# Patient Record
Sex: FEMALE | Race: WHITE | Employment: PART TIME | URBAN - METROPOLITAN AREA
[De-identification: names, ages, dates, MRNs, and addresses within clinical notes are randomized per-mention and may not be internally consistent; named-entity substitution may affect disease eponyms.]

---
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
## 2017-01-10 ENCOUNTER — TELEPHONE (OUTPATIENT)
Dept: CARDIOLOGY CLINIC | Age: 67
End: 2017-01-10

## 2017-01-10 DIAGNOSIS — L65.9 HAIR LOSS: Primary | ICD-10-CM

## 2017-01-10 LAB — INR BLD: 1.6

## 2017-01-11 ENCOUNTER — ANTI-COAG VISIT (OUTPATIENT)
Dept: CARDIOLOGY CLINIC | Age: 67
End: 2017-01-11

## 2017-01-16 RX ORDER — NITROGLYCERIN 0.4 MG/1
TABLET SUBLINGUAL
Qty: 25 TABLET | Refills: 0 | Status: SHIPPED | OUTPATIENT
Start: 2017-01-16 | End: 2018-06-19 | Stop reason: SDUPTHER

## 2017-01-20 LAB — INR BLD: 2.6

## 2017-01-23 ENCOUNTER — ANTI-COAG VISIT (OUTPATIENT)
Dept: CARDIOLOGY CLINIC | Age: 67
End: 2017-01-23

## 2017-01-26 ENCOUNTER — TELEPHONE (OUTPATIENT)
Dept: CARDIOLOGY CLINIC | Age: 67
End: 2017-01-26

## 2017-01-31 ENCOUNTER — OFFICE VISIT (OUTPATIENT)
Dept: INTERNAL MEDICINE CLINIC | Age: 67
End: 2017-01-31

## 2017-01-31 VITALS
BODY MASS INDEX: 25 KG/M2 | OXYGEN SATURATION: 98 % | TEMPERATURE: 98 F | SYSTOLIC BLOOD PRESSURE: 128 MMHG | DIASTOLIC BLOOD PRESSURE: 70 MMHG | WEIGHT: 128 LBS | HEART RATE: 75 BPM

## 2017-01-31 DIAGNOSIS — M70.61 TROCHANTERIC BURSITIS OF RIGHT HIP: Primary | ICD-10-CM

## 2017-01-31 PROCEDURE — 99213 OFFICE O/P EST LOW 20 MIN: CPT | Performed by: FAMILY MEDICINE

## 2017-02-03 ENCOUNTER — TELEPHONE (OUTPATIENT)
Dept: INTERNAL MEDICINE CLINIC | Age: 67
End: 2017-02-03

## 2017-02-08 ENCOUNTER — TELEPHONE (OUTPATIENT)
Dept: CARDIOLOGY CLINIC | Age: 67
End: 2017-02-08

## 2017-03-03 RX ORDER — ATORVASTATIN CALCIUM 40 MG/1
TABLET, FILM COATED ORAL
Qty: 30 TABLET | Refills: 6 | Status: SHIPPED | OUTPATIENT
Start: 2017-03-03 | End: 2017-10-09 | Stop reason: SDUPTHER

## 2017-03-06 ENCOUNTER — TELEPHONE (OUTPATIENT)
Dept: CARDIOLOGY CLINIC | Age: 67
End: 2017-03-06

## 2017-03-09 ENCOUNTER — OFFICE VISIT (OUTPATIENT)
Dept: INTERNAL MEDICINE CLINIC | Age: 67
End: 2017-03-09

## 2017-03-09 VITALS
TEMPERATURE: 98.1 F | BODY MASS INDEX: 25 KG/M2 | WEIGHT: 128 LBS | SYSTOLIC BLOOD PRESSURE: 147 MMHG | DIASTOLIC BLOOD PRESSURE: 90 MMHG | HEART RATE: 96 BPM

## 2017-03-09 DIAGNOSIS — M54.31 SCIATICA OF RIGHT SIDE: Primary | ICD-10-CM

## 2017-03-09 PROCEDURE — 99213 OFFICE O/P EST LOW 20 MIN: CPT | Performed by: FAMILY MEDICINE

## 2017-03-09 RX ORDER — METHYLPREDNISOLONE 4 MG/1
TABLET ORAL
COMMUNITY
Start: 2017-03-05 | End: 2018-08-15

## 2017-03-09 RX ORDER — TRAMADOL HYDROCHLORIDE 50 MG/1
TABLET ORAL
COMMUNITY
Start: 2017-03-05 | End: 2017-03-09 | Stop reason: SDUPTHER

## 2017-03-09 RX ORDER — TRAMADOL HYDROCHLORIDE 50 MG/1
50 TABLET ORAL EVERY 6 HOURS PRN
Qty: 120 TABLET | Refills: 5 | Status: SHIPPED | OUTPATIENT
Start: 2017-03-09 | End: 2018-08-15

## 2017-03-09 ASSESSMENT — ENCOUNTER SYMPTOMS: BACK PAIN: 1

## 2017-03-10 ENCOUNTER — TELEPHONE (OUTPATIENT)
Dept: CARDIOLOGY CLINIC | Age: 67
End: 2017-03-10

## 2017-03-13 LAB — INR BLD: 4.9

## 2017-03-15 ENCOUNTER — ANTI-COAG VISIT (OUTPATIENT)
Dept: CARDIOLOGY CLINIC | Age: 67
End: 2017-03-15

## 2017-03-17 ENCOUNTER — TELEPHONE (OUTPATIENT)
Dept: CARDIOLOGY CLINIC | Age: 67
End: 2017-03-17

## 2017-03-20 LAB — INR BLD: 1.6

## 2017-03-21 ENCOUNTER — ANTI-COAG VISIT (OUTPATIENT)
Dept: CARDIOLOGY CLINIC | Age: 67
End: 2017-03-21

## 2017-03-23 ENCOUNTER — TELEPHONE (OUTPATIENT)
Dept: INTERNAL MEDICINE CLINIC | Age: 67
End: 2017-03-23

## 2017-03-25 ENCOUNTER — HOSPITAL ENCOUNTER (OUTPATIENT)
Dept: OTHER | Age: 67
Discharge: HOME | End: 2017-03-25
Attending: FAMILY MEDICINE | Admitting: FAMILY MEDICINE

## 2017-03-25 ENCOUNTER — HOSPITAL ENCOUNTER (OUTPATIENT)
Dept: GENERAL RADIOLOGY | Age: 67
Discharge: HOME | End: 2017-03-25

## 2017-03-25 DIAGNOSIS — M54.31 SCIATICA OF RIGHT SIDE: ICD-10-CM

## 2017-03-27 ENCOUNTER — TELEPHONE (OUTPATIENT)
Dept: INTERNAL MEDICINE CLINIC | Age: 67
End: 2017-03-27

## 2017-03-27 ENCOUNTER — CARE COORDINATION (OUTPATIENT)
Dept: CARE COORDINATION | Age: 67
End: 2017-03-27

## 2017-03-28 ENCOUNTER — TELEPHONE (OUTPATIENT)
Dept: CARDIOLOGY CLINIC | Age: 67
End: 2017-03-28

## 2017-03-28 ENCOUNTER — ANTI-COAG VISIT (OUTPATIENT)
Dept: CARDIOLOGY CLINIC | Age: 67
End: 2017-03-28

## 2017-03-28 LAB
INR BLD: 5
INR BLD: 5
PROTHROMBIN TIME: 48.5 SEC (ref 9–11.5)

## 2017-03-29 ENCOUNTER — TELEPHONE (OUTPATIENT)
Dept: INTERNAL MEDICINE CLINIC | Age: 67
End: 2017-03-29

## 2017-03-30 ENCOUNTER — TELEPHONE (OUTPATIENT)
Dept: CARDIOLOGY CLINIC | Age: 67
End: 2017-03-30

## 2017-03-30 DIAGNOSIS — I48.20 CHRONIC ATRIAL FIBRILLATION (HCC): Primary | ICD-10-CM

## 2017-03-30 LAB — INR BLD: 1.8

## 2017-03-31 ENCOUNTER — ANTI-COAG VISIT (OUTPATIENT)
Dept: CARDIOLOGY CLINIC | Age: 67
End: 2017-03-31

## 2017-03-31 LAB
INR BLD: 1.8
PROTHROMBIN TIME: 17.4 SEC (ref 9–11.5)

## 2017-04-17 RX ORDER — ATENOLOL 25 MG/1
TABLET ORAL
Qty: 60 TABLET | Refills: 5 | Status: SHIPPED | OUTPATIENT
Start: 2017-04-17 | End: 2017-10-09 | Stop reason: SDUPTHER

## 2017-04-25 ENCOUNTER — TELEPHONE (OUTPATIENT)
Dept: CARDIOLOGY CLINIC | Age: 67
End: 2017-04-25

## 2017-04-26 DIAGNOSIS — Z79.01 LONG TERM (CURRENT) USE OF ANTICOAGULANTS: ICD-10-CM

## 2017-04-26 DIAGNOSIS — I48.20 CHRONIC ATRIAL FIBRILLATION (HCC): Primary | ICD-10-CM

## 2017-04-26 LAB — INR BLD: 3.1

## 2017-04-27 LAB
INR BLD: 3.1
PROTHROMBIN TIME: 31.1 SEC (ref 9–11.5)

## 2017-04-28 ENCOUNTER — ANTI-COAG VISIT (OUTPATIENT)
Dept: CARDIOLOGY CLINIC | Age: 67
End: 2017-04-28

## 2017-05-15 LAB — INR BLD: 1.7

## 2017-05-16 ENCOUNTER — ANTI-COAG VISIT (OUTPATIENT)
Dept: CARDIOLOGY CLINIC | Age: 67
End: 2017-05-16

## 2017-05-16 LAB
INR BLD: 1.7
PROTHROMBIN TIME: 17.3 SEC (ref 9–11.5)

## 2017-06-19 ENCOUNTER — TELEPHONE (OUTPATIENT)
Dept: CARDIOLOGY CLINIC | Age: 67
End: 2017-06-19

## 2017-06-19 DIAGNOSIS — M25.50 ARTHRALGIA, UNSPECIFIED JOINT: ICD-10-CM

## 2017-06-19 DIAGNOSIS — E78.00 PURE HYPERCHOLESTEROLEMIA: Primary | ICD-10-CM

## 2017-06-19 RX ORDER — UBIDECARENONE 200 MG
200 CAPSULE ORAL DAILY
Qty: 30 CAPSULE | Refills: 5 | Status: SHIPPED | OUTPATIENT
Start: 2017-06-19 | End: 2017-09-19

## 2017-06-20 LAB — INR BLD: 3.8

## 2017-06-21 ENCOUNTER — OFFICE VISIT (OUTPATIENT)
Dept: INTERNAL MEDICINE CLINIC | Age: 67
End: 2017-06-21

## 2017-06-21 ENCOUNTER — TELEPHONE (OUTPATIENT)
Dept: CARDIOLOGY CLINIC | Age: 67
End: 2017-06-21

## 2017-06-21 VITALS
DIASTOLIC BLOOD PRESSURE: 60 MMHG | TEMPERATURE: 98 F | WEIGHT: 123 LBS | SYSTOLIC BLOOD PRESSURE: 110 MMHG | HEIGHT: 59 IN | BODY MASS INDEX: 24.8 KG/M2 | HEART RATE: 82 BPM | OXYGEN SATURATION: 96 % | RESPIRATION RATE: 14 BRPM

## 2017-06-21 DIAGNOSIS — H10.33 ACUTE BACTERIAL CONJUNCTIVITIS OF BOTH EYES: ICD-10-CM

## 2017-06-21 DIAGNOSIS — R09.89 CHEST CONGESTION: Primary | ICD-10-CM

## 2017-06-21 LAB
BUN / CREAT RATIO: ABNORMAL (CALC) (ref 6–22)
BUN BLDV-MCNC: 10 MG/DL (ref 7–25)
CALCIUM SERPL-MCNC: 9.4 MG/DL (ref 8.6–10.4)
CHLORIDE BLD-SCNC: 101 MMOL/L (ref 98–110)
CO2: 31 MMOL/L (ref 20–31)
CREAT SERPL-MCNC: 0.74 MG/DL (ref 0.5–0.99)
GFR AFRICAN AMERICAN: 97 ML/MIN/1.73M2
GFR SERPL CREATININE-BSD FRML MDRD: 84 ML/MIN/1.73M2
GLUCOSE BLD-MCNC: 107 MG/DL (ref 65–99)
INR BLD: 3.8
MAGNESIUM: 1.6 MG/DL (ref 1.5–2.5)
POTASSIUM SERPL-SCNC: 3.6 MMOL/L (ref 3.5–5.3)
PROTHROMBIN TIME: 37.3 SEC (ref 9–11.5)
SODIUM BLD-SCNC: 140 MMOL/L (ref 135–146)

## 2017-06-21 PROCEDURE — 99213 OFFICE O/P EST LOW 20 MIN: CPT | Performed by: FAMILY MEDICINE

## 2017-06-21 RX ORDER — ERYTHROMYCIN 5 MG/G
OINTMENT OPHTHALMIC
Qty: 1 TUBE | Refills: 0 | Status: SHIPPED | OUTPATIENT
Start: 2017-06-21 | End: 2017-07-01

## 2017-06-21 RX ORDER — CETIRIZINE HYDROCHLORIDE 10 MG/1
10 TABLET ORAL DAILY
Qty: 30 TABLET | Refills: 5 | Status: SHIPPED | OUTPATIENT
Start: 2017-06-21 | End: 2018-08-15

## 2017-06-21 ASSESSMENT — ENCOUNTER SYMPTOMS
EYE REDNESS: 1
COUGH: 1
EYE DISCHARGE: 1
EYE ITCHING: 1

## 2017-06-22 ENCOUNTER — TELEPHONE (OUTPATIENT)
Dept: CARDIOLOGY CLINIC | Age: 67
End: 2017-06-22

## 2017-06-22 ENCOUNTER — ANTI-COAG VISIT (OUTPATIENT)
Dept: CARDIOLOGY CLINIC | Age: 67
End: 2017-06-22

## 2017-06-22 DIAGNOSIS — Z79.899 ENCOUNTER FOR LONG-TERM (CURRENT) USE OF MEDICATIONS: Primary | ICD-10-CM

## 2017-07-10 ENCOUNTER — TELEPHONE (OUTPATIENT)
Dept: INTERNAL MEDICINE CLINIC | Age: 67
End: 2017-07-10

## 2017-07-14 ENCOUNTER — TELEPHONE (OUTPATIENT)
Dept: INTERNAL MEDICINE CLINIC | Age: 67
End: 2017-07-14

## 2017-07-31 LAB — INR BLD: 4

## 2017-08-01 ENCOUNTER — ANTI-COAG VISIT (OUTPATIENT)
Dept: CARDIOLOGY CLINIC | Age: 67
End: 2017-08-01

## 2017-08-09 RX ORDER — WARFARIN SODIUM 5 MG/1
TABLET ORAL
Qty: 30 TABLET | Refills: 10 | Status: SHIPPED | OUTPATIENT
Start: 2017-08-09 | End: 2018-09-01 | Stop reason: SDUPTHER

## 2017-09-07 ENCOUNTER — TELEPHONE (OUTPATIENT)
Dept: CARDIOLOGY CLINIC | Age: 67
End: 2017-09-07

## 2017-09-07 ENCOUNTER — ANTI-COAG VISIT (OUTPATIENT)
Dept: CARDIOLOGY CLINIC | Age: 67
End: 2017-09-07

## 2017-09-07 DIAGNOSIS — E78.00 PURE HYPERCHOLESTEROLEMIA: Primary | ICD-10-CM

## 2017-09-07 LAB — INR BLD: 2.2

## 2017-09-12 ENCOUNTER — ANTI-COAG VISIT (OUTPATIENT)
Dept: CARDIOLOGY CLINIC | Age: 67
End: 2017-09-12

## 2017-09-12 ENCOUNTER — TELEPHONE (OUTPATIENT)
Dept: CARDIOLOGY CLINIC | Age: 67
End: 2017-09-12

## 2017-09-12 LAB — INR BLD: 2.5

## 2017-09-15 ENCOUNTER — TELEPHONE (OUTPATIENT)
Dept: CARDIOLOGY CLINIC | Age: 67
End: 2017-09-15

## 2017-09-19 ENCOUNTER — OFFICE VISIT (OUTPATIENT)
Dept: CARDIOLOGY CLINIC | Age: 67
End: 2017-09-19

## 2017-09-19 VITALS
WEIGHT: 127 LBS | HEART RATE: 89 BPM | SYSTOLIC BLOOD PRESSURE: 130 MMHG | HEIGHT: 59 IN | BODY MASS INDEX: 25.6 KG/M2 | DIASTOLIC BLOOD PRESSURE: 82 MMHG

## 2017-09-19 DIAGNOSIS — I25.10 ATHEROSCLEROSIS OF NATIVE CORONARY ARTERY OF NATIVE HEART WITHOUT ANGINA PECTORIS: ICD-10-CM

## 2017-09-19 DIAGNOSIS — I48.20 CHRONIC ATRIAL FIBRILLATION (HCC): ICD-10-CM

## 2017-09-19 DIAGNOSIS — I05.9 MITRAL VALVE DISORDER: ICD-10-CM

## 2017-09-19 DIAGNOSIS — Z72.0 TOBACCO ABUSE: ICD-10-CM

## 2017-09-19 DIAGNOSIS — E78.00 PURE HYPERCHOLESTEROLEMIA: Primary | ICD-10-CM

## 2017-09-19 PROCEDURE — 99213 OFFICE O/P EST LOW 20 MIN: CPT | Performed by: INTERNAL MEDICINE

## 2017-09-19 RX ORDER — ASPIRIN 81 MG/1
81 TABLET ORAL DAILY
Qty: 30 TABLET | Refills: 11 | Status: SHIPPED | OUTPATIENT
Start: 2017-09-19 | End: 2019-02-26 | Stop reason: DRUGHIGH

## 2017-10-03 ENCOUNTER — TELEPHONE (OUTPATIENT)
Dept: CARDIOLOGY CLINIC | Age: 67
End: 2017-10-03

## 2017-10-03 DIAGNOSIS — I10 ESSENTIAL HYPERTENSION: Primary | ICD-10-CM

## 2017-10-03 NOTE — TELEPHONE ENCOUNTER
Ok to order? Assessment: 9/19/17  1. Pure hypercholesterolemia: on lipitor 40 mg daily--will repeat   2. CAD:11/18/14  Cath: Alpine stent to rca. Stable--may stop plavix restart asa   3. Mitral valve disorders: mild to moderated  12/14 YESY mild to moderate MR, small ASD     4. Atrial fibrillation: Irregularly irregular on exam today, Rate is controlled. Remains on warfarin. 5. Tobacco abuse: Quit March 2013. exposed to second hand smoke--grandson whom lives with her         Plan:     1. Continue ASA--patient increased dose to 325--Increased risk of bleeding discussed. 2.  May Take claritin D  3. Patient stopped CoQ  Patient has been advised on the importance of regular exercise of at least 20-30 minutes daily alternating with aerobic and isometric activities.   OV X 6 months.

## 2017-10-03 NOTE — TELEPHONE ENCOUNTER
Pt calling, would like to know if she can have an order to test her potassium? She states her whole body aches, pt states she knows she doesn't eat right but she's never hungry and when she does eat it's not a whole meal of anything. She thinks her potassium is low.  Pls call to advise Thank you

## 2017-10-09 ENCOUNTER — TELEPHONE (OUTPATIENT)
Dept: CARDIOLOGY CLINIC | Age: 67
End: 2017-10-09

## 2017-10-09 RX ORDER — ATENOLOL 25 MG/1
TABLET ORAL
Qty: 60 TABLET | Refills: 5 | Status: SHIPPED | OUTPATIENT
Start: 2017-10-09 | End: 2018-04-04 | Stop reason: SDUPTHER

## 2017-10-09 RX ORDER — ATORVASTATIN CALCIUM 40 MG/1
TABLET, FILM COATED ORAL
Qty: 30 TABLET | Refills: 5 | Status: SHIPPED | OUTPATIENT
Start: 2017-10-09 | End: 2018-04-07 | Stop reason: SDUPTHER

## 2017-10-09 NOTE — TELEPHONE ENCOUNTER
Last ov 9/19/17  Pending appt due in march  Last refill 4/17/17 #60x5  Last labs 10/4/17  Last ekg 11/17/14

## 2017-10-11 ENCOUNTER — TELEPHONE (OUTPATIENT)
Dept: CARDIOLOGY CLINIC | Age: 67
End: 2017-10-11

## 2017-10-11 LAB
INR BLD: 2.1
PROTHROMBIN TIME: 21.2 SEC (ref 9–11.5)

## 2017-10-11 NOTE — TELEPHONE ENCOUNTER
Had potassium test yesterday at [facility] lab on [address] .  Calling for results

## 2017-10-11 NOTE — TELEPHONE ENCOUNTER
Informed pt we do not have the bloodwork yet and I tried to call but their office is closed. I informed pt to call them tomorrow and give them our fax number.

## 2017-10-12 ENCOUNTER — ANTI-COAG VISIT (OUTPATIENT)
Dept: CARDIOLOGY CLINIC | Age: 67
End: 2017-10-12

## 2017-10-12 LAB — INR BLD: 2.1

## 2017-10-13 ENCOUNTER — TELEPHONE (OUTPATIENT)
Dept: CARDIOLOGY CLINIC | Age: 67
End: 2017-10-13

## 2017-10-16 NOTE — TELEPHONE ENCOUNTER
Attempted to call patient. . Unable to reach.  Left message to call our office back regarding flu shot

## 2017-10-18 ENCOUNTER — TELEPHONE (OUTPATIENT)
Dept: CARDIOLOGY CLINIC | Age: 67
End: 2017-10-18

## 2017-10-18 NOTE — TELEPHONE ENCOUNTER
Patient at [facility] lab now and they dont have an order to check her potassium . Can an order be faxed to [phone] right away ?

## 2017-10-19 ENCOUNTER — TELEPHONE (OUTPATIENT)
Dept: CARDIOLOGY CLINIC | Age: 67
End: 2017-10-19

## 2017-10-19 DIAGNOSIS — E87.6 HYPOKALEMIA: Primary | ICD-10-CM

## 2017-10-19 LAB
BUN / CREAT RATIO: ABNORMAL (CALC) (ref 6–22)
BUN BLDV-MCNC: 11 MG/DL (ref 7–25)
CALCIUM SERPL-MCNC: 9.6 MG/DL (ref 8.6–10.4)
CHLORIDE BLD-SCNC: 98 MMOL/L (ref 98–110)
CO2: 33 MMOL/L (ref 20–31)
CREAT SERPL-MCNC: 0.73 MG/DL (ref 0.5–0.99)
GFR AFRICAN AMERICAN: 99 ML/MIN/1.73M2
GFR SERPL CREATININE-BSD FRML MDRD: 85 ML/MIN/1.73M2
GLUCOSE BLD-MCNC: 119 MG/DL (ref 65–99)
POTASSIUM SERPL-SCNC: 3.4 MMOL/L (ref 3.5–5.3)
SODIUM BLD-SCNC: 137 MMOL/L (ref 135–146)

## 2017-10-19 NOTE — TELEPHONE ENCOUNTER
Called with results of renal panel.  Will restart kdur 20 meq daily, has standing order for renal panel, will repeat in one month

## 2017-11-20 ENCOUNTER — TELEPHONE (OUTPATIENT)
Dept: CARDIOLOGY CLINIC | Age: 67
End: 2017-11-20

## 2017-11-20 DIAGNOSIS — E87.6 HYPOKALEMIA: Primary | ICD-10-CM

## 2017-11-20 RX ORDER — POTASSIUM CHLORIDE 20 MEQ/1
20 TABLET, EXTENDED RELEASE ORAL DAILY
Qty: 30 TABLET | Refills: 11 | Status: SHIPPED | OUTPATIENT
Start: 2017-11-20 | End: 2019-02-06 | Stop reason: SDUPTHER

## 2017-11-20 NOTE — TELEPHONE ENCOUNTER
Called to see if DGB wishes for pt to continue taking:  potassium chloride (KLOR-CON M20) 20 MEQ extended release tablet    Please call to adv.   Thank you CSF

## 2017-11-20 NOTE — TELEPHONE ENCOUNTER
10/19/2017  4:23 PM - [facility], Lab In seven     Component Results     Component Value Ref Range & Units Status Collected Lab   Glucose 119   65 - 99 mg/dL Final 10/18/2017 11:12 AM [facility]              Fasting reference interval   For someone without known diabetes, a glucose value   between 100 and 125 mg/dL is consistent with   prediabetes and should be confirmed with a   follow-up test.    BUN 11  7 - 25 mg/dL Final 10/18/2017 11:12 AM [facility]   CREATININE 0.73  0.50 - 0.99 mg/dL Final 10/18/2017 11:12 AM [facility]   For patients >52years of age, the reference limit   for Creatinine is approximately 13% higher for people   identified as -American.     Est, Glom Filt Rate 85  > OR = 60 mL/min/1.73m2 Final 10/18/2017 11:12 AM [facility]   GFR  99  > OR = 60 mL/min/1.73m2 Final 10/18/2017 11:12 AM [facility]   BUN/Creatinine Ratio NOT APPLICABLE  6 - 22 (calc) Final 10/18/2017 11:12 AM [facility]   Sodium 137  135 - 146 mmol/L Final 10/18/2017 11:12 AM [facility]   Potassium 3.4   3.5 - 5.3 mmol/L Final 10/18/2017 11:12 AM [facility]   Chloride 98  98 - 110 mmol/L Final 10/18/2017 11:12 AM [facility]   CO2 33   20 - 31 mmol/L Final 10/18/2017 11:12 AM [facility]   Calcium 9.6

## 2017-11-20 NOTE — TELEPHONE ENCOUNTER
Faxed renal panel form to [facility] on [address]. She will get labs tomorrow.   I have refilled her potassium, which she said \"I am not filling until I get my labs\"  I have been out a few days

## 2017-11-21 DIAGNOSIS — Z79.01 LONG-TERM (CURRENT) USE OF ANTICOAGULANTS: ICD-10-CM

## 2017-11-21 DIAGNOSIS — I48.20 CHRONIC ATRIAL FIBRILLATION (HCC): Primary | ICD-10-CM

## 2017-11-22 ENCOUNTER — TELEPHONE (OUTPATIENT)
Dept: CARDIOLOGY CLINIC | Age: 67
End: 2017-11-22

## 2017-11-22 ENCOUNTER — ANTI-COAG VISIT (OUTPATIENT)
Dept: CARDIOLOGY CLINIC | Age: 67
End: 2017-11-22

## 2017-11-22 DIAGNOSIS — E87.6 HYPOKALEMIA: Primary | ICD-10-CM

## 2017-11-22 LAB
ALBUMIN SERPL-MCNC: 4.1 G/DL (ref 3.6–5.1)
BUN / CREAT RATIO: ABNORMAL (CALC) (ref 6–22)
BUN BLDV-MCNC: 12 MG/DL (ref 7–25)
CALCIUM SERPL-MCNC: 10.1 MG/DL (ref 8.6–10.4)
CHLORIDE BLD-SCNC: 99 MMOL/L (ref 98–110)
CO2: 28 MMOL/L (ref 20–31)
CREAT SERPL-MCNC: 0.8 MG/DL (ref 0.5–0.99)
GFR AFRICAN AMERICAN: 88 ML/MIN/1.73M2
GFR SERPL CREATININE-BSD FRML MDRD: 76 ML/MIN/1.73M2
GLUCOSE BLD-MCNC: 104 MG/DL (ref 65–99)
INR BLD: 2.5
PHOSPHORUS: 3.9 MG/DL (ref 2.1–4.3)
POTASSIUM SERPL-SCNC: 4.2 MMOL/L (ref 3.5–5.3)
PROTHROMBIN TIME: 25 SEC (ref 9–11.5)
SODIUM BLD-SCNC: 137 MMOL/L (ref 135–146)

## 2017-11-22 NOTE — TELEPHONE ENCOUNTER
I spoke with pt and relayed results and instructions per DGB . Pt verbalized understanding.  INR addressed in an anticoag encounter

## 2017-11-27 ENCOUNTER — TELEPHONE (OUTPATIENT)
Dept: CARDIOLOGY CLINIC | Age: 67
End: 2017-11-27

## 2017-11-27 NOTE — TELEPHONE ENCOUNTER
LMOM for pt to let her know that that last testing we have is 11/22 and the results were already given for this. I spoke with pt myself and relayed them. I requested that she give me a call if she has something more recent, that we are unable to see in Epic.

## 2017-12-06 ENCOUNTER — TELEPHONE (OUTPATIENT)
Dept: OTHER | Facility: CLINIC | Age: 67
End: 2017-12-06

## 2017-12-06 NOTE — TELEPHONE ENCOUNTER
Contacted patient regarding need for mammogram.  Explained to patient that she can go to any [facility] facility for her mammogram without an order.

## 2017-12-15 RX ORDER — METOLAZONE 5 MG/1
TABLET ORAL
Qty: 30 TABLET | Refills: 4 | Status: SHIPPED | OUTPATIENT
Start: 2017-12-15 | End: 2018-08-06 | Stop reason: SDUPTHER

## 2017-12-26 ENCOUNTER — TELEPHONE (OUTPATIENT)
Dept: CARDIOLOGY CLINIC | Age: 67
End: 2017-12-26

## 2017-12-26 DIAGNOSIS — M51.9 LUMBAR DISC DISEASE: ICD-10-CM

## 2017-12-26 DIAGNOSIS — M79.604 BILATERAL LEG PAIN: Primary | ICD-10-CM

## 2017-12-26 DIAGNOSIS — I73.9 CLAUDICATION OF BOTH LOWER EXTREMITIES (HCC): ICD-10-CM

## 2017-12-26 DIAGNOSIS — M79.605 BILATERAL LEG PAIN: Primary | ICD-10-CM

## 2017-12-26 NOTE — TELEPHONE ENCOUNTER
Pt called asked for Laith WASHINGTON to call her regarding some previous testing she had done. Please call to adv.   Thank you CSF

## 2017-12-27 NOTE — TELEPHONE ENCOUNTER
Spoke to the pt, with instructions per Dr. Marie Fenton. Ordered the arterial dopplers, and sent the referral to Dr. Bello Ruiz.

## 2018-01-19 LAB — INR BLD: 2.7

## 2018-01-20 LAB
INR BLD: 2.7
PROTHROMBIN TIME: 26.9 SEC (ref 9–11.5)

## 2018-01-22 ENCOUNTER — ANTI-COAG VISIT (OUTPATIENT)
Dept: CARDIOLOGY CLINIC | Age: 68
End: 2018-01-22

## 2018-03-01 ENCOUNTER — ANTI-COAG VISIT (OUTPATIENT)
Dept: CARDIOLOGY CLINIC | Age: 68
End: 2018-03-01

## 2018-03-01 LAB — INR BLD: 2.2

## 2018-03-02 ENCOUNTER — TELEPHONE (OUTPATIENT)
Dept: INTERNAL MEDICINE CLINIC | Age: 68
End: 2018-03-02

## 2018-03-15 ENCOUNTER — OFFICE VISIT (OUTPATIENT)
Dept: CARDIOLOGY CLINIC | Age: 68
End: 2018-03-15

## 2018-03-15 VITALS
BODY MASS INDEX: 25.6 KG/M2 | OXYGEN SATURATION: 96 % | HEART RATE: 80 BPM | WEIGHT: 127 LBS | HEIGHT: 59 IN | SYSTOLIC BLOOD PRESSURE: 124 MMHG | DIASTOLIC BLOOD PRESSURE: 78 MMHG

## 2018-03-15 DIAGNOSIS — I05.9 MITRAL VALVE DISORDER: ICD-10-CM

## 2018-03-15 DIAGNOSIS — I25.10 ATHEROSCLEROSIS OF NATIVE CORONARY ARTERY OF NATIVE HEART WITHOUT ANGINA PECTORIS: ICD-10-CM

## 2018-03-15 DIAGNOSIS — E78.00 PURE HYPERCHOLESTEROLEMIA: ICD-10-CM

## 2018-03-15 DIAGNOSIS — R06.02 SHORTNESS OF BREATH: ICD-10-CM

## 2018-03-15 DIAGNOSIS — I48.20 CHRONIC ATRIAL FIBRILLATION (HCC): Primary | ICD-10-CM

## 2018-03-15 DIAGNOSIS — I34.0 MITRAL VALVE INSUFFICIENCY, UNSPECIFIED ETIOLOGY: ICD-10-CM

## 2018-03-15 PROCEDURE — 1090F PRES/ABSN URINE INCON ASSESS: CPT | Performed by: INTERNAL MEDICINE

## 2018-03-15 PROCEDURE — G8419 CALC BMI OUT NRM PARAM NOF/U: HCPCS | Performed by: INTERNAL MEDICINE

## 2018-03-15 PROCEDURE — 1036F TOBACCO NON-USER: CPT | Performed by: INTERNAL MEDICINE

## 2018-03-15 PROCEDURE — G8484 FLU IMMUNIZE NO ADMIN: HCPCS | Performed by: INTERNAL MEDICINE

## 2018-03-15 PROCEDURE — 1123F ACP DISCUSS/DSCN MKR DOCD: CPT | Performed by: INTERNAL MEDICINE

## 2018-03-15 PROCEDURE — G8400 PT W/DXA NO RESULTS DOC: HCPCS | Performed by: INTERNAL MEDICINE

## 2018-03-15 PROCEDURE — 4040F PNEUMOC VAC/ADMIN/RCVD: CPT | Performed by: INTERNAL MEDICINE

## 2018-03-15 PROCEDURE — 3017F COLORECTAL CA SCREEN DOC REV: CPT | Performed by: INTERNAL MEDICINE

## 2018-03-15 PROCEDURE — 99214 OFFICE O/P EST MOD 30 MIN: CPT | Performed by: INTERNAL MEDICINE

## 2018-03-15 PROCEDURE — G8598 ASA/ANTIPLAT THER USED: HCPCS | Performed by: INTERNAL MEDICINE

## 2018-03-15 PROCEDURE — G8427 DOCREV CUR MEDS BY ELIG CLIN: HCPCS | Performed by: INTERNAL MEDICINE

## 2018-03-15 PROCEDURE — 3014F SCREEN MAMMO DOC REV: CPT | Performed by: INTERNAL MEDICINE

## 2018-03-15 NOTE — COMMUNICATION BODY
[address]   Cardiac Followup    Referring Provider:  No primary care provider on file. Chief Complaint   Patient presents with    Atrial Fibrillation     no cardiac complaints         History of Present Illness:  Ms. Dominick Alvarado is here for a 6 month follow up. Last visit she was taken off of plavix, and started full dose asa. She has a history of CAD,mild to mod MR, hypertension, hyperlipidemia and atrial fibrillation(on coumadin with protimes at [facility]). She has a small pfo. Today, she states that she feels tired in this weather. Denies chest discomfort, change in exercise tolerance, palpitations or syncope. Patient has been advised on the importance of regular exercise of at least 20-30 minutes daily alternating with aerobic and isometric activities. She gets a lot of exercise at work. Walks at home. She has been fighting a chest cold and taking Tylenol cold and flu. Past Medical History:   has a past medical history of AF (atrial fibrillation) ([address] Back pain; CAD (coronary artery disease); Compression fracture; Hyperlipidemia; Hypertension; Myelolipoma; Right ear injury; Shingles; and Valvular disease. Surgical History:   has a past surgical history that includes Coronary angioplasty with stent; Coronary angioplasty with stent (03/14/2003 & 06/02/2003); Hysterectomy (09/01/1983); Tubal ligation (09/1983); and ventral hernia repair (5-). Social History:   reports that she quit smoking about 4 years ago. Her smoking use included Cigarettes. She has a 10.00 pack-year smoking history. She has never used smokeless tobacco. She reports that she drinks about 1.8 oz of alcohol per week . She reports that she does not use drugs. Family History:  family history includes Heart Attack (age of onset: 64) in her mother; Heart Attack (age of onset: 76) in her father; Heart Disease in her father.      Current Outpatient Prescriptions   Medication Sig Dispense Refill    thickness and systolic function with an estimated ejection fraction of 50-55%. Mild late systolic prolapse of anterior leaflet of mitral valve. Mild to moderate mitral regurgitation is present. Patent foramen ovale, small in size, with right-to-left shunt was visualized. A bubble study was performed and showed evidence of right to left shunt consistent with a patent foramen ovale . Biatrial enlargment. 5/16 Left lower lobe pneumonia  Mild cardiomegaly and/or pericardial effusion   Assessment:   1. Pure hypercholesterolemia: on lipitor 40 mg daily--  9/11/17    hdl 56 ldl 75 tri 87   2. CAD:11/18/14  Cath: Alpine stent to rca. 4/13  myoview--normal perfusion     3. Mitral valve disorders: mild to moderated  12/14 YESY mild to moderate MR, small ASD  --on asa   4. Atrial fibrillation:on coumadin. 5. Tobacco abuse: Quit March 2013. exposed to second hand smoke--grandson whom lives with her       Plan:  CPT  Echo to F/U MR  Patient has been advised on the importance of regular exercise of at least 20-30 minutes daily alternating with aerobic and isometric activities. OV X 6 months    Thank you for allowing me to participate in the care of this individual.      Sohail Driscoll M.D., [facility].

## 2018-03-15 NOTE — PROGRESS NOTES
[address]   Cardiac Followup    Referring Provider:  No primary care provider on file. Chief Complaint   Patient presents with    Atrial Fibrillation     no cardiac complaints         History of Present Illness:  Ms. Abdullahi Caba is here for a 6 month follow up. Last visit she was taken off of plavix, and started full dose asa. She has a history of CAD,mild to mod MR, hypertension, hyperlipidemia and atrial fibrillation(on coumadin with protimes at [facility]). She has a small pfo. Today, she states that she feels tired in this weather. Denies chest discomfort, change in exercise tolerance, palpitations or syncope. Patient has been advised on the importance of regular exercise of at least 20-30 minutes daily alternating with aerobic and isometric activities. She gets a lot of exercise at work. Walks at home. She has been fighting a chest cold and taking Tylenol cold and flu. Past Medical History:   has a past medical history of AF (atrial fibrillation) ([address] Back pain; CAD (coronary artery disease); Compression fracture; Hyperlipidemia; Hypertension; Myelolipoma; Right ear injury; Shingles; and Valvular disease. Surgical History:   has a past surgical history that includes Coronary angioplasty with stent; Coronary angioplasty with stent (03/14/2003 & 06/02/2003); Hysterectomy (09/01/1983); Tubal ligation (09/1983); and ventral hernia repair (5-). Social History:   reports that she quit smoking about 4 years ago. Her smoking use included Cigarettes. She has a 10.00 pack-year smoking history. She has never used smokeless tobacco. She reports that she drinks about 1.8 oz of alcohol per week . She reports that she does not use drugs. Family History:  family history includes Heart Attack (age of onset: 64) in her mother; Heart Attack (age of onset: 76) in her father; Heart Disease in her father.      Current Outpatient Prescriptions   Medication Sig Dispense Refill   

## 2018-03-27 ENCOUNTER — HOSPITAL ENCOUNTER (OUTPATIENT)
Dept: NON INVASIVE DIAGNOSTICS | Age: 68
Discharge: HOME | End: 2018-03-27
Attending: INTERNAL MEDICINE | Admitting: INTERNAL MEDICINE

## 2018-03-27 DIAGNOSIS — I48.20 CHRONIC ATRIAL FIBRILLATION (HCC): ICD-10-CM

## 2018-03-27 LAB
LEFT VENTRICULAR EJECTION FRACTION HIGH VALUE: 50 %
LEFT VENTRICULAR EJECTION FRACTION MODE: NORMAL
LV EF: 45 %
LV EF: 48 %
LVEF MODALITY: NORMAL

## 2018-03-30 ENCOUNTER — TELEPHONE (OUTPATIENT)
Dept: CARDIOLOGY CLINIC | Age: 68
End: 2018-03-30

## 2018-03-30 DIAGNOSIS — E78.00 PURE HYPERCHOLESTEROLEMIA: Primary | ICD-10-CM

## 2018-03-30 DIAGNOSIS — I25.10 ATHEROSCLEROSIS OF NATIVE CORONARY ARTERY OF NATIVE HEART WITHOUT ANGINA PECTORIS: ICD-10-CM

## 2018-03-30 NOTE — TELEPHONE ENCOUNTER
Called patient, she has been off of potassium for some time. \"someone called and told me to lay off for a while\"  I asked when she came off and she doesn't know. Just happened to think about taking it again. Per dgb  [facility]  For her to do it.  She will do it Monday so that we can decide if she needs potassium

## 2018-03-30 NOTE — TELEPHONE ENCOUNTER
Pt calling to know if she is supposed to start back taking medication potassium chloride (KLOR-CON M) or not. Please call to advise,Thank You!

## 2018-04-02 ENCOUNTER — TELEPHONE (OUTPATIENT)
Dept: CARDIOLOGY CLINIC | Age: 68
End: 2018-04-02

## 2018-04-03 ENCOUNTER — TELEPHONE (OUTPATIENT)
Dept: CARDIOLOGY CLINIC | Age: 68
End: 2018-04-03

## 2018-04-04 RX ORDER — ATENOLOL 25 MG/1
TABLET ORAL
Qty: 60 TABLET | Refills: 4 | Status: SHIPPED | OUTPATIENT
Start: 2018-04-04 | End: 2018-09-01 | Stop reason: SDUPTHER

## 2018-04-09 RX ORDER — ATORVASTATIN CALCIUM 40 MG/1
TABLET, FILM COATED ORAL
Qty: 30 TABLET | Refills: 4 | Status: SHIPPED | OUTPATIENT
Start: 2018-04-09 | End: 2018-08-27 | Stop reason: SDUPTHER

## 2018-04-11 LAB — INR BLD: 2.7

## 2018-04-12 ENCOUNTER — ANTI-COAG VISIT (OUTPATIENT)
Dept: CARDIOLOGY CLINIC | Age: 68
End: 2018-04-12

## 2018-04-12 LAB
BUN / CREAT RATIO: ABNORMAL (CALC) (ref 6–22)
BUN BLDV-MCNC: 12 MG/DL (ref 7–25)
CALCIUM SERPL-MCNC: 9.5 MG/DL (ref 8.6–10.4)
CHLORIDE BLD-SCNC: 99 MMOL/L (ref 98–110)
CO2: 33 MMOL/L (ref 20–31)
CREAT SERPL-MCNC: 0.79 MG/DL (ref 0.5–0.99)
GFR AFRICAN AMERICAN: 89 ML/MIN/1.73M2
GFR SERPL CREATININE-BSD FRML MDRD: 77 ML/MIN/1.73M2
GLUCOSE BLD-MCNC: 118 MG/DL (ref 65–139)
INR BLD: 2.7
POTASSIUM SERPL-SCNC: 4 MMOL/L (ref 3.5–5.3)
PROTHROMBIN TIME: 27.3 SEC (ref 9–11.5)
SODIUM BLD-SCNC: 138 MMOL/L (ref 135–146)

## 2018-04-13 ENCOUNTER — TELEPHONE (OUTPATIENT)
Dept: CARDIOLOGY CLINIC | Age: 68
End: 2018-04-13

## 2018-05-31 ENCOUNTER — TELEPHONE (OUTPATIENT)
Dept: CARDIOLOGY CLINIC | Age: 68
End: 2018-05-31

## 2018-05-31 DIAGNOSIS — I48.20 CHRONIC ATRIAL FIBRILLATION (HCC): Primary | ICD-10-CM

## 2018-05-31 LAB — INR BLD: 2.6

## 2018-06-01 ENCOUNTER — ANTI-COAG VISIT (OUTPATIENT)
Dept: CARDIOLOGY CLINIC | Age: 68
End: 2018-06-01

## 2018-06-13 ENCOUNTER — TELEPHONE (OUTPATIENT)
Dept: CARDIOLOGY CLINIC | Age: 68
End: 2018-06-13

## 2018-06-18 ENCOUNTER — TELEPHONE (OUTPATIENT)
Dept: CARDIOLOGY CLINIC | Age: 68
End: 2018-06-18

## 2018-06-19 RX ORDER — NITROGLYCERIN 0.4 MG/1
TABLET SUBLINGUAL
Qty: 25 TABLET | Refills: 3 | Status: SHIPPED | OUTPATIENT
Start: 2018-06-19 | End: 2018-08-15 | Stop reason: ALTCHOICE

## 2018-07-31 LAB — INR BLD: 1.9

## 2018-08-01 ENCOUNTER — ANTI-COAG VISIT (OUTPATIENT)
Dept: CARDIOLOGY CLINIC | Age: 68
End: 2018-08-01

## 2018-08-01 ENCOUNTER — TELEPHONE (OUTPATIENT)
Dept: CARDIOLOGY CLINIC | Age: 68
End: 2018-08-01

## 2018-08-01 NOTE — TELEPHONE ENCOUNTER
Spoke to the pt. Has started taking Jass Brand Glucosamine/Chondroitin 500 mg, three tabs once daily.

## 2018-08-06 RX ORDER — METOLAZONE 5 MG/1
5 TABLET ORAL DAILY
Qty: 30 TABLET | Refills: 3 | Status: SHIPPED | OUTPATIENT
Start: 2018-08-06 | End: 2018-12-23 | Stop reason: SDUPTHER

## 2018-08-15 ENCOUNTER — OFFICE VISIT (OUTPATIENT)
Dept: INTERNAL MEDICINE CLINIC | Age: 68
End: 2018-08-15

## 2018-08-15 VITALS
BODY MASS INDEX: 27.01 KG/M2 | WEIGHT: 134 LBS | HEART RATE: 64 BPM | DIASTOLIC BLOOD PRESSURE: 80 MMHG | HEIGHT: 59 IN | SYSTOLIC BLOOD PRESSURE: 124 MMHG

## 2018-08-15 DIAGNOSIS — F17.200 TOBACCO USE DISORDER: Primary | ICD-10-CM

## 2018-08-15 ASSESSMENT — PATIENT HEALTH QUESTIONNAIRE - PHQ9
SUM OF ALL RESPONSES TO PHQ QUESTIONS 1-9: 0
SUM OF ALL RESPONSES TO PHQ9 QUESTIONS 1 & 2: 0
SUM OF ALL RESPONSES TO PHQ QUESTIONS 1-9: 0
2. FEELING DOWN, DEPRESSED OR HOPELESS: 0
1. LITTLE INTEREST OR PLEASURE IN DOING THINGS: 0

## 2018-08-15 NOTE — PROGRESS NOTES
Patient declined to stay for her visit because she did not want to sign up for MyChart.     Rut Hopper MD

## 2018-08-27 RX ORDER — ATORVASTATIN CALCIUM 40 MG/1
TABLET, FILM COATED ORAL
Qty: 90 TABLET | Refills: 3 | Status: SHIPPED | OUTPATIENT
Start: 2018-08-27 | End: 2018-08-28 | Stop reason: SDUPTHER

## 2018-08-28 ENCOUNTER — TELEPHONE (OUTPATIENT)
Dept: CARDIOLOGY CLINIC | Age: 68
End: 2018-08-28

## 2018-08-28 RX ORDER — ATORVASTATIN CALCIUM 40 MG/1
TABLET, FILM COATED ORAL
Qty: 90 TABLET | Refills: 3 | Status: SHIPPED | OUTPATIENT
Start: 2018-08-28 | End: 2018-11-30 | Stop reason: SDUPTHER

## 2018-08-28 NOTE — TELEPHONE ENCOUNTER
Medication Refill    When was your last appointment with cardiology?  (if 1year or longer, please schedule an appointment)    Medication needing refilled:lipitor     Doseage of the medication:    How are you taking this medication (QD, BID, TID, QID, PRN):    Patient want a 30 or 90 day supply called in:90 due to ins     Which Pharmacy are we sending the medication to:[facility]     Pharmacy Phone number:    Pharmacy Fax number:  Has appt 9/13/18

## 2018-09-04 RX ORDER — ATENOLOL 25 MG/1
TABLET ORAL
Qty: 180 TABLET | Refills: 1 | Status: SHIPPED | OUTPATIENT
Start: 2018-09-04 | End: 2019-02-26 | Stop reason: SDUPTHER

## 2018-09-04 RX ORDER — WARFARIN SODIUM 5 MG/1
TABLET ORAL
Qty: 90 TABLET | Refills: 1 | Status: SHIPPED | OUTPATIENT
Start: 2018-09-04 | End: 2019-02-26 | Stop reason: SDUPTHER

## 2018-09-12 ENCOUNTER — TELEPHONE (OUTPATIENT)
Dept: CARDIOLOGY CLINIC | Age: 68
End: 2018-09-12

## 2018-09-12 NOTE — TELEPHONE ENCOUNTER
Pt calling to let us know that she has not got the result of her last INR.  She states that she had an INR done in the middle of August. I called Timi and she stated that the last was July 31st.

## 2018-09-13 LAB — INR BLD: 3.4

## 2018-09-14 ENCOUNTER — ANTI-COAG VISIT (OUTPATIENT)
Dept: CARDIOLOGY CLINIC | Age: 68
End: 2018-09-14

## 2018-09-14 NOTE — PROGRESS NOTES
Left a message for the pt to return the call. Need to verify dosage schedule. Spoke to the pt. She took an extra 2.5 mg by mistake last week. Advised to resume the regular dosage schedule.

## 2018-10-09 NOTE — PROGRESS NOTES
[facility]   Cardiac Followup    Referring Provider:  Jose Raul Turcios MD     Chief Complaint   Patient presents with    Coronary Artery Disease     No cardiac complaints    Atrial Fibrillation    Hypertension    Hyperlipidemia        History of Present Illness:  Ms. Toya Blanton is here for a 6 month follow up. She has a history of CAD,mild to mod MR, hypertension, hyperlipidemia and atrial fibrillation(on coumadin with protimes at [facility]). She has a small pfo. She no longer smokes(quit 2013), eats \"pretzel rods\"    In the interval since her last visit she had an echo that showed mild to mod MR, mild MVP. Today, she states she is \"kicking along\"  She is looking for a job that  Does not involve computers. She has no chest pain, change in exertional shortness of breath palpitations or dizziness. She is active, would  Like to ride her bike but right now  Both tires are flat. Currently suffering from cold like symptoms, runny nose,  drainage       Past Medical History:   has a past medical history of AF (atrial fibrillation) ([address] Back pain; CAD (coronary artery disease); Compression fracture; Hyperlipidemia; Hypertension; Myelolipoma; Right ear injury; Shingles; and Valvular disease. Surgical History:   has a past surgical history that includes Coronary angioplasty with stent; Coronary angioplasty with stent (03/14/2003 & 06/02/2003); Hysterectomy (09/01/1983); Tubal ligation (09/1983); and ventral hernia repair (5-). Social History:   reports that she quit smoking about 4 years ago. Her smoking use included Cigarettes. She has a 10.00 pack-year smoking history. She has never used smokeless tobacco. She reports that she drinks about 1.8 oz of alcohol per week . She reports that she does not use drugs. Family History:  family history includes Heart Attack (age of onset: 64) in her mother; Heart Attack (age of onset: 76) in her father; Heart Disease in her father.      Current temperature intolerance. No excessive thirst, fluid intake, or urination. No tremor. · Hematologic/Lymphatic: No abnormal bruising or bleeding, blood clots or swollen lymph nodes. · Allergic/Immunologic: + nasal congestion    Physical Examination:    Vitals:    10/08/18 1633   BP: 110/70   Pulse: 66        Constitutional and General Appearance:   . NAD   SKIN:  .     Warm and dry  EYES:    .     EOMI  Neck:   . Normal carotid contour  Respiratory:  Normal excursion and expansion without use of accessory muscles  Resp Auscultation: Normal breath sounds without dullness  Cardiovascular: The apical impulses not displaced  Heart tones are crisp and normal  Cervical veins are not engorged  Normal S1S2, No S3, No Murmur  Peripheral pulses are symmetrical and full  Extremities: There is no clubbing, cyanosis of the extremities. No edema  Femoral Arteries: 2+ and equal  Pedal Pulses: 2+ and equal   Abdomen:  No masses or tenderness  Liver/Spleen: No Abnormalities Noted  Neurological/Psychiatric:  Alert and oriented in all spheres  Moves all extremities well  Exhibits normal gait balance and coordination  No abnormalities of mood, affect, memory    4/2007 Carotid US <40%    10/12 ct of abd--no aaa  . 10/29/13  CT of chest--no pulmonary nodules, mild left basilar atelectasis versus fibrosis, stable bilateral lipomatous lesions of the adrenal glands  Consistent with myelolipomas    11/18/14  Angiogram: 90% ostial RCA--3.0x12 Alpine to 18 HATTIE--0 residual, Mid RCA stent patent, Normal LV FXN, 2+ MR    8/18 echo  Normal left ventricle size and wall thickness. Ejection fraction is visually estimated to be 45-50%.  Mild septal hypokinesis noted. Mild-moderate mitral regurgitation is present. Mild Mitral valve prolapse is present. Mild tricuspid regurgitation.    Trivial pericardial effusion noted. Assessment:   1.  Pure hypercholesterolemia: on lipitor 40 mg daily--  9/11/17    hdl 56 ldl 75 tri 87-has  Muscle

## 2018-10-10 ENCOUNTER — OFFICE VISIT (OUTPATIENT)
Dept: CARDIOLOGY CLINIC | Age: 68
End: 2018-10-10
Payer: MEDICARE

## 2018-10-10 VITALS
BODY MASS INDEX: 26 KG/M2 | WEIGHT: 129 LBS | HEIGHT: 59 IN | DIASTOLIC BLOOD PRESSURE: 70 MMHG | HEART RATE: 66 BPM | SYSTOLIC BLOOD PRESSURE: 110 MMHG

## 2018-10-10 DIAGNOSIS — E78.00 PURE HYPERCHOLESTEROLEMIA: ICD-10-CM

## 2018-10-10 DIAGNOSIS — I25.10 ATHEROSCLEROSIS OF NATIVE CORONARY ARTERY OF NATIVE HEART WITHOUT ANGINA PECTORIS: ICD-10-CM

## 2018-10-10 DIAGNOSIS — I05.9 MITRAL VALVE DISORDER: ICD-10-CM

## 2018-10-10 DIAGNOSIS — I34.0 MITRAL VALVE INSUFFICIENCY, UNSPECIFIED ETIOLOGY: ICD-10-CM

## 2018-10-10 DIAGNOSIS — I48.20 CHRONIC ATRIAL FIBRILLATION (HCC): ICD-10-CM

## 2018-10-10 DIAGNOSIS — R06.02 SHORTNESS OF BREATH: Primary | ICD-10-CM

## 2018-10-10 PROCEDURE — G8427 DOCREV CUR MEDS BY ELIG CLIN: HCPCS | Performed by: INTERNAL MEDICINE

## 2018-10-10 PROCEDURE — 3017F COLORECTAL CA SCREEN DOC REV: CPT | Performed by: INTERNAL MEDICINE

## 2018-10-10 PROCEDURE — G8484 FLU IMMUNIZE NO ADMIN: HCPCS | Performed by: INTERNAL MEDICINE

## 2018-10-10 PROCEDURE — G8598 ASA/ANTIPLAT THER USED: HCPCS | Performed by: INTERNAL MEDICINE

## 2018-10-10 PROCEDURE — 1036F TOBACCO NON-USER: CPT | Performed by: INTERNAL MEDICINE

## 2018-10-10 PROCEDURE — 1101F PT FALLS ASSESS-DOCD LE1/YR: CPT | Performed by: INTERNAL MEDICINE

## 2018-10-10 PROCEDURE — G8400 PT W/DXA NO RESULTS DOC: HCPCS | Performed by: INTERNAL MEDICINE

## 2018-10-10 PROCEDURE — 4040F PNEUMOC VAC/ADMIN/RCVD: CPT | Performed by: INTERNAL MEDICINE

## 2018-10-10 PROCEDURE — 99214 OFFICE O/P EST MOD 30 MIN: CPT | Performed by: INTERNAL MEDICINE

## 2018-10-10 PROCEDURE — G8419 CALC BMI OUT NRM PARAM NOF/U: HCPCS | Performed by: INTERNAL MEDICINE

## 2018-10-10 PROCEDURE — 1090F PRES/ABSN URINE INCON ASSESS: CPT | Performed by: INTERNAL MEDICINE

## 2018-10-10 PROCEDURE — 1123F ACP DISCUSS/DSCN MKR DOCD: CPT | Performed by: INTERNAL MEDICINE

## 2018-10-12 ENCOUNTER — TELEPHONE (OUTPATIENT)
Dept: CARDIOLOGY CLINIC | Age: 68
End: 2018-10-12

## 2018-10-12 DIAGNOSIS — E78.00 PURE HYPERCHOLESTEROLEMIA: Primary | ICD-10-CM

## 2018-10-12 LAB — INR BLD: 2.6

## 2018-10-12 NOTE — COMMUNICATION BODY
aches and pains--refused crestor--recommend she come off of statin for one month to see I f this alleviates the pain. She is reluctant to do this   2. CAD:11/18/14  Cath: Alpine stent to rca. 4/13  myoview--normal perfusion     3. Mitral valve disorders: mild to moderated  12/14 YESY mild to moderate MR, small ASD  --on asa  8/18 echo mild to mod MR mild MVP  Echo in 6months   4. Atrial fibrillation:on coumadin. -Asx--No bleeding   5. Tobacco abuse: Quit March 2013. exposed to second hand smoke--grandson whom lives with her   6. Sob--multifactorial,  But will repeat echo in 6months    Plan:  Echo and follow up  In 6 months  Continue all medications  Can take mucinex, or mucinex allergy/claritin D if needed for cold like symptoms  Bonny Rg [id number]  Stop atorvastatin for one month to evaluate it it alleviates muscle aches or  Fatigue--Patient reluctant to try. Thank you for allowing me to participate in the care of this individual.      Ana Wall M.D., [facility]. Scribe Attestation:  Earle Gonzalez, am scribing for and in the presence of Marycruz Murphy MD.   Cortez Ames 10/10/18 11:22 AM   Provider Urszula Zavaleta is working as a scribe for and in the presence of me Marycruz Murphy MD). Working as a scribe, Charlette Leighann may have prepopulated components of this note with my historical  intellectual property under my direct supervision. Any additions to this intellectual property were performed in my presence and at my direction. Furthermore, the content and accuracy of this note have been reviewed by me Marycruz Murphy MD).

## 2018-10-13 LAB
INR BLD: 2.6
PROTHROMBIN TIME: 25.7 SEC (ref 9–11.5)

## 2018-10-15 ENCOUNTER — ANTI-COAG VISIT (OUTPATIENT)
Dept: CARDIOLOGY CLINIC | Age: 68
End: 2018-10-15

## 2018-10-24 ENCOUNTER — TELEPHONE (OUTPATIENT)
Dept: CARDIOLOGY CLINIC | Age: 68
End: 2018-10-24

## 2018-11-01 ENCOUNTER — TELEPHONE (OUTPATIENT)
Dept: CARDIOLOGY CLINIC | Age: 68
End: 2018-11-01

## 2018-11-27 LAB — INR BLD: 1.7

## 2018-11-28 ENCOUNTER — ANTI-COAG VISIT (OUTPATIENT)
Dept: CARDIOLOGY CLINIC | Age: 68
End: 2018-11-28

## 2018-11-28 LAB
A/G RATIO: 1.7 (CALC) (ref 1–2.5)
ALBUMIN SERPL-MCNC: 4 G/DL (ref 3.6–5.1)
ALP BLD-CCNC: 72 U/L (ref 33–130)
ALT SERPL-CCNC: 25 U/L (ref 6–29)
AST SERPL-CCNC: 20 U/L (ref 10–35)
BILIRUB SERPL-MCNC: 0.4 MG/DL (ref 0.2–1.2)
BILIRUBIN DIRECT: 0.1 MG/DL
BILIRUBIN, INDIRECT: 0.3 MG/DL (CALC) (ref 0.2–1.2)
CHOLESTEROL, TOTAL: 219 MG/DL
CHOLESTEROL/HDL RATIO: 4 (CALC)
CHOLESTEROL: 164 MG/DL (CALC)
GDT REPLACEMENT: NORMAL
GLOBULIN: 2.3 G/DL (CALC) (ref 1.9–3.7)
HDLC SERPL-MCNC: 55 MG/DL
INR BLD: 1.7
LDL CHOLESTEROL CALCULATED: 144 MG/DL (CALC)
PROTHROMBIN TIME: 16.9 SEC (ref 9–11.5)
TOTAL PROTEIN: 6.3 G/DL (ref 6.1–8.1)
TRIGL SERPL-MCNC: 94 MG/DL

## 2018-11-30 ENCOUNTER — TELEPHONE (OUTPATIENT)
Dept: CARDIOLOGY CLINIC | Age: 68
End: 2018-11-30

## 2018-11-30 RX ORDER — ATORVASTATIN CALCIUM 40 MG/1
TABLET, FILM COATED ORAL
Qty: 90 TABLET | Refills: 3 | Status: SHIPPED | OUTPATIENT
Start: 2018-11-30 | End: 2019-02-26

## 2018-11-30 NOTE — TELEPHONE ENCOUNTER
Sent medication to pharmacy on file-- and explained that she also needs the OCT medication COQ10 as well - stated to call us back if a different pharmacy is needed and with any other questions or concerns

## 2018-11-30 NOTE — TELEPHONE ENCOUNTER
She is leaving for the weekend in an hour and needs to know if dgb wants her to start taking cholesterol meds again . Please call asap because if she is supposed to take them she needs to get rx before she goes away for the weekend .

## 2018-12-03 ENCOUNTER — TELEPHONE (OUTPATIENT)
Dept: CARDIOLOGY CLINIC | Age: 68
End: 2018-12-03

## 2018-12-05 ENCOUNTER — ANTI-COAG VISIT (OUTPATIENT)
Dept: CARDIOLOGY CLINIC | Age: 68
End: 2018-12-05

## 2018-12-05 DIAGNOSIS — I48.20 CHRONIC ATRIAL FIBRILLATION (HCC): ICD-10-CM

## 2018-12-05 DIAGNOSIS — I25.10 ATHEROSCLEROSIS OF NATIVE CORONARY ARTERY OF NATIVE HEART WITHOUT ANGINA PECTORIS: ICD-10-CM

## 2018-12-05 DIAGNOSIS — E78.00 PURE HYPERCHOLESTEROLEMIA: Primary | ICD-10-CM

## 2018-12-05 DIAGNOSIS — Z79.01 LONG TERM (CURRENT) USE OF ANTICOAGULANTS: ICD-10-CM

## 2018-12-05 DIAGNOSIS — M79.10 MUSCLE ACHE: ICD-10-CM

## 2018-12-05 DIAGNOSIS — Z79.899 ENCOUNTER FOR LONG-TERM (CURRENT) USE OF MEDICATIONS: ICD-10-CM

## 2018-12-17 ENCOUNTER — TELEPHONE (OUTPATIENT)
Dept: CARDIOLOGY CLINIC | Age: 68
End: 2018-12-17

## 2018-12-17 RX ORDER — ROSUVASTATIN CALCIUM 10 MG/1
10 TABLET, COATED ORAL DAILY
Qty: 30 TABLET | Refills: 3 | Status: SHIPPED | OUTPATIENT
Start: 2018-12-17 | End: 2019-02-26 | Stop reason: SDUPTHER

## 2018-12-17 NOTE — TELEPHONE ENCOUNTER
Spoke to Dr Rafi Aguirre and he stated to have her back on atorvastatin 40 mg once daily-  Spoke to pt and pt stated why is she being put back on a medication that is not helping- I explained because she is refusing the other medication option we have given her and Dr Edwin Arthur would rather her on at least the atorvastatin then nothing at all - her last option was the OTC red yeast rice which she also refused     Pt then agreed to start the generic of Crestor which was the medication Dr Karla Yeung wanted to put her on due to the side affects she was having from the atorvastatin     Spoke to Dr Rafi Aguirre and he stated to start Rovastatin   (crestor) 10 mg once daily

## 2018-12-17 NOTE — TELEPHONE ENCOUNTER
Pt stated that she didn't was the lipitor she want something that would help so dr Vita Chu suggested Crestor - pt stated that she didn't want that medication so dr Jessica Malcolm suggested repatha injection and if to expansive to just go on OTC medication red yeast rice    Spoke to pt and she just wants to be put back on atorvastatin

## 2018-12-20 ENCOUNTER — TELEPHONE (OUTPATIENT)
Dept: CARDIOLOGY CLINIC | Age: 68
End: 2018-12-20

## 2018-12-24 RX ORDER — METOLAZONE 5 MG/1
TABLET ORAL
Qty: 30 TABLET | Refills: 6 | Status: SHIPPED | OUTPATIENT
Start: 2018-12-24 | End: 2019-06-20 | Stop reason: SDUPTHER

## 2019-01-08 ENCOUNTER — TELEPHONE (OUTPATIENT)
Dept: CARDIOLOGY CLINIC | Age: 69
End: 2019-01-08

## 2019-01-21 ENCOUNTER — TELEPHONE (OUTPATIENT)
Dept: CARDIOLOGY CLINIC | Age: 69
End: 2019-01-21

## 2019-01-21 DIAGNOSIS — R73.9 HYPERGLYCEMIA: Primary | ICD-10-CM

## 2019-01-22 LAB — INR BLD: 3.3

## 2019-01-23 ENCOUNTER — ANTI-COAG VISIT (OUTPATIENT)
Dept: CARDIOLOGY CLINIC | Age: 69
End: 2019-01-23

## 2019-01-23 LAB
HBA1C MFR BLD: 6.3 % OF TOTAL HGB
INR BLD: 3.3
PROTHROMBIN TIME: 31.6 SEC (ref 9–11.5)
TOTAL CK: 175 U/L (ref 29–143)

## 2019-02-06 RX ORDER — POTASSIUM CHLORIDE 20 MEQ/1
TABLET, EXTENDED RELEASE ORAL
Qty: 90 TABLET | Refills: 10 | Status: SHIPPED | OUTPATIENT
Start: 2019-02-06 | End: 2020-02-10

## 2019-02-08 ENCOUNTER — TELEPHONE (OUTPATIENT)
Dept: CARDIOLOGY CLINIC | Age: 69
End: 2019-02-08

## 2019-02-14 ENCOUNTER — HOSPITAL ENCOUNTER (OUTPATIENT)
Dept: VASCULAR LAB | Age: 69
Discharge: HOME | End: 2019-02-14
Payer: MEDICARE

## 2019-02-14 DIAGNOSIS — M79.604 LEG PAIN, BILATERAL: Primary | ICD-10-CM

## 2019-02-14 DIAGNOSIS — M79.605 LEG PAIN, BILATERAL: Primary | ICD-10-CM

## 2019-02-14 PROCEDURE — 93923 UPR/LXTR ART STDY 3+ LVLS: CPT

## 2019-02-18 ENCOUNTER — TELEPHONE (OUTPATIENT)
Dept: CARDIOLOGY CLINIC | Age: 69
End: 2019-02-18

## 2019-02-19 ENCOUNTER — TELEPHONE (OUTPATIENT)
Dept: CARDIOLOGY CLINIC | Age: 69
End: 2019-02-19

## 2019-02-26 ENCOUNTER — OFFICE VISIT (OUTPATIENT)
Dept: CARDIOLOGY CLINIC | Age: 69
End: 2019-02-26
Payer: MEDICARE

## 2019-02-26 VITALS
OXYGEN SATURATION: 98 % | SYSTOLIC BLOOD PRESSURE: 94 MMHG | WEIGHT: 127.3 LBS | BODY MASS INDEX: 25.66 KG/M2 | HEART RATE: 77 BPM | DIASTOLIC BLOOD PRESSURE: 64 MMHG | HEIGHT: 59 IN

## 2019-02-26 DIAGNOSIS — M79.605 PAIN IN BOTH LOWER EXTREMITIES: Primary | ICD-10-CM

## 2019-02-26 DIAGNOSIS — I73.9 PAD (PERIPHERAL ARTERY DISEASE) (HCC): ICD-10-CM

## 2019-02-26 DIAGNOSIS — M79.604 PAIN IN BOTH LOWER EXTREMITIES: Primary | ICD-10-CM

## 2019-02-26 PROCEDURE — 1101F PT FALLS ASSESS-DOCD LE1/YR: CPT | Performed by: INTERNAL MEDICINE

## 2019-02-26 PROCEDURE — 1123F ACP DISCUSS/DSCN MKR DOCD: CPT | Performed by: INTERNAL MEDICINE

## 2019-02-26 PROCEDURE — G8484 FLU IMMUNIZE NO ADMIN: HCPCS | Performed by: INTERNAL MEDICINE

## 2019-02-26 PROCEDURE — 99214 OFFICE O/P EST MOD 30 MIN: CPT | Performed by: INTERNAL MEDICINE

## 2019-02-26 PROCEDURE — G8427 DOCREV CUR MEDS BY ELIG CLIN: HCPCS | Performed by: INTERNAL MEDICINE

## 2019-02-26 PROCEDURE — 1036F TOBACCO NON-USER: CPT | Performed by: INTERNAL MEDICINE

## 2019-02-26 PROCEDURE — 3017F COLORECTAL CA SCREEN DOC REV: CPT | Performed by: INTERNAL MEDICINE

## 2019-02-26 PROCEDURE — 4040F PNEUMOC VAC/ADMIN/RCVD: CPT | Performed by: INTERNAL MEDICINE

## 2019-02-26 PROCEDURE — G8598 ASA/ANTIPLAT THER USED: HCPCS | Performed by: INTERNAL MEDICINE

## 2019-02-26 PROCEDURE — G8400 PT W/DXA NO RESULTS DOC: HCPCS | Performed by: INTERNAL MEDICINE

## 2019-02-26 PROCEDURE — G8419 CALC BMI OUT NRM PARAM NOF/U: HCPCS | Performed by: INTERNAL MEDICINE

## 2019-02-26 PROCEDURE — 1090F PRES/ABSN URINE INCON ASSESS: CPT | Performed by: INTERNAL MEDICINE

## 2019-02-26 RX ORDER — ATENOLOL 25 MG/1
TABLET ORAL
Qty: 180 TABLET | Refills: 1 | Status: SHIPPED | OUTPATIENT
Start: 2019-02-26 | End: 2019-09-03 | Stop reason: SDUPTHER

## 2019-02-26 RX ORDER — ATENOLOL 25 MG/1
TABLET ORAL
Qty: 180 TABLET | Refills: 1 | Status: CANCELLED | OUTPATIENT
Start: 2019-02-26

## 2019-02-26 RX ORDER — ROSUVASTATIN CALCIUM 10 MG/1
10 TABLET, COATED ORAL DAILY
Qty: 30 TABLET | Refills: 3 | Status: SHIPPED | OUTPATIENT
Start: 2019-02-26 | End: 2019-11-12 | Stop reason: SDUPTHER

## 2019-02-26 RX ORDER — WARFARIN SODIUM 5 MG/1
TABLET ORAL
Qty: 90 TABLET | Refills: 1 | Status: SHIPPED | OUTPATIENT
Start: 2019-02-26 | End: 2020-01-03

## 2019-02-26 RX ORDER — ROSUVASTATIN CALCIUM 10 MG/1
10 TABLET, COATED ORAL DAILY
Qty: 30 TABLET | Refills: 3 | Status: CANCELLED | OUTPATIENT
Start: 2019-02-26

## 2019-03-05 ENCOUNTER — HOSPITAL ENCOUNTER (OUTPATIENT)
Dept: CT IMAGING | Age: 69
Discharge: HOME | End: 2019-03-05
Payer: MEDICARE

## 2019-03-05 DIAGNOSIS — I73.9 PAD (PERIPHERAL ARTERY DISEASE) (HCC): ICD-10-CM

## 2019-03-05 DIAGNOSIS — M79.604 PAIN IN BOTH LOWER EXTREMITIES: ICD-10-CM

## 2019-03-05 DIAGNOSIS — M79.605 PAIN IN BOTH LOWER EXTREMITIES: ICD-10-CM

## 2019-03-05 LAB
ANION GAP SERPL CALCULATED.3IONS-SCNC: 9 MMOL/L (ref 3–16)
BUN BLDV-MCNC: 24 MG/DL (ref 7–20)
CALCIUM SERPL-MCNC: 10.5 MG/DL (ref 8.3–10.6)
CHLORIDE BLD-SCNC: 97 MMOL/L (ref 99–110)
CO2: 32 MMOL/L (ref 21–32)
CREAT SERPL-MCNC: 0.7 MG/DL (ref 0.6–1.2)
GFR AFRICAN AMERICAN: >60
GFR NON-AFRICAN AMERICAN: >60
GLUCOSE BLD-MCNC: 105 MG/DL (ref 70–99)
POTASSIUM SERPL-SCNC: 4.1 MMOL/L (ref 3.5–5.1)
SODIUM BLD-SCNC: 138 MMOL/L (ref 136–145)

## 2019-03-05 PROCEDURE — 75635 CT ANGIO ABDOMINAL ARTERIES: CPT

## 2019-03-05 PROCEDURE — 80048 BASIC METABOLIC PNL TOTAL CA: CPT

## 2019-03-05 PROCEDURE — 36415 COLL VENOUS BLD VENIPUNCTURE: CPT

## 2019-03-05 PROCEDURE — 6360000004 HC RX CONTRAST MEDICATION: Performed by: INTERNAL MEDICINE

## 2019-03-05 RX ADMIN — IOPAMIDOL 100 ML: 755 INJECTION, SOLUTION INTRAVENOUS at 15:43

## 2019-03-08 ENCOUNTER — TELEPHONE (OUTPATIENT)
Dept: CARDIOLOGY CLINIC | Age: 69
End: 2019-03-08

## 2019-03-12 ENCOUNTER — TELEPHONE (OUTPATIENT)
Dept: CARDIOLOGY CLINIC | Age: 69
End: 2019-03-12

## 2019-03-13 DIAGNOSIS — I73.9 PAD (PERIPHERAL ARTERY DISEASE) (HCC): Primary | ICD-10-CM

## 2019-03-15 ENCOUNTER — TELEPHONE (OUTPATIENT)
Dept: CARDIOLOGY CLINIC | Age: 69
End: 2019-03-15

## 2019-03-19 ENCOUNTER — ANTI-COAG VISIT (OUTPATIENT)
Dept: CARDIOLOGY CLINIC | Age: 69
End: 2019-03-19

## 2019-03-19 LAB
INR BLD: 2
INR BLD: 2
PROTHROMBIN TIME: 20.1 SEC (ref 9–11.5)

## 2019-03-21 ENCOUNTER — HOSPITAL ENCOUNTER (OUTPATIENT)
Dept: CARDIAC CATH/INVASIVE PROCEDURES | Age: 69
Discharge: HOME | End: 2019-03-21
Attending: INTERNAL MEDICINE | Admitting: INTERNAL MEDICINE
Payer: MEDICARE

## 2019-03-21 VITALS
HEART RATE: 79 BPM | WEIGHT: 127 LBS | BODY MASS INDEX: 25.6 KG/M2 | OXYGEN SATURATION: 100 % | SYSTOLIC BLOOD PRESSURE: 135 MMHG | DIASTOLIC BLOOD PRESSURE: 76 MMHG | TEMPERATURE: 98 F | RESPIRATION RATE: 18 BRPM | HEIGHT: 59 IN

## 2019-03-21 LAB
GFR AFRICAN AMERICAN: >60
GFR NON-AFRICAN AMERICAN: >60
GLUCOSE BLD-MCNC: 104 MG/DL (ref 70–99)
HEMOGLOBIN: 14.2 G/DL (ref 12–16)
INR BLD: 1.5 (ref 0.85–1.15)
PERFORMED ON: ABNORMAL
PLATELET # BLD: 186 K/UL (ref 135–450)
POC ACT LR: 314 SEC
POC ACT LR: >400 SEC
POC BUN: 18 MG/DL (ref 7–18)
POC CREATININE: 0.7 MG/DL (ref 0.6–1.2)
POC HEMATOCRIT: 43 % (ref 36–48)
POC POTASSIUM: 3.8 MMOL/L (ref 3.5–5.1)
POC SAMPLE TYPE: ABNORMAL
POC SODIUM: 138 MMOL/L (ref 136–145)
WBC # BLD: 9 K/UL (ref 4–11)

## 2019-03-21 PROCEDURE — 6360000002 HC RX W HCPCS

## 2019-03-21 PROCEDURE — 36415 COLL VENOUS BLD VENIPUNCTURE: CPT

## 2019-03-21 PROCEDURE — 85610 PROTHROMBIN TIME: CPT

## 2019-03-21 PROCEDURE — 99152 MOD SED SAME PHYS/QHP 5/>YRS: CPT

## 2019-03-21 PROCEDURE — 84520 ASSAY OF UREA NITROGEN: CPT

## 2019-03-21 PROCEDURE — 75716 ARTERY X-RAYS ARMS/LEGS: CPT | Performed by: INTERNAL MEDICINE

## 2019-03-21 PROCEDURE — 37226 PR REVSC OPN/PRQ FEM/POP W/STNT/ANGIOP SM VSL: CPT | Performed by: INTERNAL MEDICINE

## 2019-03-21 PROCEDURE — 84132 ASSAY OF SERUM POTASSIUM: CPT

## 2019-03-21 PROCEDURE — 99153 MOD SED SAME PHYS/QHP EA: CPT

## 2019-03-21 PROCEDURE — C1760 CLOSURE DEV, VASC: HCPCS

## 2019-03-21 PROCEDURE — 2500000003 HC RX 250 WO HCPCS

## 2019-03-21 PROCEDURE — 75774 ARTERY X-RAY EACH VESSEL: CPT

## 2019-03-21 PROCEDURE — 85048 AUTOMATED LEUKOCYTE COUNT: CPT

## 2019-03-21 PROCEDURE — C1894 INTRO/SHEATH, NON-LASER: HCPCS

## 2019-03-21 PROCEDURE — C1769 GUIDE WIRE: HCPCS

## 2019-03-21 PROCEDURE — 2580000003 HC RX 258

## 2019-03-21 PROCEDURE — 2709999900 HC NON-CHARGEABLE SUPPLY

## 2019-03-21 PROCEDURE — 75716 ARTERY X-RAYS ARMS/LEGS: CPT

## 2019-03-21 PROCEDURE — 82565 ASSAY OF CREATININE: CPT

## 2019-03-21 PROCEDURE — C2623 CATH, TRANSLUMIN, DRUG-COAT: HCPCS

## 2019-03-21 PROCEDURE — 6360000004 HC RX CONTRAST MEDICATION: Performed by: INTERNAL MEDICINE

## 2019-03-21 PROCEDURE — 85049 AUTOMATED PLATELET COUNT: CPT

## 2019-03-21 PROCEDURE — 85018 HEMOGLOBIN: CPT

## 2019-03-21 PROCEDURE — 85014 HEMATOCRIT: CPT

## 2019-03-21 PROCEDURE — 84295 ASSAY OF SERUM SODIUM: CPT

## 2019-03-21 PROCEDURE — 6370000000 HC RX 637 (ALT 250 FOR IP)

## 2019-03-21 PROCEDURE — 37226 HC FEM POP TERR PLASTY AND STENT: CPT

## 2019-03-21 PROCEDURE — 85347 COAGULATION TIME ACTIVATED: CPT

## 2019-03-21 PROCEDURE — C1874 STENT, COATED/COV W/DEL SYS: HCPCS

## 2019-03-21 PROCEDURE — 99223 1ST HOSP IP/OBS HIGH 75: CPT | Performed by: INTERNAL MEDICINE

## 2019-03-21 PROCEDURE — 75625 CONTRAST EXAM ABDOMINL AORTA: CPT | Performed by: INTERNAL MEDICINE

## 2019-03-21 PROCEDURE — 37224 PR REVSC OPN/PRG FEM/POP W/ANGIOPLASTY UNI: CPT | Performed by: INTERNAL MEDICINE

## 2019-03-21 PROCEDURE — C1725 CATH, TRANSLUMIN NON-LASER: HCPCS

## 2019-03-21 PROCEDURE — C1887 CATHETER, GUIDING: HCPCS

## 2019-03-21 PROCEDURE — 75625 CONTRAST EXAM ABDOMINL AORTA: CPT

## 2019-03-21 PROCEDURE — 82947 ASSAY GLUCOSE BLOOD QUANT: CPT

## 2019-03-21 RX ORDER — IODIXANOL 320 MG/ML
240 INJECTION, SOLUTION INTRAVASCULAR ONCE
Status: COMPLETED | OUTPATIENT
Start: 2019-03-21 | End: 2019-03-21

## 2019-03-21 RX ORDER — CLOPIDOGREL BISULFATE 75 MG/1
75 TABLET ORAL DAILY
Qty: 30 TABLET | Refills: 5 | Status: SHIPPED | OUTPATIENT
Start: 2019-03-21 | End: 2019-12-10 | Stop reason: ALTCHOICE

## 2019-03-21 RX ADMIN — IODIXANOL 240 ML: 320 INJECTION, SOLUTION INTRAVASCULAR at 15:11

## 2019-03-22 ENCOUNTER — TELEPHONE (OUTPATIENT)
Dept: CARDIOLOGY CLINIC | Age: 69
End: 2019-03-22

## 2019-03-26 ENCOUNTER — OFFICE VISIT (OUTPATIENT)
Dept: CARDIOLOGY CLINIC | Age: 69
End: 2019-03-26
Payer: MEDICARE

## 2019-03-26 VITALS
WEIGHT: 128.2 LBS | HEIGHT: 58 IN | SYSTOLIC BLOOD PRESSURE: 96 MMHG | HEART RATE: 70 BPM | BODY MASS INDEX: 26.91 KG/M2 | DIASTOLIC BLOOD PRESSURE: 64 MMHG | OXYGEN SATURATION: 90 %

## 2019-03-26 DIAGNOSIS — I10 ESSENTIAL HYPERTENSION: ICD-10-CM

## 2019-03-26 DIAGNOSIS — I73.9 PAD (PERIPHERAL ARTERY DISEASE) (HCC): Primary | ICD-10-CM

## 2019-03-26 DIAGNOSIS — I25.10 ATHEROSCLEROSIS OF NATIVE CORONARY ARTERY OF NATIVE HEART WITHOUT ANGINA PECTORIS: ICD-10-CM

## 2019-03-26 DIAGNOSIS — I05.9 MITRAL VALVE DISORDER: ICD-10-CM

## 2019-03-26 PROCEDURE — 99214 OFFICE O/P EST MOD 30 MIN: CPT | Performed by: NURSE PRACTITIONER

## 2019-03-26 PROCEDURE — G8427 DOCREV CUR MEDS BY ELIG CLIN: HCPCS | Performed by: NURSE PRACTITIONER

## 2019-03-26 PROCEDURE — G8598 ASA/ANTIPLAT THER USED: HCPCS | Performed by: NURSE PRACTITIONER

## 2019-03-26 PROCEDURE — G8400 PT W/DXA NO RESULTS DOC: HCPCS | Performed by: NURSE PRACTITIONER

## 2019-03-26 PROCEDURE — G8484 FLU IMMUNIZE NO ADMIN: HCPCS | Performed by: NURSE PRACTITIONER

## 2019-03-26 PROCEDURE — G8419 CALC BMI OUT NRM PARAM NOF/U: HCPCS | Performed by: NURSE PRACTITIONER

## 2019-03-26 PROCEDURE — 1036F TOBACCO NON-USER: CPT | Performed by: NURSE PRACTITIONER

## 2019-03-26 PROCEDURE — 4040F PNEUMOC VAC/ADMIN/RCVD: CPT | Performed by: NURSE PRACTITIONER

## 2019-03-26 PROCEDURE — 1090F PRES/ABSN URINE INCON ASSESS: CPT | Performed by: NURSE PRACTITIONER

## 2019-03-26 PROCEDURE — 3017F COLORECTAL CA SCREEN DOC REV: CPT | Performed by: NURSE PRACTITIONER

## 2019-03-26 PROCEDURE — 1123F ACP DISCUSS/DSCN MKR DOCD: CPT | Performed by: NURSE PRACTITIONER

## 2019-03-29 ENCOUNTER — TELEPHONE (OUTPATIENT)
Dept: CARDIOLOGY CLINIC | Age: 69
End: 2019-03-29

## 2019-04-04 LAB — INR BLD: 1.6

## 2019-04-05 ENCOUNTER — ANTI-COAG VISIT (OUTPATIENT)
Dept: CARDIOLOGY CLINIC | Age: 69
End: 2019-04-05

## 2019-04-05 LAB
INR BLD: 1.6
PROTHROMBIN TIME: 15.9 SEC (ref 9–11.5)

## 2019-04-11 ENCOUNTER — TELEPHONE (OUTPATIENT)
Dept: CARDIOLOGY CLINIC | Age: 69
End: 2019-04-11

## 2019-04-11 NOTE — TELEPHONE ENCOUNTER
According to tel enc dated 4/5/19, Elayne MORTON did speak to the patient and gave dosing instructions. However, when I spoke to Corey, she insists she did not receive a call but admits to being under a lot of stress lately. We went over dosing instructions: 5mg 4 days/week, 2.5mg 3 days/week to which she repeated to me twice. This was a slight increase. She states she does not eat a lot of green vegetables. I advised her to recheck 4/25/19. She confirmed her upcoming 4/23/19 ECHO and DGB OV.

## 2019-04-23 ENCOUNTER — OFFICE VISIT (OUTPATIENT)
Dept: CARDIOLOGY CLINIC | Age: 69
End: 2019-04-23
Payer: MEDICARE

## 2019-04-23 ENCOUNTER — HOSPITAL ENCOUNTER (OUTPATIENT)
Dept: NON INVASIVE DIAGNOSTICS | Age: 69
Discharge: HOME | End: 2019-04-23
Payer: MEDICARE

## 2019-04-23 VITALS
HEIGHT: 58 IN | SYSTOLIC BLOOD PRESSURE: 102 MMHG | OXYGEN SATURATION: 96 % | WEIGHT: 125 LBS | BODY MASS INDEX: 26.24 KG/M2 | HEART RATE: 75 BPM | DIASTOLIC BLOOD PRESSURE: 70 MMHG

## 2019-04-23 DIAGNOSIS — I48.20 CHRONIC ATRIAL FIBRILLATION (HCC): ICD-10-CM

## 2019-04-23 DIAGNOSIS — I34.0 MITRAL VALVE INSUFFICIENCY, UNSPECIFIED ETIOLOGY: ICD-10-CM

## 2019-04-23 DIAGNOSIS — I10 ESSENTIAL HYPERTENSION: ICD-10-CM

## 2019-04-23 DIAGNOSIS — I73.9 PAD (PERIPHERAL ARTERY DISEASE) (HCC): ICD-10-CM

## 2019-04-23 DIAGNOSIS — E78.00 PURE HYPERCHOLESTEROLEMIA: ICD-10-CM

## 2019-04-23 DIAGNOSIS — I25.10 ATHEROSCLEROSIS OF NATIVE CORONARY ARTERY OF NATIVE HEART WITHOUT ANGINA PECTORIS: Primary | ICD-10-CM

## 2019-04-23 LAB
LEFT VENTRICULAR EJECTION FRACTION HIGH VALUE: 60 %
LEFT VENTRICULAR EJECTION FRACTION MODE: NORMAL
LV EF: 60 %
LVEF MODALITY: NORMAL

## 2019-04-23 PROCEDURE — G8419 CALC BMI OUT NRM PARAM NOF/U: HCPCS | Performed by: INTERNAL MEDICINE

## 2019-04-23 PROCEDURE — 1123F ACP DISCUSS/DSCN MKR DOCD: CPT | Performed by: INTERNAL MEDICINE

## 2019-04-23 PROCEDURE — 3017F COLORECTAL CA SCREEN DOC REV: CPT | Performed by: INTERNAL MEDICINE

## 2019-04-23 PROCEDURE — G8598 ASA/ANTIPLAT THER USED: HCPCS | Performed by: INTERNAL MEDICINE

## 2019-04-23 PROCEDURE — G8400 PT W/DXA NO RESULTS DOC: HCPCS | Performed by: INTERNAL MEDICINE

## 2019-04-23 PROCEDURE — 93306 TTE W/DOPPLER COMPLETE: CPT

## 2019-04-23 PROCEDURE — G8427 DOCREV CUR MEDS BY ELIG CLIN: HCPCS | Performed by: INTERNAL MEDICINE

## 2019-04-23 PROCEDURE — 99213 OFFICE O/P EST LOW 20 MIN: CPT | Performed by: INTERNAL MEDICINE

## 2019-04-23 PROCEDURE — 1036F TOBACCO NON-USER: CPT | Performed by: INTERNAL MEDICINE

## 2019-04-23 PROCEDURE — 4040F PNEUMOC VAC/ADMIN/RCVD: CPT | Performed by: INTERNAL MEDICINE

## 2019-04-23 PROCEDURE — 1090F PRES/ABSN URINE INCON ASSESS: CPT | Performed by: INTERNAL MEDICINE

## 2019-04-23 NOTE — PROGRESS NOTES
[facility], [place], operated by [facility]   Cardiac Followup    Referring Provider:  No primary care provider on file. Chief Complaint   Patient presents with    6 Month Follow-Up     Patient return to office for 6 month f/u /c Echo results     Coronary Artery Disease    Atrial Fibrillation       History of Present Illness:  Ms Michell Perez is seen today as a 6 month follow up with an echo to evaluate sob with mild  To mod MR. She has a history of CAD,htn, hchol,  Atrial fibrillation, MR. She has a small pfo. Echo today stable    In the interval since her last visit she had her  left toenail removed  with podiatrist.  She also had PTA left SVA and popliteal  Artery 3/21/19  Today she states she is doing well, except for her left foot from her toenail removal.  She has no exertional or resting chest pain, palpitations dizziness, palpitations or syncope. No edema. Continues to work as a  until 2 am     Past Medical History:   has a past medical history of AF (atrial fibrillation) ([address] Back pain, CAD (coronary artery disease), Compression fracture, Hyperlipidemia, Hypertension, Myelolipoma, Right ear injury, Shingles, and Valvular disease. Surgical History:   has a past surgical history that includes Coronary angioplasty with stent; Coronary angioplasty with stent (03/14/2003 & 06/02/2003); Hysterectomy (09/01/1983); Tubal ligation (09/1983); and ventral hernia repair (5-). Social History:   reports that she quit smoking about 5 years ago. Her smoking use included cigarettes. She has a 10.00 pack-year smoking history. She has never used smokeless tobacco. She reports that she does not drink alcohol or use drugs. Family History:  family history includes Heart Attack (age of onset: 64) in her mother; Heart Attack (age of onset: 76) in her father; Heart Disease in her father.      Current Outpatient Medications   Medication Sig Dispense Refill    Specialty Vitamins Products (VITAMINS FOR THE HAIR PO) Take by mouth daily      clopidogrel (PLAVIX) 75 MG tablet Take 1 tablet by mouth daily 30 tablet 5    atenolol (TENORMIN) 25 MG tablet TAKE TWO TABLETS BY MOUTH DAILY 180 tablet 1    rosuvastatin (CRESTOR) 10 MG tablet Take 1 tablet by mouth daily Dispense Generic name only per pt request 30 tablet 3    warfarin (COUMADIN) 5 MG tablet TAKE ONE TABLET BY MOUTH DAILY 90 tablet 1    potassium chloride (KLOR-CON M) 20 MEQ extended release tablet TAKE ONE TABLET BY MOUTH DAILY 90 tablet 10    metolazone (ZAROXOLYN) 5 MG tablet TAKE ONE TABLET BY MOUTH DAILY (Patient taking differently: TAKE ONE TABLET BY MOUTH DAILY, PRN) 30 tablet 6    Coenzyme Q10 400 MG CAPS Take 400 mg/day by mouth daily 90 capsule 3    Acetaminophen-Guaifenesin (TYLENOL CHEST CONGESTION PO) Take by mouth      Magnesium Oxide 250 MG TABS Take 2 tablets by mouth daily (Patient taking differently: Take 500 mg by mouth 2 times daily Taking 1 tab daily) 30 tablet 5    Simethicone (GAS RELIEF) 180 MG CAPS Take  by mouth as needed. No current facility-administered medications for this visit. Allergies:  Patient has no known allergies. Review of Systems:   · Constitutional: there has been no unanticipated weight loss. Complains of fatigue  · Eyes: No visual changes or diplopia. No scleral icterus. · ENT: No Headaches, hearing loss or vertigo. No mouth sores or sore throat. · Cardiovascular: Reviewed in HPI  · Respiratory: No cough or wheezing, no sputum production. No hematemesis. · Gastrointestinal: No abdominal pain, appetite loss, blood in stools. No change in bowel or bladder habits. · Genitourinary: No dysuria, trouble voiding, or hematuria. · Musculoskeletal:  No gait disturbance, weakness or joint complaints. · Integumentary: No rash or pruritis. · Neurological: No headache, diplopia, change in muscle strength, numbness or tingling.  No change in gait, balance, coordination, mood, affect, memory, mentation, Tolerating medications    hchol  Will repeat LLL on crestor 10 mg daily    MR  Stable  By echo today    PVD   PTA left SVA and popliteal  Artery 3/21/19  No claudication    AF  On coumadin with routine checks      Plan:  Continue all  Medications  Call for any changes   Follow up in 6 months    Patient has been advised on the importance of regular exercise of at least 20-30 minutes daily alternating with aerobic and isometric activities. Thank you for allowing me to participate in the care of this individual.      Irena Eller M.D., [facility] [place]. Scribe Attestation:  Scribe Attestation:  Micky Marquez am scribing for and in the presence of Elieser Olvera MD.   Lalo Sanfordp 04/23/19 6:30 PM   Provider Rupali Keys is working as a scribe for and in the presence of leoncio Olvera MD). Working as a scribe, [facility] may have prepopulated components of this note with my historical  intellectual property under my direct supervision. Any additions to this intellectual property were performed in my presence and at my direction. Furthermore, the content and accuracy of this note have been reviewed by leoncio Olvera MD).

## 2019-04-29 ENCOUNTER — TELEPHONE (OUTPATIENT)
Dept: CARDIOLOGY CLINIC | Age: 69
End: 2019-04-29

## 2019-05-08 LAB — INR BLD: 2.9

## 2019-05-09 ENCOUNTER — ANTI-COAG VISIT (OUTPATIENT)
Dept: CARDIOLOGY CLINIC | Age: 69
End: 2019-05-09

## 2019-05-09 LAB
A/G RATIO: 2 (CALC) (ref 1–2.5)
ALBUMIN SERPL-MCNC: 3.9 G/DL (ref 3.6–5.1)
ALBUMIN SERPL-MCNC: 3.9 G/DL (ref 3.6–5.1)
ALP BLD-CCNC: 58 U/L (ref 33–130)
ALT SERPL-CCNC: 22 U/L (ref 6–29)
AST SERPL-CCNC: 23 U/L (ref 10–35)
BILIRUB SERPL-MCNC: 0.7 MG/DL (ref 0.2–1.2)
BILIRUBIN DIRECT: 0.2 MG/DL
BILIRUBIN, INDIRECT: 0.5 MG/DL (CALC) (ref 0.2–1.2)
BUN / CREAT RATIO: ABNORMAL (CALC) (ref 6–22)
BUN BLDV-MCNC: 13 MG/DL (ref 7–25)
CALCIUM SERPL-MCNC: 9.5 MG/DL (ref 8.6–10.4)
CHLORIDE BLD-SCNC: 92 MMOL/L (ref 98–110)
CHOLESTEROL, TOTAL: 135 MG/DL
CHOLESTEROL/HDL RATIO: 2.6 (CALC)
CHOLESTEROL: 83 MG/DL (CALC)
CO2: 34 MMOL/L (ref 20–32)
CREAT SERPL-MCNC: 0.75 MG/DL (ref 0.5–0.99)
GFR AFRICAN AMERICAN: 94 ML/MIN/1.73M2
GFR, ESTIMATED: 81 ML/MIN/1.73M2
GLOBULIN: 2 G/DL (CALC) (ref 1.9–3.7)
GLUCOSE BLD-MCNC: 105 MG/DL (ref 65–99)
HDLC SERPL-MCNC: 52 MG/DL
INR BLD: 2.9
LDL CHOLESTEROL CALCULATED: 65 MG/DL (CALC)
PHOSPHORUS: 3 MG/DL (ref 2.1–4.3)
POTASSIUM SERPL-SCNC: 3.5 MMOL/L (ref 3.5–5.3)
PROTHROMBIN TIME: 28.3 SEC (ref 9–11.5)
SODIUM BLD-SCNC: 133 MMOL/L (ref 135–146)
TOTAL PROTEIN: 5.9 G/DL (ref 6.1–8.1)
TRIGL SERPL-MCNC: 94 MG/DL

## 2019-05-10 ENCOUNTER — TELEPHONE (OUTPATIENT)
Dept: CARDIOLOGY CLINIC | Age: 69
End: 2019-05-10

## 2019-05-10 NOTE — TELEPHONE ENCOUNTER
Pt calling, Vascular Lab called advising her that her appt on 5-22 needed to be canceled because office will be in a meeting and the room will not be available. She now needs to cancel her appt on same day with [facility] because she was not going to come here twice to have all of this done. She needs to get all of this done soon because she needs to know what is going on with her legs. The next available for [facility] is July. Can we work her in before then and also try to get Arterial Doppler scheduled the same day. Pt did stress she can only do on a Tues or Thurs. pls call to advise. Thank you.

## 2019-05-10 NOTE — TELEPHONE ENCOUNTER
[facility] does not have offices on a Tuesday until July. She does not do office on Thursdays. [employer] can be added to a vascular spot on 5/24 with leg studies same day. She also has options to be added on to n schedule with leg studies same day on 6/12 at 1215 OR 6/14 at 1245. If she wants to wait until 7/2 (a Tuesday) she can be added on at [phone] with her leg studies same day. Or she can keep currently scheduled appointment and do leg studies at her convenience.

## 2019-05-14 ENCOUNTER — TELEPHONE (OUTPATIENT)
Dept: CARDIOLOGY CLINIC | Age: 69
End: 2019-05-14

## 2019-05-14 NOTE — TELEPHONE ENCOUNTER
I spoke with pt and relayed message per  MMRN/ DGB. I told her to avoid white foods . She states that she doesn't eat sugar. I advised her to do the best that she can. She request that Carlos Perry D call her about the testing she has coming up.

## 2019-05-14 NOTE — TELEPHONE ENCOUNTER
Cele Kelsey, RN  [facility]             Please call and tell her that her labs are stable, be sure someone has dosed her INR   dgb/MM      Gave pt message. Let her know what glucose was after she asked. She asked what can she do to get that in range? I let her know watch sugar and carbohydrates intake. She states she barely eats carbohydrates and does not eat anything sugary. She wants to know what else she can do?     Also faxing results to Dr Merari Eng per patient request.

## 2019-05-15 NOTE — TELEPHONE ENCOUNTER
Spoke to the pt-she was seen by Dr. Leal Cornea 5/13/19. She did like his bedside manner. He did tell her she should not have the arterial doppler-\"it will flatten the stent\". Asking for Dr. Bridget Moyer response. She is aware that Dr. Cary Cline is out until 5/20/19.

## 2019-05-16 ENCOUNTER — TELEPHONE (OUTPATIENT)
Dept: CARDIOLOGY CLINIC | Age: 69
End: 2019-05-16

## 2019-05-16 NOTE — TELEPHONE ENCOUNTER
EVANI  - wanted DGB know that pt has stents in her legs and concerned about the arterial lower ext text on 5-22. Should she still have this done? Thank you.

## 2019-05-21 ENCOUNTER — TELEPHONE (OUTPATIENT)
Dept: CARDIOLOGY CLINIC | Age: 69
End: 2019-05-21

## 2019-05-21 NOTE — TELEPHONE ENCOUNTER
Pt calling wants to have lab results and any test results faxed to her new PCP Dr Bonnie Yo at [phone] office [phone].  Pls call to advise Thank you

## 2019-05-22 ENCOUNTER — OFFICE VISIT (OUTPATIENT)
Dept: CARDIOLOGY CLINIC | Age: 69
End: 2019-05-22
Payer: MEDICARE

## 2019-05-22 ENCOUNTER — TELEPHONE (OUTPATIENT)
Dept: CARDIOLOGY CLINIC | Age: 69
End: 2019-05-22

## 2019-05-22 ENCOUNTER — HOSPITAL ENCOUNTER (OUTPATIENT)
Dept: VASCULAR LAB | Age: 69
Discharge: HOME | End: 2019-05-22
Payer: MEDICARE

## 2019-05-22 VITALS
SYSTOLIC BLOOD PRESSURE: 114 MMHG | DIASTOLIC BLOOD PRESSURE: 82 MMHG | HEART RATE: 77 BPM | BODY MASS INDEX: 26.01 KG/M2 | WEIGHT: 123.9 LBS | HEIGHT: 58 IN | OXYGEN SATURATION: 97 %

## 2019-05-22 DIAGNOSIS — I73.9 PAD (PERIPHERAL ARTERY DISEASE) (HCC): Primary | ICD-10-CM

## 2019-05-22 DIAGNOSIS — I05.9 MITRAL VALVE DISORDER: ICD-10-CM

## 2019-05-22 DIAGNOSIS — Z72.0 TOBACCO ABUSE: ICD-10-CM

## 2019-05-22 DIAGNOSIS — I48.20 CHRONIC ATRIAL FIBRILLATION (HCC): ICD-10-CM

## 2019-05-22 DIAGNOSIS — I25.10 ATHEROSCLEROSIS OF NATIVE CORONARY ARTERY OF NATIVE HEART WITHOUT ANGINA PECTORIS: ICD-10-CM

## 2019-05-22 PROCEDURE — 1123F ACP DISCUSS/DSCN MKR DOCD: CPT | Performed by: INTERNAL MEDICINE

## 2019-05-22 PROCEDURE — 1036F TOBACCO NON-USER: CPT | Performed by: INTERNAL MEDICINE

## 2019-05-22 PROCEDURE — G8419 CALC BMI OUT NRM PARAM NOF/U: HCPCS | Performed by: INTERNAL MEDICINE

## 2019-05-22 PROCEDURE — G8400 PT W/DXA NO RESULTS DOC: HCPCS | Performed by: INTERNAL MEDICINE

## 2019-05-22 PROCEDURE — 1090F PRES/ABSN URINE INCON ASSESS: CPT | Performed by: INTERNAL MEDICINE

## 2019-05-22 PROCEDURE — 93923 UPR/LXTR ART STDY 3+ LVLS: CPT

## 2019-05-22 PROCEDURE — G8598 ASA/ANTIPLAT THER USED: HCPCS | Performed by: INTERNAL MEDICINE

## 2019-05-22 PROCEDURE — 99214 OFFICE O/P EST MOD 30 MIN: CPT | Performed by: INTERNAL MEDICINE

## 2019-05-22 PROCEDURE — 3017F COLORECTAL CA SCREEN DOC REV: CPT | Performed by: INTERNAL MEDICINE

## 2019-05-22 PROCEDURE — 4040F PNEUMOC VAC/ADMIN/RCVD: CPT | Performed by: INTERNAL MEDICINE

## 2019-05-22 PROCEDURE — G8427 DOCREV CUR MEDS BY ELIG CLIN: HCPCS | Performed by: INTERNAL MEDICINE

## 2019-05-22 NOTE — LETTER
[facility]  [address]  [address] [address]  Phone: [phone]  Fax: [phone], DO        May 29, 2019     Maida Magana, [place] [place] [facility] [address]    Patient: Dinora Villalobos  MR Number: [id number]  YOB: 1950  Date of Visit: 2019    Dear Dr. Maida Magana:                                         19    PATIENT: Dinora Villalobos  : 1950    Reason for evaluation:   Chief Complaint   Patient presents with    Hypertension     no cardiac complaints at this time    Hyperlipidemia    Other     PVD    Follow-up     vascular study       History of present illness:   Ms. Dinora Villalobos is a 70 y.o. female patient of Dr. Stew Carlos who was referred after recent abnormal ankle brachial indices and is now   Two months out from intervention for symptomatic PAD. Nanda Reddy continues to work 3 nights a week from 5 PM to approximately 2 AM.  She says that they can be even longer shifts given her line of work as a . She says she is on her feet the entire 9-10 hours and denies any level of claudication or leg pain or fatigue. She's had a right bunion repair in the past and is looking to surgery for her left bunion. However, more recently she has had a wound at the left hallux valgus and is wearing a slipper. The wound itself is healing. She again says several times that she is very nervous about the thought of an amputation. She quit smoking in  when following repeat coronary stenting from Dr. Car Morrow. Medical History:      Diagnosis Date    AF (atrial fibrillation) (HCC)     on anticoagulation    Back pain     CAD (coronary artery disease)     Compression fracture 1994    Hyperlipidemia     Hypertension     Myelolipoma     bilateral adrenals    Right ear injury 1983    Per pt.     Shingles     Valvular disease        Surgical History:      Procedure Laterality Date  CORONARY ANGIOPLASTY WITH STENT PLACEMENT      CORONARY ANGIOPLASTY WITH STENT PLACEMENT  2003 & 2003    per pt    HYSTERECTOMY  1983    fibroids    TUBAL LIGATION  1983    VENTRAL HERNIA REPAIR  2013    VENTRAL HERNIA REPAIR WITH MESH              Social History:  Social History     Socioeconomic History    Marital status:      Spouse name: Not on file    Number of children: Not on file    Years of education: Not on file    Highest education level: Not on file   Occupational History    Occupation: cleans woods   Social Needs    Financial resource strain: Not on file    Food insecurity:     Worry: Not on file     Inability: Not on file    Transportation needs:     Medical: Not on file     Non-medical: Not on file   Tobacco Use    Smoking status: Former Smoker     Years: 40.00     Types: Cigarettes     Last attempt to quit: 3/1/2014     Years since quittin.2    Smokeless tobacco: Never Used   Substance and Sexual Activity    Alcohol use: No     Alcohol/week: 1.8 oz     Types: 3 Cans of beer per week    Drug use: No    Sexual activity: Not Currently   Lifestyle    Physical activity:     Days per week: Not on file     Minutes per session: Not on file    Stress: Not on file   Relationships    Social connections:     Talks on phone: Not on file     Gets together: Not on file     Attends [employer] service: Not on file     Active member of club or organization: Not on file     Attends meetings of clubs or organizations: Not on file     Relationship status: Not on file    Intimate partner violence:     Fear of current or ex partner: Not on file     Emotionally abused: Not on file     Physically abused: Not on file     Forced sexual activity: Not on file   Other Topics Concern    Not on file   Social History Narrative    Not on file        Family History:  No evidence for sudden cardiac death or premature CAD.       Problem Relation Age of Onset  Heart Attack Father 76    Heart Disease Father         complications from surgery, per pt    Heart Attack Mother 64       Medications:  [x] Medications and dosages reviewed. Prior to Admission medications    Medication Sig Start Date End Date Taking? Authorizing Provider   Specialty Vitamins Products (VITAMINS FOR THE HAIR PO) Take by mouth daily   Yes Historical Provider, MD   clopidogrel (PLAVIX) 75 MG tablet Take 1 tablet by mouth daily 3/21/19  Yes Kaylen Coley,    atenolol (TENORMIN) 25 MG tablet TAKE TWO TABLETS BY MOUTH DAILY 2/26/19  Yes James Mcallister MD   rosuvastatin (CRESTOR) 10 MG tablet Take 1 tablet by mouth daily Dispense Generic name only per pt request 2/26/19  Yes James Mcallister MD   warfarin (COUMADIN) 5 MG tablet TAKE ONE TABLET BY MOUTH DAILY 2/26/19  Yes James Mcallister MD   potassium chloride (KLOR-CON M) 20 MEQ extended release tablet TAKE ONE TABLET BY MOUTH DAILY 2/6/19  Yes James Mcallister MD   metolazone (ZAROXOLYN) 5 MG tablet TAKE ONE TABLET BY MOUTH DAILY  Patient taking differently: TAKE ONE TABLET BY MOUTH DAILY, PRN 12/24/18  Yes James Mcallister MD   Coenzyme Q10 400 MG CAPS Take 400 mg/day by mouth daily 12/3/18  Yes James Mcallister MD   Acetaminophen-Guaifenesin (TYLENOL CHEST CONGESTION PO) Take by mouth   Yes Historical Provider, MD   Magnesium Oxide 250 MG TABS Take 2 tablets by mouth daily  Patient taking differently: Take 500 mg by mouth daily Taking 1 tab daily 4/6/16  Yes James Mcallister MD   Simethicone (GAS RELIEF) 180 MG CAPS Take  by mouth as needed. Yes Historical Provider, MD       Allergies:  Patient has no known allergies.      Review of Systems:    [x]Full ROS obtained and negative except as mentioned in HPI    Physical Examination:    /82 (Site: Right Upper Arm, Position: Sitting, Cuff Size: Medium Adult)   Pulse 77   Ht 4' 10\" (1.473 m)   Wt 123 lb 14.4 oz (56.2 kg)   LMP 01/01/1983   SpO2 97%   BMI 25.90 kg/m²  Alb 05/08/2019 3.9     Globulin 05/08/2019 2.0     Albumin/Globulin Ratio 05/08/2019 2.0     Total Bilirubin 05/08/2019 0.7     Bilirubin, Direct 05/08/2019 0.2     Bilirubin, Indirect 05/08/2019 0.5     Alkaline Phosphatase 05/08/2019 58     AST 05/08/2019 23     ALT 05/08/2019 22     INR 05/08/2019 2.9*    Protime 05/08/2019 28.3*   Anti-coag visit on 04/05/2019   Component Date Value    INR 04/04/2019 1.60          Imaging:  I have reviewed the below testing personally:  ECHO:   3/27/18   Summary      Normal left ventricle size and wall thickness.      Ejection fraction is visually estimated to be 45-50%.     Mild septal hypokinesis noted.      Mild-moderate mitral regurgitation is present. Mild Mitral valve prolapse is present.      Mild tricuspid regurgitation.      Trivial pericardial effusion noted. STRESS TEST:   4/2013   Summary    There is normal isotope uptake at stress and rest. There is no evidence of    myocardial ischemia or scar. VASCULAR:   2/14/19  Impressions   Right Impression   Right SUKUMAR: 1.14. This is consistent with no significant PAD at rest.   Right first toe/brachial index 0.28: This is severely abnormal and not   favorable for wound healing. Right continuous wave Doppler tracings, pulse volume recordings and segmental   pressures are normal .   Photoplethysmography (PPG) on the right 1st thorugh 5th digit(s) demonstrates   mildly diminished waveforms . Left Impression   Left SUKUMAR: 0.64. This is consistent with moderate arterial insufficiency at   rest.   A toe pressure was not able to be obtained due to low signal.   Continuous wave Doppler of the left PTA, DPA is abnormal monophasic . Left pulse volume recordings are normal .   Digit photoplethysmography (PPG) on the left 1st thoriugh 5th digits   demonstrates severly diminished waveforms .       Conclusions        Summary        -Right SUKUMAR: 1.14.  This is consistent with no significant PAD at rest.  -Right first toe/brachial index 0.28: This is severely abnormal and not    favorable for wound healing. Clinical correlation advised.    -Left SUKUMAR: 0.64. This is consistent with moderate arterial insufficiency at   [facility] which appears to involve the infrapopliteal circulation.    -A toe pressure in the left lower extremity was not able to be obtained due    to low signal.        Recommendations        -Recommend referral to a vascular specialist.         3/5/19      VASCULAR       Calcified plaque along the aorta and its branches.  Mild-Moderate stenosis   proximal celiac and SMA.  Mild stenosis in the renal arteries bilaterally. Calcified plaque and moderate stenosis proximal SFA.  Multiple mild to   moderate tandem stenoses are noted more distally.  There is moderate stenosis   noted in the popliteal artery with three-vessel runoff to the ankles but   diffuse scattered calcific atherosclerotic disease.       Mild-to-moderate plaque and stenosis throughout the superficial femoral   artery the with multiple diffuse tandem lesions. Melissa Trace is three-vessel   runoff with diffuse calcified plaque to the ankles.        Peripheral Angiography 3/21/19  Abdominal Aortogram with bilateral lower extremity runoff, selective LLE angiography with scoring PTA to distal left SFA and proximal popliteal segments, followed by long DCB covering both prepared lesions and subsequent short DEE to popliteal      Infrarenal aorta and aortoiliac tree with mild plaquing.      Mild diffuse plaque in bilateral SFAs  Left SFA with hazy napkin ring lesion distally that is mildly calcified and estimated to be 80% narrowing; proximal popliteal with 60-70% eccentric stenosis      3 vessel runoff bilaterally to foot      Advantage wire with Omni flush catheter for initial angiography from distal abdominal aorta to tibial vessels.      Omni used to select left and allow for right to left crossing with Advantage wire followed by exchange for 4 Fr Earmon Nanas to mid left SFA for additional images of hazy, concentric short stenosis in distal SFA.      Distal SFA and popliteal lesions prepped with 5 x 40 mm Chocolate scoring PTA to nominal      5.0 x 100 mm Lutonix drug coated balloon PTA over 0.18 long Advantage wire      5.0 x 15 mm Sebastián DEE to residual popliteal disease with good angiographic results following 5.0 x 13 mm NC to 13 HTATIE.    5/22/19  Tech Comments   Right   Right SUKUMAR: 1.08. This is consistent with no significant PAD at rest.   Right first toe/brachial index 0.52: This is mildly abnormal .   Left   Left SUKUMAR: 1.24. This is consistent with no significant PAD at rest.   Left first toe/brachial index 0.21: This is abnormal .         Impression/Recommendations    Ms. Norm Morgan is a 70 y.o. female patient of Dr. Jamie Bowers:     Stable PAD S/P Scoring PTA, DCB, and DEE to left distal SFA/popliteal 3/21/19  CAD (2014 PCI to [facility]- with mRCA stent patent)    ICMP  PAF, on Warfarin   Primary MR - mild to moderate   HTN  Hyperlipidemia   PFO  Nicotine dependence, in remission since 2013      Continue Aspirin and statin therapies. Clopidogrel for at least one more month. Effie Dance is pleased that her left SUKUMAR has normalized on today's vascular study. Left TBI is abnormal. She reports weekly follow up for wound care with great improvement thus far at left foot. She knows to call should this progress stall and we can speak about further options. Return in about 6 months (around 11/22/2019). Patient Instructions   1 more month of Plavix- Can stop taking after June 21st  Follow up in 6 months        Thank you for allowing me to participate in the care of your patient. Please do not hesitate to call. Omero Haywood D.O., [facility] [place]  Interventional Cardiology     o: [phone]  [address], [address]    NOTE:  This report was transcribed using voice recognition software.

## 2019-05-22 NOTE — PROGRESS NOTES
19    PATIENT: Sara Acosta  : 1950    Reason for evaluation:   Chief Complaint   Patient presents with    Hypertension     no cardiac complaints at this time    Hyperlipidemia    Other     PVD    Follow-up     vascular study       History of present illness:   Ms. Sara Acosta is a 70 y.o. female patient of Dr. Asia Jones who was referred after recent abnormal ankle brachial indices and is now   Two months out from intervention for symptomatic PAD. Peter Wahl continues to work 3 nights a week from 5 PM to approximately 2 AM.  She says that they can be even longer shifts given her line of work as a . She says she is on her feet the entire 9-10 hours and denies any level of claudication or leg pain or fatigue. She's had a right bunion repair in the past and is looking to surgery for her left bunion. However, more recently she has had a wound at the left hallux valgus and is wearing a slipper. The wound itself is healing. She again says several times that she is very nervous about the thought of an amputation. She quit smoking in  when following repeat coronary stenting from Dr. Macrina Mai. Medical History:      Diagnosis Date    AF (atrial fibrillation) (HCC)     on anticoagulation    Back pain     CAD (coronary artery disease)     Compression fracture 1994    Hyperlipidemia     Hypertension     Myelolipoma     bilateral adrenals    Right ear injury 1983    Per pt.  Shingles     Valvular disease        Surgical History:      Procedure Laterality Date    CORONARY ANGIOPLASTY WITH STENT PLACEMENT      CORONARY ANGIOPLASTY WITH STENT PLACEMENT  2003 & 2003    per pt    HYSTERECTOMY  1983    fibroids    TUBAL LIGATION  1983    VENTRAL HERNIA REPAIR  2013    VENTRAL HERNIA REPAIR WITH MESH              Social History:  Social History     Socioeconomic History    Marital status:   Spouse name: Not on file    Number of children: Not on file    Years of education: Not on file    Highest education level: Not on file   Occupational History    Occupation: cleans woods   Social Needs    Financial resource strain: Not on file    Food insecurity:     Worry: Not on file     Inability: Not on file    Transportation needs:     Medical: Not on file     Non-medical: Not on file   Tobacco Use    Smoking status: Former Smoker     Years: 40.00     Types: Cigarettes     Last attempt to quit: 3/1/2014     Years since quittin.2    Smokeless tobacco: Never Used   Substance and Sexual Activity    Alcohol use: No     Alcohol/week: 1.8 oz     Types: 3 Cans of beer per week    Drug use: No    Sexual activity: Not Currently   Lifestyle    Physical activity:     Days per week: Not on file     Minutes per session: Not on file    Stress: Not on file   Relationships    Social connections:     Talks on phone: Not on file     Gets together: Not on file     Attends Sikh service: Not on file     Active member of club or organization: Not on file     Attends meetings of clubs or organizations: Not on file     Relationship status: Not on file    Intimate partner violence:     Fear of current or ex partner: Not on file     Emotionally abused: Not on file     Physically abused: Not on file     Forced sexual activity: Not on file   Other Topics Concern    Not on file   Social History Narrative    Not on file        Family History:  No evidence for sudden cardiac death or premature CAD. Problem Relation Age of Onset    Heart Attack Father 76    Heart Disease Father         complications from surgery, per pt    Heart Attack Mother 64       Medications:  [x] Medications and dosages reviewed. Prior to Admission medications    Medication Sig Start Date End Date Taking?  Authorizing Provider   Specialty Vitamins Products (VITAMINS FOR THE HAIR PO) Take by mouth daily   Yes Historical Provider, MD clopidogrel (PLAVIX) 75 MG tablet Take 1 tablet by mouth daily 3/21/19  Yes Liane Brown DO   atenolol (TENORMIN) 25 MG tablet TAKE TWO TABLETS BY MOUTH DAILY 2/26/19  Yes Peter Lopez MD   rosuvastatin (CRESTOR) 10 MG tablet Take 1 tablet by mouth daily Dispense Generic name only per pt request 2/26/19  Yes Peter Lopez MD   warfarin (COUMADIN) 5 MG tablet TAKE ONE TABLET BY MOUTH DAILY 2/26/19  Yes Peter Lopez MD   potassium chloride (KLOR-CON M) 20 MEQ extended release tablet TAKE ONE TABLET BY MOUTH DAILY 2/6/19  Yes Peter Lopez MD   metolazone (ZAROXOLYN) 5 MG tablet TAKE ONE TABLET BY MOUTH DAILY  Patient taking differently: TAKE ONE TABLET BY MOUTH DAILY, PRN 12/24/18  Yes Peter Lopez MD   Coenzyme Q10 400 MG CAPS Take 400 mg/day by mouth daily 12/3/18  Yes Peter Lopez MD   Acetaminophen-Guaifenesin (TYLENOL CHEST CONGESTION PO) Take by mouth   Yes Historical Provider, MD   Magnesium Oxide 250 MG TABS Take 2 tablets by mouth daily  Patient taking differently: Take 500 mg by mouth daily Taking 1 tab daily 4/6/16  Yes Peter Lopez MD   Simethicone (GAS RELIEF) 180 MG CAPS Take  by mouth as needed. Yes Historical Provider, MD       Allergies:  Patient has no known allergies.      Review of Systems:    [x]Full ROS obtained and negative except as mentioned in HPI    Physical Examination:    /82 (Site: Right Upper Arm, Position: Sitting, Cuff Size: Medium Adult)   Pulse 77   Ht 4' 10\" (1.473 m)   Wt 123 lb 14.4 oz (56.2 kg)   LMP 01/01/1983   SpO2 97%   BMI 25.90 kg/m²   Wt Readings from Last 3 Encounters:   05/22/19 123 lb 14.4 oz (56.2 kg)   04/23/19 125 lb (56.7 kg)   03/26/19 128 lb 3.2 oz (58.2 kg)       GENERAL: Well developed, well nourished, no acute distress  NEUROLOGICAL: Alert and oriented x3  PSYCH: Normal mood and affect   SKIN: Warm and dry, without lesions  HEENT: Normocephalic, atraumatic, Sclera non-icteric, mucous membranes moist  NECK: supple,    5/22/19  Tech Comments   Right   Right SUKUMAR: 1.08. This is consistent with no significant PAD at rest.   Right first toe/brachial index 0.52: This is mildly abnormal .   Left   Left SUKUMAR: 1.24. This is consistent with no significant PAD at rest.   Left first toe/brachial index 0.21: This is abnormal .         Impression/Recommendations    Ms. Kinjal Dallas is a 70 y.o. female patient of Dr. Benito Larios:     Stable PAD S/P Scoring PTA, DCB, and DEE to left distal SFA/popliteal 3/21/19  CAD (2014 PCI to [facility]- with mRCA stent patent)    ICMP  PAF, on Warfarin   Primary MR - mild to moderate   HTN  Hyperlipidemia   PFO  Nicotine dependence, in remission since 2013      Continue Aspirin and statin therapies. Clopidogrel for at least one more month. Bal Lauren is pleased that her left SUKUMAR has normalized on today's vascular study. Left TBI is abnormal. She reports weekly follow up for wound care with great improvement thus far at left foot. She knows to call should this progress stall and we can speak about further options. Return in about 6 months (around 11/22/2019). Patient Instructions   1 more month of Plavix- Can stop taking after June 21st  Follow up in 6 months        Thank you for allowing me to participate in the care of your patient. Please do not hesitate to call. Le Peacock D.O., [facility] [place]  Interventional Cardiology     o: [phone]  [facility] [address] [facility], [address] [facility]    NOTE:  This report was transcribed using voice recognition software. Every effort was made to ensure accuracy; however, inadvertent computerized transcription errors may be present. Scribe's Attestation: This note was scribed in the presence of Dr. Nataly Mackey DO by ELIOT Obrien. The above documentation has been prepared under my direction and personally reviewed by me in its entirety.  I confirm that the note above accurately reflects all work, treatment, procedures, and medical decision making

## 2019-05-22 NOTE — TELEPHONE ENCOUNTER
Pt is asking that the test results that [facility] did today be faxed to both Dr. Rajani Quick office. She provided fax numbers to [facility] nurse at her last visit. She needs them faxed before Friday because she has an appt with Dr. Jose Manuel Foster that day. Pls call to advise. Thank you.

## 2019-05-24 ENCOUNTER — TELEPHONE (OUTPATIENT)
Dept: CARDIOLOGY CLINIC | Age: 69
End: 2019-05-24

## 2019-05-24 NOTE — TELEPHONE ENCOUNTER
No bypass or stents at this time- we discussed yesterday significant improvement in blood flow to foot and will monitor healing of wound first as it has not stalled.

## 2019-05-24 NOTE — TELEPHONE ENCOUNTER
Pt wants to know if she needs another by pass on legs or more stents. Pt went to foot doctor and he said that there is still decreased blood flow to her toes and therefore he will not perform bunion surgery. Pt would like [facility] to call her.

## 2019-05-30 ENCOUNTER — HOSPITAL ENCOUNTER (OUTPATIENT)
Dept: VASCULAR LAB | Age: 69
Discharge: HOME | End: 2019-05-30
Payer: MEDICARE

## 2019-05-30 DIAGNOSIS — R09.89 BILATERAL CAROTID BRUITS: Primary | ICD-10-CM

## 2019-05-30 PROCEDURE — 93880 EXTRACRANIAL BILAT STUDY: CPT

## 2019-06-03 ENCOUNTER — TELEPHONE (OUTPATIENT)
Dept: CARDIOLOGY CLINIC | Age: 69
End: 2019-06-03

## 2019-06-03 NOTE — TELEPHONE ENCOUNTER
I spoke with pt and relayed message per [facility]. She verbalized understanding    She was saying she aggravated because her new PCP Dr Nori Seaman is requesting all kinds of blood tests. I told her that if they will accept, I can fax them over. I called and LMOM for Dr Nori Seaman office to call me and let them know pt had some labs last month and will fax if they like.

## 2019-06-03 NOTE — TELEPHONE ENCOUNTER
Carotids reviewed by [facility] which demonstrated <50% stenosis bilaterally, which is stable. Will not need rechecked for 1 year.

## 2019-06-03 NOTE — TELEPHONE ENCOUNTER
Ciro Yeung calling to get the results of her carotid bilateral test she had done on 5-30. Pls call to advise. Thank you.

## 2019-06-04 LAB — INR BLD: 1.9

## 2019-06-05 ENCOUNTER — ANTI-COAG VISIT (OUTPATIENT)
Dept: CARDIOLOGY CLINIC | Age: 69
End: 2019-06-05

## 2019-06-05 LAB
COPY RECEIVED FROM:: NORMAL
COPY TO: NORMAL
INR BLD: 1.9
INR BLD: 1.9
PROTHROMBIN TIME: 19 SEC (ref 9–11.5)
PROTHROMBIN TIME: 19 SEC (ref 9–11.5)

## 2019-06-12 ENCOUNTER — TELEPHONE (OUTPATIENT)
Dept: CARDIOLOGY CLINIC | Age: 69
End: 2019-06-12

## 2019-06-12 NOTE — TELEPHONE ENCOUNTER
No results in Epic. I spoke to Cirobere Yeung; she had her blood drawn at [address] [facility] per Dr. Chand [place] order but she asked them to send us a copy. I found results in [facility] folder and went over results with her. A1c and glucose slightly elevated, vit D low. She also said her PCP has mentioned that she needs to take vitamin K but she wasn't clear about this, was told by pharmacist not to take it so she did not fill Rx (she is on Coumadin and knows Vit K would interfere). She has yajaira't with PCP 6/18/19 and will go over results with him.

## 2019-06-12 NOTE — TELEPHONE ENCOUNTER
Trey Settles calling to get the results of her blood work from 6-4-19. Please call to advise. Thank you.

## 2019-06-20 RX ORDER — METOLAZONE 5 MG/1
TABLET ORAL
Qty: 30 TABLET | Refills: 6 | Status: SHIPPED | OUTPATIENT
Start: 2019-06-20 | End: 2020-03-06 | Stop reason: SDUPTHER

## 2019-07-02 DIAGNOSIS — I48.91 ATRIAL FIBRILLATION, UNSPECIFIED TYPE (HCC): Primary | ICD-10-CM

## 2019-07-02 LAB — INR BLD: 4.7

## 2019-07-03 ENCOUNTER — ANTI-COAG VISIT (OUTPATIENT)
Dept: CARDIOLOGY CLINIC | Age: 69
End: 2019-07-03

## 2019-07-03 DIAGNOSIS — I48.91 ATRIAL FIBRILLATION, UNSPECIFIED TYPE (HCC): Primary | ICD-10-CM

## 2019-07-03 DIAGNOSIS — Z79.01 LONG TERM (CURRENT) USE OF ANTICOAGULANTS: ICD-10-CM

## 2019-07-03 LAB
INR BLD: 4.7
PROTHROMBIN TIME: 44.7 SEC (ref 9–11.5)

## 2019-07-09 ENCOUNTER — TELEPHONE (OUTPATIENT)
Dept: CARDIOLOGY CLINIC | Age: 69
End: 2019-07-09

## 2019-07-09 NOTE — TELEPHONE ENCOUNTER
Shelly Son calling to let Tammi Jin know that she had her INR drawn today and results should be available tomorrow. Please call her to discuss after results are seen. Thank you.

## 2019-07-10 ENCOUNTER — ANTI-COAG VISIT (OUTPATIENT)
Dept: CARDIOLOGY CLINIC | Age: 69
End: 2019-07-10

## 2019-07-10 LAB
INR BLD: 1.7
INR BLD: 1.7
PROTHROMBIN TIME: 17.4 SEC (ref 9–11.5)

## 2019-07-24 ENCOUNTER — ANTI-COAG VISIT (OUTPATIENT)
Dept: CARDIOLOGY CLINIC | Age: 69
End: 2019-07-24

## 2019-07-24 LAB
INR BLD: 4.2
PROTHROMBIN TIME: 39.8 SEC (ref 9–11.5)

## 2019-08-01 ENCOUNTER — TELEPHONE (OUTPATIENT)
Dept: CARDIOLOGY CLINIC | Age: 69
End: 2019-08-01

## 2019-08-01 RX ORDER — NITROGLYCERIN 0.4 MG/1
0.4 TABLET SUBLINGUAL EVERY 5 MIN PRN
Qty: 25 TABLET | Refills: 3 | Status: SHIPPED | OUTPATIENT
Start: 2019-08-01 | End: 2019-09-18 | Stop reason: SDUPTHER

## 2019-08-01 NOTE — TELEPHONE ENCOUNTER
Per [facility], DAHLIA to address as she is seeing [place] [facility] for PVD.  TY

## 2019-09-03 ENCOUNTER — TELEPHONE (OUTPATIENT)
Dept: CARDIOLOGY CLINIC | Age: 69
End: 2019-09-03

## 2019-09-03 RX ORDER — ATENOLOL 25 MG/1
TABLET ORAL
Qty: 180 TABLET | Refills: 1 | Status: SHIPPED | OUTPATIENT
Start: 2019-09-03 | End: 2019-12-10 | Stop reason: CLARIF

## 2019-09-03 NOTE — TELEPHONE ENCOUNTER
Medication Refill    Last appointment with cardiology? 5-22-19     Next appointment with Cardiology?  11-12-19    Medication needing refilled: atenolol (TENORMIN) 25 MG tablet    Doseage of the medication: 25 mg    How are you taking this medication (QD, BID, TID, QID, PRN): 2 tablets daily    Patient want a 30 or 90 day supply called in: 80    Which Pharmacy are we sending the medication to    [facility] [address] [place] [address]

## 2019-09-05 LAB — INR BLD: 2

## 2019-09-05 RX ORDER — ATENOLOL 25 MG/1
TABLET ORAL
Qty: 180 TABLET | Refills: 3 | Status: SHIPPED | OUTPATIENT
Start: 2019-09-05 | End: 2019-12-10

## 2019-09-06 ENCOUNTER — ANTI-COAG VISIT (OUTPATIENT)
Dept: CARDIOLOGY CLINIC | Age: 69
End: 2019-09-06

## 2019-09-06 LAB
INR BLD: 2
PROTHROMBIN TIME: 20 SEC (ref 9–11.5)

## 2019-09-19 RX ORDER — NITROGLYCERIN 0.4 MG/1
0.4 TABLET SUBLINGUAL EVERY 5 MIN PRN
Qty: 25 TABLET | Refills: 3 | Status: SHIPPED | OUTPATIENT
Start: 2019-09-19 | End: 2020-10-21 | Stop reason: SDUPTHER

## 2019-10-15 LAB — INR BLD: 2.8

## 2019-10-16 ENCOUNTER — ANTI-COAG VISIT (OUTPATIENT)
Dept: CARDIOLOGY CLINIC | Age: 69
End: 2019-10-16

## 2019-10-22 ENCOUNTER — TELEPHONE (OUTPATIENT)
Dept: CARDIOLOGY CLINIC | Age: 69
End: 2019-10-22

## 2019-10-24 ENCOUNTER — TELEPHONE (OUTPATIENT)
Dept: CARDIOLOGY CLINIC | Age: 69
End: 2019-10-24

## 2019-11-12 RX ORDER — ROSUVASTATIN CALCIUM 10 MG/1
10 TABLET, COATED ORAL DAILY
Qty: 30 TABLET | Refills: 11 | Status: SHIPPED | OUTPATIENT
Start: 2019-11-12 | End: 2020-11-16 | Stop reason: SDUPTHER

## 2019-11-19 ENCOUNTER — TELEPHONE (OUTPATIENT)
Dept: CARDIOLOGY CLINIC | Age: 69
End: 2019-11-19

## 2019-11-19 DIAGNOSIS — I48.91 ATRIAL FIBRILLATION, UNSPECIFIED TYPE (HCC): Primary | ICD-10-CM

## 2019-11-19 DIAGNOSIS — Z79.01 LONG TERM (CURRENT) USE OF ANTICOAGULANTS: ICD-10-CM

## 2019-12-03 ENCOUNTER — TELEPHONE (OUTPATIENT)
Dept: CARDIOLOGY CLINIC | Age: 69
End: 2019-12-03

## 2019-12-03 LAB — INR BLD: 6.2

## 2019-12-04 ENCOUNTER — ANTI-COAG VISIT (OUTPATIENT)
Dept: CARDIOLOGY CLINIC | Age: 69
End: 2019-12-04

## 2019-12-04 ENCOUNTER — TELEPHONE (OUTPATIENT)
Dept: CARDIOLOGY CLINIC | Age: 69
End: 2019-12-04

## 2019-12-05 ENCOUNTER — TELEPHONE (OUTPATIENT)
Dept: CARDIOLOGY CLINIC | Age: 69
End: 2019-12-05

## 2019-12-05 ENCOUNTER — HOSPITAL ENCOUNTER (OUTPATIENT)
Dept: GENERAL RADIOLOGY | Age: 69
Discharge: HOME | End: 2019-12-05
Payer: MEDICARE

## 2019-12-05 ENCOUNTER — HOSPITAL ENCOUNTER (OUTPATIENT)
Age: 69
Discharge: HOME | End: 2019-12-05
Payer: MEDICARE

## 2019-12-05 DIAGNOSIS — R05.9 COUGH: ICD-10-CM

## 2019-12-05 PROCEDURE — 71046 X-RAY EXAM CHEST 2 VIEWS: CPT

## 2019-12-06 ENCOUNTER — TELEPHONE (OUTPATIENT)
Dept: CARDIOLOGY CLINIC | Age: 69
End: 2019-12-06

## 2019-12-10 ENCOUNTER — TELEPHONE (OUTPATIENT)
Dept: CARDIOLOGY CLINIC | Age: 69
End: 2019-12-10

## 2019-12-10 ENCOUNTER — OFFICE VISIT (OUTPATIENT)
Dept: CARDIOLOGY CLINIC | Age: 69
End: 2019-12-10
Payer: MEDICARE

## 2019-12-10 ENCOUNTER — ANTI-COAG VISIT (OUTPATIENT)
Dept: CARDIOLOGY CLINIC | Age: 69
End: 2019-12-10

## 2019-12-10 VITALS
BODY MASS INDEX: 27.92 KG/M2 | HEIGHT: 56 IN | WEIGHT: 124.1 LBS | HEART RATE: 84 BPM | DIASTOLIC BLOOD PRESSURE: 56 MMHG | OXYGEN SATURATION: 98 % | SYSTOLIC BLOOD PRESSURE: 92 MMHG

## 2019-12-10 DIAGNOSIS — R53.83 OTHER FATIGUE: ICD-10-CM

## 2019-12-10 DIAGNOSIS — Z79.01 LONG TERM (CURRENT) USE OF ANTICOAGULANTS: ICD-10-CM

## 2019-12-10 DIAGNOSIS — I10 ESSENTIAL HYPERTENSION: ICD-10-CM

## 2019-12-10 DIAGNOSIS — E78.00 PURE HYPERCHOLESTEROLEMIA: ICD-10-CM

## 2019-12-10 DIAGNOSIS — I05.9 MITRAL VALVE DISORDER: ICD-10-CM

## 2019-12-10 DIAGNOSIS — I65.23 OCCLUSION AND STENOSIS OF BILATERAL CAROTID ARTERIES: ICD-10-CM

## 2019-12-10 DIAGNOSIS — I48.91 ATRIAL FIBRILLATION, UNSPECIFIED TYPE (HCC): Primary | ICD-10-CM

## 2019-12-10 LAB
INR BLD: 1.2
PROTHROMBIN TIME: 12.3 SEC (ref 9–11.5)

## 2019-12-10 PROCEDURE — G8417 CALC BMI ABV UP PARAM F/U: HCPCS | Performed by: INTERNAL MEDICINE

## 2019-12-10 PROCEDURE — G8427 DOCREV CUR MEDS BY ELIG CLIN: HCPCS | Performed by: INTERNAL MEDICINE

## 2019-12-10 PROCEDURE — 3017F COLORECTAL CA SCREEN DOC REV: CPT | Performed by: INTERNAL MEDICINE

## 2019-12-10 PROCEDURE — 1123F ACP DISCUSS/DSCN MKR DOCD: CPT | Performed by: INTERNAL MEDICINE

## 2019-12-10 PROCEDURE — 4040F PNEUMOC VAC/ADMIN/RCVD: CPT | Performed by: INTERNAL MEDICINE

## 2019-12-10 PROCEDURE — 99214 OFFICE O/P EST MOD 30 MIN: CPT | Performed by: INTERNAL MEDICINE

## 2019-12-10 PROCEDURE — G8484 FLU IMMUNIZE NO ADMIN: HCPCS | Performed by: INTERNAL MEDICINE

## 2019-12-10 PROCEDURE — 1090F PRES/ABSN URINE INCON ASSESS: CPT | Performed by: INTERNAL MEDICINE

## 2019-12-10 PROCEDURE — G8598 ASA/ANTIPLAT THER USED: HCPCS | Performed by: INTERNAL MEDICINE

## 2019-12-10 PROCEDURE — G8400 PT W/DXA NO RESULTS DOC: HCPCS | Performed by: INTERNAL MEDICINE

## 2019-12-10 PROCEDURE — 1036F TOBACCO NON-USER: CPT | Performed by: INTERNAL MEDICINE

## 2019-12-10 RX ORDER — ATENOLOL 25 MG/1
25 TABLET ORAL DAILY
Qty: 90 TABLET | Refills: 3 | Status: SHIPPED | OUTPATIENT
Start: 2019-12-10 | End: 2020-05-01 | Stop reason: SDUPTHER

## 2019-12-18 LAB
INR BLD: 2.5
INR BLD: 2.5

## 2019-12-19 ENCOUNTER — ANTI-COAG VISIT (OUTPATIENT)
Dept: CARDIOLOGY CLINIC | Age: 69
End: 2019-12-19

## 2019-12-19 LAB
INR BLD: 2.5
PROTHROMBIN TIME: 25 SEC (ref 9–11.5)

## 2019-12-26 ENCOUNTER — TELEPHONE (OUTPATIENT)
Dept: CARDIOLOGY CLINIC | Age: 69
End: 2019-12-26

## 2019-12-30 ENCOUNTER — TELEPHONE (OUTPATIENT)
Dept: CARDIOLOGY CLINIC | Age: 69
End: 2019-12-30

## 2020-01-02 ENCOUNTER — TELEPHONE (OUTPATIENT)
Dept: CARDIOLOGY CLINIC | Age: 70
End: 2020-01-02

## 2020-01-03 ENCOUNTER — TELEPHONE (OUTPATIENT)
Dept: CARDIOLOGY CLINIC | Age: 70
End: 2020-01-03

## 2020-01-03 RX ORDER — WARFARIN SODIUM 5 MG/1
TABLET ORAL
Qty: 90 TABLET | Refills: 0 | Status: SHIPPED | OUTPATIENT
Start: 2020-01-03 | End: 2020-04-17 | Stop reason: SDUPTHER

## 2020-01-14 ENCOUNTER — TELEPHONE (OUTPATIENT)
Dept: CARDIOLOGY CLINIC | Age: 70
End: 2020-01-14

## 2020-01-15 NOTE — TELEPHONE ENCOUNTER
I investigated this issue and discovered the patient did in fact pay her $40 Co-pay at her 12/10/19 office visit with Dr. Bogdan Simon. The bill received was for a co-pay she did not pay when she saw Dr. Sewell Fees 2/26/19. I verified this in her chart, billing record and on our EOD paper work for both dates in questions. Ms. Siddharth Guidry did call the billing office in late December to pay her outstanding balance so she currently does not owe anything. I spoke with MsBrooke Goldie to relayed this information and to apologize for any confusion. She went on to express her dissatisfaction with [facility]'s policy to collect the co-pay at check-in instead of check-out. She doesn't agree with the policy and doesn't think the staff should be so persistent in collecting the co-pays. I explained the staff is following the policy as directed,  and are required to ask for the co-pay at check-in. I did however assure her that [facility] does not deny care to those who are unable to pay and that there are options available to help her meet these costs. I gave her my name and told her she can call me anytime if she has a problem or concern. She seemed satisfied with this, and wrote down my name for future reference.

## 2020-01-15 NOTE — PROGRESS NOTES
file     Non-medical: Not on file   Tobacco Use    Smoking status: Former Smoker     Years: 40.00     Types: Cigarettes     Last attempt to quit: 3/1/2014     Years since quittin.8    Smokeless tobacco: Never Used   Substance and Sexual Activity    Alcohol use: No     Alcohol/week: 3.0 standard drinks     Types: 3 Cans of beer per week    Drug use: No    Sexual activity: Not Currently   Lifestyle    Physical activity:     Days per week: Not on file     Minutes per session: Not on file    Stress: Not on file   Relationships    Social connections:     Talks on phone: Not on file     Gets together: Not on file     Attends Congregational service: Not on file     Active member of club or organization: Not on file     Attends meetings of clubs or organizations: Not on file     Relationship status: Not on file    Intimate partner violence:     Fear of current or ex partner: Not on file     Emotionally abused: Not on file     Physically abused: Not on file     Forced sexual activity: Not on file   Other Topics Concern    Not on file   Social History Narrative    Not on file        Family History:  No evidence for sudden cardiac death or premature CAD. Problem Relation Age of Onset    Heart Attack Father 76    Heart Disease Father         complications from surgery, per pt    Heart Attack Mother 64       Medications:  [x] Medications and dosages reviewed. Prior to Admission medications    Medication Sig Start Date End Date Taking?  Authorizing Provider   warfarin (COUMADIN) 5 MG tablet TAKE ONE TABLET BY MOUTH DAILY 1/3/20  Yes Coco Davila MD   atenolol (TENORMIN) 25 MG tablet Take 1 tablet by mouth daily 12/10/19  Yes Coco Davila MD   rosuvastatin (CRESTOR) 10 MG tablet Take 1 tablet by mouth daily Dispense Generic name only per pt request 19  Yes Coco Davila MD   nitroGLYCERIN (NITROSTAT) 0.4 MG SL tablet Place 1 tablet under the tongue every 5 minutes as needed for Chest pain 19 Yes Latisha Raya MD   metolazone (ZAROXOLYN) 5 MG tablet TAKE ONE TABLET BY MOUTH DAILY, PRN 6/20/19  Yes Latisha Raya MD   Specialty Vitamins Products (VITAMINS FOR THE HAIR PO) Take by mouth daily   Yes Historical Provider, MD   potassium chloride (KLOR-CON M) 20 MEQ extended release tablet TAKE ONE TABLET BY MOUTH DAILY 2/6/19  Yes Latisha Raya MD   Magnesium Oxide 250 MG TABS Take 2 tablets by mouth daily  Patient taking differently: Take 500 mg by mouth daily Taking 1 tab daily 4/6/16  Yes Latisha Raya MD   Simethicone (GAS RELIEF) 180 MG CAPS Take  by mouth as needed. Historical Provider, MD       Allergies:  Patient has no known allergies.      Review of Systems:    [x]Full ROS obtained and negative except as mentioned in HPI    Physical Examination:    BP 96/60   Pulse 78   Ht 4' 7\" (1.397 m) Comment: per pt report  Wt 125 lb (56.7 kg)   LMP 01/01/1983   SpO2 99%   BMI 29.05 kg/m²   Wt Readings from Last 3 Encounters:   01/17/20 125 lb (56.7 kg)   12/10/19 124 lb 1.6 oz (56.3 kg)   05/22/19 123 lb 14.4 oz (56.2 kg)        Vitals:    01/17/20 1127   BP: 96/60   Pulse: 78   SpO2: 99%       · GENERAL: Well developed, well nourished, no acute distress  · NEUROLOGICAL: Alert and oriented x3  · PSYCH: Normal mood and affect   · SKIN: Warm and dry  · HEENT: Normocephalic, atraumatic, Sclera non-icteric, mucous membranes moist  · NECK: supple, JVP normal  · CARDIAC: Normal PMI, regular rate and rhythm, normal S1S2, no murmur, rub, or gallop  · RESPIRATORY: Normal respiratory effort, clear to auscultation bilaterally  · EXTREMITIES: no edema or clubbing, Doppler pulses left foot   · MUSCULOSKELETAL: No joint swelling or tenderness, no chest wall tenderness  · GASTROINTESTINAL: normal bowel sounds, soft, non-tender      Labs:  Lab Review   Anti-coag visit on 12/19/2019   Component Date Value    INR 12/18/2019 2.50     INR 12/18/2019 2.50    Office Visit on 12/10/2019   Component Date Value is consistent with no significant PAD at rest.   Left first toe/brachial index 0.21: This is abnormal .         Impression/Recommendations    Ms. Rosendo Dwyer is a 78 y.o. female patient     Stable PAD S/P Scoring PTA, DCB, and DEE to left distal SFA/popliteal March 2019 (May 2019 Left SUKUMAR normalized to 1.24)    CAD (2014 PCI to [facility]- with mRCA stent patent) , under the care of Dr. Nereida CABRERA, on Warfarin   Hypertension  Hyperlipidemia   Nicotine dependence, in remission since 2013      Continue Aspirin and Rosuvastatin. Biphasic PT and DP by left sided Doppler in office today. Wound at left bunion appears healed and she continues to follow with Podiatry. No recurrent claudication. Will see in annual follow up. Return in about 1 year (around 1/17/2021). Patient Instructions   No medication changes  Follow up in 1 year    Dr. Luz Marina Wynn- Podiatry  [phone]  [address]        Thank you for allowing me to participate in the care of your patient. Please do not hesitate to call. Ayala Beasley D.O., [facility] [place]  Interventional Cardiology     o: [phone]  [address] [place] [address]., [address], [address] [facility]    NOTE:  This report was transcribed using voice recognition software. Every effort was made to ensure accuracy; however, inadvertent computerized transcription errors may be present. Scribe's Attestation: This note was scribed in the presence of Dr. Fabienne Roa DO by Arielle Cevallos RN.    I, Ayala Beasley, have personally performed the services described in this documentation as scribed by Francine Sage. ELIOT Green in my presence, and it is both accurate and complete. An electronic signature was used to authenticate this note.

## 2020-01-17 ENCOUNTER — TELEPHONE (OUTPATIENT)
Dept: CARDIOLOGY CLINIC | Age: 70
End: 2020-01-17

## 2020-01-17 ENCOUNTER — OFFICE VISIT (OUTPATIENT)
Dept: CARDIOLOGY CLINIC | Age: 70
End: 2020-01-17
Payer: MEDICARE

## 2020-01-17 VITALS
DIASTOLIC BLOOD PRESSURE: 60 MMHG | HEIGHT: 55 IN | WEIGHT: 125 LBS | SYSTOLIC BLOOD PRESSURE: 96 MMHG | HEART RATE: 78 BPM | OXYGEN SATURATION: 99 % | BODY MASS INDEX: 28.93 KG/M2

## 2020-01-17 PROCEDURE — 1090F PRES/ABSN URINE INCON ASSESS: CPT | Performed by: INTERNAL MEDICINE

## 2020-01-17 PROCEDURE — G8400 PT W/DXA NO RESULTS DOC: HCPCS | Performed by: INTERNAL MEDICINE

## 2020-01-17 PROCEDURE — G8484 FLU IMMUNIZE NO ADMIN: HCPCS | Performed by: INTERNAL MEDICINE

## 2020-01-17 PROCEDURE — 1123F ACP DISCUSS/DSCN MKR DOCD: CPT | Performed by: INTERNAL MEDICINE

## 2020-01-17 PROCEDURE — 99214 OFFICE O/P EST MOD 30 MIN: CPT | Performed by: INTERNAL MEDICINE

## 2020-01-17 PROCEDURE — G8427 DOCREV CUR MEDS BY ELIG CLIN: HCPCS | Performed by: INTERNAL MEDICINE

## 2020-01-17 PROCEDURE — 3017F COLORECTAL CA SCREEN DOC REV: CPT | Performed by: INTERNAL MEDICINE

## 2020-01-17 PROCEDURE — 1036F TOBACCO NON-USER: CPT | Performed by: INTERNAL MEDICINE

## 2020-01-17 PROCEDURE — 4040F PNEUMOC VAC/ADMIN/RCVD: CPT | Performed by: INTERNAL MEDICINE

## 2020-01-17 PROCEDURE — G8417 CALC BMI ABV UP PARAM F/U: HCPCS | Performed by: INTERNAL MEDICINE

## 2020-01-17 NOTE — TELEPHONE ENCOUNTER
Attempted to call and speak with patient. Female answered and stated that the patient was not available. She will relay message for return call.

## 2020-01-17 NOTE — PATIENT INSTRUCTIONS
No medication changes  Follow up in 1 year    Dr. Shakira Miller- Podiatry  [phone]  [address] [facility]

## 2020-01-17 NOTE — LETTER
Spouse name: Not on file    Number of children: Not on file    Years of education: Not on file    Highest education level: Not on file   Occupational History    Occupation: cleans woods   Social Needs    Financial resource strain: Not on file    Food insecurity:     Worry: Not on file     Inability: Not on file    Transportation needs:     Medical: Not on file     Non-medical: Not on file   Tobacco Use    Smoking status: Former Smoker     Years: 40.00     Types: Cigarettes     Last attempt to quit: 3/1/2014     Years since quittin.8    Smokeless tobacco: Never Used   Substance and Sexual Activity    Alcohol use: No     Alcohol/week: 3.0 standard drinks     Types: 3 Cans of beer per week    Drug use: No    Sexual activity: Not Currently   Lifestyle    Physical activity:     Days per week: Not on file     Minutes per session: Not on file    Stress: Not on file   Relationships    Social connections:     Talks on phone: Not on file     Gets together: Not on file     Attends [employer] service: Not on file     Active member of club or organization: Not on file     Attends meetings of clubs or organizations: Not on file     Relationship status: Not on file    Intimate partner violence:     Fear of current or ex partner: Not on file     Emotionally abused: Not on file     Physically abused: Not on file     Forced sexual activity: Not on file   Other Topics Concern    Not on file   Social History Narrative    Not on file        Family History:  No evidence for sudden cardiac death or premature CAD. Problem Relation Age of Onset    Heart Attack Father 76    Heart Disease Father         complications from surgery, per pt    Heart Attack Mother 64       Medications:  [x] Medications and dosages reviewed. Prior to Admission medications    Medication Sig Start Date End Date Taking?  Authorizing Provider   warfarin (COUMADIN) 5 MG tablet TAKE ONE TABLET BY MOUTH DAILY 1/3/20  Yes bilaterally  · EXTREMITIES: no edema or clubbing, Doppler pulses left foot   · MUSCULOSKELETAL: No joint swelling or tenderness, no chest wall tenderness  · GASTROINTESTINAL: normal bowel sounds, soft, non-tender      Labs:  Lab Review   Anti-coag visit on 12/19/2019   Component Date Value    INR 12/18/2019 2.50     INR 12/18/2019 2.50    Office Visit on 12/10/2019   Component Date Value    INR 12/18/2019 2.5*    Protime 12/18/2019 25.0*   Anti-coag visit on 12/10/2019   Component Date Value    INR 1950 1.20    Hospital Outpatient Visit on 12/05/2019   Component Date Value    INR 12/09/2019 1.2*    Protime 12/09/2019 12.3*   Anti-coag visit on 12/04/2019   Component Date Value    INR 12/03/2019 6.20          Imaging:  I have reviewed the below testing personally:    VASCULAR:   2/14/19  Impressions   Right Impression   Right SUKUMAR: 1.14. This is consistent with no significant PAD at rest.   Right first toe/brachial index 0.28: This is severely abnormal and not   favorable for wound healing. Right continuous wave Doppler tracings, pulse volume recordings and segmental   pressures are normal .   Photoplethysmography (PPG) on the right 1st thorugh 5th digit(s) demonstrates   mildly diminished waveforms . Left Impression   Left SUKUMAR: 0.64. This is consistent with moderate arterial insufficiency at   rest.   A toe pressure was not able to be obtained due to low signal.   Continuous wave Doppler of the left PTA, DPA is abnormal monophasic . Left pulse volume recordings are normal .   Digit photoplethysmography (PPG) on the left 1st thoriugh 5th digits   demonstrates severly diminished waveforms .       Conclusions        Summary        -Right SUKUMAR: 1.14. This is consistent with no significant PAD at rest.    -Right first toe/brachial index 0.28: This is severely abnormal and not    favorable for wound healing. Clinical correlation advised.

## 2020-01-18 LAB
INR BLD: 7.6
PROTHROMBIN TIME: 74.6 SEC (ref 9–11.5)

## 2020-01-20 ENCOUNTER — ANTI-COAG VISIT (OUTPATIENT)
Dept: CARDIOLOGY CLINIC | Age: 70
End: 2020-01-20

## 2020-01-20 ENCOUNTER — TELEPHONE (OUTPATIENT)
Dept: CARDIOLOGY CLINIC | Age: 70
End: 2020-01-20

## 2020-01-20 LAB — INR BLD: 7.6

## 2020-01-20 NOTE — PROGRESS NOTES
Spoke to the pt with instructions per Dr. Beth Brooks. No active bleeding. The [facility] she uses does not have Vitamin K 5 mg. It was called into [facility], [phone], per pt request. Will hold the Coumadin, and recheck 1/21/2020.

## 2020-01-20 NOTE — PROGRESS NOTES
Make certain she is having no active bleeding or she needs to go to ER  Encourage eating greens today if she would like   Vitamin K 5mg po X 1 today--Call it in  Needs repeat INR tomorrow to assure it is going down  If it is increasing tomorrow, she needs to go to ER for Vit K injection.

## 2020-01-21 ENCOUNTER — TELEPHONE (OUTPATIENT)
Dept: CARDIOLOGY CLINIC | Age: 70
End: 2020-01-21

## 2020-01-21 LAB — INR BLD: 3.2

## 2020-01-21 NOTE — TELEPHONE ENCOUNTER
Dottiedani Flaherty is at [facility] [employer] [facility] right now waiting to get her standing INR drawn. They need the standing weekly orders faxed to them at [phone]. Please call with any questions. Thank you.

## 2020-01-22 ENCOUNTER — ANTI-COAG VISIT (OUTPATIENT)
Dept: CARDIOLOGY CLINIC | Age: 70
End: 2020-01-22

## 2020-01-22 LAB
INR BLD: 3.2
PROTHROMBIN TIME: 32 SEC (ref 9–11.5)

## 2020-01-31 LAB
INR BLD: 3
PROTHROMBIN TIME: 30 SEC (ref 9–11.5)

## 2020-02-03 ENCOUNTER — TELEPHONE (OUTPATIENT)
Dept: CARDIOLOGY CLINIC | Age: 70
End: 2020-02-03

## 2020-02-03 NOTE — TELEPHONE ENCOUNTER
Pt calling for Carline Perez, she was supposed to have her INR done today. Her other Dr gave her 2 Rx's one is a steroid that she didn't get until today and can't start until tomorrow, it's the one that you take 6 pills the 1st day then 5,4,3,2,1 each day after. Pt was going to wait to have INR done until after this medication was done. Is this ok?  Pls call to advise Thank you

## 2020-02-06 LAB — INR BLD: 3.3

## 2020-02-07 ENCOUNTER — ANTI-COAG VISIT (OUTPATIENT)
Dept: CARDIOLOGY CLINIC | Age: 70
End: 2020-02-07

## 2020-02-07 LAB
INR BLD: 3.3
PROTHROMBIN TIME: 32.5 SEC (ref 9–11.5)

## 2020-02-10 RX ORDER — POTASSIUM CHLORIDE 20 MEQ/1
TABLET, EXTENDED RELEASE ORAL
Qty: 90 TABLET | Refills: 9 | Status: SHIPPED | OUTPATIENT
Start: 2020-02-10 | End: 2021-04-19 | Stop reason: SDUPTHER

## 2020-02-10 NOTE — TELEPHONE ENCOUNTER
Received refill request for Potassium 20 meq from [address] [place] [address].     Last OV: 1/17/20 [facility] and 12/10/19 [facility]    Next Appt:  6/16/20    Last Refill: 2/6/19 #90 with 10 refills    Last Labs: 5/18/19

## 2020-02-11 ENCOUNTER — TELEPHONE (OUTPATIENT)
Dept: CARDIOLOGY CLINIC | Age: 70
End: 2020-02-11

## 2020-02-11 NOTE — TELEPHONE ENCOUNTER
The celebrex can interfere with warfarin thoughwould be ok  To take if you monitor INR weekly for 3 weeks. .  Also would suggest you limit it to 200mg/day.

## 2020-02-13 NOTE — TELEPHONE ENCOUNTER
Pt calling back. She is having a lot of pain, and will take the Celebrex twice a day, and have her INR tested 2/17/20.

## 2020-02-18 LAB — INR BLD: 2.3

## 2020-02-20 ENCOUNTER — TELEPHONE (OUTPATIENT)
Dept: CARDIOLOGY CLINIC | Age: 70
End: 2020-02-20

## 2020-02-20 ENCOUNTER — ANTI-COAG VISIT (OUTPATIENT)
Dept: CARDIOLOGY CLINIC | Age: 70
End: 2020-02-20

## 2020-02-20 NOTE — TELEPHONE ENCOUNTER
I printed out the VL Lower extremity segmental pressure reports from 2/14/19 and 5/22/19. The UA doppler leg left was ordered 3/26/19 is still showing \"active. \"   I notified the office requesting the documents. The verbalized understanding. Faxed the reports listed above to the number provided below.

## 2020-02-21 ENCOUNTER — TELEPHONE (OUTPATIENT)
Dept: CARDIOLOGY CLINIC | Age: 70
End: 2020-02-21

## 2020-02-21 NOTE — TELEPHONE ENCOUNTER
She saw the foot doctor , Dr Negin Mares , yesterday and she wants to know what to do now? Does she need appt with [facility] again?

## 2020-03-05 NOTE — TELEPHONE ENCOUNTER
Medication Refill    Medication needing refilled:   metolazone (ZAROXOLYN) 5 MG tablet     Doseage of the medication: 5 mg    How are you taking this medication (QD, BID, TID, QID, PRN): 1 tablet daily, prn    30 or 90 day supply called in: 30    Which Pharmacy are we sending the medication to?:    Janice [facility] [address] [place] [address] [phone]    Pt would like to pick it up on Friday 3-6. Thank you.

## 2020-03-06 RX ORDER — METOLAZONE 5 MG/1
TABLET ORAL
Qty: 30 TABLET | Refills: 6 | Status: SHIPPED | OUTPATIENT
Start: 2020-03-06 | End: 2020-08-26

## 2020-03-17 ENCOUNTER — OFFICE VISIT (OUTPATIENT)
Dept: VASCULAR SURGERY | Age: 70
End: 2020-03-17
Payer: MEDICARE

## 2020-03-17 VITALS
DIASTOLIC BLOOD PRESSURE: 68 MMHG | SYSTOLIC BLOOD PRESSURE: 124 MMHG | WEIGHT: 133 LBS | HEIGHT: 55 IN | BODY MASS INDEX: 30.78 KG/M2

## 2020-03-17 PROCEDURE — 1090F PRES/ABSN URINE INCON ASSESS: CPT | Performed by: SURGERY

## 2020-03-17 PROCEDURE — G8427 DOCREV CUR MEDS BY ELIG CLIN: HCPCS | Performed by: SURGERY

## 2020-03-17 PROCEDURE — G8400 PT W/DXA NO RESULTS DOC: HCPCS | Performed by: SURGERY

## 2020-03-17 PROCEDURE — G8417 CALC BMI ABV UP PARAM F/U: HCPCS | Performed by: SURGERY

## 2020-03-17 PROCEDURE — 99203 OFFICE O/P NEW LOW 30 MIN: CPT | Performed by: SURGERY

## 2020-03-17 PROCEDURE — 4040F PNEUMOC VAC/ADMIN/RCVD: CPT | Performed by: SURGERY

## 2020-03-17 PROCEDURE — 1123F ACP DISCUSS/DSCN MKR DOCD: CPT | Performed by: SURGERY

## 2020-03-17 PROCEDURE — G8484 FLU IMMUNIZE NO ADMIN: HCPCS | Performed by: SURGERY

## 2020-03-17 PROCEDURE — 1036F TOBACCO NON-USER: CPT | Performed by: SURGERY

## 2020-03-17 PROCEDURE — 3017F COLORECTAL CA SCREEN DOC REV: CPT | Performed by: SURGERY

## 2020-03-17 RX ORDER — TERBINAFINE HYDROCHLORIDE 250 MG/1
250 TABLET ORAL DAILY
COMMUNITY
End: 2021-04-28

## 2020-03-17 NOTE — PROGRESS NOTES
[facility])  Consultation/History & Physical    Date of Admission:  (Not on file)  Date of Consultation:  3/17/2020    PCP:  Harjeet Hawkins RN Other:  Mp Ga    Chief Complaint:    Chief Complaint   Patient presents with    New Patient           History of Present Illness:  Ham Jackson is a 78 y.o. female who presents with history of atrial fibrillation, hypertension and hyperlipidemia in referral today from Dr. Mp Ga for peripheral vascular disease. She has a history of peripheral vascular with history of left SFA intervention in March 2019. She states that she had her left hallux nail removed about a year ago. She's had numbness and tingling in that left foot. She has a large bunion on the medial aspect of that left foot. She states no one will take it off because of poor circulation. Additionally, at this point. She's not sure if she even once it removed. She denies any classic claudication or rest pain. She works as a  and is on her feet for extended periods of time. PMH:   has a past medical history of AF (atrial fibrillation) ([address] Back pain, CAD (coronary artery disease), Compression fracture, Hyperlipidemia, Hypertension, Myelolipoma, Right ear injury, Shingles, and Valvular disease. PSH:   has a past surgical history that includes Coronary angioplasty with stent; Coronary angioplasty with stent (03/14/2003 & 06/02/2003); Hysterectomy (09/01/1983); Tubal ligation (09/1983); and ventral hernia repair (5-). Allergies:  No Known Allergies     Home Meds:    Prior to Admission medications    Medication Sig Start Date End Date Taking?  Authorizing Provider   terbinafine (LAMISIL) 250 MG tablet Take 250 mg by mouth daily   Yes Historical Provider, MD   metOLazone (ZAROXOLYN) 5 MG tablet TAKE ONE TABLET BY MOUTH DAILY, PRN 3/6/20  Yes Palma Chery MD   potassium chloride (KLOR-CON M) 20 MEQ extended release tablet TAKE ONE TABLET BY MOUTH DAILY 2/10/20  Yes Lisandro Harris Beth Brooks MD   warfarin (COUMADIN) 5 MG tablet TAKE ONE TABLET BY MOUTH DAILY 1/3/20  Yes Palma Chery MD   atenolol (TENORMIN) 25 MG tablet Take 1 tablet by mouth daily 12/10/19  Yes Palma Chery MD   rosuvastatin (CRESTOR) 10 MG tablet Take 1 tablet by mouth daily Dispense Generic name only per pt request 11/12/19  Yes Palma Chery MD   nitroGLYCERIN (NITROSTAT) 0.4 MG SL tablet Place 1 tablet under the tongue every 5 minutes as needed for Chest pain 9/19/19  Yes Palma Chery MD   Specialty Vitamins Products (VITAMINS FOR THE HAIR PO) Take by mouth daily   Yes Historical Provider, MD   Magnesium Oxide 250 MG TABS Take 2 tablets by mouth daily  Patient taking differently: Take 500 mg by mouth daily Taking 1 tab daily 4/6/16  Yes Palma Chery MD   Simethicone (GAS RELIEF) 180 MG CAPS Take  by mouth as needed. Yes Historical Provider, MD        Hospital Meds:    Current Outpatient Medications   Medication Sig Dispense Refill    terbinafine (LAMISIL) 250 MG tablet Take 250 mg by mouth daily      metOLazone (ZAROXOLYN) 5 MG tablet TAKE ONE TABLET BY MOUTH DAILY, PRN 30 tablet 6    potassium chloride (KLOR-CON M) 20 MEQ extended release tablet TAKE ONE TABLET BY MOUTH DAILY 90 tablet 9    warfarin (COUMADIN) 5 MG tablet TAKE ONE TABLET BY MOUTH DAILY 90 tablet 0    atenolol (TENORMIN) 25 MG tablet Take 1 tablet by mouth daily 90 tablet 3    rosuvastatin (CRESTOR) 10 MG tablet Take 1 tablet by mouth daily Dispense Generic name only per pt request 30 tablet 11    nitroGLYCERIN (NITROSTAT) 0.4 MG SL tablet Place 1 tablet under the tongue every 5 minutes as needed for Chest pain 25 tablet 3    Specialty Vitamins Products (VITAMINS FOR THE HAIR PO) Take by mouth daily      Magnesium Oxide 250 MG TABS Take 2 tablets by mouth daily (Patient taking differently: Take 500 mg by mouth daily Taking 1 tab daily) 30 tablet 5    Simethicone (GAS RELIEF) 180 MG CAPS Take  by mouth as needed.        No current facility-administered medications for this visit. Social History:       Social History     Socioeconomic History    Marital status:      Spouse name: None    Number of children: None    Years of education: None    Highest education level: None   Occupational History    Occupation: cleans woods   Social Needs    Financial resource strain: None    Food insecurity     Worry: None     Inability: None    Transportation needs     Medical: None     Non-medical: None   Tobacco Use    Smoking status: Former Smoker     Years: 40.00     Types: Cigarettes     Last attempt to quit: 3/1/2014     Years since quittin.0    Smokeless tobacco: Never Used   Substance and Sexual Activity    Alcohol use: No     Alcohol/week: 3.0 standard drinks     Types: 3 Cans of beer per week    Drug use: No    Sexual activity: Not Currently   Lifestyle    Physical activity     Days per week: None     Minutes per session: None    Stress: None   Relationships    Social connections     Talks on phone: None     Gets together: None     Attends Bahai service: None     Active member of club or organization: None     Attends meetings of clubs or organizations: None     Relationship status: None    Intimate partner violence     Fear of current or ex partner: None     Emotionally abused: None     Physically abused: None     Forced sexual activity: None   Other Topics Concern    None   Social History Narrative    None       Family Histroy:      Family History   Problem Relation Age of Onset    Heart Attack Father 76    Heart Disease Father         complications from surgery, per pt    Heart Attack Mother 64       Review of Systems:  A 14 point review of systems was completed. Pertinent positives identified in the HPI, all other review of symptoms negative.         Physical Exam   Vital Signs: Ht 4' 7\" (1.397 m)   Wt 133 lb (60.3 kg)   LMP 1983   BMI 30.91 kg/m²        Admission Weight: 133 lb (60.3 kg) of breath    PAD (peripheral artery disease) (HCC)         Assessment:     There are no diagnoses linked to this encounter. Recommendations/Plan:  1. Atherosclerosis of native arteries of extremities with intermittent claudication, bilateral legs (HCC)  Recommend repeat arterial duplex of both lower extremities to further evaluate. Known history of peripheral vascular disease and may require repeat intervention if in need of a bunion surgery. Will Contact with results of the study. - VL DUP LOWER EXTREMITY ARTERIES BILATERAL; Future            Jeovany Lindsey M.D., FACS.   3/17/2020  11:43 AM

## 2020-03-27 ENCOUNTER — TELEPHONE (OUTPATIENT)
Dept: CARDIOLOGY CLINIC | Age: 70
End: 2020-03-27

## 2020-03-27 RX ORDER — CELECOXIB 200 MG/1
200 CAPSULE ORAL 2 TIMES DAILY
Qty: 180 CAPSULE | Refills: 1
Start: 2020-03-27 | End: 2020-03-27 | Stop reason: CLARIF

## 2020-03-27 RX ORDER — CYCLOBENZAPRINE HCL 5 MG
5 TABLET ORAL NIGHTLY PRN
Qty: 30 TABLET | Refills: 0 | Status: CANCELLED
Start: 2020-03-27 | End: 2020-04-06

## 2020-03-30 NOTE — TELEPHONE ENCOUNTER
Pt states she will get labs done as soon as the stay at home order is lifted, but has heard it may continue until 4/30/20. Please call with any questions.

## 2020-04-07 ENCOUNTER — HOSPITAL ENCOUNTER (OUTPATIENT)
Dept: VASCULAR LAB | Age: 70
Discharge: HOME | End: 2020-04-07
Payer: MEDICARE

## 2020-04-07 PROCEDURE — 93925 LOWER EXTREMITY STUDY: CPT

## 2020-04-17 RX ORDER — WARFARIN SODIUM 5 MG/1
TABLET ORAL
Qty: 90 TABLET | Refills: 3 | Status: ON HOLD | OUTPATIENT
Start: 2020-04-17 | End: 2021-03-02 | Stop reason: SDUPTHER

## 2020-04-17 NOTE — TELEPHONE ENCOUNTER
Pt called on answering service for the following refill Pls call to advise Thank you      Medication Refill    Medication needing refilled: warfarin (COUMADIN) 5 MG tablet - PT IS OUT OF MEDICATION    Doseage of the medication:5 mg    How are you taking this medication (QD, BID, TID, QID, PRN): 1 tab daily    30 or 90 day supply called in: 80    Which Pharmacy are we sending the medication to?: Adan Truong [address] [place] [address] [id number] [place] [address]

## 2020-04-17 NOTE — TELEPHONE ENCOUNTER
Received refill request for Warfarin 5 mg from patient to be sent to [address] [place] [address].     Last OV: 12/10/19 (DAHLIA) and 1/17/20 (SWETA)     Last Labs: 1/17/20    Last Refill: 1/3/20 #90 with no refills    Next Appt: 6/16/20 with DAHLIA

## 2020-04-22 ENCOUNTER — TELEPHONE (OUTPATIENT)
Dept: CARDIOLOGY CLINIC | Age: 70
End: 2020-04-22

## 2020-04-22 NOTE — TELEPHONE ENCOUNTER
Pt calling to see if [facility] had results from doppler on legs? Please call to advise.

## 2020-04-27 ENCOUNTER — TELEPHONE (OUTPATIENT)
Dept: CARDIOLOGY CLINIC | Age: 70
End: 2020-04-27

## 2020-04-27 NOTE — TELEPHONE ENCOUNTER
Pt asking if [facility] has received results on her doppler. States she has made multiple attempt to get results from Dr Chandrakant Parada office.  Please call to advise

## 2020-04-28 ENCOUNTER — TELEPHONE (OUTPATIENT)
Dept: VASCULAR SURGERY | Age: 70
End: 2020-04-28

## 2020-04-28 NOTE — TELEPHONE ENCOUNTER
Discussed test results with patient. Normal left lower extremity arterial study.   No need for further follow-up with me

## 2020-05-01 ENCOUNTER — TELEPHONE (OUTPATIENT)
Dept: CARDIOLOGY CLINIC | Age: 70
End: 2020-05-01

## 2020-05-01 ENCOUNTER — ANTI-COAG VISIT (OUTPATIENT)
Dept: CARDIOLOGY CLINIC | Age: 70
End: 2020-05-01
Payer: MEDICARE

## 2020-05-01 LAB
INR BLD: 2
PROTHROMBIN TIME: 19.8 SEC (ref 9–11.5)

## 2020-05-01 PROCEDURE — 93793 ANTICOAG MGMT PT WARFARIN: CPT | Performed by: NURSE PRACTITIONER

## 2020-05-01 RX ORDER — ATENOLOL 25 MG/1
25 TABLET ORAL DAILY
Qty: 90 TABLET | Refills: 3 | Status: SHIPPED | OUTPATIENT
Start: 2020-05-01 | End: 2021-05-06 | Stop reason: SDUPTHER

## 2020-05-04 ENCOUNTER — ANTI-COAG VISIT (OUTPATIENT)
Dept: CARDIOLOGY CLINIC | Age: 70
End: 2020-05-04

## 2020-05-08 ENCOUNTER — TELEPHONE (OUTPATIENT)
Dept: CARDIOLOGY CLINIC | Age: 70
End: 2020-05-08

## 2020-05-08 NOTE — TELEPHONE ENCOUNTER
Pt is asking if there is anything besides tylenol for congestion and sinus. States it does not seem to work anymore. Please leave message if not available.

## 2020-05-26 ENCOUNTER — TELEPHONE (OUTPATIENT)
Dept: CARDIOLOGY CLINIC | Age: 70
End: 2020-05-26

## 2020-05-27 ENCOUNTER — ANTI-COAG VISIT (OUTPATIENT)
Dept: CARDIOLOGY CLINIC | Age: 70
End: 2020-05-27

## 2020-05-28 ENCOUNTER — TELEPHONE (OUTPATIENT)
Dept: CARDIOLOGY CLINIC | Age: 70
End: 2020-05-28

## 2020-05-29 ENCOUNTER — TELEPHONE (OUTPATIENT)
Dept: CARDIOLOGY CLINIC | Age: 70
End: 2020-05-29

## 2020-06-01 NOTE — TELEPHONE ENCOUNTER
Called and discussed with patient . She does not want to take the zetia Dr Abdi Spencer prescribed.  She will talk to Dr Claire Lucas further about it on 6/16

## 2020-06-02 ENCOUNTER — HOSPITAL ENCOUNTER (OUTPATIENT)
Dept: VASCULAR LAB | Age: 70
Discharge: HOME | End: 2020-06-02
Payer: MEDICARE

## 2020-06-02 PROCEDURE — 93880 EXTRACRANIAL BILAT STUDY: CPT

## 2020-06-03 ENCOUNTER — TELEPHONE (OUTPATIENT)
Dept: CARDIOLOGY CLINIC | Age: 70
End: 2020-06-03

## 2020-06-15 NOTE — TELEPHONE ENCOUNTER
I spoke with pt and relayed message per LES. She verbalized understanding. She states that someone called her and told her something different, but I was the only on who noted a call to the pt. I asked about her INR and when she will be testing again, she said whenever she can get out there.

## 2020-06-18 ENCOUNTER — HOSPITAL ENCOUNTER (OUTPATIENT)
Dept: VASCULAR LAB | Age: 70
Discharge: HOME | End: 2020-06-18
Payer: MEDICARE

## 2020-06-18 PROCEDURE — 93922 UPR/L XTREMITY ART 2 LEVELS: CPT

## 2020-07-08 LAB
INR BLD: 2.5
PROTHROMBIN TIME: 25.5 SEC (ref 9–11.5)

## 2020-07-09 ENCOUNTER — OFFICE VISIT (OUTPATIENT)
Dept: SURGERY | Age: 70
End: 2020-07-09
Payer: MEDICARE

## 2020-07-09 VITALS
DIASTOLIC BLOOD PRESSURE: 78 MMHG | WEIGHT: 124.8 LBS | TEMPERATURE: 97 F | HEIGHT: 56 IN | BODY MASS INDEX: 28.07 KG/M2 | SYSTOLIC BLOOD PRESSURE: 128 MMHG

## 2020-07-09 PROCEDURE — G8427 DOCREV CUR MEDS BY ELIG CLIN: HCPCS | Performed by: SURGERY

## 2020-07-09 PROCEDURE — 99213 OFFICE O/P EST LOW 20 MIN: CPT | Performed by: SURGERY

## 2020-07-09 PROCEDURE — 1090F PRES/ABSN URINE INCON ASSESS: CPT | Performed by: SURGERY

## 2020-07-09 PROCEDURE — G8417 CALC BMI ABV UP PARAM F/U: HCPCS | Performed by: SURGERY

## 2020-07-09 ASSESSMENT — ENCOUNTER SYMPTOMS
GASTROINTESTINAL NEGATIVE: 1
EYES NEGATIVE: 1
ALLERGIC/IMMUNOLOGIC NEGATIVE: 1
RESPIRATORY NEGATIVE: 1

## 2020-07-09 NOTE — PROGRESS NOTES
:     Tala Toney is a 78 y.o. female     CC: Indigestion, dysphasia    HPI: 79-year-old female known to me from repair of an incarcerated ventral hernia in 2013. She presents now for 2-month history of vague symptoms. She denies abdominal pain but reports indigestion, nausea and occasional dysphasia. No history of vomiting, weight loss, fevers, chills, change in bowel habits or urinary symptoms. She has had no recent imaging studies. Past Medical History:   Diagnosis Date    AF (atrial fibrillation) (HCC)     on anticoagulation    Back pain     CAD (coronary artery disease)     Compression fracture 01/01/1994    Hyperlipidemia     Hypertension     Myelolipoma     bilateral adrenals    Right ear injury 01/01/1983    Per pt.  Shingles     Valvular disease        Patient has no known allergies. Past Surgical History:   Procedure Laterality Date    CORONARY ANGIOPLASTY WITH STENT PLACEMENT      CORONARY ANGIOPLASTY WITH STENT PLACEMENT  03/14/2003 & 06/02/2003    per pt    HYSTERECTOMY  09/01/1983    fibroids    TUBAL LIGATION  09/1983    VENTRAL HERNIA REPAIR  5-    VENTRAL HERNIA REPAIR WITH MESH               Prior to Visit Medications    Medication Sig Taking?  Authorizing Provider   atenolol (TENORMIN) 25 MG tablet Take 1 tablet by mouth daily Yes Mireya Geller MD   aspirin 81 MG tablet Take 1 tablet by mouth daily M Yes Mireya Geller MD   warfarin (COUMADIN) 5 MG tablet Indications: Restart on 3/23/19 TAKE ONE TABLET BY MOUTH DAILY or as directed Yes Mireya Geller MD   potassium chloride (KLOR-CON M) 20 MEQ extended release tablet TAKE ONE TABLET BY MOUTH DAILY Yes Mireya Geller MD   rosuvastatin (CRESTOR) 10 MG tablet Take 1 tablet by mouth daily Dispense Generic name only per pt request Yes Mireya Geller MD   nitroGLYCERIN (NITROSTAT) 0.4 MG SL tablet Place 1 tablet under the tongue every 5 minutes as needed for Chest pain Yes Mireya Geller MD Specialty Vitamins Products (VITAMINS FOR THE HAIR PO) Take by mouth daily Yes Historical Provider, MD   Simethicone (GAS RELIEF) 180 MG CAPS Take  by mouth as needed. Yes Historical Provider, MD   terbinafine (LAMISIL) 250 MG tablet Take 250 mg by mouth daily  Historical Provider, MD   metOLazone (ZAROXOLYN) 5 MG tablet TAKE ONE TABLET BY MOUTH DAILY, PRN  Nahun Chahal MD   Magnesium Oxide 250 MG TABS Take 2 tablets by mouth daily  Patient not taking: Reported on 2020  Nahun Chahal MD       Social History     Socioeconomic History    Marital status:       Spouse name: Not on file    Number of children: Not on file    Years of education: Not on file    Highest education level: Not on file   Occupational History    Occupation: cleans woods   Social Needs    Financial resource strain: Not on file    Food insecurity     Worry: Not on file     Inability: Not on file    Transportation needs     Medical: Not on file     Non-medical: Not on file   Tobacco Use    Smoking status: Current Every Day Smoker     Years: 40.00     Types: Cigarettes     Last attempt to quit: 3/1/2014     Years since quittin.3    Smokeless tobacco: Never Used    Tobacco comment: Trying to quit   Substance and Sexual Activity    Alcohol use: No     Alcohol/week: 3.0 standard drinks     Types: 3 Cans of beer per week    Drug use: No    Sexual activity: Not Currently   Lifestyle    Physical activity     Days per week: Not on file     Minutes per session: Not on file    Stress: Not on file   Relationships    Social connections     Talks on phone: Not on file     Gets together: Not on file     Attends Yazdanism service: Not on file     Active member of club or organization: Not on file     Attends meetings of clubs or organizations: Not on file     Relationship status: Not on file    Intimate partner violence     Fear of current or ex partner: Not on file     Emotionally abused: Not on file     Physically abused: Not on file     Forced sexual activity: Not on file   Other Topics Concern    Not on file   Social History Narrative    Not on file       Review of Systems   Constitutional: Negative. HENT: Negative. Eyes: Negative. Respiratory: Negative. Cardiovascular: Negative. Gastrointestinal: Negative. Endocrine: Negative. Genitourinary: Negative. Musculoskeletal: Negative. Skin: Negative. Allergic/Immunologic: Negative. Neurological: Negative. Hematological: Negative. Psychiatric/Behavioral: Negative.        :   Physical Exam  Constitutional:       Appearance: She is well-developed. HENT:      Head: Normocephalic and atraumatic. Right Ear: External ear normal.      Left Ear: External ear normal.   Eyes:      Conjunctiva/sclera: Conjunctivae normal.   Neck:      Musculoskeletal: Normal range of motion and neck supple. Cardiovascular:      Rate and Rhythm: Normal rate and regular rhythm. Pulmonary:      Effort: Pulmonary effort is normal.      Breath sounds: Normal breath sounds. Abdominal:      General: There is no distension. Palpations: Abdomen is soft. Tenderness: There is no abdominal tenderness. Musculoskeletal: Normal range of motion. Skin:     General: Skin is warm and dry. Neurological:      Mental Status: She is alert and oriented to person, place, and time. Psychiatric:         Behavior: Behavior normal.       /78   Temp 97 °F (36.1 °C)   Ht 4' 8\" (1.422 m)   Wt 124 lb 12.8 oz (56.6 kg)   LMP 01/01/1983   BMI 27.98 kg/m²     :      67-year-old female known to me from repair of an incarcerated ventral hernia in 2013 who presents for evaluation of a 2-month history of vague abdominal symptoms. She reports indigestion, nausea and occasional dysphasia. There is no abdominal tenderness or evidence of a recurrent hernia on physical examination. Plan:      There are no surgical indications at the current time.   Recommended a referral for an upper endoscopy. She is unsure whether or not she wishes to proceed. She will contact the office if she would like us to make a referral to [address] [place] [address]

## 2020-07-09 NOTE — LETTER
[address] [address]  Phone: [phone]  Fax: [phone]    July 9, 2020    Patient: Rhona Rangel  MRN:  [id number]  YOB: 1950  Date of Visit: 7/9/2020    Dear  Referral:    Thank you for the request for consultation for Danna Elam. Below are the relevant portions of my assessment and plan of care. Assessment:  69-year-old female known to me from repair of an incarcerated ventral hernia in 2013 who presents for evaluation of a 2-month history of vague abdominal symptoms. She reports indigestion, nausea and occasional dysphasia. There is no abdominal tenderness or evidence of a recurrent hernia on physical examination. Plan:  There are no surgical indications at the current time. Recommended a referral for an upper endoscopy. She is unsure whether or not she wishes to proceed. She will contact the office if she would like us to make a referral to [address] [place] [address]. If you have questions, please do not hesitate to call me. I look forward to following Aurelio Hadley along with you.     Sincerely,    Camryn Medrano MD    CC providers:    [facility], [address]  VIA Mail

## 2020-07-10 ENCOUNTER — TELEPHONE (OUTPATIENT)
Dept: CARDIOLOGY CLINIC | Age: 70
End: 2020-07-10

## 2020-07-10 NOTE — TELEPHONE ENCOUNTER
Called and discussed with patient. Frustrated about increase in superficial bruising on her arms-Looks terrible.   INR 2.5    Suggest reduce ASA to 81mg MWF and S  Obtain a CBC with platelet ct at [facility]--We will schedule

## 2020-07-21 ENCOUNTER — TELEPHONE (OUTPATIENT)
Dept: SURGERY | Age: 70
End: 2020-07-21

## 2020-07-21 NOTE — TELEPHONE ENCOUNTER
I called patient to explain due to no hernia and her symptoms that is why she was referred to [facility]. I sent a referral form over.

## 2020-07-30 ENCOUNTER — OFFICE VISIT (OUTPATIENT)
Dept: CARDIOLOGY CLINIC | Age: 70
End: 2020-07-30
Payer: MEDICARE

## 2020-07-30 VITALS
SYSTOLIC BLOOD PRESSURE: 126 MMHG | WEIGHT: 123.3 LBS | HEART RATE: 80 BPM | HEIGHT: 56 IN | DIASTOLIC BLOOD PRESSURE: 80 MMHG | BODY MASS INDEX: 27.74 KG/M2

## 2020-07-30 PROCEDURE — 1123F ACP DISCUSS/DSCN MKR DOCD: CPT | Performed by: INTERNAL MEDICINE

## 2020-07-30 PROCEDURE — 1090F PRES/ABSN URINE INCON ASSESS: CPT | Performed by: INTERNAL MEDICINE

## 2020-07-30 PROCEDURE — 1036F TOBACCO NON-USER: CPT | Performed by: INTERNAL MEDICINE

## 2020-07-30 PROCEDURE — G8417 CALC BMI ABV UP PARAM F/U: HCPCS | Performed by: INTERNAL MEDICINE

## 2020-07-30 PROCEDURE — G8427 DOCREV CUR MEDS BY ELIG CLIN: HCPCS | Performed by: INTERNAL MEDICINE

## 2020-07-30 PROCEDURE — G8400 PT W/DXA NO RESULTS DOC: HCPCS | Performed by: INTERNAL MEDICINE

## 2020-07-30 PROCEDURE — 99213 OFFICE O/P EST LOW 20 MIN: CPT | Performed by: INTERNAL MEDICINE

## 2020-07-30 PROCEDURE — 3017F COLORECTAL CA SCREEN DOC REV: CPT | Performed by: INTERNAL MEDICINE

## 2020-07-30 PROCEDURE — 4040F PNEUMOC VAC/ADMIN/RCVD: CPT | Performed by: INTERNAL MEDICINE

## 2020-07-30 RX ORDER — ASPIRIN 81 MG/1
TABLET ORAL
Qty: 90 TABLET | Refills: 1 | Status: SHIPPED | OUTPATIENT
Start: 2020-07-30 | End: 2021-05-02 | Stop reason: ALTCHOICE

## 2020-07-30 NOTE — PATIENT INSTRUCTIONS
Stress echo to evaluate sob and valvular function  CBC  She will investigate the cost of eliquis/xarelto, if interested in switching from coumadin she will let us know  Call for any changes  Follow up with Dr Bill Arthur

## 2020-07-30 NOTE — PROGRESS NOTES
[address]   Cardiac Followup    Referring Provider:  Nicolas Signs, DO     Chief Complaint   Patient presents with    Atrial Fibrillation    Hypertension       History of Present Illness:  Ms Patty Salazar is seen today as a follow up to evaluate CAD, htn,hchol, MR, atrial fib. She has a small PFO . In the interval since her last visit, she decreased her asa to Monday Wed Friday. Despite this she still has bruising on her arms and legs. They have not worsened. However, she would like to discuss other treatment options  She has no chest pain, change in exertional sob palpitations or dizziness. Today Continues to work as a  until 2 am.      Patient is compliant  with medication. Easily bruises on coumadin and asa       Past Medical History:   has a past medical history of AF (atrial fibrillation) ([address] Back pain, CAD (coronary artery disease), Compression fracture, Hyperlipidemia, Hypertension, Myelolipoma, Right ear injury, Shingles, and Valvular disease. Surgical History:   has a past surgical history that includes Coronary angioplasty with stent; Coronary angioplasty with stent (03/14/2003 & 06/02/2003); Hysterectomy (09/01/1983); Tubal ligation (09/1983); and ventral hernia repair (5-). Social History:   reports that she quit smoking about 6 years ago. Her smoking use included cigarettes. She quit after 40.00 years of use. She has never used smokeless tobacco. She reports that she does not drink alcohol or use drugs. Family History:  family history includes Heart Attack (age of onset: 64) in her mother; Heart Attack (age of onset: 76) in her father; Heart Disease in her father.      Current Outpatient Medications   Medication Sig Dispense Refill    atenolol (TENORMIN) 25 MG tablet Take 1 tablet by mouth daily 90 tablet 3    aspirin 81 MG tablet Take 1 tablet by mouth daily M 30 tablet 3    warfarin (COUMADIN) 5 MG tablet Indications: Restart on 3/23/19 TAKE ONE blood clots or swollen lymph nodes. · Allergic/Immunologic: + nasal congestion    Physical Examination:    Vitals:    07/30/20 1333   BP: 126/80   Pulse: 80   Constitutional and General Appearance:   . NAD   SKIN:  .     Warm and dry  EYES:    .     EOMI  Neck:   . Normal carotid contour  Respiratory:  Normal excursion and expansion without use of accessory muscles  Resp Auscultation: Normal breath sounds without dullness  Cardiovascular: The apical impulses not displaced  Heart tones are crisp and normal  Cervical veins are not engorged  Normal S1S2, No S3, soft systolic Murmur  Peripheral pulses are symmetrical and full  Extremities: There is no clubbing, cyanosis of the extremities. No edema  Femoral Arteries: 2+ and equal  Pedal Pulses: 2+ and equal   Abdomen:  No masses or tenderness  Liver/Spleen: No Abnormalities Noted  Neurological/Psychiatric:  Alert and oriented in all spheres  No abnormalities of mood, affect, memory      All testing and labs listed below were personally reviewed. 4/2007 Carotid US <40%    10/12 ct of abd--no aaa  . 10/29/13  CT of chest--no pulmonary nodules, mild left basilar atelectasis versus fibrosis, stable bilateral lipomatous lesions of the adrenal glands  Consistent with myelolipomas    11/18/14  Angiogram: 90% ostial RCA--3.0x12 [place] to 18 HATTIE--0 residual, Mid RCA stent patent, Normal LV FXN, 2+ MR  Assessment:   CAD  11/2014 stent to RCA  Stable cardiac status  On asa -easily bruises  3/21/19 platelet count 759    htn  /80 (Site: Right Upper Arm, Position: Sitting, Cuff Size: Medium Adult)   Pulse 80   Ht 4' 8\" (1.422 m)   Wt 123 lb 4.8 oz (55.9 kg)   LMP 01/01/1983   BMI 27.64 kg/m²     Stable hypertension.  Tolerating medications    hchol  Will repeat LLL on crestor 10 mg daily  5/8/2019  Tc 83  Tc 135 hdl 52 ldl 65    MR  4/23/19  Echo mild to moderate MR    PVD   PTA left SVA and popliteal  Artery 3/21/19  No claudication  Followes with  Norman    AF  [id number] Score for Atrial Fibrillation Stroke Risk   Risk   Factors  Component Value   C CHF No 0   H HTN Yes 1   A2 Age >= 76 No,  (68 y.o.) 0   D DM No 0   S2 Prior Stroke/TIA No 0   V Vascular Disease No 1   A Age 74-69 Yes,  (68 y.o.) 1   Sc Sex female 1    [id number]  Score  4   Score last updated 7/30/20 1:51 PM EDT  On coumadin with routine checks  Discussed optional treatment of  Xarelto/eliquis, instead of coumadin, and, or evaluation for watchman. She is  Not interested in the [place]. Does not want to have a stroke. Will investigate cost of xarelto eliquis    Bruising  Will  Decrease asa to Monday Wed Friday  Will do a cbc        Occlusion and stenosis of bilateral carotid arteries  5/19 less than 50% bilateral  6/2020 carotids--less than 50% bilateral      Plan:  Stress echo to evaluate sob and valvular function  CBC  She will investigate the cost of eliquis/xarelto, if interested in switching from coumadin she will let us know  Call for any changes  Follow up with Dr Amezquita Comment:  Duane Kc am scribing for and in the presence of Gracie Swann MD.   Rose Marie Gibbs 07/30/20 1:45 PM   Provider Bill Farley is working as a scribe for and in the presence of leoncio Swann MD). Working as a scribe, Farzanehelena Carlson may have prepopulated components of this note with my historical  intellectual property under my direct supervision. Any additions to this intellectual property were performed in my presence and at my direction. Furthermore, the content and accuracy of this note have been reviewed by leoncio Swann MD). David Queen M.D., [facility].

## 2020-07-31 ENCOUNTER — TELEPHONE (OUTPATIENT)
Dept: CARDIOLOGY CLINIC | Age: 70
End: 2020-07-31

## 2020-07-31 NOTE — TELEPHONE ENCOUNTER
Dr Owen Ramachandran made aware of her decision. Discussed risk and benefits of all of her medications yesterday at office visit

## 2020-07-31 NOTE — TELEPHONE ENCOUNTER
Yesterday DGB decreased her asa to Monday wed and Friday  Due to these spots.  The EC only means It is enteric coated meaning it will prevent GI distress

## 2020-07-31 NOTE — TELEPHONE ENCOUNTER
Pt states she has red spots on her arm and her pharmacist states that the aspirin causes that. Pt asking why she was changed to aspirin EC. Please call pt before 430 pm to discuss.

## 2020-07-31 NOTE — TELEPHONE ENCOUNTER
Called and spoke to the Patient about the message below she stated that she was told by her pharmacy that it was the Asprin causing her to have the red bumps. She stated that she does not want to take anything but the warfarin because she seen in a book that it will cause liver cancer.

## 2020-08-24 ENCOUNTER — TELEPHONE (OUTPATIENT)
Dept: CARDIOLOGY CLINIC | Age: 70
End: 2020-08-24

## 2020-08-24 NOTE — TELEPHONE ENCOUNTER
Lab order ready to fax-need to call [address] [place] [address], [phone], for the fax number. They are closed on Mondays.

## 2020-08-25 NOTE — TELEPHONE ENCOUNTER
Called [facility] for the fax number. Faxed the standing INR lab order to [phone]. Confirmation received.

## 2020-08-26 RX ORDER — METOLAZONE 5 MG/1
TABLET ORAL
Qty: 90 TABLET | Refills: 5 | Status: SHIPPED | OUTPATIENT
Start: 2020-08-26 | End: 2021-06-16 | Stop reason: ALTCHOICE

## 2020-08-27 ENCOUNTER — TELEPHONE (OUTPATIENT)
Dept: CARDIOLOGY CLINIC | Age: 70
End: 2020-08-27

## 2020-08-27 NOTE — TELEPHONE ENCOUNTER
Called patient, she knows that dgb wont be in til Monday. She does not think that Dr Mynor Nina listens to her. She would like [facility] opinion about her taking this.  Allowed her to express her feelings for over 10 minutes

## 2020-08-27 NOTE — TELEPHONE ENCOUNTER
Pt calling she went to see Dr Leandra Mar and he did labs, the results show her sugar is high. He wants her to start on pioglitazone HCL 30 mg tab 1 tab daily. Pt wants to know if it's ok to take with all her heart medications? And should she start it in the morning?  Pls call to advise Thank you

## 2020-08-30 NOTE — TELEPHONE ENCOUNTER
Would be ok to try this with her cardiac medications. Is important to get her sugar under control. If she holds onto fluid with this we can always change to something else.

## 2020-09-10 ENCOUNTER — TELEPHONE (OUTPATIENT)
Dept: CARDIOLOGY CLINIC | Age: 70
End: 2020-09-10
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
## 2020-09-14 ENCOUNTER — TELEPHONE (OUTPATIENT)
Dept: CARDIOLOGY CLINIC | Age: 70
End: 2020-09-14

## 2020-09-15 NOTE — TELEPHONE ENCOUNTER
Left a message for the pt-the requested information has been faxed to [facility], [phone]. There phone is [phone].

## 2020-09-30 ENCOUNTER — HOSPITAL ENCOUNTER (OUTPATIENT)
Dept: WOMENS IMAGING | Age: 70
Discharge: HOME | End: 2020-09-30
Payer: MEDICARE

## 2020-09-30 PROCEDURE — 77063 BREAST TOMOSYNTHESIS BI: CPT

## 2020-10-01 ENCOUNTER — HOSPITAL ENCOUNTER (OUTPATIENT)
Dept: CT IMAGING | Age: 70
Discharge: HOME | End: 2020-10-01
Payer: MEDICARE

## 2020-10-01 LAB
CREAT SERPL-MCNC: 0.8 MG/DL (ref 0.6–1.2)
GFR AFRICAN AMERICAN: >60
GFR NON-AFRICAN AMERICAN: >60

## 2020-10-01 PROCEDURE — 70470 CT HEAD/BRAIN W/O & W/DYE: CPT

## 2020-10-01 PROCEDURE — 82565 ASSAY OF CREATININE: CPT

## 2020-10-01 PROCEDURE — 36415 COLL VENOUS BLD VENIPUNCTURE: CPT

## 2020-10-01 PROCEDURE — 6360000004 HC RX CONTRAST MEDICATION: Performed by: INTERNAL MEDICINE

## 2020-10-01 RX ADMIN — IOPAMIDOL 75 ML: 755 INJECTION, SOLUTION INTRAVENOUS at 08:40

## 2020-10-09 ENCOUNTER — ANTI-COAG VISIT (OUTPATIENT)
Dept: CARDIOLOGY CLINIC | Age: 70
End: 2020-10-09
Payer: MEDICARE

## 2020-10-09 LAB
BASOPHILS ABSOLUTE: 47 CELLS/UL (ref 0–200)
BASOPHILS RELATIVE PERCENT: 0.6 %
EOSINOPHILS ABSOLUTE: 257 CELLS/UL (ref 15–500)
EOSINOPHILS RELATIVE PERCENT: 3.3 %
HCT VFR BLD CALC: 37.1 % (ref 35–45)
HEMOGLOBIN: 11.7 G/DL (ref 11.7–15.5)
INR BLD: 3.5
LYMPHOCYTES ABSOLUTE: 1771 CELLS/UL (ref 850–3900)
LYMPHOCYTES RELATIVE PERCENT: 22.7 %
MCH RBC QN AUTO: 25.6 PG (ref 27–33)
MCHC RBC AUTO-ENTMCNC: 31.5 G/DL (ref 32–36)
MCV RBC AUTO: 81.2 FL (ref 80–100)
MONOCYTES ABSOLUTE: 655 CELLS/UL (ref 200–950)
MONOCYTES RELATIVE PERCENT: 8.4 %
NEUTROPHILS ABSOLUTE: 5070 CELLS/UL (ref 1500–7800)
PDW BLD-RTO: 17.2 % (ref 11–15)
PLATELET # BLD: 194 THOUSAND/UL (ref 140–400)
PMV BLD AUTO: 10.2 FL (ref 7.5–12.5)
PROTHROMBIN TIME: 34.7 SEC (ref 9–11.5)
RBC # BLD: 4.57 MILLION/UL (ref 3.8–5.1)
SEGMENTED NEUTROPHILS RELATIVE PERCENT: 65 %
WBC # BLD: 7.8 THOUSAND/UL (ref 3.8–10.8)

## 2020-10-09 PROCEDURE — 93793 ANTICOAG MGMT PT WARFARIN: CPT | Performed by: NURSE PRACTITIONER

## 2020-10-19 ENCOUNTER — TELEPHONE (OUTPATIENT)
Dept: CARDIOLOGY CLINIC | Age: 70
End: 2020-10-19

## 2020-10-19 NOTE — TELEPHONE ENCOUNTER
Pt states she is having swelling feet and leg and left is being the worse. States she thinks it might be from pioglitazone. asking what she should do.

## 2020-10-20 NOTE — TELEPHONE ENCOUNTER
I spoke with pt and relayed message per  DGB. She stated that she already spoke with PCP and she stated that they sent a script for compression stockings and the were $85, she stated that she could not afford. I also recommended that she call them back and tell them. Sh was very aggravated that she felt that they weren't taking her seriously. I just recommended that she be upfront with them and tell them her preference.

## 2020-10-20 NOTE — TELEPHONE ENCOUNTER
Agree--Swelling is likely from pioglitazone. Can use support hose to reduce the swelling and elevate legs when sitting or discuss with PCP about changing you to alternative medication for the edema.

## 2020-10-21 ENCOUNTER — TELEPHONE (OUTPATIENT)
Dept: CARDIOLOGY CLINIC | Age: 70
End: 2020-10-21

## 2020-10-21 RX ORDER — NITROGLYCERIN 0.4 MG/1
0.4 TABLET SUBLINGUAL EVERY 5 MIN PRN
Qty: 25 TABLET | Refills: 3 | Status: SHIPPED | OUTPATIENT
Start: 2020-10-21 | End: 2021-07-20 | Stop reason: SDUPTHER

## 2020-10-26 ENCOUNTER — HOSPITAL ENCOUNTER (OUTPATIENT)
Dept: NON INVASIVE DIAGNOSTICS | Age: 70
Discharge: HOME | End: 2020-10-26
Payer: MEDICARE

## 2020-10-26 ENCOUNTER — TELEPHONE (OUTPATIENT)
Dept: CARDIOLOGY CLINIC | Age: 70
End: 2020-10-26

## 2020-10-26 LAB
LEFT VENTRICULAR EJECTION FRACTION HIGH VALUE: 60 %
LEFT VENTRICULAR EJECTION FRACTION MODE: NORMAL
LV EF: 50 %
LVEF MODALITY: NORMAL

## 2020-10-26 PROCEDURE — 93351 STRESS TTE COMPLETE: CPT

## 2020-10-26 PROCEDURE — 93320 DOPPLER ECHO COMPLETE: CPT

## 2020-10-26 NOTE — TELEPHONE ENCOUNTER
Called with pre procedure instructions. Instructed Mario Oakes to hold Coumadin for 5 days prior. She verbalized understanding.

## 2020-11-03 ENCOUNTER — TELEPHONE (OUTPATIENT)
Dept: CARDIOLOGY CLINIC | Age: 70
End: 2020-11-03

## 2020-11-03 NOTE — TELEPHONE ENCOUNTER
Spoke to pt she is very upset. She did not hold her coumadin because she is concerned whether or not insurance is going to cover the cath.

## 2020-11-09 ENCOUNTER — TELEPHONE (OUTPATIENT)
Dept: CARDIOLOGY CLINIC | Age: 70
End: 2020-11-09

## 2020-11-09 NOTE — TELEPHONE ENCOUNTER
Spoke with Janice Sprague and she is uncomfortable with having angiogram if she has not held her Coumadin for a full five days prior to procedure. Informed her BNN out next week, however Janice Sprague does not want to wait for procedure until [facility] return. Please schedule with another interventionalist next week for [facility]. She needs to hold Coumadin for 5 days prior and hold diuretic the morning of.

## 2020-11-09 NOTE — TELEPHONE ENCOUNTER
Spoke with Piedad Natarajan she did not take the coumadin today. Advised that she can still have the [address] on 11-12 and again reminded her not to take it Tues, Wed or Thurs. She said ok but she worried that she could bleed out for not being off of it for 5 days as she was originally advised. I told her that [facility] was fine with it as long as she didn't take it from today until after the procedure. She said ok and hung up. Not sure if [facility] would want to call her to confirm. Thank you.

## 2020-11-09 NOTE — TELEPHONE ENCOUNTER
Called patient and she is upset and argued with me and states that is not 5 days . She got a letter saying that she must be off the medication for 5 days . She states that she would like to reschedule or speak with [facility] in regards to why she was told 5 days and that's not what it is.

## 2020-11-12 ENCOUNTER — TELEPHONE (OUTPATIENT)
Dept: CARDIOLOGY CLINIC | Age: 70
End: 2020-11-12

## 2020-11-12 NOTE — TELEPHONE ENCOUNTER
Pt calling she is a little confused about which doctor is doing her procedure on the [address] [facility] call to advise thank you

## 2020-11-16 RX ORDER — ROSUVASTATIN CALCIUM 10 MG/1
10 TABLET, COATED ORAL DAILY
Qty: 30 TABLET | Refills: 3 | Status: SHIPPED | OUTPATIENT
Start: 2020-11-16 | End: 2021-03-22 | Stop reason: SDUPTHER

## 2020-11-16 NOTE — TELEPHONE ENCOUNTER
Pt called on answering service she is out of the following medication and needs refill today.  Pls call to advise Thank you      Medication Refill    Medication needing refilled: rosuvastatin (CRESTOR) 10 MG tablet    Dosage of the medication: 10 mg    How are you taking this medication (QD, BID, TID, QID, PRN): Take 1 tablet by mouth daily Dispense Generic name only per pt request     30 or 90 day supply called in: 80    Which Pharmacy are we sending the medication to?: Isreal Abarca [address] [place], [place] [phone]

## 2020-11-16 NOTE — TELEPHONE ENCOUNTER
RX APPROVAL:      Refill:   Requested Prescriptions      No prescriptions requested or ordered in this encounter      Last OV: 7/30/2020   Last EKG:    Last Labs:  Lab Results   Component Value Date    CHOL 135 05/08/2019    CHOL 83 05/08/2019    TRIG 94 05/08/2019    HDL 52 05/08/2019    HDL 52 05/21/2012    LDLCALC 65 05/08/2019    LABVLDL 29 09/06/2013     Lab Results   Component Value Date    ALT 22 05/08/2019    AST 23 05/08/2019       Plan and labs reviewed

## 2020-11-17 ENCOUNTER — HOSPITAL ENCOUNTER (OUTPATIENT)
Dept: CARDIAC CATH/INVASIVE PROCEDURES | Age: 70
Discharge: HOME | End: 2020-11-17
Attending: INTERNAL MEDICINE | Admitting: INTERNAL MEDICINE
Payer: MEDICARE

## 2020-11-17 VITALS
HEART RATE: 77 BPM | DIASTOLIC BLOOD PRESSURE: 84 MMHG | BODY MASS INDEX: 27.67 KG/M2 | RESPIRATION RATE: 16 BRPM | TEMPERATURE: 98.1 F | HEIGHT: 56 IN | SYSTOLIC BLOOD PRESSURE: 149 MMHG | WEIGHT: 123 LBS | OXYGEN SATURATION: 100 %

## 2020-11-17 LAB
ANION GAP SERPL CALCULATED.3IONS-SCNC: 10 MMOL/L (ref 3–16)
BASOPHILS ABSOLUTE: 0.1 K/UL (ref 0–0.2)
BASOPHILS RELATIVE PERCENT: 0.8 %
BUN BLDV-MCNC: 20 MG/DL (ref 7–20)
CALCIUM SERPL-MCNC: 10.8 MG/DL (ref 8.3–10.6)
CHLORIDE BLD-SCNC: 98 MMOL/L (ref 99–110)
CO2: 28 MMOL/L (ref 21–32)
CREAT SERPL-MCNC: 0.6 MG/DL (ref 0.6–1.2)
EOSINOPHILS ABSOLUTE: 0.3 K/UL (ref 0–0.6)
EOSINOPHILS RELATIVE PERCENT: 2.9 %
GFR AFRICAN AMERICAN: >60
GFR NON-AFRICAN AMERICAN: >60
GLUCOSE BLD-MCNC: 124 MG/DL (ref 70–99)
HCT VFR BLD CALC: 36.3 % (ref 36–48)
HEMOGLOBIN: 11.9 G/DL (ref 12–16)
INR BLD: 1.5 (ref 0.86–1.14)
LYMPHOCYTES ABSOLUTE: 1.5 K/UL (ref 1–5.1)
LYMPHOCYTES RELATIVE PERCENT: 17.6 %
MCH RBC QN AUTO: 26 PG (ref 26–34)
MCHC RBC AUTO-ENTMCNC: 32.6 G/DL (ref 31–36)
MCV RBC AUTO: 79.5 FL (ref 80–100)
MONOCYTES ABSOLUTE: 0.6 K/UL (ref 0–1.3)
MONOCYTES RELATIVE PERCENT: 7.3 %
NEUTROPHILS ABSOLUTE: 6.2 K/UL (ref 1.7–7.7)
NEUTROPHILS RELATIVE PERCENT: 71.4 %
PDW BLD-RTO: 17 % (ref 12.4–15.4)
PLATELET # BLD: 172 K/UL (ref 135–450)
PMV BLD AUTO: 7.7 FL (ref 5–10.5)
POTASSIUM SERPL-SCNC: 3.9 MMOL/L (ref 3.5–5.1)
RBC # BLD: 4.57 M/UL (ref 4–5.2)
SODIUM BLD-SCNC: 136 MMOL/L (ref 136–145)
WBC # BLD: 8.7 K/UL (ref 4–11)

## 2020-11-17 PROCEDURE — 80048 BASIC METABOLIC PNL TOTAL CA: CPT

## 2020-11-17 PROCEDURE — C1894 INTRO/SHEATH, NON-LASER: HCPCS

## 2020-11-17 PROCEDURE — 6360000002 HC RX W HCPCS

## 2020-11-17 PROCEDURE — 36415 COLL VENOUS BLD VENIPUNCTURE: CPT

## 2020-11-17 PROCEDURE — 85025 COMPLETE CBC W/AUTO DIFF WBC: CPT

## 2020-11-17 PROCEDURE — 99153 MOD SED SAME PHYS/QHP EA: CPT

## 2020-11-17 PROCEDURE — C1769 GUIDE WIRE: HCPCS

## 2020-11-17 PROCEDURE — 2500000003 HC RX 250 WO HCPCS

## 2020-11-17 PROCEDURE — 99024 POSTOP FOLLOW-UP VISIT: CPT | Performed by: INTERNAL MEDICINE

## 2020-11-17 PROCEDURE — 93454 CORONARY ARTERY ANGIO S&I: CPT

## 2020-11-17 PROCEDURE — 93005 ELECTROCARDIOGRAM TRACING: CPT | Performed by: INTERNAL MEDICINE

## 2020-11-17 PROCEDURE — 2709999900 HC NON-CHARGEABLE SUPPLY

## 2020-11-17 PROCEDURE — 93454 CORONARY ARTERY ANGIO S&I: CPT | Performed by: INTERNAL MEDICINE

## 2020-11-17 PROCEDURE — 99152 MOD SED SAME PHYS/QHP 5/>YRS: CPT

## 2020-11-17 PROCEDURE — 85610 PROTHROMBIN TIME: CPT

## 2020-11-17 NOTE — PRE SEDATION
Brief Pre-Op Note/Sedation Assessment      [facility]  1950  Cath/NONE      [id number]  1:06 PM    Planned Procedure: Cardiac Catheterization Procedure    Post Procedure Plan: Return to same level of care    Consent: I have discussed with the patient and/or the patient representative the indication, alternatives, and the possible risks and/or complications of the planned procedure and the anesthesia methods. The patient and/or patient representative appear to understand and agree to proceed. Chief Complaint: Dyspnea on Exertion  Dyspnea      Indications for Cath Procedure:  New Onset Angina <= 2 months, Worsening Angina and Suspected CAD  Anginal Classification within 2 weeks:  CCS III - Symptoms with everyday living activities, i.e., moderate limitation  NYHA Heart Failure Class within 2 weeks: No symptoms  Is Cath Lab Visit Valve-related?: No  Surgical Risk: Intermediate  Functional Type: >= 4 METS with symptoms    Anti- Anginal Meds within 2 weeks:   Yes: Beta Blockers and Aspirin    Stress or Imaging Studies Performed:  Stress Echo Result: Positive:  apical Risk/Extent of Ischemia:  Intermediate     Vital Signs:  BP (!) 149/84   Pulse 77   Temp 98.1 °F (36.7 °C)   Resp 16   Ht 4' 8\" (1.422 m)   Wt 123 lb (55.8 kg)   LMP 01/01/1983   SpO2 100%   BMI 27.58 kg/m²     Allergies:  No Known Allergies    Past Medical History:  Past Medical History:   Diagnosis Date    AF (atrial fibrillation) (HCC)     on anticoagulation    Back pain     CAD (coronary artery disease)     Compression fracture 01/01/1994    Hyperlipidemia     Hypertension     Myelolipoma     bilateral adrenals    Right ear injury 01/01/1983    Per pt.     Shingles     Valvular disease          Surgical History:  Past Surgical History:   Procedure Laterality Date    CORONARY ANGIOPLASTY WITH STENT PLACEMENT      CORONARY ANGIOPLASTY WITH STENT PLACEMENT  03/14/2003 & 06/02/2003    per pt    HYSTERECTOMY  09/01/1983 fibroids    TUBAL LIGATION  09/1983    VENTRAL HERNIA REPAIR  5-    VENTRAL HERNIA REPAIR WITH MESH                Medications:  No current facility-administered medications for this encounter. Pre-Sedation:    Pre-Sedation Documentation and Exam:  I have personally completed a history, physical exam & review of systems for this patient (see notes). Prior History of Anesthesia Complications:   none    Modified Mallampati:  II (soft palate, uvula, fauces visible)    ASA Classification:  Class 2 - A normal healthy patient with mild systemic disease      Samuel Scale: Activity:  2 - Able to move 4 extremities voluntarily on command  Respiration:  2 - Able to breathe deeply and cough freely  Circulation:  2 - BP+/- 20mmHg of normal  Consciousness:  2 - Fully awake  Oxygen Saturation (color):  2 - Able to maintain oxygen saturation >92% on room air    Sedation/Anesthesia Plan:  Guard the patient's safety and welfare. Minimize physical discomfort and pain. Minimize negative psychological responses to treatment by providing sedation and analgesia and maximize the potential amnesia. Patient to meet pre-procedure discharge plan.     Medication Planned:  midazolam intravenously and fentanyl intravenously    Patient is an appropriate candidate for plan of sedation: yes      Electronically signed by Marichuy Mackay MD on 11/17/2020 at 1:06 PM

## 2020-11-17 NOTE — H&P
[facility]   Cardiac Followup     Referring Provider:  Marc Zayas DO          Chief Complaint   Patient presents with    Atrial Fibrillation    Hypertension         History of Present Illness:  Ms Opal Uribe is seen today as a follow up to evaluate CAD, htn,hchol, MR, atrial fib. She has a small PFO . In the interval since her last visit, she decreased her asa to Monday Wed Friday. Despite this she still has bruising on her arms and legs. They have not worsened. However, she would like to discuss other treatment options  She has no chest pain, change in exertional sob palpitations or dizziness.        Today Continues to work as a  until 2 am.        Patient is compliant  with medication. Easily bruises on coumadin and asa         Past Medical History:   has a past medical history of AF (atrial fibrillation) ([address] Back pain, CAD (coronary artery disease), Compression fracture, Hyperlipidemia, Hypertension, Myelolipoma, Right ear injury, Shingles, and Valvular disease.     Surgical History:   has a past surgical history that includes Coronary angioplasty with stent; Coronary angioplasty with stent (03/14/2003 & 06/02/2003); Hysterectomy (09/01/1983); Tubal ligation (09/1983); and ventral hernia repair (5-).    Social History:   reports that she quit smoking about 6 years ago. Her smoking use included cigarettes. She quit after 40.00 years of use.  She has never used smokeless tobacco. She reports that she does not drink alcohol or use drugs.      Family History:  family history includes Heart Attack (age of onset: 64) in her mother; Heart Attack (age of onset: 76) in her father; Heart Disease in her father.      Current Facility-Administered Medications          Current Outpatient Medications   Medication Sig Dispense Refill    atenolol (TENORMIN) 25 MG tablet Take 1 tablet by mouth daily 90 tablet 3    aspirin 81 MG tablet Take 1 tablet by mouth daily M 30 tablet 3    warfarin (COUMADIN) 5 MG tablet Indications: Restart on 3/23/19 TAKE ONE TABLET BY MOUTH DAILY or as directed 90 tablet 3    terbinafine (LAMISIL) 250 MG tablet Take 250 mg by mouth daily        metOLazone (ZAROXOLYN) 5 MG tablet TAKE ONE TABLET BY MOUTH DAILY, PRN 30 tablet 6    potassium chloride (KLOR-CON M) 20 MEQ extended release tablet TAKE ONE TABLET BY MOUTH DAILY 90 tablet 9    rosuvastatin (CRESTOR) 10 MG tablet Take 1 tablet by mouth daily Dispense Generic name only per pt request 30 tablet 11    nitroGLYCERIN (NITROSTAT) 0.4 MG SL tablet Place 1 tablet under the tongue every 5 minutes as needed for Chest pain 25 tablet 3    Specialty Vitamins Products (VITAMINS FOR THE HAIR PO) Take by mouth daily        Magnesium Oxide 250 MG TABS Take 2 tablets by mouth daily 30 tablet 5    Simethicone (GAS RELIEF) 180 MG CAPS Take  by mouth as needed.          No current facility-administered medications for this visit. Allergies:  Patient has no known allergies.      Review of Systems:   · Constitutional: there has been no unanticipated weight loss. Complains of fatigue  · Eyes: No visual changes or diplopia. No scleral icterus. · ENT: No Headaches, hearing loss or vertigo. No mouth sores or sore throat. · Cardiovascular: Reviewed in HPI  · Respiratory: No cough or wheezing, no sputum production. No hematemesis. · Gastrointestinal: No abdominal pain, appetite loss, blood in stools. No change in bowel or bladder habits. · Genitourinary: No dysuria, trouble voiding, or hematuria. · Musculoskeletal:  No gait disturbance, weakness or joint complaints. · Integumentary: No rash or pruritis. · Neurological: No headache, diplopia, change in muscle strength, numbness or tingling. No change in gait, balance, coordination, mood, affect, memory, mentation, behavior. · Psychiatric: No anxiety, no depression. · Endocrine: No malaise, fatigue or temperature intolerance.  No excessive thirst, fluid intake, or urination. No tremor. · Hematologic/Lymphatic: No abnormal bruising or bleeding, blood clots or swollen lymph nodes. · Allergic/Immunologic: + nasal congestion     Physical Examination:      ·   Vitals: ·     · 07/30/20 1333 ·   BP: · 126/80 ·   Pulse: · 80 ·   Constitutional and General Appearance:   · . NAD   · SKIN:  · . Warm and dry  · EYES:    · . EOMI  · Neck:   · . Normal carotid contour  · Respiratory:  · Normal excursion and expansion without use of accessory muscles  · Resp Auscultation: Normal breath sounds without dullness  · Cardiovascular:  · The apical impulses not displaced  · Heart tones are crisp and normal  · Cervical veins are not engorged  · Normal S1S2, No S3, soft systolic Murmur  · Peripheral pulses are symmetrical and full  · Extremities:  · There is no clubbing, cyanosis of the extremities. · No edema  · Femoral Arteries: 2+ and equal  · Pedal Pulses: 2+ and equal   · Abdomen:  · No masses or tenderness  · Liver/Spleen: No Abnormalities Noted  · Neurological/Psychiatric:  · Alert and oriented in all spheres  · No abnormalities of mood, affect, memory        All testing and labs listed below were personally reviewed.     4/2007 Carotid US <40%     10/12 ct of abd--no aaa  . 10/29/13  CT of chest--no pulmonary nodules, mild left basilar atelectasis versus fibrosis, stable bilateral lipomatous lesions of the adrenal glands  Consistent with myelolipomas     11/18/14  Angiogram: 90% ostial RCA--3.0x12 [place] to 18 HATTIE--0 residual, Mid RCA stent patent, Normal LV FXN, 2+ MR  Assessment:   CAD  11/2014 stent to RCA  Stable cardiac status  On asa -easily bruises  3/21/19 platelet count 519     htn  /80 (Site: Right Upper Arm, Position: Sitting, Cuff Size: Medium Adult)   Pulse 80   Ht 4' 8\" (1.422 m)   Wt 123 lb 4.8 oz (55.9 kg)   LMP 01/01/1983   BMI 27.64 kg/m²      Stable hypertension.  Tolerating medications     hchol  Will repeat LLL on crestor 10 mg daily  5/8/2019  Tc 83  Tc 135 hdl 46 ldl 65     MR  4/23/19  Echo mild to moderate MR     PVD   PTA left SVA and popliteal  Artery 3/21/19  No claudication  Followes with Dr Adelle Mcburney     AF  [id number] Score for Atrial Fibrillation Stroke Risk    Risk   Factors   Component Value   C CHF No 0   H HTN Yes 1   A2 Age >= 76 No,  (78 y.o.) 0   D DM No 0   S2 Prior Stroke/TIA No 0   V Vascular Disease No 1   A Age 74-69 Yes,  (68 y.o.) 1   Sc Sex female 1     [id number]  Score   4   Score last updated 7/30/20 1:51 PM EDT  On coumadin with routine checks  Discussed optional treatment of  Xarelto/eliquis, instead of coumadin, and, or evaluation for watchman. She is  Not interested in the [place]. Does not want to have a stroke. Will investigate cost of xarelto eliquis     Bruising  Will  Decrease asa to Monday Wed Friday  Will do a cbc           Occlusion and stenosis of bilateral carotid arteries  5/19 less than 50% bilateral  6/2020 carotids--less than 50% bilateral        Plan:  Stress echo to evaluate sob and valvular function  CBC  She will investigate the cost of eliquis/xarelto, if interested in switching from coumadin she will let us know  Call for any changes  Follow up with Dr Nely Holm:  Cecilia Vargas, will scribing for and in the presence of Evelia Crystal MD.   Selina Cavazos 07/30/20 1:45 PM   Provider Veronica Espinoza is working as a scribe for and in the presence of leoncio Crystal MD). Working as a scribe, Lior Jacek navarro have prepopulated components of this note with my historical  intellectual property under my direct supervision. Any additions to this intellectual property were performed in my presence and at my direction. Furthermore, the content and accuracy of this note have been reviewed by me Evelia Crystal MD).             JUNITO Grossman M.D., [address] [facility].          echo   Normal left ventricle size, wall thickness and systolic function with an   estimated ejection fraction of 60%. No regional wall motion abnormalities are seen. Indeterminate diastolic function. There is mild-to-moderate mitral regurgitation. Aortic valve appears sclerotic but opens adequately. There is mild tricuspid regurgitation with a RVSP estimation of 45-50 mmHg. Pulmonary hypertension. The right ventricle is normal in size. RV systolic function appears to be at   least moderately reduced. TAPSE is estimated at 1.1 cm. Color flow seen crossing the atrial septum suggesting a left-to-right   intracardiac shunt. Biatrial enlargement.                     I have reviewed the history and physical and examined the patient. Abnormal stress echo. Poor exercise tolerance. Normal resting blood pressure with blunted response to exercise. 1 mm ST horizontal depressions inferolaterally with stress consistent with ischemia. Ischemic changes extended throughout recovery. Normal echocardiographic response to stress, but abnormal EKG response. I have reviewed with the patient and/or family the risks, benefits, and alternatives to the procedure. Based on these findings I recommend left heart cath for definitive evaluation of coronary arteries. Risks, benefits, expectations, and alternative treatments were discussed. Questions appropriately answered. Genna Chiu agrees to proceed and verbalized understanding.        Zach Jackson 11/17/2020 1:00 PM

## 2020-11-17 NOTE — LETTER
[facility] Cath Lab  [phone]  Phone: [employer] [phone] CATH LAB ROOM 1        November 17, 2020     Patient: Kimmie Tamez   YOB: 1950   Date of Visit: 11/17/2020       To Whom It May Concern: It is my medical opinion that Randy Barth can return to work Monday, Thursday, November 23, 2020. If you have any questions or concerns, please don't hesitate to call.     Sincerely,        [facility] CARDIAC CATH LAB ROOM 1

## 2020-11-17 NOTE — OP NOTE
Patient:  Vik Chiu   :   1950    Procedural Summary  ~Consent:   Obtained written and verbal consent      Risks/benefits explained in detail  ~Procedure:    Left Heart Catheterization  ~Medications:    Procedural sedation with minimal conscious sedation  ~Complications:   None  ~Blood Loss:    <10cc  ~Specimens:    None obtained  ~Pre-sedation re-evaluation: Performed immediately prior to procedure. Medication and Procedural Reconciliation:  An independent trained observer pushed medications at my direction. We monitored the patient's level of consciousness and vital signs/physiologic status throughout the procedure duration (see start and stop times below). Sedation: 2.5 mg Versed, 125 mcg Fentanyl  Sedation start: 1344  Sedation stop: 1424    Cardiac Cath : Anatomy:   LM-nml   LAD-proximal 90% calcified, distal 90%  Cx-proximal 80%  OM- nml  RCA-dominant. Ostial stent 60% ISR, mid stent 99% ISR      Contrast: 48  Flouro Time: 3.6  Access: R Radial artery. Small size artery. Do not attempt radial in the future    Impression  ~Coronary Angiography w/ severe MVD        Recommendation  ~Aggressive medical treatment and risk factor modification  ~CABG. Check echo. Refer to CT surgery as an outpatient.           Marichuy Mackay MD 2020 2:32 PM

## 2020-11-17 NOTE — LETTER
[facility] Cath Lab  [phone]  Phone: [employer] CATH LAB ROOM 1        November 17, 2020     Patient: Vik Chiu   YOB: 1950   Date of Visit: 11/17/2020       To Whom It May Concern: It is my medical opinion that Harlan Leyden may return to work on Monday November 23, 2020. If you have any questions or concerns, please don't hesitate to call.     Sincerely,        [facility] CARDIAC CATH LAB ROOM 1

## 2020-11-18 LAB
EKG ATRIAL RATE: 70 BPM
EKG DIAGNOSIS: NORMAL
EKG Q-T INTERVAL: 388 MS
EKG QRS DURATION: 74 MS
EKG QTC CALCULATION (BAZETT): 439 MS
EKG R AXIS: 35 DEGREES
EKG T AXIS: 20 DEGREES
EKG VENTRICULAR RATE: 77 BPM

## 2020-11-18 PROCEDURE — 93010 ELECTROCARDIOGRAM REPORT: CPT | Performed by: INTERNAL MEDICINE

## 2020-12-03 ENCOUNTER — TELEPHONE (OUTPATIENT)
Dept: CARDIOTHORACIC SURGERY | Age: 70
End: 2020-12-03

## 2020-12-03 ENCOUNTER — OFFICE VISIT (OUTPATIENT)
Dept: CARDIOTHORACIC SURGERY | Age: 70
End: 2020-12-03
Payer: MEDICARE

## 2020-12-03 VITALS
HEIGHT: 56 IN | WEIGHT: 135 LBS | OXYGEN SATURATION: 98 % | HEART RATE: 75 BPM | TEMPERATURE: 97.2 F | SYSTOLIC BLOOD PRESSURE: 110 MMHG | DIASTOLIC BLOOD PRESSURE: 70 MMHG | BODY MASS INDEX: 30.37 KG/M2

## 2020-12-03 PROCEDURE — 1123F ACP DISCUSS/DSCN MKR DOCD: CPT | Performed by: THORACIC SURGERY (CARDIOTHORACIC VASCULAR SURGERY)

## 2020-12-03 PROCEDURE — 1036F TOBACCO NON-USER: CPT | Performed by: THORACIC SURGERY (CARDIOTHORACIC VASCULAR SURGERY)

## 2020-12-03 PROCEDURE — 3017F COLORECTAL CA SCREEN DOC REV: CPT | Performed by: THORACIC SURGERY (CARDIOTHORACIC VASCULAR SURGERY)

## 2020-12-03 PROCEDURE — G8484 FLU IMMUNIZE NO ADMIN: HCPCS | Performed by: THORACIC SURGERY (CARDIOTHORACIC VASCULAR SURGERY)

## 2020-12-03 PROCEDURE — 4040F PNEUMOC VAC/ADMIN/RCVD: CPT | Performed by: THORACIC SURGERY (CARDIOTHORACIC VASCULAR SURGERY)

## 2020-12-03 PROCEDURE — 1090F PRES/ABSN URINE INCON ASSESS: CPT | Performed by: THORACIC SURGERY (CARDIOTHORACIC VASCULAR SURGERY)

## 2020-12-03 PROCEDURE — 99214 OFFICE O/P EST MOD 30 MIN: CPT | Performed by: THORACIC SURGERY (CARDIOTHORACIC VASCULAR SURGERY)

## 2020-12-03 PROCEDURE — G8427 DOCREV CUR MEDS BY ELIG CLIN: HCPCS | Performed by: THORACIC SURGERY (CARDIOTHORACIC VASCULAR SURGERY)

## 2020-12-03 PROCEDURE — G8417 CALC BMI ABV UP PARAM F/U: HCPCS | Performed by: THORACIC SURGERY (CARDIOTHORACIC VASCULAR SURGERY)

## 2020-12-03 PROCEDURE — G8400 PT W/DXA NO RESULTS DOC: HCPCS | Performed by: THORACIC SURGERY (CARDIOTHORACIC VASCULAR SURGERY)

## 2020-12-03 RX ORDER — NICOTINE POLACRILEX 2 MG
GUM BUCCAL
COMMUNITY
End: 2021-06-28

## 2020-12-03 NOTE — PROGRESS NOTES
EC 81 MG EC tablet Monday Wed Friday 90 tablet 1    atenolol (TENORMIN) 25 MG tablet Take 1 tablet by mouth daily 90 tablet 3    warfarin (COUMADIN) 5 MG tablet Indications: Restart on 3/23/19 TAKE ONE TABLET BY MOUTH DAILY or as directed (Patient taking differently: Indications: Restart on 3/23/19 TAKE ONE TABLET BY MOUTH DAILY or as directed Dr Adriana Florez goes to [facility] lab) 90 tablet 3    terbinafine (LAMISIL) 250 MG tablet Take 250 mg by mouth daily      potassium chloride (KLOR-CON M) 20 MEQ extended release tablet TAKE ONE TABLET BY MOUTH DAILY 90 tablet 9    Specialty Vitamins Products (VITAMINS FOR THE HAIR PO) Take by mouth daily      Magnesium Oxide 250 MG TABS Take 2 tablets by mouth daily 30 tablet 5    Simethicone (GAS RELIEF) 180 MG CAPS Take  by mouth as needed. No current facility-administered medications for this visit. Allergies:  Actos [pioglitazone] and Percocet [oxycodone-acetaminophen]    Social History:    TOBACCO:   reports that she quit smoking about 6 years ago. Her smoking use included cigarettes. She quit after 40.00 years of use. She has never used smokeless tobacco.  ETOH:   reports no history of alcohol use. DRUGS:   reports no history of drug use.   LIFESTYLE: active at work    MARITAL STATUS:    OCCUPATION:      Family History:        Problem Relation Age of Onset    Heart Attack Father 76    Heart Disease Father         complications from surgery, per pt    Heart Attack Mother 64       REVIEW OF SYSTEMS:      Personally reviewed and agree with Shalini Castelan's progress note from 12/3/2020    PHYSICAL EXAM:    VITALS:  /70 (Site: Left Upper Arm)   Pulse 75   Temp 97.2 °F (36.2 °C) (Infrared)   Ht 4' 8\" (1.422 m)   Wt 135 lb (61.2 kg)   LMP 01/01/1983   SpO2 98%   BMI 30.27 kg/m²     Eyes:  lids and lashes normal, pupils equal and round, extra ocular muscles intact, sclera clear, conjunctiva normal    Head/ENT:  Normocephalic, atraumatic, edentulous with full upper and lower dentures, normal gums, & palate, oropharynx without erythema or exudates    Neck:  supple, symmetrical, trachea midline, no lymphadenopathy, no jugular venous distension, no carotid bruits and MASSES:  no masses    Lungs:  no increased work of breathing, good air exchange, no retractions and clear to auscultation, no crackles or wheezing, no palpable / percussible abnormalities    Cardiovascular:  normal apical impulse and regularly irregular rhythm    Pulses:  Right dorsalis pedis N/A, Left dorsalis pedis N/A, Right posterior tibial N/A, Left Posterior tibial N/A, Right Femoral 1, Left Femoral 1, Right radial 1, and Left radial 1  Feet warm with prompt capillary refill    Abdomen:  Soft, normal bowel sounds, non-tender, no hepatosplenomegaly, aorta likely normal and bruits absent    Musculoskeletal:  Back is straight and non-tender,  No CVAT, full ROM of upper and lower extremities.     Extremities:   No clubbing, or cyanosis, or edema     Skin: warm and normal turgor, no ulcers, infections, or rashes, no jaundice, well-healed umbilical hernia repair scar    Neurological: awake, alert and oriented x 3, motor 5/5 bilateral upper and lower extremities, sensation grossly intact    Psychiatric: Mood and affect appear appropriate    DATA:    CBC:   Lab Results   Component Value Date    WBC 8.7 11/17/2020    RBC 4.57 11/17/2020    HGB 11.9 11/17/2020    HCT 36.3 11/17/2020    MCV 79.5 11/17/2020    MCH 26.0 11/17/2020    MCHC 32.6 11/17/2020    RDW 17.0 11/17/2020     11/17/2020    MPV 7.7 11/17/2020     BMP:    Lab Results   Component Value Date     11/17/2020    K 3.9 11/17/2020    CL 98 11/17/2020    CO2 28 11/17/2020    BUN 20 11/17/2020    LABALBU 3.9 05/08/2019    LABALBU 3.9 05/08/2019    CREATININE 0.6 11/17/2020    CALCIUM 10.8 11/17/2020    GFRAA >60 11/17/2020    GFRAA >60 05/20/2013    LABGLOM >60 11/17/2020    LABGLOM 77 04/11/2018    GLUCOSE 124 11/17/2020 ECHO/YESY: personally mild to moderate MR with biatrial enlargement,  PFO and pulmonary HTN -- MR appeared worse on stress echo  Other diagnostic test:  [facility] personally reviewed 3V CAD    ASSESSMENT AND PLAN:    3V CAD, h/o a fib, MR questionably significant in setting of a fib and atrial enlargement, pulmonary hypertension, DM, HTN, HLD, PAD, significant h/o tobacco use    I discussed the R/B/Alt/E of CABG with ligation XENA and blood transfusion only with the patient and her daughter and answered all their questions. I will get PFT's with room air ABG and non-contrast CT scan of the chest to better assess risk of surgery. I will discuss the patient further with cardiology in regards to a fib and mitral valve. The patient would like surgery after the holidays, so we have time for further work-up.     Electronically signed by Bird Luque MD on 12/3/2020 at 2:15 PM

## 2020-12-03 NOTE — TELEPHONE ENCOUNTER
Spoke with patient in the office regarding schedule of pulmonary function testing and CT chest without contrast on 12/16/2020 @ 8:30 am with arrival time of 8:00 am for the CT chest without contrast and at 10:00 am for the PFT's/ Instructions of No food or drinks 2 hours prior, No inhaler/smoking 4 hours prior, No caffeine after midnight and to wear loose fitted clothing relayed to patient regarding PFT's. Patient is scheduled for a COVID-19 swab on 12/10/2020 at 9:00 am at [place] in preparation for PFT's.

## 2020-12-10 ENCOUNTER — OFFICE VISIT (OUTPATIENT)
Dept: PRIMARY CARE CLINIC | Age: 70
End: 2020-12-10
Payer: MEDICARE

## 2020-12-10 PROCEDURE — G8417 CALC BMI ABV UP PARAM F/U: HCPCS | Performed by: NURSE PRACTITIONER

## 2020-12-10 PROCEDURE — 99211 OFF/OP EST MAY X REQ PHY/QHP: CPT | Performed by: NURSE PRACTITIONER

## 2020-12-10 PROCEDURE — G8428 CUR MEDS NOT DOCUMENT: HCPCS | Performed by: NURSE PRACTITIONER

## 2020-12-10 NOTE — PATIENT INSTRUCTIONS

## 2020-12-11 LAB
REASON FOR REJECTION: NORMAL
REJECTED TEST: NORMAL

## 2020-12-15 ENCOUNTER — TELEPHONE (OUTPATIENT)
Dept: CARDIOTHORACIC SURGERY | Age: 70
End: 2020-12-15

## 2020-12-15 ENCOUNTER — TELEPHONE (OUTPATIENT)
Dept: FAMILY MEDICINE CLINIC | Age: 70
End: 2020-12-15

## 2020-12-15 NOTE — TELEPHONE ENCOUNTER
Spoke at length with patient- aware that covid testing 11Dec not completed due to inadequate specimen. JAN Yeung aware to follow with pt and assess next steps regarding PFTs and CT Chest scheduled for 16Dec. Advised pt to await call from scheduling team. Verb understanding.

## 2020-12-15 NOTE — TELEPHONE ENCOUNTER
Per dr Claritza Mota, PFT's with room air ABG and non-contrast CT scan of the chest to better assess risk of surgery.     Orders placed and faxed per request to [facility]: fax - [phone]

## 2020-12-15 NOTE — TELEPHONE ENCOUNTER
Notified patient of QNS result, lab reported that the specimen leaked in transit. Advised to  doing her procedures.

## 2020-12-16 ENCOUNTER — HOSPITAL ENCOUNTER (OUTPATIENT)
Dept: CT IMAGING | Age: 70
Discharge: HOME | End: 2020-12-16
Payer: MEDICARE

## 2020-12-16 LAB
BASE EXCESS ARTERIAL: 3.3 MMOL/L (ref -3–3)
CARBOXYHEMOGLOBIN ARTERIAL: 0.9 % (ref 0–1.5)
HCO3 ARTERIAL: 27.8 MMOL/L (ref 21–29)
HEMOGLOBIN, ART, EXTENDED: 11.8 G/DL (ref 12–16)
METHEMOGLOBIN ARTERIAL: 0.2 %
O2 CONTENT ARTERIAL: 16 ML/DL
O2 SAT, ARTERIAL: 98 %
O2 THERAPY: ABNORMAL
PCO2 ARTERIAL: 41.1 MMHG (ref 35–45)
PH ARTERIAL: 7.44 (ref 7.35–7.45)
PO2 ARTERIAL: 91.3 MMHG (ref 75–108)
SARS-COV-2, NAAT: NOT DETECTED
TCO2 ARTERIAL: 65.1 MMOL/L

## 2020-12-16 PROCEDURE — 82803 BLOOD GASES ANY COMBINATION: CPT

## 2020-12-16 PROCEDURE — 36415 COLL VENOUS BLD VENIPUNCTURE: CPT

## 2020-12-16 PROCEDURE — U0002 COVID-19 LAB TEST NON-CDC: HCPCS

## 2020-12-16 PROCEDURE — 71250 CT THORAX DX C-: CPT

## 2020-12-17 ENCOUNTER — TELEPHONE (OUTPATIENT)
Dept: CARDIOTHORACIC SURGERY | Age: 70
End: 2020-12-17

## 2020-12-17 ENCOUNTER — TELEPHONE (OUTPATIENT)
Dept: CARDIOLOGY CLINIC | Age: 70
End: 2020-12-17

## 2020-12-17 NOTE — TELEPHONE ENCOUNTER
Dr. Judene Bosworth reviewed the CT scan of chest and PFTs that were done yesterday. The PFTs were good, The CT of chest and echo has showed a very calcified aorta. Dr. Judene Bosworth spoke with Dr. Hi Kendall. It was decided not to proceed with CABG. Dr. Hi Kendall felt it would be best to do a PCI w/ stent. I called Ms. Jennifer Ortiz and let her know that the open CABG surgery was not in her best interest due to the danger of having to cross clamp the calcified aorta for bypass. I told her that 's office would be contacting her. She wanted everyone to   Know that she leaves for work at 4:30 pm if they need to reach her.

## 2020-12-17 NOTE — TELEPHONE ENCOUNTER
Spoke with shayla regarding planned pci. She agrees to procedure and would like a phone call tomorrow morning to schedule it bc she is leaving for work now.

## 2020-12-18 ENCOUNTER — TELEPHONE (OUTPATIENT)
Dept: CARDIOLOGY CLINIC | Age: 70
End: 2020-12-18

## 2020-12-23 PROBLEM — E78.5 HYPERLIPIDEMIA: Status: ACTIVE | Noted: 2020-12-23

## 2020-12-23 PROBLEM — I10 ESSENTIAL HYPERTENSION: Status: ACTIVE | Noted: 2020-12-23

## 2021-01-06 ENCOUNTER — TELEPHONE (OUTPATIENT)
Dept: CARDIOLOGY CLINIC | Age: 71
End: 2021-01-06

## 2021-01-06 NOTE — TELEPHONE ENCOUNTER
Spoke with the patient and advised her of the message below per [facility] .  Patient voiced understanding

## 2021-01-08 ENCOUNTER — TELEPHONE (OUTPATIENT)
Dept: CARDIOTHORACIC SURGERY | Age: 71
End: 2021-01-08

## 2021-01-08 ENCOUNTER — TELEPHONE (OUTPATIENT)
Dept: CARDIOLOGY CLINIC | Age: 71
End: 2021-01-08

## 2021-01-08 NOTE — TELEPHONE ENCOUNTER
Balbir Stallings, I work for [facility] (Dr. Colton Mayberry office). Ms. Evelyn Herrera called our office today regarding the PFT's that were ordered by your office on 12/15/2020. She had them done along with ABG's on 12/16/20. However, central scheduling has apparently scheduled them again on 1/21/2021; when Ms. Evelyn Herrera questioned them, she was told that she had been a \"no show. \" Under the Encounter tab, it says she no showed on 12/16/2020 (as well as showing canceled per provider). I see the results in Epic and also saw your tel enc dated 12/17/20. I did not call central scheduling as we are not the ordering provider. Unless she has to have them repeated, are you able to cancel them? She is very upset by the test being rescheduled. I promised her I would call her back Monday to let her know they have been cancelled or if there is some reason they need repeated. Thank you so much.

## 2021-01-11 NOTE — TELEPHONE ENCOUNTER
I spoke to Fidelina Correa this morning (Dr. Guy Zuñiga nurse). She has sent me a reply but I have not received yet at the time of this encounter. She agreed that the PFT's were done, can see the results in Epic. She does not know why Central Scheduling insisted on rescheduling them. I told her I would call CS and cancel them. I spoke to CS rep and discussed the situation. She does not know why the PFT's and chest CT were listed as cancelled even though they were done on 12/16/2020. She cancelled the PFT's scheduled on 1/21/2021. I spoke to Berlin Greene and told her all of the above. I reminded her of her upcoming yajaira't with Dr. Earlene Wilburn 2/5/2021 at 2pm. She needs her daughter with her to help with remembering any instructions and what she was told by the physician.

## 2021-01-13 ENCOUNTER — TELEPHONE (OUTPATIENT)
Dept: CARDIOLOGY CLINIC | Age: 71
End: 2021-01-13

## 2021-01-13 DIAGNOSIS — I48.91 ATRIAL FIBRILLATION, UNSPECIFIED TYPE (HCC): Primary | ICD-10-CM

## 2021-01-13 DIAGNOSIS — Z79.01 LONG TERM (CURRENT) USE OF ANTICOAGULANTS: ICD-10-CM

## 2021-01-13 NOTE — TELEPHONE ENCOUNTER
PT calling wanting a lab order for her PT/INR sent to [facility] [place] at [facility].  Pls call pt to advise Thank you

## 2021-01-15 ENCOUNTER — TELEPHONE (OUTPATIENT)
Dept: CARDIOLOGY CLINIC | Age: 71
End: 2021-01-15

## 2021-01-15 NOTE — TELEPHONE ENCOUNTER
I spoke to [facility] from Respiratory; she performed Leighann's PFT's on 12/16/20. She explained that if a patient's pre PFT's are normal with an SAV1 >80, they do not do the post PFT's (with bronchodilator). Leighann's SAV1 was 82 so she did not need the bronchodilator. I then spoke to [employer] and discussed with her. She said Halima Frost has been receiving robo-calls re the PFT's with bronch as well as a brain MRI and Dexascan (both ordered by Dr. Norbert Mcdaniels). She took the PFT out of the work queue which should stop the robo-call but she would still receive the other reminders. I called Halima Frost and explained the above. She said the MRI was cancelled because she has a peripheral stent; instead, she had a brain CT 10/1/2020 instead. I told her she would have to talk to Dr. Hadley Ayala office re stopping the reminder calls and to ask if he still wants her to schedule the [address].

## 2021-01-15 NOTE — TELEPHONE ENCOUNTER
Asking to speak to Marie Finch about her PFT . The hospital scheduling dept keeps calling her to rs her PFT with medication . Patient doesn't understand why they are calling to schedule a test she already had . The tech that did her PFT told her her lungs were normal , but then a doctors office called and told her that her lungs were \"shot\". Patient upset and thinks there are mistakes being made .

## 2021-01-18 ENCOUNTER — TELEPHONE (OUTPATIENT)
Dept: CARDIOLOGY CLINIC | Age: 71
End: 2021-01-18

## 2021-01-18 NOTE — TELEPHONE ENCOUNTER
Pt calling because there seems to be some confusion between Dr cooks office and [facility] she says Dr Keesha Johnson office called her for a procedure that she said she already did?  But they don't  have record of it? pls call to advise thank you
Viraj Zapata, we talked last week re Leighann's PFT test. As noted below, someone from your office called her this morning telling her she needed to schedule it (even though it was done on 12/16/2020 w/out bronchodilators -- ABG's were also done). I just spoke to Alexandro Ma, but she can't remember the name of the person who called her. I had talked to the respiratory therapist who performed her PFT's last week in response to Central Scheduling who kept calling her to schedule the PFT's. I thought maybe she needed the test re-done with the bronchs but the therapist said that because her SAV1 was >80, she does not need the bronchs; Central Scheduling is now aware of this. The PFT's and the ABG's are still listed under Open Orders in EPIC as needing to be done. But since they were ordered by Dr. Ethan Pascual, I am not comfortable deleting them. I was hoping you could do this and maybe that would help resolve the issue with others thinking she needs to schedule them when they were already done. Or maybe there is more going on that I am not aware of which is why I am messaging you. Glen Geronimo stated that Dr. Ethan Pascual told her he didn't think she should have a CABG because she would not be able to tolerate the ventilator. His note reads that he wanted a pulm w/u and possibly other testing. I told Alexandro Ma to keep her yajaira't with Dr. Minesh Wilkinson on 2/5/2021 to discuss with him and get his thoughts. Refer to Telephone Encounter message dated 1/15/2021. Thank you so much for your time; I appreciate any help you can give with this situation.     Karen 2  [phone] (feel free to call me if you want to talk in person)
-

## 2021-01-28 NOTE — ASSESSMENT & PLAN NOTE
AFIB  Type~ paroxysmal  Current Rhythm~ regular  Rate controlled   ChadsVasc score~ >4  Current meds~ warfarin  Plan~ hold for 5 days for [facility] 2/10/21

## 2021-01-28 NOTE — ASSESSMENT & PLAN NOTE
Angina ~ with exertion and SOB   CCS class 3  Intervention~ 3VD per [facility] 11/2020; not surgical candidate per CTS  Current meds~ asa  Plan~ planned PCI to RCA and possibly LAD for 2/10/21. Risks, benefits, expectations, and alternative treatments were discussed. Questions appropriately answered. Mari Rodriguez agrees to proceed and verbalized understanding.

## 2021-01-28 NOTE — PROGRESS NOTES
[address]   Cardiac Evaluation      Patient: Yessica Phillip  YOB: 1950         Chief Complaint   Patient presents with    Follow-up     wants to discuss procedure and discuss the blood thinner         Referring provider: Sandor Ozuna III, DO    History of Present Illness:  Ms. Halley Ricks is a 69 y.o. female here for follow up for CAD, Htn, Hchol, MR, afib, she also has a small PFO and PAD (follows Cecilio Francois). She is a former patient of Dr. Vel Stubbs and was seen in July with c/o sob with exertion. She works as a . She had a positive stress echo and underwent a LHC in November where she was found to have 3VD and referred to CTS. She, unfortunately is not a surgical candidate due to calcification and is here today to discuss PCI. Today she has questions about her condition. She has a lot of questions as to why she could not have surgery. She is having trouble keeping track of all of the info she has received. She does get SOB when doing stairs and exerting, she works as a  for a [employer] She would like to proceed with the planned stents so that she can feel better. With regard to medication therapy he/she has been compliant with prescribed regimen and has tolerated therapy to date. Past Medical History:   has a past medical history of AF (atrial fibrillation) ([address] Back pain, CAD (coronary artery disease), Compression fracture, Hyperlipidemia, Hypertension, Myelolipoma, PVD (peripheral vascular disease) ([address] Right ear injury, Shingles, and Valvular disease. Surgical History:   has a past surgical history that includes Coronary angioplasty with stent (2014); Coronary angioplasty with stent (03/14/2003 & 06/02/2003); Hysterectomy (09/01/1983); Tubal ligation (09/1983); and ventral hernia repair (5-).      Current Outpatient Medications   Medication Sig Dispense Refill    Biotin 1 MG CAPS Take by mouth Indications: Hair volume  medicagion  OMEPRAZOLE PO Take by mouth daily as needed      rosuvastatin (CRESTOR) 10 MG tablet Take 1 tablet by mouth daily Dispense Generic name only per pt request 30 tablet 3    nitroGLYCERIN (NITROSTAT) 0.4 MG SL tablet Place 1 tablet under the tongue every 5 minutes as needed for Chest pain 25 tablet 3    metOLazone (ZAROXOLYN) 5 MG tablet TAKE ONE TABLET BY MOUTH DAILY AS NEEDED 90 tablet 5    aspirin EC 81 MG EC tablet Monday Wed Friday 90 tablet 1    atenolol (TENORMIN) 25 MG tablet Take 1 tablet by mouth daily 90 tablet 3    warfarin (COUMADIN) 5 MG tablet Indications: Restart on 3/23/19 TAKE ONE TABLET BY MOUTH DAILY or as directed (Patient taking differently: Indications: Restart on 3/23/19 TAKE ONE TABLET BY MOUTH DAILY or as directed Dr Lina Bear goes to [facility] lab) 90 tablet 3    terbinafine (LAMISIL) 250 MG tablet Take 250 mg by mouth daily      potassium chloride (KLOR-CON M) 20 MEQ extended release tablet TAKE ONE TABLET BY MOUTH DAILY 90 tablet 9    Specialty Vitamins Products (VITAMINS FOR THE HAIR PO) Take by mouth daily      Magnesium Oxide 250 MG TABS Take 2 tablets by mouth daily 30 tablet 5    Simethicone (GAS RELIEF) 180 MG CAPS Take  by mouth as needed. No current facility-administered medications for this visit. Allergies:  Actos [pioglitazone] and Percocet [oxycodone-acetaminophen]     Social History:  Social History     Socioeconomic History    Marital status:       Spouse name: Not on file    Number of children: Not on file    Years of education: Not on file    Highest education level: Not on file   Occupational History    Occupation: cleans [employer]   Social Needs    Financial resource strain: Not on file    Food insecurity     Worry: Not on file     Inability: Not on file    Transportation needs     Medical: Not on file     Non-medical: Not on file   Tobacco Use    Smoking status: Former Smoker     Years: 40.00     Types: Cigarettes     Quit date: 3/1/2014 Years since quittin.9    Smokeless tobacco: Never Used    Tobacco comment: Trying to quit   Substance and Sexual Activity    Alcohol use: No     Alcohol/week: 3.0 standard drinks     Types: 3 Cans of beer per week    Drug use: No    Sexual activity: Not Currently   Lifestyle    Physical activity     Days per week: Not on file     Minutes per session: Not on file    Stress: Not on file   Relationships    Social connections     Talks on phone: Not on file     Gets together: Not on file     Attends [employer] service: Not on file     Active member of club or organization: Not on file     Attends meetings of clubs or organizations: Not on file     Relationship status: Not on file    Intimate partner violence     Fear of current or ex partner: Not on file     Emotionally abused: Not on file     Physically abused: Not on file     Forced sexual activity: Not on file   Other Topics Concern    Not on file   Social History Narrative    Not on file       Family History:   Family History   Problem Relation Age of Onset    Heart Attack Father 76    Heart Disease Father         complications from surgery, per pt    Heart Attack Mother 64     Family history has been reviewed and not pertinent except as noted above. Review of Systems:   · Constitutional: there has been no unanticipated weight loss. No change in energy or activity level   · Eyes: No visual changes   · ENT: No Headaches, hearing loss or vertigo. No mouth sores or sore throat. · Cardiovascular: Reviewed in HPI  · Respiratory: No cough or wheezing, no sputum production. · Gastrointestinal: No abdominal pain, appetite loss, blood in stools. No change in bowel or bladder habits. · Genitourinary: No nocturia, dysuria, trouble voiding  · Musculoskeletal:  No gait disturbance, weakness or joint complaints. · Integumentary: No rash or pruritis. · Neurological: No headache, change in muscle strength, numbness or tingling.  No change in gait, balance, coordination, mood, affect, memory, mentation, behavior. · Psychiatric: No anxiety or depression  · Endocrine: No malaise or fever  · Hematologic/Lymphatic: No abnormal bruising or bleeding, blood clots or swollen lymph nodes. · Allergic/Immunologic: No nasal congestion or hives. Physical Examination:    Vitals:    02/05/21 1336   BP: 122/80   Site: Left Upper Arm   Position: Sitting   Cuff Size: Medium Adult   Pulse: 85   SpO2: 97%   Weight: 137 lb (62.1 kg)   Height: 4' 8\" (1.422 m)     Body mass index is 30.71 kg/m². Wt Readings from Last 3 Encounters:   02/05/21 137 lb (62.1 kg)   12/03/20 135 lb (61.2 kg)   11/17/20 123 lb (55.8 kg)      BP Readings from Last 3 Encounters:   02/05/21 122/80   12/03/20 110/70   11/17/20 (!) 149/84        Physical Examination:    · CONSTITUTIONAL: Well developed, well nourished  · EYES: PERRLA. No xanthelasma, sclera non icteric  · EARS,NOSE,MOUTH,THROAT:  Mucous membranes moist, normal hearing  · NECK: Supple, JVP normal, thyroid not enlarged. Carotids 2+ without bruits  · RESPIRATORY: Normal effort, no rales or rhonchi  · CARDIOVASCULAR: Normal PMI, regular rate and rhythm, no murmurs, rub or gallop. No edema. Radial pulses present and equal  · CHEST: No scar or masses  · ABDOMEN: Normal bowel sounds. No masses or tenderness. No bruit  · MUSCULOSKELETAL: No clubbing or cyanosis. Moves all extremities well. Normal gait  · SKIN:  Warm and dry. No rashes  · NEUROLOGIC: Cranial nerves intact. Alert and oriented  · PSYCHIATRIC: Calm affect. Appears to have normal judgement and insight    All testing and labs listed below were personally reviewed by myself. Cardiac Cath : 11/17/20  Anatomy:   LM-nml   LAD-proximal 90% calcified, distal 90%  Cx-proximal 80%  OM- nml  RCA-dominant. Ostial stent 60% ISR, mid stent 99% ISR    Contrast: 48  Flouro Time: 3.6  Access: R Radial artery. Small size artery.  Do not attempt radial in the future     Impression  ~Coronary Angiography w/ severe MVD  Recommendation  ~Aggressive medical treatment and risk factor modification  ~CABG. Check echo. Refer to CT surgery as an outpatient. Stress echo 10/26/20  Summary  Poor exercise tolerance. Normal resting blood pressure with blunted response to exercise. 1 mm ST horizontal depressions inferolaterally with stress consistent with ischemia. Ischemic changes extended throughout recovery. Normal echocardiographic response to stress, but abnormal EKG response. Echo  Baseline resting echocardiogram shows normal global LV systolic function  with an ejection fraction of 60% and uniform myocardial segmental wall motion. Following stress there was uniform augmentation of all myocardial segments with appropriate hyperdynamic LV systolic response to stress. Cardiac Doppler 10/26/20  Summary  Normal left ventricle size, wall thickness and systolic function with an estimated ejection fraction of 60%. No regional wall motion abnormalities are seen. Indeterminate diastolic function. There is mild-to-moderate mitral regurgitation. Aortic valve appears sclerotic but opens adequately. There is mild tricuspid regurgitation with a RVSP estimation of 45-50 mmHg. Pulmonary hypertension. The right ventricle is normal in size. RV systolic function appears to be at least moderately reduced. TAPSE is estimated at 1.1 cm. Color flow seen crossing the atrial septum suggesting a left-to-right intracardiac shunt. Biatrial enlargement. Assessment/Plan  1. Coronary artery disease involving native coronary artery of native heart without angina pectoris    2. Permanent atrial fibrillation ([address]    3. Essential hypertension    4. Hyperlipidemia, unspecified hyperlipidemia type    5. Nonrheumatic mitral valve regurgitation    6. PAD (peripheral artery disease) ([address]    7.  SOB (shortness of breath)          Coronary artery disease involving native coronary artery of native heart without angina pectoris  Angina

## 2021-02-03 LAB — INR BLD: 4.2

## 2021-02-05 ENCOUNTER — OFFICE VISIT (OUTPATIENT)
Dept: CARDIOLOGY CLINIC | Age: 71
End: 2021-02-05
Payer: MEDICARE

## 2021-02-05 ENCOUNTER — ANTI-COAG VISIT (OUTPATIENT)
Dept: CARDIOLOGY CLINIC | Age: 71
End: 2021-02-05
Payer: MEDICARE

## 2021-02-05 VITALS
HEIGHT: 56 IN | OXYGEN SATURATION: 97 % | DIASTOLIC BLOOD PRESSURE: 80 MMHG | SYSTOLIC BLOOD PRESSURE: 122 MMHG | WEIGHT: 137 LBS | BODY MASS INDEX: 30.82 KG/M2 | HEART RATE: 85 BPM

## 2021-02-05 DIAGNOSIS — E78.5 HYPERLIPIDEMIA, UNSPECIFIED HYPERLIPIDEMIA TYPE: ICD-10-CM

## 2021-02-05 DIAGNOSIS — I48.21 PERMANENT ATRIAL FIBRILLATION (HCC): ICD-10-CM

## 2021-02-05 DIAGNOSIS — R06.02 SOB (SHORTNESS OF BREATH): ICD-10-CM

## 2021-02-05 DIAGNOSIS — I25.10 CORONARY ARTERY DISEASE INVOLVING NATIVE CORONARY ARTERY OF NATIVE HEART WITHOUT ANGINA PECTORIS: Primary | ICD-10-CM

## 2021-02-05 DIAGNOSIS — I10 ESSENTIAL HYPERTENSION: ICD-10-CM

## 2021-02-05 DIAGNOSIS — I73.9 PAD (PERIPHERAL ARTERY DISEASE) (HCC): ICD-10-CM

## 2021-02-05 DIAGNOSIS — I34.0 NONRHEUMATIC MITRAL VALVE REGURGITATION: ICD-10-CM

## 2021-02-05 PROCEDURE — 1036F TOBACCO NON-USER: CPT | Performed by: INTERNAL MEDICINE

## 2021-02-05 PROCEDURE — 1123F ACP DISCUSS/DSCN MKR DOCD: CPT | Performed by: INTERNAL MEDICINE

## 2021-02-05 PROCEDURE — 4040F PNEUMOC VAC/ADMIN/RCVD: CPT | Performed by: INTERNAL MEDICINE

## 2021-02-05 PROCEDURE — G8427 DOCREV CUR MEDS BY ELIG CLIN: HCPCS | Performed by: INTERNAL MEDICINE

## 2021-02-05 PROCEDURE — 3017F COLORECTAL CA SCREEN DOC REV: CPT | Performed by: INTERNAL MEDICINE

## 2021-02-05 PROCEDURE — G8400 PT W/DXA NO RESULTS DOC: HCPCS | Performed by: INTERNAL MEDICINE

## 2021-02-05 PROCEDURE — 93793 ANTICOAG MGMT PT WARFARIN: CPT | Performed by: NURSE PRACTITIONER

## 2021-02-05 PROCEDURE — G8484 FLU IMMUNIZE NO ADMIN: HCPCS | Performed by: INTERNAL MEDICINE

## 2021-02-05 PROCEDURE — 99214 OFFICE O/P EST MOD 30 MIN: CPT | Performed by: INTERNAL MEDICINE

## 2021-02-05 PROCEDURE — 1090F PRES/ABSN URINE INCON ASSESS: CPT | Performed by: INTERNAL MEDICINE

## 2021-02-05 PROCEDURE — G8417 CALC BMI ABV UP PARAM F/U: HCPCS | Performed by: INTERNAL MEDICINE

## 2021-02-05 NOTE — LETTER
[facility] CARDIOLOGY[address]  Dept: [phone]  Dept Fax: [phone]      2021    Patient:Leighann Torres  : 1950  DOS: 2021    To Whom it May Concern:    Marianne Vicente  Is under the care of her Cardiologist. She will be seen for a procedure on Wednesday February 10, 2021. She will not be able to return to work until released by my care.          Eugenia Jensen MD                           [address] [place], [place] [address]S [facility] serving [place] and [place]

## 2021-02-08 ENCOUNTER — TELEPHONE (OUTPATIENT)
Dept: CARDIOLOGY CLINIC | Age: 71
End: 2021-02-08

## 2021-02-08 NOTE — TELEPHONE ENCOUNTER
Spoke with patient in detail regarding the procedure and why we are doing the LHC. She verified that she has been holding her coumadin as instructed. She will be here Wednesday for her procedure with [facility].

## 2021-02-10 ENCOUNTER — HOSPITAL ENCOUNTER (OUTPATIENT)
Dept: CARDIAC CATH/INVASIVE PROCEDURES | Age: 71
Discharge: HOME | End: 2021-02-10
Attending: INTERNAL MEDICINE | Admitting: INTERNAL MEDICINE
Payer: MEDICARE

## 2021-02-10 VITALS
HEIGHT: 56 IN | BODY MASS INDEX: 30.82 KG/M2 | TEMPERATURE: 98 F | WEIGHT: 137 LBS | HEART RATE: 85 BPM | OXYGEN SATURATION: 98 % | SYSTOLIC BLOOD PRESSURE: 134 MMHG | DIASTOLIC BLOOD PRESSURE: 80 MMHG

## 2021-02-10 LAB
ANION GAP SERPL CALCULATED.3IONS-SCNC: 9 MMOL/L (ref 3–16)
BUN BLDV-MCNC: 17 MG/DL (ref 7–20)
CALCIUM SERPL-MCNC: 10.1 MG/DL (ref 8.3–10.6)
CHLORIDE BLD-SCNC: 100 MMOL/L (ref 99–110)
CO2: 30 MMOL/L (ref 21–32)
CREAT SERPL-MCNC: 0.7 MG/DL (ref 0.6–1.2)
EKG ATRIAL RATE: 73 BPM
EKG DIAGNOSIS: NORMAL
EKG Q-T INTERVAL: 372 MS
EKG QRS DURATION: 76 MS
EKG QTC CALCULATION (BAZETT): 442 MS
EKG R AXIS: 28 DEGREES
EKG T AXIS: 5 DEGREES
EKG VENTRICULAR RATE: 85 BPM
GFR AFRICAN AMERICAN: >60
GFR NON-AFRICAN AMERICAN: >60
GLUCOSE BLD-MCNC: 122 MG/DL (ref 70–99)
HCT VFR BLD CALC: 35.8 % (ref 36–48)
HEMOGLOBIN: 11.3 G/DL (ref 12–16)
MCH RBC QN AUTO: 24.6 PG (ref 26–34)
MCHC RBC AUTO-ENTMCNC: 31.5 G/DL (ref 31–36)
MCV RBC AUTO: 78 FL (ref 80–100)
PDW BLD-RTO: 16.9 % (ref 12.4–15.4)
PLATELET # BLD: 137 K/UL (ref 135–450)
PMV BLD AUTO: 7.6 FL (ref 5–10.5)
POC ACT LR: 239 SEC
POC ACT LR: 240 SEC
POC ACT LR: 246 SEC
POC ACT LR: 293 SEC
POC ACT LR: 294 SEC
POC ACT LR: 321 SEC
POC ACT LR: 337 SEC
POC ACT LR: 378 SEC
POTASSIUM SERPL-SCNC: 3.5 MMOL/L (ref 3.5–5.1)
RBC # BLD: 4.58 M/UL (ref 4–5.2)
SODIUM BLD-SCNC: 139 MMOL/L (ref 136–145)
WBC # BLD: 5.2 K/UL (ref 4–11)

## 2021-02-10 PROCEDURE — 93005 ELECTROCARDIOGRAM TRACING: CPT | Performed by: INTERNAL MEDICINE

## 2021-02-10 PROCEDURE — 85610 PROTHROMBIN TIME: CPT

## 2021-02-10 PROCEDURE — C1725 CATH, TRANSLUMIN NON-LASER: HCPCS

## 2021-02-10 PROCEDURE — 80048 BASIC METABOLIC PNL TOTAL CA: CPT

## 2021-02-10 PROCEDURE — 6360000004 HC RX CONTRAST MEDICATION: Performed by: INTERNAL MEDICINE

## 2021-02-10 PROCEDURE — 85347 COAGULATION TIME ACTIVATED: CPT

## 2021-02-10 PROCEDURE — 2500000003 HC RX 250 WO HCPCS

## 2021-02-10 PROCEDURE — 99153 MOD SED SAME PHYS/QHP EA: CPT

## 2021-02-10 PROCEDURE — 85027 COMPLETE CBC AUTOMATED: CPT

## 2021-02-10 PROCEDURE — C1887 CATHETER, GUIDING: HCPCS

## 2021-02-10 PROCEDURE — C1885 CATH, TRANSLUMIN ANGIO LASER: HCPCS

## 2021-02-10 PROCEDURE — 6370000000 HC RX 637 (ALT 250 FOR IP)

## 2021-02-10 PROCEDURE — C1769 GUIDE WIRE: HCPCS

## 2021-02-10 PROCEDURE — 36415 COLL VENOUS BLD VENIPUNCTURE: CPT

## 2021-02-10 PROCEDURE — 99152 MOD SED SAME PHYS/QHP 5/>YRS: CPT | Performed by: INTERNAL MEDICINE

## 2021-02-10 PROCEDURE — C1894 INTRO/SHEATH, NON-LASER: HCPCS

## 2021-02-10 PROCEDURE — 92933 PRQ TRLML C ATHRC ST ANGIOP1: CPT | Performed by: INTERNAL MEDICINE

## 2021-02-10 PROCEDURE — 93010 ELECTROCARDIOGRAM REPORT: CPT | Performed by: INTERNAL MEDICINE

## 2021-02-10 PROCEDURE — 99152 MOD SED SAME PHYS/QHP 5/>YRS: CPT

## 2021-02-10 PROCEDURE — C9602 PERC D-E COR STENT ATHER S: HCPCS

## 2021-02-10 PROCEDURE — C1760 CLOSURE DEV, VASC: HCPCS

## 2021-02-10 PROCEDURE — C1874 STENT, COATED/COV W/DEL SYS: HCPCS

## 2021-02-10 PROCEDURE — 6360000002 HC RX W HCPCS

## 2021-02-10 RX ORDER — CLOPIDOGREL BISULFATE 75 MG/1
75 TABLET ORAL DAILY
Qty: 90 TABLET | Refills: 1 | Status: SHIPPED | OUTPATIENT
Start: 2021-02-10 | End: 2021-09-17

## 2021-02-10 RX ADMIN — IOPAMIDOL 55 ML: 755 INJECTION, SOLUTION INTRAVENOUS at 13:07

## 2021-02-10 NOTE — PRE SEDATION
Brief Pre-Op Note/Sedation Assessment      Robert Juan  1950  Cath/NONE      [id number]  8:04 AM    Planned Procedure: Cardiac Catheterization Procedure    Post Procedure Plan: Return to same level of care    Consent: I have discussed with the patient and/or the patient representative the indication, alternatives, and the possible risks and/or complications of the planned procedure and the anesthesia methods. The patient and/or patient representative appear to understand and agree to proceed. Chief Complaint: Chest Pain/Pressure  Anginal Equivalent      Indications for Cath Procedure: Worsening Angina and Stable Known CAD  Anginal Classification within 2 weeks:  CCS III - Symptoms with everyday living activities, i.e., moderate limitation  NYHA Heart Failure Class within 2 weeks: No symptoms  Is Cath Lab Visit Valve-related?: No  Surgical Risk: High Risk: Vascular  Functional Type: >= 4 METS with symptoms    Anti- Anginal Meds within 2 weeks:   Yes: Beta Blockers and Aspirin    Stress or Imaging Studies Performed:  None     Vital Signs:  LMP 01/01/1983     Allergies: Allergies   Allergen Reactions    Actos [Pioglitazone]     Percocet [Oxycodone-Acetaminophen]      Confused , loopy       Past Medical History:  Past Medical History:   Diagnosis Date    [facility] fibrillation[facility])     on anticoagulation    Back pain     compaound fracture    CAD (coronary artery disease)     Compression fracture 01/01/1994    Hyperlipidemia     Hypertension     Myelolipoma     bilateral adrenals    PVD (peripheral vascular disease) ([facility] [address]     Right ear injury 01/01/1983    Per pt.     Shingles     Valvular disease          Surgical History:  Past Surgical History:   Procedure Laterality Date    CORONARY ANGIOPLASTY WITH STENT PLACEMENT  2014    CORONARY ANGIOPLASTY WITH STENT PLACEMENT  03/14/2003 & 06/02/2003    per pt    HYSTERECTOMY  09/01/1983    fibroids    TUBAL LIGATION  09/1983    VENTRAL HERNIA REPAIR  5-    VENTRAL HERNIA REPAIR WITH MESH                Medications:  No current facility-administered medications for this encounter. Current Outpatient Medications   Medication Sig Dispense Refill    Biotin 1 MG CAPS Take by mouth Indications: Hair volume  medicagion      OMEPRAZOLE PO Take by mouth daily as needed      rosuvastatin (CRESTOR) 10 MG tablet Take 1 tablet by mouth daily Dispense Generic name only per pt request 30 tablet 3    nitroGLYCERIN (NITROSTAT) 0.4 MG SL tablet Place 1 tablet under the tongue every 5 minutes as needed for Chest pain 25 tablet 3    metOLazone (ZAROXOLYN) 5 MG tablet TAKE ONE TABLET BY MOUTH DAILY AS NEEDED 90 tablet 5    aspirin EC 81 MG EC tablet Monday Wed Friday 90 tablet 1    atenolol (TENORMIN) 25 MG tablet Take 1 tablet by mouth daily 90 tablet 3    warfarin (COUMADIN) 5 MG tablet Indications: Restart on 3/23/19 TAKE ONE TABLET BY MOUTH DAILY or as directed (Patient taking differently: Indications: Restart on 3/23/19 TAKE ONE TABLET BY MOUTH DAILY or as directed Dr Coretta Her goes to [facility]) 90 tablet 3    terbinafine (LAMISIL) 250 MG tablet Take 250 mg by mouth daily      potassium chloride (KLOR-CON M) 20 MEQ extended release tablet TAKE ONE TABLET BY MOUTH DAILY 90 tablet 9    Specialty Vitamins Products (VITAMINS FOR THE HAIR PO) Take by mouth daily      Magnesium Oxide 250 MG TABS Take 2 tablets by mouth daily 30 tablet 5    Simethicone (GAS RELIEF) 180 MG CAPS Take  by mouth as needed. Pre-Sedation:    Pre-Sedation Documentation and Exam:  I have personally completed a history, physical exam & review of systems for this patient (see notes). Prior History of Anesthesia Complications:   none    Modified Mallampati:  II (soft palate, uvula, fauces visible)    ASA Classification:  Class 2 - A normal healthy patient with mild systemic disease      Samuel Scale:   Activity:  2 - Able to move 4 extremities voluntarily on command  Respiration:  2 - Able to breathe deeply and cough freely  Circulation:  2 - BP+/- 20mmHg of normal  Consciousness:  2 - Fully awake  Oxygen Saturation (color):  2 - Able to maintain oxygen saturation >92% on room air    Sedation/Anesthesia Plan:  Guard the patient's safety and welfare. Minimize physical discomfort and pain. Minimize negative psychological responses to treatment by providing sedation and analgesia and maximize the potential amnesia. Patient to meet pre-procedure discharge plan.     Medication Planned:  midazolam intravenously and fentanyl intravenously    Patient is an appropriate candidate for plan of sedation: yes      Electronically signed by Jo Wheeler MD on 2/10/2021 at 8:04 AM

## 2021-02-10 NOTE — OP NOTE
Patient:  Yamil Hollis   :   1950    Procedural Summary  ~Consent:   Obtained written and verbal consent      Risks/benefits explained in detail  ~Procedure:    PCI  ~Medications:    Procedural sedation with minimal conscious sedation  ~Complications:   None  ~Blood Loss:    <10cc  ~Specimens:    None obtained  ~Pre-sedation re-evaluation: Performed immediately prior to procedure. Medication and Procedural Reconciliation:  An independent trained observer pushed medications at my direction. We monitored the patient's level of consciousness and vital signs/physiologic status throughout the procedure duration (see start and stop times below). Sedation: 2.5 mg Versed, 175 mcg Fentanyl  Sedation start: 954  Sedation stop: 123    Cardiac Cath PCI:  Anatomy:     RCA-ostial 50%  RPDA- mid 99% ISR    Intervention  ~Successful PCI to RCA with ELCA laser atherectomy. 2.5 NC balloon, 3.5 NC balloon. 3.5x38 DEE to 20 kirti. Mid stent underexpanded segment. 3.75x8 NC to 22atm. 10% residual stenosis. Excellent Result. Contrast: 55  Flouro Time: 42.8  Access: R CFA. Ultrasound guidance used to determine aforementioned artery patency, size (>2mm), anatomic variations and ideal puncture location. Real-time ultrasound utilized concurrent with vascular needle entry into the artery. Image(s) permanently recorded and reported in the patient chart. Perc stick safe zone    Impression  ~Coronary Angiography w/ severe single vessel CAD  ~Successful complex angioplasty and stenting of RCA        Recommendation  ~Aggressive medical treatment and risk factor modification  ~1. Stop heparin gtt. Post cath IVF. Bedrest.  2. Recommend beta blocker, high potency statin, aspirin coumadin and plavix. Restart coumadin tomorrow. 3. Referral to cardiac rehab placed  4. Patient has been advised on the importance of regular exercise of at least 20-30 minutes daily.    5. Patient counseled about and offered assistance for smoking cessation   6.  Follow up in 1-2 weeks with cardiology            Jose Manuel Zacarias MD 2/10/2021 12:46 PM

## 2021-02-10 NOTE — LETTER
[facility]  [address] [id number]  Phone: [phone]         February 10, 2021     Patient: Yessica Phillip   YOB: 1950   Date of Visit: 2/10/2021       To Whom It May Concern:    Lisa Poe was seen and treated in our Cath Lab for a procedure on 2/10/2021. She must remain on limited duty until 2/17/2021. She may return to work Feb. 17, 2021.        Sincerely,        Yo Browne RN        Signature:__________________________________

## 2021-02-10 NOTE — H&P
[facility]   Cardiac Evaluation      Patient: Tr Mcmillan  YOB: 1950         No chief complaint on file. Referring provider: Dar Anaya III, DO    History of Present Illness:  Ms. Brent Youssef is a 69 y.o. female here for follow up for CAD, Htn, Hchol, MR, afib, she also has a small PFO and PAD (follows [facility]). She is a former patient of Dr. Radha Ennis and was seen in July with c/o sob with exertion. She works as a . She had a positive stress echo and underwent a LHC in November where she was found to have 3VD and referred to CTS. She, unfortunately is not a surgical candidate due to calcification and is here today to discuss PCI. Today she has questions about her condition. She has a lot of questions as to why she could not have surgery. She is having trouble keeping track of all of the info she has received. She does get SOB when doing stairs and exerting, she works as a  for a [employer] She would like to proceed with the planned stents so that she can feel better. With regard to medication therapy he/she has been compliant with prescribed regimen and has tolerated therapy to date. Past Medical History:   has a past medical history of AF (atrial fibrillation) ([address] Back pain, CAD (coronary artery disease), Compression fracture, Hyperlipidemia, Hypertension, Myelolipoma, PVD (peripheral vascular disease) ([address] Right ear injury, Shingles, and Valvular disease. Surgical History:   has a past surgical history that includes Coronary angioplasty with stent (2014); Coronary angioplasty with stent (03/14/2003 & 06/02/2003); Hysterectomy (09/01/1983); Tubal ligation (09/1983); and ventral hernia repair (5-). No current facility-administered medications for this encounter.       Current Outpatient Medications   Medication Sig Dispense Refill    Biotin 1 MG CAPS Take by mouth Indications: Hair volume  medicagion      OMEPRAZOLE PO Take by mouth daily as needed      rosuvastatin (CRESTOR) 10 MG tablet Take 1 tablet by mouth daily Dispense Generic name only per pt request 30 tablet 3    nitroGLYCERIN (NITROSTAT) 0.4 MG SL tablet Place 1 tablet under the tongue every 5 minutes as needed for Chest pain 25 tablet 3    metOLazone (ZAROXOLYN) 5 MG tablet TAKE ONE TABLET BY MOUTH DAILY AS NEEDED 90 tablet 5    aspirin EC 81 MG EC tablet  90 tablet 1    atenolol (TENORMIN) 25 MG tablet Take 1 tablet by mouth daily 90 tablet 3    warfarin (COUMADIN) 5 MG tablet Indications: Restart on 3/23/19 TAKE ONE TABLET BY MOUTH DAILY or as directed (Patient taking differently: Indications: Restart on 3/23/19 TAKE ONE TABLET BY MOUTH DAILY or as directed Dr Lina Bear goes to [facility] lab) 90 tablet 3    terbinafine (LAMISIL) 250 MG tablet Take 250 mg by mouth daily      potassium chloride (KLOR-CON M) 20 MEQ extended release tablet TAKE ONE TABLET BY MOUTH DAILY 90 tablet 9    Specialty Vitamins Products (VITAMINS FOR THE HAIR PO) Take by mouth daily      Magnesium Oxide 250 MG TABS Take 2 tablets by mouth daily 30 tablet 5    Simethicone (GAS RELIEF) 180 MG CAPS Take  by mouth as needed. Allergies:  Actos [pioglitazone] and Percocet [oxycodone-acetaminophen]     Social History:  Social History     Socioeconomic History    Marital status:       Spouse name: Not on file    Number of children: Not on file    Years of education: Not on file    Highest education level: Not on file   Occupational History    Occupation: cleans woods   Social Needs    Financial resource strain: Not on file    Food insecurity     Worry: Not on file     Inability: Not on file    Transportation needs     Medical: Not on file     Non-medical: Not on file   Tobacco Use    Smoking status: Former Smoker     Years: 40.00     Types: Cigarettes     Quit date: 3/1/2014     Years since quittin.9    Smokeless tobacco: Never Used    Tobacco comment: Trying to quit   Substance and Sexual Activity    Alcohol use: No     Alcohol/week: 3.0 standard drinks     Types: 3 Cans of beer per week    Drug use: No    Sexual activity: Not Currently   Lifestyle    Physical activity     Days per week: Not on file     Minutes per session: Not on file    Stress: Not on file   Relationships    Social connections     Talks on phone: Not on file     Gets together: Not on file     Attends Anabaptism service: Not on file     Active member of club or organization: Not on file     Attends meetings of clubs or organizations: Not on file     Relationship status: Not on file    Intimate partner violence     Fear of current or ex partner: Not on file     Emotionally abused: Not on file     Physically abused: Not on file     Forced sexual activity: Not on file   Other Topics Concern    Not on file   Social History Narrative    Not on file       Family History:   Family History   Problem Relation Age of Onset    Heart Attack Father 76    Heart Disease Father         complications from surgery, per pt    Heart Attack Mother 64     Family history has been reviewed and not pertinent except as noted above. Review of Systems:   · Constitutional: there has been no unanticipated weight loss. No change in energy or activity level   · Eyes: No visual changes   · ENT: No Headaches, hearing loss or vertigo. No mouth sores or sore throat. · Cardiovascular: Reviewed in HPI  · Respiratory: No cough or wheezing, no sputum production. · Gastrointestinal: No abdominal pain, appetite loss, blood in stools. No change in bowel or bladder habits. · Genitourinary: No nocturia, dysuria, trouble voiding  · Musculoskeletal:  No gait disturbance, weakness or joint complaints. · Integumentary: No rash or pruritis. · Neurological: No headache, change in muscle strength, numbness or tingling. No change in gait, balance, coordination, mood, affect, memory, mentation, behavior.   · Psychiatric: No anxiety or depression  · Endocrine: No malaise or fever  · Hematologic/Lymphatic: No abnormal bruising or bleeding, blood clots or swollen lymph nodes. · Allergic/Immunologic: No nasal congestion or hives. Physical Examination:    There were no vitals filed for this visit. There is no height or weight on file to calculate BMI. Wt Readings from Last 3 Encounters:   02/05/21 137 lb (62.1 kg)   12/03/20 135 lb (61.2 kg)   11/17/20 123 lb (55.8 kg)      BP Readings from Last 3 Encounters:   02/05/21 122/80   12/03/20 110/70   11/17/20 (!) 149/84        Physical Examination:    · CONSTITUTIONAL: Well developed, well nourished  · EYES: PERRLA. No xanthelasma, sclera non icteric  · EARS,NOSE,MOUTH,THROAT:  Mucous membranes moist, normal hearing  · NECK: Supple, JVP normal, thyroid not enlarged. Carotids 2+ without bruits  · RESPIRATORY: Normal effort, no rales or rhonchi  · CARDIOVASCULAR: Normal PMI, regular rate and rhythm, no murmurs, rub or gallop. No edema. Radial pulses present and equal  · CHEST: No scar or masses  · ABDOMEN: Normal bowel sounds. No masses or tenderness. No bruit  · MUSCULOSKELETAL: No clubbing or cyanosis. Moves all extremities well. Normal gait  · SKIN:  Warm and dry. No rashes  · NEUROLOGIC: Cranial nerves intact. Alert and oriented  · PSYCHIATRIC: Calm affect. Appears to have normal judgement and insight    All testing and labs listed below were personally reviewed by myself. Cardiac Cath : 11/17/20  Anatomy:   LM-nml   LAD-proximal 90% calcified, distal 90%  Cx-proximal 80%  OM- nml  RCA-dominant. Ostial stent 60% ISR, mid stent 99% ISR    Contrast: 48  Flouro Time: 3.6  Access: R Radial artery. Small size artery. Do not attempt radial in the future     Impression  ~Coronary Angiography w/ severe MVD  Recommendation  ~Aggressive medical treatment and risk factor modification  ~CABG. Check echo. Refer to CT surgery as an outpatient.       Stress echo 10/26/20  Summary  Poor exercise tolerance. Normal resting blood pressure with blunted response to exercise. 1 mm ST horizontal depressions inferolaterally with stress consistent with ischemia. Ischemic changes extended throughout recovery. Normal echocardiographic response to stress, but abnormal EKG response. Echo  Baseline resting echocardiogram shows normal global LV systolic function  with an ejection fraction of 60% and uniform myocardial segmental wall motion. Following stress there was uniform augmentation of all myocardial segments with appropriate hyperdynamic LV systolic response to stress. Cardiac Doppler 10/26/20  Summary  Normal left ventricle size, wall thickness and systolic function with an estimated ejection fraction of 60%. No regional wall motion abnormalities are seen. Indeterminate diastolic function. There is mild-to-moderate mitral regurgitation. Aortic valve appears sclerotic but opens adequately. There is mild tricuspid regurgitation with a RVSP estimation of 45-50 mmHg. Pulmonary hypertension. The right ventricle is normal in size. RV systolic function appears to be at least moderately reduced. TAPSE is estimated at 1.1 cm. Color flow seen crossing the atrial septum suggesting a left-to-right intracardiac shunt. Biatrial enlargement. Assessment/Plan  No diagnosis found. Coronary artery disease involving native coronary artery of native heart without angina pectoris  Angina ~ with exertion and SOB   CCS class 3  Intervention~ 3VD per [facility] 11/2020; not surgical candidate per CTS  Current meds~ asa  Plan~ planned PCI to RCA and possibly LAD for 2/10/21. Risks, benefits, expectations, and alternative treatments were discussed. Questions appropriately answered.   Marianne Vicente agrees to proceed and verbalized understanding.            Atrial fibrillation ([address]  AFIB  Type~ paroxysmal  Current Rhythm~ regular  Rate controlled   ChadsVasc score~ >4  Current meds~ warfarin  Plan~ hold for 5 days for [facility] 2/10/21    No problem-specific Assessment & Plan notes found for this encounter. Orders Placed This Encounter   Procedures    EKG 12 Lead       Rayna Lehman MD      I have reviewed the history and physical and examined the patient and find no relevant changes. I have reviewed with the patient and/or family the risks, benefits, and alternatives to the procedure. Based on these findings I recommend left heart cath for definitive evaluation of coronary arteries. Risks, benefits, expectations, and alternative treatments were discussed. Questions appropriately answered. Ramírez Ugarte agrees to proceed and verbalized understanding.        Gautam Márquez 2/10/2021 8:04 AM

## 2021-02-11 LAB — INR BLD: 1.3 (ref 0.86–1.14)

## 2021-02-15 ENCOUNTER — TELEPHONE (OUTPATIENT)
Dept: CARDIOLOGY CLINIC | Age: 71
End: 2021-02-15

## 2021-02-15 NOTE — TELEPHONE ENCOUNTER
Spoke to the pt-she is concerned about the paper work given to her. Stated it was talking about cardiac rehab, and she was not told about. Has a few more questions for YAMILETH Giraldo.

## 2021-02-16 NOTE — TELEPHONE ENCOUNTER
Spoke to pt. She is not feeling better after cath. However, she is not feeling worse. Offered for her to move OV up but she declined.

## 2021-02-17 ENCOUNTER — TELEPHONE (OUTPATIENT)
Dept: CARDIOLOGY CLINIC | Age: 71
End: 2021-02-17

## 2021-02-17 NOTE — TELEPHONE ENCOUNTER
Asking to speak to [facility] about her SOB. She doesn't understand why she is having the SOB.  Asking for her to call before 430 pm today

## 2021-02-17 NOTE — TELEPHONE ENCOUNTER
I spoke with pt. She states that she feels worse than before she had the cath. She says she has LUCAS. She wants to feel better , she wanted an appointment as close to noon as possible.

## 2021-02-17 NOTE — TELEPHONE ENCOUNTER
If she feels bad enough she needs to go to ER but tomorrow at 11:30 is fine. Appointment was made for today at 11:30 so if someone could change that I would appreciate it.

## 2021-02-24 NOTE — PROGRESS NOTES
[address]     Outpatient Follow Up Note      CHIEF COMPLAINT / HPI:  Follow Up secondary to status post coronary artery stenting    Subjective:   Graceann Plan is 69 y.o. female who presents today with a history of DM, HTN, HLD, PAD, a fib, MR. Pt reports having a \"funny feeling\" in her chest once in a while that started after PCI. Unable to describe feeling. Located across chest sometimes on right side, left side, or radiating up through mid chest. Not in correlation with food. Not sure if it is exertional because she has such significant LUCAS. She takes metolazone daily. The other night she took three pills because she couldn't breath, she is unsure if this helped. She is very upset during visit and reports she is frustrated that her breathing does not feel any different after PCI. Her breathing is only comfortable at rest.  The patient denies orthopnea/PND. Denies swelling and her weight is stable. The patient is not experiencing palpitations. Has positional dizziness that resolves quickly and is not new. With regard to medication therapy the patient has been compliant with prescribed regimen. They have tolerated therapy to date. Past Medical History:   Diagnosis Date    AF (atrial fibrillation) (HCC)     on anticoagulation    Back pain     compaound fracture    CAD (coronary artery disease)     Compression fracture 1994    Hyperlipidemia     Hypertension     Myelolipoma     bilateral adrenals    PVD (peripheral vascular disease) ([facility] [address]     Right ear injury 1983    Per pt.     Shingles     Valvular disease      Social History:    Social History     Tobacco Use   Smoking Status Former Smoker    Years: 40.00    Types: Cigarettes    Quit date: 3/1/2014    Years since quittin.9   Smokeless Tobacco Never Used   Tobacco Comment    Trying to quit     Current Medications:  Current Outpatient Medications   Medication Sig Dispense Refill    clopidogrel (PLAVIX) 75 MG tablet Take 1 tablet by mouth daily 90 tablet 1    Biotin 1 MG CAPS Take by mouth Indications: Hair volume  medicagion      OMEPRAZOLE PO Take by mouth daily as needed      rosuvastatin (CRESTOR) 10 MG tablet Take 1 tablet by mouth daily Dispense Generic name only per pt request 30 tablet 3    nitroGLYCERIN (NITROSTAT) 0.4 MG SL tablet Place 1 tablet under the tongue every 5 minutes as needed for Chest pain 25 tablet 3    metOLazone (ZAROXOLYN) 5 MG tablet TAKE ONE TABLET BY MOUTH DAILY AS NEEDED 90 tablet 5    aspirin EC 81 MG EC tablet Monday Wed Friday 90 tablet 1    atenolol (TENORMIN) 25 MG tablet Take 1 tablet by mouth daily 90 tablet 3    warfarin (COUMADIN) 5 MG tablet Indications: Restart on 3/23/19 TAKE ONE TABLET BY MOUTH DAILY or as directed (Patient taking differently: Indications: Restart on 3/23/19 TAKE ONE TABLET BY MOUTH DAILY or as directed Dr Yancy Lovelace goes to [facility]) 90 tablet 3    terbinafine (LAMISIL) 250 MG tablet Take 250 mg by mouth daily      potassium chloride (KLOR-CON M) 20 MEQ extended release tablet TAKE ONE TABLET BY MOUTH DAILY 90 tablet 9    Specialty Vitamins Products (VITAMINS FOR THE HAIR PO) Take by mouth daily      Magnesium Oxide 250 MG TABS Take 2 tablets by mouth daily 30 tablet 5    Simethicone (GAS RELIEF) 180 MG CAPS Take  by mouth as needed. No current facility-administered medications for this visit. REVIEW OF SYSTEMS:    CONSTITUTIONAL: No major weight gain or loss, night sweats, fever, fatigue, or weakness. HEENT: No new vision difficulties or ringing in the ears. RESPIRATORY: No new SOB, PND, orthopnea or cough. CARDIOVASCULAR: See HPI  GI: No N/V/D, constipation, or abdominal pain. No black/tarry stools  : No urinary urgency, incontinence, or hematuria. SKIN: No cyanosis or skin lesions. MUSCULOSKELETAL: No new muscle or joint pain. NEUROLOGICAL: No syncope or TIA-like symptoms.   PSYCHIATRIC: No anxiety, pain, insomnia or depression    Objective:   PHYSICAL EXAM:      VITALS:  /70 (Site: Left Upper Arm, Position: Sitting, Cuff Size: Medium Adult)   Pulse 80   Ht 4' 8\" (1.422 m)   Wt 137 lb (62.1 kg)   LMP 01/01/1983   SpO2 94%   BMI 30.71 kg/m²   CONSTITUTIONAL: Cooperative, no apparent distress, and appears well nourished / developed  NEUROLOGIC:  Awake and orientated to person, place, and time. PSYCH: Calm affect. SKIN: Warm and dry. HEENT: Sclera non-icteric, normocephalic, neck supple. RESPIRATORY:  No increased work of breathing and clear to auscultation, no crackles or wheezing. CARDIOVASCULAR:  Regular rate and rhythm without murmur. Normal S1 and S2. No gallops or rubs. Normal PMI. No elevation of JVP. Normal carotid pulses with no bruits. GI:  Normal bowel sounds. Non-distended. Non-tender to palpation  EXT: No edema. No calf tenderness. Pulses are present bilaterally.     DATA:    Lab Results   Component Value Date    ALT 22 05/08/2019    AST 23 05/08/2019    ALKPHOS 58 05/08/2019    BILITOT 0.7 05/08/2019     Lab Results   Component Value Date    CREATININE 0.7 02/10/2021    BUN 17 02/10/2021     02/10/2021    K 3.5 02/10/2021     02/10/2021    CO2 30 02/10/2021     Lab Results   Component Value Date    TSH 1.48 01/20/2017     Lab Results   Component Value Date    WBC 5.2 02/10/2021    HGB 11.3 (L) 02/10/2021    HCT 35.8 (L) 02/10/2021    MCV 78.0 (L) 02/10/2021     02/10/2021     No components found for: CHLPL  Lab Results   Component Value Date    TRIG 94 05/08/2019    TRIG 94 11/27/2018    TRIG 87 09/11/2017     Lab Results   Component Value Date    HDL 52 05/08/2019    HDL 55 11/27/2018    HDL 56 09/11/2017     Lab Results   Component Value Date    LDLCALC 65 05/08/2019    LDLCALC 144 (H) 11/27/2018    LDLCALC 75 09/11/2017     Lab Results   Component Value Date    LABVLDL 29 09/06/2013    LABVLDL 17 10/17/2012    LABVLDL 30 05/21/2012      No results found for: BNP  Radiology Review:  Pertinent images / reports were reviewed as a part of this visit and reveals the following:    Last Echo: 10/2020   Summary   Normal left ventricle size, wall thickness and systolic function with an   estimated ejection fraction of 60%. No regional wall motion abnormalities are seen. Indeterminate diastolic function. There is mild-to-moderate mitral regurgitation. Aortic valve appears sclerotic but opens adequately. There is mild tricuspid regurgitation with a RVSP estimation of 45-50 mmHg. Pulmonary hypertension. The right ventricle is normal in size. RV systolic function appears to be at   least moderately reduced. TAPSE is estimated at 1.1 cm. Color flow seen crossing the atrial septum suggesting a left-to-right   intracardiac shunt. Biatrial enlargement. Last Stress ECHO: 10/2020  Summary   Poor exercise tolerance. Normal resting blood pressure with blunted response   to exercise. 1 mm ST horizontal depressions inferolaterally with stress consistent with   ischemia. Ischemic changes extended throughout recovery. Normal echocardiographic response to stress, but abnormal EKG response. Cardiac Cath : 11/2020  Anatomy:   LM-nml   LAD-proximal 90% calcified, distal 90%  Cx-proximal 80%  OM- nml  RCA-dominant. Ostial stent 60% ISR, mid stent 99% ISR  Contrast: 48  Flouro Time: 3.6  Access: R Radial artery. Small size artery. Do not attempt radial in the future  Impression  ~Coronary Angiography w/ severe MVD  Recommendation  ~Aggressive medical treatment and risk factor modification  ~CABG. Check echo. Refer to CT surgery as an outpatient. Cardiac Cath PCI: 2/10/21  Anatomy:   RCA-ostial 50%  RPDA- mid 99% ISR  Intervention  ~Successful PCI to RCA with ELCA laser atherectomy. 2.5 NC balloon, 3.5 NC balloon. 3.5x38 DEE to 20 kirti. Mid stent underexpanded segment. 3.75x8 NC to 22atm. 10% residual stenosis. Excellent Result. Contrast: 55  Flouro Time: 42.8  Access: R CFA. Ultrasound guidance used to determine aforementioned artery patency, size (>2mm), anatomic variations and ideal puncture location. Real-time ultrasound utilized concurrent with vascular needle entry into the artery. Image(s) permanently recorded and reported in the patient chart. Perc stick safe zone   Impression  ~Coronary Angiography w/ severe single vessel CAD  ~Successful complex angioplasty and stenting of RCA  Recommendation  ~Aggressive medical treatment and risk factor modification  ~1. Stop heparin gtt. Post cath IVF. Bedrest.  2. Recommend beta blocker, high potency statin, aspirin coumadin and plavix. Restart coumadin tomorrow. 3. Referral to cardiac rehab placed  4. Patient has been advised on the importance of regular exercise of at least 20-30 minutes daily. 5. Patient counseled about and offered assistance for smoking cessation   6. Follow up in 1-2 weeks with cardiology    PFT: 12/16/2020  Interpretation:  Essentially normal pulmonary function test.  ABG on room air was normal.    Assessment:     1. Coronary artery disease   ~ stable ; c/o atypical chest symptoms, does have + LUCAS  ~ hx of stents to RCA   ~ [facility] 11/2020: reveling severe MVD. She was referred to [facility] for CABG evaluation. Due to a very calcified aorta it was decided upon not to proceed with CABG.   ~ [facility] 2/10/21: s/p PCI to RCA with ELCA laser atherectomy and DEE placement   ~ on aspirin and plavix (along with coumadin), BB, statin  ~ pt is not interested in cardiac rehab     2. Shortness of breath   ~ persistent   ~ Echo 10/2020: EF 60%,   ~ PFT 12/2020 essentially normal with normal ABG on room air    3. Pulmonary HTN  ~ Echo 11/2014: RVSP 34 mmHg  ~ [facility] 2014 consistent with mild pulm HTN  ~ Echo 4/2019: RVSP 23 mmHg, mild TR  ~ Echo 10/2020: RVSP 45-50 mmHg, mild TR    4. Mitral regurgitation    ~ Echo 4/2019: mod MR   ~ Echo 10/2020: mild-mod MR  ~ stable     5. Hypertension   ~ controlled     6.  Hyperlipidemia   ~ panel not up to date - will need to get soon   ~ on Crestor 10    7. Paroxysmal atrial fibrillation   ~ stable ; regular to ausculation today   ~ on coumadin     8. PAD  ~ S/P Scoring PTA, DCB, and DEE to left distal SFA/popliteal March 2019 (May 2019 Left SUKUMAR normalized to 1.24)  ~ on aspirin and statin  ~ follows with Dr. Radha Jaime     I had the opportunity to review the clinical symptoms and presentation of Jaleel Luu. Plan:     1. LHC with possible intervention to LAD  2. BMP and BNP today   3. Follow up after procedure     Overall the patient is stable from CV standpoint    I have addressed the patient's cardiac risk factors and adjusted pharmacologic treatment as needed. In addition, I have reinforced the need for patient directed risk factor modification. Further evaluation will be based upon the patient's clinical course and testing results. All questions and concerns were addressed to the patient. Alternatives to my treatment were discussed. The patient verbalizes understanding not to stop medications without discussing with us. The patient is not currently smoking. The risks related to smoking were reviewed with the patient. Recommend maintaining a smoke-free lifestyle. Patient is on a beta-blocker  Patient is not on an ACE / ARB ; neg CHF   Patient is on a statin    Dual Antiplatelet therapy / Anticoagulation has been recommended / prescribed for this patient. Education conducted on adverse reactions including bleeding were discussed. Daily weights, low sodium diet were discussed. Patient instructed to call the office with a weight gain: 3lbs over night and/or 5lbs in one week, swelling, shortness of breath, orthopnea, and/or PND. Discussed exercise: 30min of sustained cardiovascular exercise most days of the week   Discussed Low saturated fat / Cholesterol diet. Thank you for allowing us to participate in the care of Jaleel Luu.     Randa Quintanilla APRN-CNP  [facility], [place], operated by [facility] Office: [phone]    Documentation of today's visit sent to PCP

## 2021-02-25 ENCOUNTER — OFFICE VISIT (OUTPATIENT)
Dept: CARDIOLOGY CLINIC | Age: 71
End: 2021-02-25
Payer: MEDICARE

## 2021-02-25 VITALS
HEART RATE: 80 BPM | HEIGHT: 56 IN | BODY MASS INDEX: 30.82 KG/M2 | WEIGHT: 137 LBS | OXYGEN SATURATION: 94 % | SYSTOLIC BLOOD PRESSURE: 120 MMHG | DIASTOLIC BLOOD PRESSURE: 70 MMHG

## 2021-02-25 DIAGNOSIS — I27.20 PULMONARY HTN (HCC): ICD-10-CM

## 2021-02-25 DIAGNOSIS — R06.02 SOB (SHORTNESS OF BREATH): ICD-10-CM

## 2021-02-25 DIAGNOSIS — I25.110 CORONARY ARTERY DISEASE INVOLVING NATIVE CORONARY ARTERY OF NATIVE HEART WITH UNSTABLE ANGINA PECTORIS (HCC): Primary | ICD-10-CM

## 2021-02-25 DIAGNOSIS — I34.0 MITRAL VALVE INSUFFICIENCY, UNSPECIFIED ETIOLOGY: ICD-10-CM

## 2021-02-25 DIAGNOSIS — I10 ESSENTIAL HYPERTENSION: ICD-10-CM

## 2021-02-25 DIAGNOSIS — I48.0 PAF (PAROXYSMAL ATRIAL FIBRILLATION) (HCC): ICD-10-CM

## 2021-02-25 DIAGNOSIS — Z79.899 HIGH RISK MEDICATION USE: ICD-10-CM

## 2021-02-25 PROCEDURE — 1036F TOBACCO NON-USER: CPT | Performed by: NURSE PRACTITIONER

## 2021-02-25 PROCEDURE — 1123F ACP DISCUSS/DSCN MKR DOCD: CPT | Performed by: NURSE PRACTITIONER

## 2021-02-25 PROCEDURE — G8417 CALC BMI ABV UP PARAM F/U: HCPCS | Performed by: NURSE PRACTITIONER

## 2021-02-25 PROCEDURE — 1090F PRES/ABSN URINE INCON ASSESS: CPT | Performed by: NURSE PRACTITIONER

## 2021-02-25 PROCEDURE — G8427 DOCREV CUR MEDS BY ELIG CLIN: HCPCS | Performed by: NURSE PRACTITIONER

## 2021-02-25 PROCEDURE — 3017F COLORECTAL CA SCREEN DOC REV: CPT | Performed by: NURSE PRACTITIONER

## 2021-02-25 PROCEDURE — 4040F PNEUMOC VAC/ADMIN/RCVD: CPT | Performed by: NURSE PRACTITIONER

## 2021-02-25 PROCEDURE — G8484 FLU IMMUNIZE NO ADMIN: HCPCS | Performed by: NURSE PRACTITIONER

## 2021-02-25 PROCEDURE — G8400 PT W/DXA NO RESULTS DOC: HCPCS | Performed by: NURSE PRACTITIONER

## 2021-02-25 PROCEDURE — 99214 OFFICE O/P EST MOD 30 MIN: CPT | Performed by: NURSE PRACTITIONER

## 2021-02-26 ENCOUNTER — TELEPHONE (OUTPATIENT)
Dept: CARDIOLOGY | Age: 71
End: 2021-02-26

## 2021-02-26 NOTE — TELEPHONE ENCOUNTER
Patient is scheduled for a LHC with Dr. Caresse Prader on 3/4.   laron
Pt can be scheduled for [facility] on Thursday at [address] [address] [address]. Pt unsure if she can get a ride that day. She will talk to her daughter and call office back today or Monday with decision if she will have [address] on Thursday or not. Pt aware that if the decision is to have [address] Thursday she will need to hold coumadin 5 days prior.
(3) adequate

## 2021-03-01 ENCOUNTER — TELEPHONE (OUTPATIENT)
Dept: CARDIOLOGY CLINIC | Age: 71
End: 2021-03-01

## 2021-03-01 NOTE — TELEPHONE ENCOUNTER
PT calling back she does have a ride to get here for her procedure on Thurs. Pt was told to hold her Coumadin 5 days prior she stopped that yesterday however she needs to know what she is to do about her Plavix? She didn't take that yesterday either because she didn't know what to do?  Pt needs a call back today BEFORE 4 pm. Pls call to advise Thank you

## 2021-03-01 NOTE — TELEPHONE ENCOUNTER
Spoke with NPALESSANDRA and was advised for pt to continue taking Plavix but HOLD Coumadin . Spoke with the patient and advised her of this. She voiced understanding .  Call complete

## 2021-03-02 NOTE — TELEPHONE ENCOUNTER
Medication Refill    Medication needing refilled:  warfarin (COUMADIN) 5 MG- pt states she thinks she threw her bottle in the trash she can't find it.       Dosage of the medication:    How are you taking this medication (QD, BID, TID, QID, PRN):mon wed fri 2.5 mg Sun Tue Thur Sat 5 mg    30 or 90 day supply called in:    When will you run out of your medication:    Which Pharmacy are we sending the medication to?: [facility] [address] [place] [address],[address] [id number]   Phone:  [phone]  Fax:  [phone]

## 2021-03-02 NOTE — TELEPHONE ENCOUNTER
Prescription refill    Last OV:02/25/2021    Last Refill:04/17/2020    Labs:02/10/2021    Future Appt: none scheduled at this time

## 2021-03-04 ENCOUNTER — TELEPHONE (OUTPATIENT)
Dept: CARDIOLOGY CLINIC | Age: 71
End: 2021-03-04

## 2021-03-04 ENCOUNTER — HOSPITAL ENCOUNTER (OUTPATIENT)
Dept: CARDIAC CATH/INVASIVE PROCEDURES | Age: 71
Discharge: HOME | End: 2021-03-04
Attending: INTERNAL MEDICINE | Admitting: INTERNAL MEDICINE
Payer: MEDICARE

## 2021-03-04 VITALS
RESPIRATION RATE: 18 BRPM | OXYGEN SATURATION: 93 % | DIASTOLIC BLOOD PRESSURE: 68 MMHG | BODY MASS INDEX: 30.82 KG/M2 | HEIGHT: 56 IN | SYSTOLIC BLOOD PRESSURE: 135 MMHG | HEART RATE: 77 BPM | TEMPERATURE: 97.5 F | WEIGHT: 137 LBS

## 2021-03-04 DIAGNOSIS — R06.02 SOB (SHORTNESS OF BREATH): Primary | ICD-10-CM

## 2021-03-04 LAB
ANION GAP SERPL CALCULATED.3IONS-SCNC: 6 MMOL/L (ref 3–16)
BUN BLDV-MCNC: 14 MG/DL (ref 7–20)
CALCIUM SERPL-MCNC: 9.9 MG/DL (ref 8.3–10.6)
CHLORIDE BLD-SCNC: 100 MMOL/L (ref 99–110)
CO2: 30 MMOL/L (ref 21–32)
CREAT SERPL-MCNC: 0.7 MG/DL (ref 0.6–1.2)
EKG ATRIAL RATE: 74 BPM
EKG DIAGNOSIS: NORMAL
EKG Q-T INTERVAL: 376 MS
EKG QRS DURATION: 78 MS
EKG QTC CALCULATION (BAZETT): 441 MS
EKG R AXIS: 29 DEGREES
EKG T AXIS: -2 DEGREES
EKG VENTRICULAR RATE: 83 BPM
GFR AFRICAN AMERICAN: >60
GFR NON-AFRICAN AMERICAN: >60
GLUCOSE BLD-MCNC: 117 MG/DL (ref 70–99)
HCT VFR BLD CALC: 32.3 % (ref 36–48)
HEMOGLOBIN: 10.1 G/DL (ref 12–16)
INR BLD: 1.32 (ref 0.86–1.14)
MCH RBC QN AUTO: 24.3 PG (ref 26–34)
MCHC RBC AUTO-ENTMCNC: 31.2 G/DL (ref 31–36)
MCV RBC AUTO: 77.8 FL (ref 80–100)
PDW BLD-RTO: 17.2 % (ref 12.4–15.4)
PLATELET # BLD: 153 K/UL (ref 135–450)
PMV BLD AUTO: 7.6 FL (ref 5–10.5)
POC ACT LR: 262 SEC
POC ACT LR: 272 SEC
POC ACT LR: 324 SEC
POTASSIUM SERPL-SCNC: 4.2 MMOL/L (ref 3.5–5.1)
PROTHROMBIN TIME: 15.3 SEC (ref 10–13.2)
RBC # BLD: 4.15 M/UL (ref 4–5.2)
SODIUM BLD-SCNC: 136 MMOL/L (ref 136–145)
WBC # BLD: 6 K/UL (ref 4–11)

## 2021-03-04 PROCEDURE — 6360000004 HC RX CONTRAST MEDICATION: Performed by: INTERNAL MEDICINE

## 2021-03-04 PROCEDURE — 92978 ENDOLUMINL IVUS OCT C 1ST: CPT | Performed by: INTERNAL MEDICINE

## 2021-03-04 PROCEDURE — 92928 PRQ TCAT PLMT NTRAC ST 1 LES: CPT | Performed by: INTERNAL MEDICINE

## 2021-03-04 PROCEDURE — 80048 BASIC METABOLIC PNL TOTAL CA: CPT

## 2021-03-04 PROCEDURE — 6360000002 HC RX W HCPCS

## 2021-03-04 PROCEDURE — C1725 CATH, TRANSLUMIN NON-LASER: HCPCS

## 2021-03-04 PROCEDURE — 2709999900 HC NON-CHARGEABLE SUPPLY

## 2021-03-04 PROCEDURE — 92979 ENDOLUMINL IVUS OCT C EA: CPT

## 2021-03-04 PROCEDURE — C1887 CATHETER, GUIDING: HCPCS

## 2021-03-04 PROCEDURE — 92978 ENDOLUMINL IVUS OCT C 1ST: CPT

## 2021-03-04 PROCEDURE — C1753 CATH, INTRAVAS ULTRASOUND: HCPCS

## 2021-03-04 PROCEDURE — C9600 PERC DRUG-EL COR STENT SING: HCPCS

## 2021-03-04 PROCEDURE — 6370000000 HC RX 637 (ALT 250 FOR IP)

## 2021-03-04 PROCEDURE — 99153 MOD SED SAME PHYS/QHP EA: CPT

## 2021-03-04 PROCEDURE — 85347 COAGULATION TIME ACTIVATED: CPT

## 2021-03-04 PROCEDURE — C1769 GUIDE WIRE: HCPCS

## 2021-03-04 PROCEDURE — 85027 COMPLETE CBC AUTOMATED: CPT

## 2021-03-04 PROCEDURE — 36415 COLL VENOUS BLD VENIPUNCTURE: CPT

## 2021-03-04 PROCEDURE — C1894 INTRO/SHEATH, NON-LASER: HCPCS

## 2021-03-04 PROCEDURE — C1874 STENT, COATED/COV W/DEL SYS: HCPCS

## 2021-03-04 PROCEDURE — C1760 CLOSURE DEV, VASC: HCPCS

## 2021-03-04 PROCEDURE — 92979 ENDOLUMINL IVUS OCT C EA: CPT | Performed by: INTERNAL MEDICINE

## 2021-03-04 PROCEDURE — 99152 MOD SED SAME PHYS/QHP 5/>YRS: CPT

## 2021-03-04 PROCEDURE — 85610 PROTHROMBIN TIME: CPT

## 2021-03-04 PROCEDURE — 93005 ELECTROCARDIOGRAM TRACING: CPT | Performed by: INTERNAL MEDICINE

## 2021-03-04 PROCEDURE — 2500000003 HC RX 250 WO HCPCS

## 2021-03-04 PROCEDURE — 93010 ELECTROCARDIOGRAM REPORT: CPT | Performed by: INTERNAL MEDICINE

## 2021-03-04 RX ORDER — WARFARIN SODIUM 5 MG/1
5 TABLET ORAL DAILY
Qty: 90 TABLET | Refills: 3 | Status: SHIPPED
Start: 2021-03-04 | End: 2022-02-25 | Stop reason: SDDI

## 2021-03-04 RX ADMIN — IOPAMIDOL 64 ML: 755 INJECTION, SOLUTION INTRAVENOUS at 11:34

## 2021-03-04 NOTE — H&P
[facility]     Outpatient Follow Up Note      CHIEF COMPLAINT / HPI:  Follow Up secondary to status post coronary artery stenting    Subjective:   Derik Reich is 69 y.o. female who presents today with a history of DM, HTN, HLD, PAD, a fib, MR. Pt reports having a \"funny feeling\" in her chest once in a while that started after PCI. Unable to describe feeling. Located across chest sometimes on right side, left side, or radiating up through mid chest. Not in correlation with food. Not sure if it is exertional because she has such significant LUCAS. She takes metolazone daily. The other night she took three pills because she couldn't breath, she is unsure if this helped. She is very upset during visit and reports she is frustrated that her breathing does not feel any different after PCI. Her breathing is only comfortable at rest.  The patient denies orthopnea/PND. Denies swelling and her weight is stable. The patient is not experiencing palpitations. Has positional dizziness that resolves quickly and is not new. With regard to medication therapy the patient has been compliant with prescribed regimen. They have tolerated therapy to date. Past Medical History:   Diagnosis Date    AF (atrial fibrillation) (HCC)     on anticoagulation    Back pain     compaound fracture    CAD (coronary artery disease)     Compression fracture 1994    Hyperlipidemia     Hypertension     Myelolipoma     bilateral adrenals    PVD (peripheral vascular disease) ([facility] [address]     Right ear injury 1983    Per pt.  Shingles     Valvular disease      Social History:    Social History     Tobacco Use   Smoking Status Former Smoker    Years: 40.00    Types: Cigarettes    Quit date: 3/1/2014    Years since quittin.0   Smokeless Tobacco Never Used   Tobacco Comment    Trying to quit     Current Medications:  No current facility-administered medications for this encounter.       REVIEW OF SYSTEMS: CONSTITUTIONAL: No major weight gain or loss, night sweats, fever, fatigue, or weakness. HEENT: No new vision difficulties or ringing in the ears. RESPIRATORY: No new SOB, PND, orthopnea or cough. CARDIOVASCULAR: See HPI  GI: No N/V/D, constipation, or abdominal pain. No black/tarry stools  : No urinary urgency, incontinence, or hematuria. SKIN: No cyanosis or skin lesions. MUSCULOSKELETAL: No new muscle or joint pain. NEUROLOGICAL: No syncope or TIA-like symptoms. PSYCHIATRIC: No anxiety, pain, insomnia or depression    Objective:   PHYSICAL EXAM:      VITALS:  /68   Pulse 77   Temp 97.5 °F (36.4 °C)   Resp 18   Ht 4' 8\" (1.422 m)   Wt 137 lb (62.1 kg)   LMP 01/01/1983   SpO2 93%   BMI 30.71 kg/m²   CONSTITUTIONAL: Cooperative, no apparent distress, and appears well nourished / developed  NEUROLOGIC:  Awake and orientated to person, place, and time. PSYCH: Calm affect. SKIN: Warm and dry. HEENT: Sclera non-icteric, normocephalic, neck supple. RESPIRATORY:  No increased work of breathing and clear to auscultation, no crackles or wheezing. CARDIOVASCULAR:  Regular rate and rhythm without murmur. Normal S1 and S2. No gallops or rubs. Normal PMI. No elevation of JVP. Normal carotid pulses with no bruits. GI:  Normal bowel sounds. Non-distended. Non-tender to palpation  EXT: No edema. No calf tenderness. Pulses are present bilaterally.     DATA:    Lab Results   Component Value Date    ALT 22 05/08/2019    AST 23 05/08/2019    ALKPHOS 58 05/08/2019    BILITOT 0.7 05/08/2019     Lab Results   Component Value Date    CREATININE 0.7 03/04/2021    BUN 14 03/04/2021     03/04/2021    K 4.2 03/04/2021     03/04/2021    CO2 30 03/04/2021     Lab Results   Component Value Date    TSH 1.48 01/20/2017     Lab Results   Component Value Date    WBC 6.0 03/04/2021    HGB 10.1 (L) 03/04/2021    HCT 32.3 (L) 03/04/2021    MCV 77.8 (L) 03/04/2021     03/04/2021     No components found severe MVD  Recommendation  ~Aggressive medical treatment and risk factor modification  ~CABG. Check echo. Refer to CT surgery as an outpatient. Cardiac Cath PCI: 2/10/21  Anatomy:   RCA-ostial 50%  RPDA- mid 99% ISR  Intervention  ~Successful PCI to RCA with ELCA laser atherectomy. 2.5 NC balloon, 3.5 NC balloon. 3.5x38 DEE to 20 kirti. Mid stent underexpanded segment. 3.75x8 NC to 22atm. 10% residual stenosis. Excellent Result. Contrast: 55  Flouro Time: 42.8  Access: R CFA. Ultrasound guidance used to determine aforementioned artery patency, size (>2mm), anatomic variations and ideal puncture location. Real-time ultrasound utilized concurrent with vascular needle entry into the artery. Image(s) permanently recorded and reported in the patient chart. Perc stick safe zone   Impression  ~Coronary Angiography w/ severe single vessel CAD  ~Successful complex angioplasty and stenting of RCA  Recommendation  ~Aggressive medical treatment and risk factor modification  ~1. Stop heparin gtt. Post cath IVF. Bedrest.  2. Recommend beta blocker, high potency statin, aspirin coumadin and plavix. Restart coumadin tomorrow. 3. Referral to cardiac rehab placed  4. Patient has been advised on the importance of regular exercise of at least 20-30 minutes daily. 5. Patient counseled about and offered assistance for smoking cessation   6. Follow up in 1-2 weeks with cardiology    PFT: 12/16/2020  Interpretation:  Essentially normal pulmonary function test.  ABG on room air was normal.    Assessment:     1. Coronary artery disease   ~ stable ; c/o atypical chest symptoms, does have + LUCAS  ~ hx of stents to RCA   ~ [facility] 11/2020: reveling severe MVD. She was referred to [facility] for CABG evaluation.  Due to a very calcified aorta it was decided upon not to proceed with CABG.   ~ [facility] 2/10/21: s/p PCI to RCA with ELCA laser atherectomy and DEE placement   ~ on aspirin and plavix (along with coumadin), BB, statin  ~ pt is not interested in cardiac rehab     2. Shortness of breath   ~ persistent   ~ Echo 10/2020: EF 60%,   ~ PFT 12/2020 essentially normal with normal ABG on room air    3. Pulmonary HTN  ~ Echo 11/2014: RVSP 34 mmHg  ~ [facility] 2014 consistent with mild pulm HTN  ~ Echo 4/2019: RVSP 23 mmHg, mild TR  ~ Echo 10/2020: RVSP 45-50 mmHg, mild TR    4. Mitral regurgitation    ~ Echo 4/2019: mod MR   ~ Echo 10/2020: mild-mod MR  ~ stable     5. Hypertension   ~ controlled     6. Hyperlipidemia   ~ panel not up to date - will need to get soon   ~ on Crestor 10    7. Paroxysmal atrial fibrillation   ~ stable ; regular to ausculation today   ~ on coumadin     8. PAD  ~ S/P Scoring PTA, DCB, and DEE to left distal SFA/popliteal March 2019 (May 2019 Left SUKUMAR normalized to 1.24)  ~ on aspirin and statin  ~ follows with Dr. Chema Quiles     I had the opportunity to review the clinical symptoms and presentation of Violet Reddy. Plan:     1. LHC with possible intervention to LAD  2. BMP and BNP today   3. Follow up after procedure     Overall the patient is stable from CV standpoint    I have reviewed the history and physical and examined the patient and find no relevant changes. I have reviewed with the patient and/or family the risks, benefits, and alternatives to the procedure. Based on these findings I recommend left heart cath for definitive evaluation of coronary arteries. For PCI of LAD today. INR 1.3  Risks, benefits, expectations, and alternative treatments were discussed. Questions appropriately answered. Violet Reddy agrees to proceed and verbalized understanding.        Dave Adame 3/4/2021 10:03 AM

## 2021-03-04 NOTE — OP NOTE
Patient:  Violet Reddy   :   1950    Procedural Summary  ~Consent:   Obtained written and verbal consent      Risks/benefits explained in detail  ~Procedure:    Left Heart Catheterization  ~Medications:    Procedural sedation with minimal conscious sedation  ~Complications:   None  ~Blood Loss:    <10cc  ~Specimens:    None obtained  ~Pre-sedation re-evaluation: Performed immediately prior to procedure. Medication and Procedural Reconciliation:  An independent trained observer pushed medications at my direction. We monitored the patient's level of consciousness and vital signs/physiologic status throughout the procedure duration (see start and stop times below). Sedation: 2.5 mg Versed, 100 mcg Fentanyl  Sedation start: 957  Sedation stop:     Cardiac Cath IVUS PCI:  Anatomy:   LM-distal 70%   LAD-prox 90%, distal 70%    IVUS LAD showed MLA of 1mm2  IVUS of LM showed MLA 5.2mm2     Intervention  ~Successful PCI to LAD with 2.5x32 DEE to 18atm. PCI to LM with 2.75x16 DEE to 18atm. PD with 3.0x8 NC to 24atm. Excellent Result. IVUS showed good stent apposition and expansion. Contrast: 64  Flouro Time: 8.5   Access: L CFA. Perc stick safe zone    Impression  ~Coronary Angiography w/ Severe Single vessel CAD  ~Successful complex angioplasty and stenting of LAD/LM        Recommendation  ~Aggressive medical treatment and risk factor modification  ~1. Stop heparin gtt. Post cath IVF. Bedrest.  2. Recommend beta blocker, high potency statin, coumadin and plavix  3. Referral to cardiac rehab placed  4. Patient has been advised on the importance of regular exercise of at least 20-30 minutes daily. 5. Patient counseled about and offered assistance for smoking cessation   6. Follow up in 1-2 weeks with cardiology. Restart coumadin tonight. Recommend Pulmonary evaluation for COPD.             Dave Adame MD 3/4/2021 11:16 AM

## 2021-03-04 NOTE — TELEPHONE ENCOUNTER
Patient asking if she is going to get a card to keep in her wallet saying what kind of stent she has ?

## 2021-03-04 NOTE — TELEPHONE ENCOUNTER
Spoke with patient to let her know that a referral to pulmonology was placed. Phone number given to her to call for an appointment. She did mention that going up her stairs today was easier for her than it has been.  [email or web address]

## 2021-03-04 NOTE — PRE SEDATION
Brief Pre-Op Note/Sedation Assessment      Yessica Phillip  1950  Cath/NONE      [id number]  10:05 AM    Planned Procedure: Cardiac Catheterization Procedure    Post Procedure Plan: Return to same level of care    Consent: I have discussed with the patient and/or the patient representative the indication, alternatives, and the possible risks and/or complications of the planned procedure and the anesthesia methods. The patient and/or patient representative appear to understand and agree to proceed. Chief Complaint: Chest Pain/Pressure  Anginal Equivalent      Indications for Cath Procedure: Worsening Angina and Stable Known CAD  Anginal Classification within 2 weeks:  CCS III - Symptoms with everyday living activities, i.e., moderate limitation  NYHA Heart Failure Class within 2 weeks: No symptoms  Is Cath Lab Visit Valve-related?: No  Surgical Risk: High Risk: Vascular  Functional Type: >= 4 METS with symptoms    Anti- Anginal Meds within 2 weeks:   Yes: Beta Blockers and Aspirin    Stress or Imaging Studies Performed:  None     Vital Signs:  /68   Pulse 77   Temp 97.5 °F (36.4 °C)   Resp 18   Ht 4' 8\" (1.422 m)   Wt 137 lb (62.1 kg)   LMP 01/01/1983   SpO2 93%   BMI 30.71 kg/m²     Allergies: Allergies   Allergen Reactions    Actos [Pioglitazone]     Percocet [Oxycodone-Acetaminophen]      Confused , loopy       Past Medical History:  Past Medical History:   Diagnosis Date    AF (atrial fibrillation) (HCC)     on anticoagulation    Back pain     compaound fracture    CAD (coronary artery disease)     Compression fracture 01/01/1994    Hyperlipidemia     Hypertension     Myelolipoma     bilateral adrenals    PVD (peripheral vascular disease) ([facility] [address]     Right ear injury 01/01/1983    Per pt.     Shingles     Valvular disease          Surgical History:  Past Surgical History:   Procedure Laterality Date    CORONARY ANGIOPLASTY WITH STENT PLACEMENT  2014    CORONARY ANGIOPLASTY WITH STENT PLACEMENT  03/14/2003 & 06/02/2003    per pt    HYSTERECTOMY  09/01/1983    fibroids    TUBAL LIGATION  09/1983    VENTRAL HERNIA REPAIR  5-    VENTRAL HERNIA REPAIR WITH MESH                Medications:  No current facility-administered medications for this encounter. Pre-Sedation:    Pre-Sedation Documentation and Exam:  I have personally completed a history, physical exam & review of systems for this patient (see notes). Prior History of Anesthesia Complications:   none    Modified Mallampati:  II (soft palate, uvula, fauces visible)    ASA Classification:  Class 2 - A normal healthy patient with mild systemic disease      Samuel Scale: Activity:  2 - Able to move 4 extremities voluntarily on command  Respiration:  2 - Able to breathe deeply and cough freely  Circulation:  2 - BP+/- 20mmHg of normal  Consciousness:  2 - Fully awake  Oxygen Saturation (color):  2 - Able to maintain oxygen saturation >92% on room air    Sedation/Anesthesia Plan:  Guard the patient's safety and welfare. Minimize physical discomfort and pain. Minimize negative psychological responses to treatment by providing sedation and analgesia and maximize the potential amnesia. Patient to meet pre-procedure discharge plan.     Medication Planned:  midazolam intravenously and fentanyl intravenously    Patient is an appropriate candidate for plan of sedation: yes      Electronically signed by Val Andrew MD on 3/4/2021 at 10:05 AM

## 2021-03-05 NOTE — TELEPHONE ENCOUNTER
LMOM for patient to return call . Paperwork received from Cath Lab and placed in the mail for patient. Includes card she is requesting and informational packet.

## 2021-03-05 NOTE — TELEPHONE ENCOUNTER
Spoke with the patient and advised her of my message below .  She is aware and voiced understanding that information is in the mail

## 2021-03-05 NOTE — TELEPHONE ENCOUNTER
They are usually given one at discharge from the cath lab. I will check with them to see if they still have it.

## 2021-03-08 ENCOUNTER — TELEPHONE (OUTPATIENT)
Dept: CARDIOLOGY CLINIC | Age: 71
End: 2021-03-08

## 2021-03-08 ENCOUNTER — APPOINTMENT (OUTPATIENT)
Dept: CT IMAGING | Age: 71
End: 2021-03-08
Payer: MEDICARE

## 2021-03-08 ENCOUNTER — HOSPITAL ENCOUNTER (EMERGENCY)
Age: 71
Discharge: HOME | End: 2021-03-08
Attending: EMERGENCY MEDICINE
Payer: MEDICARE

## 2021-03-08 VITALS
DIASTOLIC BLOOD PRESSURE: 88 MMHG | HEART RATE: 88 BPM | OXYGEN SATURATION: 97 % | SYSTOLIC BLOOD PRESSURE: 147 MMHG | TEMPERATURE: 97.6 F | RESPIRATION RATE: 16 BRPM

## 2021-03-08 DIAGNOSIS — K55.1 SUPERIOR MESENTERIC ARTERY STENOSIS (HCC): ICD-10-CM

## 2021-03-08 DIAGNOSIS — I73.9 PVD (PERIPHERAL VASCULAR DISEASE) (HCC): ICD-10-CM

## 2021-03-08 DIAGNOSIS — I70.1 RENAL ARTERY STENOSIS (HCC): ICD-10-CM

## 2021-03-08 DIAGNOSIS — I77.1 CELIAC ARTERY STENOSIS (HCC): ICD-10-CM

## 2021-03-08 DIAGNOSIS — G89.18 ACUTE POST-OPERATIVE PAIN: Primary | ICD-10-CM

## 2021-03-08 DIAGNOSIS — Z79.01 ANTICOAGULATED ON COUMADIN: ICD-10-CM

## 2021-03-08 LAB
A/G RATIO: 1.6 (ref 1.1–2.2)
ALBUMIN SERPL-MCNC: 3.9 G/DL (ref 3.4–5)
ALP BLD-CCNC: 68 U/L (ref 40–129)
ALT SERPL-CCNC: 12 U/L (ref 10–40)
ANION GAP SERPL CALCULATED.3IONS-SCNC: 7 MMOL/L (ref 3–16)
APTT: 28.8 SEC (ref 24.2–36.2)
AST SERPL-CCNC: 16 U/L (ref 15–37)
BASOPHILS ABSOLUTE: 0 K/UL (ref 0–0.2)
BASOPHILS RELATIVE PERCENT: 0.8 %
BILIRUB SERPL-MCNC: 0.3 MG/DL (ref 0–1)
BUN BLDV-MCNC: 12 MG/DL (ref 7–20)
CALCIUM SERPL-MCNC: 10.1 MG/DL (ref 8.3–10.6)
CHLORIDE BLD-SCNC: 99 MMOL/L (ref 99–110)
CO2: 31 MMOL/L (ref 21–32)
CREAT SERPL-MCNC: 0.7 MG/DL (ref 0.6–1.2)
EOSINOPHILS ABSOLUTE: 0.5 K/UL (ref 0–0.6)
EOSINOPHILS RELATIVE PERCENT: 8.1 %
GFR AFRICAN AMERICAN: >60
GFR NON-AFRICAN AMERICAN: >60
GLOBULIN: 2.5 G/DL
GLUCOSE BLD-MCNC: 122 MG/DL (ref 70–99)
HCT VFR BLD CALC: 31.8 % (ref 36–48)
HEMOGLOBIN: 10 G/DL (ref 12–16)
INR BLD: 1.55 (ref 0.86–1.14)
LYMPHOCYTES ABSOLUTE: 1.3 K/UL (ref 1–5.1)
LYMPHOCYTES RELATIVE PERCENT: 21.6 %
MCH RBC QN AUTO: 24.5 PG (ref 26–34)
MCHC RBC AUTO-ENTMCNC: 31.4 G/DL (ref 31–36)
MCV RBC AUTO: 77.8 FL (ref 80–100)
MONOCYTES ABSOLUTE: 0.5 K/UL (ref 0–1.3)
MONOCYTES RELATIVE PERCENT: 8.8 %
NEUTROPHILS ABSOLUTE: 3.7 K/UL (ref 1.7–7.7)
NEUTROPHILS RELATIVE PERCENT: 60.7 %
PDW BLD-RTO: 16.8 % (ref 12.4–15.4)
PLATELET # BLD: 150 K/UL (ref 135–450)
PMV BLD AUTO: 7.8 FL (ref 5–10.5)
POTASSIUM SERPL-SCNC: 4 MMOL/L (ref 3.5–5.1)
PROTHROMBIN TIME: 18.1 SEC (ref 10–13.2)
RBC # BLD: 4.09 M/UL (ref 4–5.2)
SODIUM BLD-SCNC: 137 MMOL/L (ref 136–145)
TOTAL PROTEIN: 6.4 G/DL (ref 6.4–8.2)
WBC # BLD: 6 K/UL (ref 4–11)

## 2021-03-08 PROCEDURE — 36415 COLL VENOUS BLD VENIPUNCTURE: CPT

## 2021-03-08 PROCEDURE — 80053 COMPREHEN METABOLIC PANEL: CPT

## 2021-03-08 PROCEDURE — 6360000004 HC RX CONTRAST MEDICATION: Performed by: PHYSICIAN ASSISTANT

## 2021-03-08 PROCEDURE — 99283 EMERGENCY DEPT VISIT LOW MDM: CPT

## 2021-03-08 PROCEDURE — 93971 EXTREMITY STUDY: CPT

## 2021-03-08 PROCEDURE — 85610 PROTHROMBIN TIME: CPT

## 2021-03-08 PROCEDURE — 75635 CT ANGIO ABDOMINAL ARTERIES: CPT

## 2021-03-08 PROCEDURE — 85730 THROMBOPLASTIN TIME PARTIAL: CPT

## 2021-03-08 PROCEDURE — 85025 COMPLETE CBC W/AUTO DIFF WBC: CPT

## 2021-03-08 RX ORDER — TRAMADOL HYDROCHLORIDE 50 MG/1
50 TABLET ORAL EVERY 6 HOURS PRN
Qty: 7 TABLET | Refills: 0 | Status: SHIPPED | OUTPATIENT
Start: 2021-03-08 | End: 2021-03-11

## 2021-03-08 RX ADMIN — IOPAMIDOL 120 ML: 755 INJECTION, SOLUTION INTRAVENOUS at 10:51

## 2021-03-08 ASSESSMENT — ENCOUNTER SYMPTOMS
DIARRHEA: 0
STRIDOR: 0
CONSTIPATION: 0
COLOR CHANGE: 0
ABDOMINAL DISTENTION: 0
NAUSEA: 0
SHORTNESS OF BREATH: 0
ABDOMINAL PAIN: 0
COUGH: 0
WHEEZING: 0
VOMITING: 0
BACK PAIN: 0

## 2021-03-08 NOTE — ED PROVIDER NOTES
[facility]        Pt Name: Tr Mcmillan  MRN: [id number]  [place] 1950  Date of evaluation: 3/8/2021  Provider: Fabrizio Traore PA-C  PCP: Dar Anaya III, DO     I have seen and evaluated this patient with my supervising physician No att. providers found. CHIEF COMPLAINT       No chief complaint on file. HISTORY OF PRESENT ILLNESS   (Location, Timing/Onset, Context/Setting, Quality, Duration, Modifying Factors, Severity, Associated Signs and Symptoms)  Note limiting factors. Tr Mcmillan is a 69 y.o. female with history of atrial fibrillation, coronary artery disease, hypertension, hyperlipidemia who presents to the emergency department complaining of persistent left groin pain and bruising after recent heart cath on Thursday last week by Dr. Jared Guido, cardiologist.  She denies any spontaneous drainage or bleeding. Although she is worried about possible infection, she is able to ambulate without difficulty and denies fever or chills. She takes Coumadin and plavix. Patient also states that for the past couple of days she reports intermittent left calf pain. Denies any leg swelling however. Nursing Notes were all reviewed and agreed with or any disagreements were addressed in the HPI. REVIEW OF SYSTEMS    (2-9 systems for level 4, 10 or more for level 5)     Review of Systems   Constitutional: Negative for chills and fever. HENT: Negative. Eyes: Negative for visual disturbance. Respiratory: Negative for cough, shortness of breath, wheezing and stridor. Cardiovascular: Negative for chest pain, palpitations and leg swelling. Gastrointestinal: Negative for abdominal distention, abdominal pain, constipation, diarrhea, nausea and vomiting. Genitourinary: Negative. Musculoskeletal: Positive for myalgias. Negative for back pain, neck pain and neck stiffness.    Skin: Negative for color change, pallor, rash and wound. Neurological: Negative for dizziness, tremors, seizures, syncope, facial asymmetry, speech difficulty, weakness, light-headedness, numbness and headaches. Hematological: Bruises/bleeds easily. Psychiatric/Behavioral: Negative for confusion. All other systems reviewed and are negative. Positives and Pertinent negatives as per HPI. Except as noted above in the ROS, all other systems were reviewed and negative. PAST MEDICAL HISTORY     Past Medical History:   Diagnosis Date    [facility]     on anticoagulation    Back pain     compaound fracture    CAD (coronary artery disease)     Compression fracture 01/01/1994    Hyperlipidemia     Hypertension     Myelolipoma     bilateral adrenals    PVD (peripheral vascular disease) ([facility] [address]     Right ear injury 01/01/1983    Per pt.     Shingles     Valvular disease          SURGICAL HISTORY     Past Surgical History:   Procedure Laterality Date    CORONARY ANGIOPLASTY WITH STENT PLACEMENT  2014    CORONARY ANGIOPLASTY WITH STENT PLACEMENT  03/14/2003 & 06/02/2003    per pt    HYSTERECTOMY  09/01/1983    fibroids    TUBAL LIGATION  09/1983    VENTRAL HERNIA REPAIR  5-    VENTRAL HERNIA REPAIR WITH MESH                CURRENTMEDICATIONS       Discharge Medication List as of 3/8/2021  2:19 PM      CONTINUE these medications which have NOT CHANGED    Details   warfarin (COUMADIN) 5 MG tablet Take 1 tablet by mouth daily Indications: Restart on 3/23/19 TAKE ONE TABLET BY MOUTH DAILY or as directed, Disp-90 tablet, R-3Normal      clopidogrel (PLAVIX) 75 MG tablet Take 1 tablet by mouth daily, Disp-90 tablet, R-1Normal      Biotin 1 MG CAPS Take by mouth Indications: Hair volume  medicagionHistorical Med      OMEPRAZOLE PO Take by mouth daily as neededHistorical Med      rosuvastatin (CRESTOR) 10 MG tablet Take 1 tablet by mouth daily Dispense Generic name only per pt request, Disp-30 tablet,R-3Needs lab workNormal      nitroGLYCERIN (NITROSTAT) 0.4 MG SL tablet Place 1 tablet under the tongue every 5 minutes as needed for Chest pain, Disp-25 tablet,R-3Normal      metOLazone (ZAROXOLYN) 5 MG tablet TAKE ONE TABLET BY MOUTH DAILY AS NEEDED, Disp-90 tablet,R-5Normal      aspirin EC 81 MG EC tablet , Disp-90 tablet,R-1Normal      atenolol (TENORMIN) 25 MG tablet Take 1 tablet by mouth daily, Disp-90 tablet, R-3Normal      terbinafine (LAMISIL) 250 MG tablet Take 250 mg by mouth dailyHistorical Med      potassium chloride (KLOR-CON M) 20 MEQ extended release tablet TAKE ONE TABLET BY MOUTH DAILY, Disp-90 tablet, R-9Normal      Specialty Vitamins Products (VITAMINS FOR THE HAIR PO) Take by mouth dailyHistorical Med      Magnesium Oxide 250 MG TABS Take 2 tablets by mouth daily, Disp-30 tablet, R-5      Simethicone (GAS RELIEF) 180 MG CAPS Take  by mouth as needed. ALLERGIES     Actos [pioglitazone] and Percocet [oxycodone-acetaminophen]    FAMILYHISTORY       Family History   Problem Relation Age of Onset    Heart Attack Father 76    Heart Disease Father         complications from surgery, per pt    Heart Attack Mother 64          SOCIAL HISTORY       Social History     Tobacco Use    Smoking status: Former Smoker     Years: 40.00     Types: Cigarettes     Quit date: 3/1/2014     Years since quittin.0    Smokeless tobacco: Never Used    Tobacco comment: Trying to quit   Substance Use Topics    Alcohol use: No     Alcohol/week: 3.0 standard drinks     Types: 3 Cans of beer per week    Drug use: No       SCREENINGS             PHYSICAL EXAM    (up to 7 for level 4, 8 or more for level 5)     ED Triage Vitals [21 0944]   BP Temp Temp Source Pulse Resp SpO2 Height Weight   (!) 147/75 97.6 °F (36.4 °C) Oral 84 18 97 % -- --       Physical Exam  Vitals signs and nursing note reviewed. Constitutional:       Appearance: Normal appearance.  She is well-developed. She is not toxic-appearing or diaphoretic. HENT:      Head: Normocephalic and atraumatic. Right Ear: External ear normal.      Left Ear: External ear normal.      Nose: Nose normal.      Mouth/Throat:      Mouth: Mucous membranes are moist.      Pharynx: Oropharynx is clear. Eyes:      General: No scleral icterus. Right eye: No discharge. Left eye: No discharge. Extraocular Movements: Extraocular movements intact. Conjunctiva/sclera: Conjunctivae normal.      Pupils: Pupils are equal, round, and reactive to light. Neck:      Musculoskeletal: Normal range of motion. Cardiovascular:      Rate and Rhythm: Normal rate. Pulses:           Femoral pulses are 2+ on the right side and 2+ on the left side. Posterior tibial pulses are 2+ on the right side and 2+ on the left side. Pulmonary:      Effort: Pulmonary effort is normal.      Breath sounds: No stridor. No rhonchi. Abdominal:      General: Bowel sounds are normal.      Palpations: Abdomen is soft. Tenderness: There is no abdominal tenderness. Musculoskeletal: Normal range of motion. Left hip: She exhibits tenderness (anterior bruising and tenderness). She exhibits normal range of motion, normal strength, no bony tenderness, no swelling, no crepitus, no deformity and no laceration. Left knee: Normal.      Left ankle: Normal. Achilles tendon normal.      Left upper leg: Normal.      Left lower leg: Normal.      Left foot: Normal.      Comments: No extremity edema, posterior calf or thigh tenderness, palpable cord, erythema, red streaking, cyclothymia, pallor, paresthesia, pain out of portion of physical findings, drainage, bleeding, fluctuance, crepitus. Skin:     General: Skin is warm and dry. Capillary Refill: Capillary refill takes less than 2 seconds. Coloration: Skin is not jaundiced or pale. Findings: No bruising, erythema, lesion or rash.    Neurological: Mental Status: She is alert and oriented to person, place, and time. Mental status is at baseline. Sensory: No sensory deficit. Motor: No weakness. Deep Tendon Reflexes: Reflexes normal.   Psychiatric:         Mood and Affect: Mood normal.         Behavior: Behavior normal.         DIAGNOSTIC RESULTS   LABS:    Labs Reviewed   CBC WITH AUTO DIFFERENTIAL - Abnormal; Notable for the following components:       Result Value    Hemoglobin 10.0 (*)     Hematocrit 31.8 (*)     MCV 77.8 (*)     MCH 24.5 (*)     RDW 16.8 (*)     All other components within normal limits    Narrative:     Performed at:  [facility] [address], [place], [place] ([employer])   Phone [phone]   COMPREHENSIVE METABOLIC PANEL - Abnormal; Notable for the following components:    Glucose 122 (*)     All other components within normal limits    Narrative:     Performed at:  [facility] [address], [place], [place] ([employer])   Phone [phone]   PROTIME-INR - Abnormal; Notable for the following components:    Protime 18.1 (*)     INR 1.55 (*)     All other components within normal limits    Narrative:     Performed at:  [facility] [address], [place], [place] ([employer])   Phone [phone]   APTT    Narrative:     Performed at:  [facility] [address], [place], [place] ([employer])   Phone [phone]       All other labs were within normal range or not returned as of this dictation. EKG: All EKG's are interpreted by the Emergency Department Physician in the absence of a cardiologist.  Please see their note for interpretation of EKG.       RADIOLOGY:   Non-plain film images such as CT, Ultrasound and MRI are read by the radiologist. Plain radiographic images are visualized and preliminarily interpreted by the ED Provider with the below findings:        Interpretation per the Radiologist below, if tenderness. Motor and sensory function are preserved. Patient is able to ambulate. Venous Doppler is negative for DVT. CTA runoff shows several arterial stenosis and plaque/peripheral vascular disease without large vessel occlusion or infarct. Patient is advised to follow-up with PCP and cardiologist for recheck and may return ED per discharge instructions. Continue cryotherapy to this area and will be sent home with short course of pain medicine. She believes that she has tolerated ultram in the past without allergic reaction or profound side effects and consents to this prescription. She is advised to follow-up with vascular surgeon regarding the incidental arterial stenosis and peripheral vascular disease. My suspicion is low for subungual hematoma, paronychia, eponychia, felon, flexor tenosynovitis, foreign body, tendon rupture, compartment syndrome, acute fracture, dislocation, DVT, arterial compromise or occlusion, limb ischemia, gout, septic joint, abscess, cellulitis, osteomyelitis, pseudoaneurysm, SCFE, avascular necrosis, necrotizing fasciitis, or other concerning pathology. We have addressed concerns and expectations. FINAL IMPRESSION      1. Acute post-operative pain    2. Celiac artery stenosis (HCC)    3. Superior mesenteric artery stenosis ([address]    4. PVD (peripheral vascular disease) ([facility] [address]    5. Anticoagulated on Coumadin    6.  Renal artery stenosis [facility])          DISPOSITION/PLAN   DISPOSITION Decision To Discharge 03/08/2021 12:16:17 PM      PATIENT REFERREDTO:  [address], [address]  [phone]      for follow up in 1-3 days    Kacie Hernandez MD  [address]  [address]  [address]  [phone]      follow up with your cardiologist    Basim Pinon MD  [address]  [address]  [phone]      follow up with vascular surgery    [facility]  [place] [address]  [phone]    If symptoms worsen      DISCHARGE MEDICATIONS:  Discharge Medication List as of 3/8/2021  2:19 PM      START taking these medications    Details   traMADol (ULTRAM) 50 MG tablet Take 1 tablet by mouth every 6 hours as needed for Pain for up to 3 days. , Disp-7 tablet, R-0Print             DISCONTINUED MEDICATIONS:  Discharge Medication List as of 3/8/2021  2:19 PM                 (Please note that portions of this note were completed with a voice recognition program.  Efforts were made to edit the dictations but occasionally words are mis-transcribed.)    Mendy Lino PA-C (electronically signed)            Mendy Lino PA-C  03/08/21 8053

## 2021-03-08 NOTE — ED NOTES
Prescription was not signed, pt called, I called [phone] and called prescription for Ultram in 50 mg #7 no refills pt aware     Aleja Owen RN  03/08/21 5804

## 2021-03-08 NOTE — ED PROVIDER NOTES
This patient was seen by the Mid-Level Provider. I have seen and examined the patient, agree with the workup, evaluation, management and diagnosis. Care plan has been discussed. My assessment reveals a 78-year-old female who presents with some ecchymosis around her left thigh area. The patient had a stent placed this last Thursday and noticed the bruising on Friday as already. The patient is on Coumadin. Radiology results:    VL Extremity Venous Left   Final Result      CTA ABDOMINAL AORTA W BILAT RUNOFF W CONTRAST   Final Result   No hematoma formation, pseudoaneurysm, or arteriovenous fistula at the left   groin catheterization site. Mild stranding in the left groin consistent with the recent cardiac   catheterization. There is slight irregularity of a small arterial branch   without active extravasation. Findings likely postprocedural and/or   inflammatory in nature. Moderate stenosis of the celiac artery origin and mild-to-moderate stenosis   of the superior mesenteric artery origin. Severe stenosis of the right renal artery at its origin, similar in   appearance. Mild multilevel inflow, outflow, and runoff disease. There is a short   segment area of moderate stenosis of the right popliteal artery due to   calcified and noncalcified plaque. There is 2 vessel runoff on the left and   3 vessel runoff on the right. Small polyp for stone within the gallbladder. Gallbladder wall appears   mildly thickened but is otherwise unremarkable. If clinically indicated,   right upper quadrant ultrasound may be helpful.                LABS:    Labs Reviewed   CBC WITH AUTO DIFFERENTIAL - Abnormal; Notable for the following components:       Result Value    Hemoglobin 10.0 (*)     Hematocrit 31.8 (*)     MCV 77.8 (*)     MCH 24.5 (*)     RDW 16.8 (*)     All other components within normal limits    Narrative:     Performed at:  [facility]  [address] [place] [address] [address] [employer]   Phone [phone]   COMPREHENSIVE METABOLIC PANEL - Abnormal; Notable for the following components:    Glucose 122 (*)     All other components within normal limits    Narrative:     Performed at:  [facility] [address] [place],  [place], [address]   Phone [phone]   PROTIME-INR - Abnormal; Notable for the following components:    Protime 18.1 (*)     INR 1.55 (*)     All other components within normal limits    Narrative:     Performed at:  [facility] [address]   Phone [phone]   APTT    Narrative:     Performed at:  [facility] [address] [facility]   Phone [phone]               Exam:    Well-nourished female in no acute distress. She has an ecchymosis in the left upper thigh area. There are no masses noted. She had good distal pulses. Medical decision makin-year-old female presents with some ecchymosis after a heart cath procedure last week. The patient's work-up today included a CT of the abdomen pelvis with IV runoff that showed no evidence of any pseudoaneurysm or active bleeding. Patient does have some known peripheral vascular disease. The patient be discharged with cardiology follow-up and instructed to return if worse. FINAL IMPRESSION:    1. Acute post-operative pain    2. Celiac artery stenosis (HCC)    3. Superior mesenteric artery stenosis ([address]    4. PVD (peripheral vascular disease) ([address]    5. Anticoagulated on Coumadin    6.  Renal artery stenosis (HCC)            Amna Mohamud MD  21 [id number]

## 2021-03-08 NOTE — ED NOTES
Pt sitting up in chair in room. Bruising noted to left upper thigh. States she had an angio and stent placement on Thursday and noted bruising Friday or Saturday. Pt states she has two other appointments today and asking how much longer this is going to take. Explained to patient that CT can not be done until after chemistry results are back. Ct to bedside at end of conversation.      Fady Oneal RN  03/08/21 2505

## 2021-03-16 ENCOUNTER — OFFICE VISIT (OUTPATIENT)
Dept: PULMONOLOGY | Age: 71
End: 2021-03-16
Payer: MEDICARE

## 2021-03-16 VITALS — HEART RATE: 80 BPM | OXYGEN SATURATION: 99 %

## 2021-03-16 DIAGNOSIS — I25.10 CORONARY ARTERY DISEASE INVOLVING NATIVE CORONARY ARTERY OF NATIVE HEART WITHOUT ANGINA PECTORIS: ICD-10-CM

## 2021-03-16 DIAGNOSIS — R06.02 SOB (SHORTNESS OF BREATH): Primary | ICD-10-CM

## 2021-03-16 DIAGNOSIS — Z87.891 FORMER SMOKER: ICD-10-CM

## 2021-03-16 DIAGNOSIS — J43.2 CENTRILOBULAR EMPHYSEMA (HCC): ICD-10-CM

## 2021-03-16 DIAGNOSIS — I48.21 PERMANENT ATRIAL FIBRILLATION (HCC): ICD-10-CM

## 2021-03-16 PROCEDURE — G8417 CALC BMI ABV UP PARAM F/U: HCPCS | Performed by: INTERNAL MEDICINE

## 2021-03-16 PROCEDURE — G8427 DOCREV CUR MEDS BY ELIG CLIN: HCPCS | Performed by: INTERNAL MEDICINE

## 2021-03-16 PROCEDURE — 4040F PNEUMOC VAC/ADMIN/RCVD: CPT | Performed by: INTERNAL MEDICINE

## 2021-03-16 PROCEDURE — 1036F TOBACCO NON-USER: CPT | Performed by: INTERNAL MEDICINE

## 2021-03-16 PROCEDURE — G8400 PT W/DXA NO RESULTS DOC: HCPCS | Performed by: INTERNAL MEDICINE

## 2021-03-16 PROCEDURE — 3017F COLORECTAL CA SCREEN DOC REV: CPT | Performed by: INTERNAL MEDICINE

## 2021-03-16 PROCEDURE — G8484 FLU IMMUNIZE NO ADMIN: HCPCS | Performed by: INTERNAL MEDICINE

## 2021-03-16 PROCEDURE — 1123F ACP DISCUSS/DSCN MKR DOCD: CPT | Performed by: INTERNAL MEDICINE

## 2021-03-16 PROCEDURE — G8926 SPIRO NO PERF OR DOC: HCPCS | Performed by: INTERNAL MEDICINE

## 2021-03-16 PROCEDURE — 99204 OFFICE O/P NEW MOD 45 MIN: CPT | Performed by: INTERNAL MEDICINE

## 2021-03-16 PROCEDURE — 3023F SPIROM DOC REV: CPT | Performed by: INTERNAL MEDICINE

## 2021-03-16 PROCEDURE — 1090F PRES/ABSN URINE INCON ASSESS: CPT | Performed by: INTERNAL MEDICINE

## 2021-03-16 ASSESSMENT — ENCOUNTER SYMPTOMS
DIARRHEA: 0
ABDOMINAL PAIN: 0
CONSTIPATION: 0
SINUS PRESSURE: 0
NAUSEA: 0

## 2021-03-16 NOTE — PROGRESS NOTES
[facility]  Consult Note  MD Shakira Kimball Lakia   YOB: 1950    Date of Visit:  3/16/2021    Assessment/Plan:  1. SOB (shortness of breath)  2. Centrilobular emphysema ([address]  3. Former smoker  4. Permanent atrial fibrillation ([address]  5. Coronary artery disease involving native coronary artery of native heart without angina pectoris    She does have a significant cardiac history with A. fib and coronary artery disease. She had recent stenting in February 2021. She was initially blaming her symptoms on this procedure but the shortness of breath component of this clearly predates the procedure. She likely qualifies for diagnosis of chronic bronchitis with frequent productive cough. However, kneeling down the details of the history was challenging at times. I reviewed pulmonary function testing which revealed some evidence of hyperinflation and increased airway resistance which could relate to underlying obstructive lung disease. However, FEV1/FVC was normal and her flow rates were in the 80% of predicted range. CT scan does show some evidence of centrilobular emphysema. Fine detail was degraded by motion artifact. I think starting her on some Trelegy is a good experiment. I gave her specific instructions on proper inhaler technique and asked her to use it every day. We will have her back in about 6 weeks and see how she is doing. Chief Complaint   Patient presents with    Shortness of Breath     referred by Cardiology Sx's started the day she had stent placement Sx's do not seem to have a pattern       HPI  The patient is referred by Dr. Glenda Worthington with a chief complaint of shortness of breath on exertion. She also has associated productive cough. She states he gets bronchitis in the fall and it seems to last for all winter long.   She initially denied wheezing but later admitted others have told her that she wheezes especially No wheezing or rales. Chest:      Chest wall: No tenderness. Abdominal:      General: Bowel sounds are normal. There is no distension. Palpations: Abdomen is soft. Mass: There is no hepatosplenomegaly. Tenderness: There is no abdominal tenderness. Musculoskeletal:         General: No tenderness (Muscle strength is normal and there is no obvious atrophy). Lymphadenopathy:      Cervical: No cervical adenopathy. Skin:     General: Skin is warm and dry. Findings: No rash. Psychiatric:      Comments: Oriented to person place and time         Allergies   Allergen Reactions    Actos [Pioglitazone]     Percocet [Oxycodone-Acetaminophen]      Confused , loopy     Prior to Visit Medications    Medication Sig Taking?  Authorizing Provider   warfarin (COUMADIN) 5 MG tablet Take 1 tablet by mouth daily Indications: Restart on 3/23/19 TAKE ONE TABLET BY MOUTH DAILY or as directed  Eugenia Jensen MD   clopidogrel (PLAVIX) 75 MG tablet Take 1 tablet by mouth daily  Eugenia Jensen MD   Biotin 1 MG CAPS Take by mouth Indications: Hair volume  medicagion  Historical Provider, MD   OMEPRAZOLE PO Take by mouth daily as needed  Historical Provider, MD   rosuvastatin (CRESTOR) 10 MG tablet Take 1 tablet by mouth daily Dispense Generic name only per pt request  Stephy Peck MD   nitroGLYCERIN (NITROSTAT) 0.4 MG SL tablet Place 1 tablet under the tongue every 5 minutes as needed for Chest pain  Stephy Peck MD   metOLazone (ZAROXOLYN) 5 MG tablet TAKE ONE TABLET BY MOUTH DAILY AS NEEDED  Stephy Peck MD   aspirin EC 81 MG EC tablet Monday Wed Friday  Stephy Peck MD   atenolol (TENORMIN) 25 MG tablet Take 1 tablet by mouth daily  Stephy Peck MD   terbinafine (LAMISIL) 250 MG tablet Take 250 mg by mouth daily  Historical Provider, MD   potassium chloride (KLOR-CON M) 20 MEQ extended release tablet TAKE ONE TABLET BY MOUTH DAILY  Stephy Peck MD   Specialty Vitamins Products (VITAMINS FOR

## 2021-03-19 ENCOUNTER — OFFICE VISIT (OUTPATIENT)
Dept: CARDIOLOGY CLINIC | Age: 71
End: 2021-03-19
Payer: MEDICARE

## 2021-03-19 VITALS
DIASTOLIC BLOOD PRESSURE: 84 MMHG | HEART RATE: 79 BPM | BODY MASS INDEX: 30.59 KG/M2 | OXYGEN SATURATION: 99 % | WEIGHT: 136 LBS | SYSTOLIC BLOOD PRESSURE: 124 MMHG | HEIGHT: 56 IN

## 2021-03-19 DIAGNOSIS — I34.0 MITRAL VALVE INSUFFICIENCY, UNSPECIFIED ETIOLOGY: ICD-10-CM

## 2021-03-19 DIAGNOSIS — I25.110 CORONARY ARTERY DISEASE INVOLVING NATIVE CORONARY ARTERY OF NATIVE HEART WITH UNSTABLE ANGINA PECTORIS (HCC): Primary | ICD-10-CM

## 2021-03-19 DIAGNOSIS — R06.02 SOB (SHORTNESS OF BREATH): ICD-10-CM

## 2021-03-19 DIAGNOSIS — I48.0 PAF (PAROXYSMAL ATRIAL FIBRILLATION) (HCC): ICD-10-CM

## 2021-03-19 DIAGNOSIS — I27.20 PULMONARY HTN (HCC): ICD-10-CM

## 2021-03-19 DIAGNOSIS — E78.5 HYPERLIPIDEMIA, UNSPECIFIED HYPERLIPIDEMIA TYPE: ICD-10-CM

## 2021-03-19 DIAGNOSIS — I10 ESSENTIAL HYPERTENSION: ICD-10-CM

## 2021-03-19 PROCEDURE — 1123F ACP DISCUSS/DSCN MKR DOCD: CPT | Performed by: NURSE PRACTITIONER

## 2021-03-19 PROCEDURE — G8417 CALC BMI ABV UP PARAM F/U: HCPCS | Performed by: NURSE PRACTITIONER

## 2021-03-19 PROCEDURE — 1090F PRES/ABSN URINE INCON ASSESS: CPT | Performed by: NURSE PRACTITIONER

## 2021-03-19 PROCEDURE — 4040F PNEUMOC VAC/ADMIN/RCVD: CPT | Performed by: NURSE PRACTITIONER

## 2021-03-19 PROCEDURE — 99214 OFFICE O/P EST MOD 30 MIN: CPT | Performed by: NURSE PRACTITIONER

## 2021-03-19 PROCEDURE — G8400 PT W/DXA NO RESULTS DOC: HCPCS | Performed by: NURSE PRACTITIONER

## 2021-03-19 PROCEDURE — G8427 DOCREV CUR MEDS BY ELIG CLIN: HCPCS | Performed by: NURSE PRACTITIONER

## 2021-03-19 PROCEDURE — 3017F COLORECTAL CA SCREEN DOC REV: CPT | Performed by: NURSE PRACTITIONER

## 2021-03-19 PROCEDURE — G8484 FLU IMMUNIZE NO ADMIN: HCPCS | Performed by: NURSE PRACTITIONER

## 2021-03-19 PROCEDURE — 93000 ELECTROCARDIOGRAM COMPLETE: CPT | Performed by: NURSE PRACTITIONER

## 2021-03-19 PROCEDURE — 1036F TOBACCO NON-USER: CPT | Performed by: NURSE PRACTITIONER

## 2021-03-19 RX ORDER — ALBUTEROL SULFATE 90 UG/1
2 AEROSOL, METERED RESPIRATORY (INHALATION) 4 TIMES DAILY PRN
Qty: 1 INHALER | Refills: 0 | Status: SHIPPED | OUTPATIENT
Start: 2021-03-19 | End: 2021-09-17 | Stop reason: ALTCHOICE

## 2021-03-19 NOTE — PROGRESS NOTES
[address]     Outpatient Follow Up Note      CHIEF COMPLAINT / HPI:  Follow Up secondary to status post coronary artery stenting    Subjective:   Mari Rodriguez is 69 y.o. female who presents today with a history of DM, HTN, HLD, PAD, a fib, MR, CAD s/p PCI of RCA 2/2021. Interval hx: pt underwent LHC 3/4/21 s/p PCI of LAD/LM with DEE. She went to ER 3/8 with c/o left groin pain. Doppler negative for DVT. CTA runoff shows several arterial stenosis and plaque/peripheral vascular disease without large vessel occlusion or infarct. F/u scheduled with vascular. Pt reports her breathing in general has improved since recent PCI. Reports having some chest discomfort that she feels is consistent with a chest cold, has productive thick clear/yellow sputum and sore throat. Started on trelegy inhaler trial but she does not think it works, she thinks it is empty. She used up albuterol and she thinks her breathing improves some with that. After pt had last PCI she ate chicken nuggets that were \"hard as a rock\". Ever since then she has had epigastric pain and nausea. Recent CTA of abd showed small hiatal hernia otherwise unremarkable. She also has complaints of ringing and pain in her ears. She is going to an ear doctor on Friday the 26th. Pt c/o aching in her legs that have been going off and on for about year. No specific claudication type symptoms. She is going to see vascular, Dr. Sheri Herman on 4/7 for recent CTA with run off showing arterial stenosis and PVD. With regard to medication therapy the patient has been compliant with prescribed regimen. They have tolerated therapy to date.      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 RESPIRATORY: No new SOB, PND, orthopnea or cough. CARDIOVASCULAR: See HPI  GI: No N/V/D, constipation, or abdominal pain. No black/tarry stools  : No urinary urgency, incontinence, or hematuria. SKIN: No cyanosis or skin lesions. MUSCULOSKELETAL: No new muscle or joint pain. NEUROLOGICAL: No syncope or TIA-like symptoms. PSYCHIATRIC: No anxiety, pain, insomnia or depression    Objective:   PHYSICAL EXAM:      VITALS:  /84 (Site: Left Upper Arm, Position: Sitting, Cuff Size: Medium Adult)   Pulse 79   Ht 4' 8\" (1.422 m)   Wt 136 lb (61.7 kg)   LMP 01/01/1983   SpO2 99%   BMI 30.49 kg/m²   CONSTITUTIONAL: Cooperative, no apparent distress, and appears well nourished / developed  NEUROLOGIC:  Awake and orientated to person, place, and time. PSYCH: Calm affect. SKIN: Warm and dry. Right groin procedure site c/d/I. No hematoma or bruising. Good pulses, color, warmth, and sensation to that extremity. HEENT: Sclera non-icteric, normocephalic, neck supple. RESPIRATORY:  No increased work of breathing and clear to auscultation, no crackles or wheezing. CARDIOVASCULAR:  Regular rate and rhythm without murmur. Normal S1 and S2. No gallops or rubs. Normal PMI. No elevation of JVP. Normal carotid pulses with no bruits. GI:  Normal bowel sounds. Non-distended. Non-tender to palpation  EXT: No edema. No calf tenderness. Pulses are present bilaterally.     DATA:    Lab Results   Component Value Date    ALT 12 03/08/2021    AST 16 03/08/2021    ALKPHOS 68 03/08/2021    BILITOT 0.3 03/08/2021     Lab Results   Component Value Date    CREATININE 0.7 03/08/2021    BUN 12 03/08/2021     03/08/2021    K 4.0 03/08/2021    CL 99 03/08/2021    CO2 31 03/08/2021     Lab Results   Component Value Date    TSH 1.48 01/20/2017     Lab Results   Component Value Date    WBC 6.0 03/08/2021    HGB 10.0 (L) 03/08/2021    HCT 31.8 (L) 03/08/2021    MCV 77.8 (L) 03/08/2021     03/08/2021 No components found for: CHLPL  Lab Results   Component Value Date    TRIG 94 05/08/2019    TRIG 94 11/27/2018    TRIG 87 09/11/2017     Lab Results   Component Value Date    HDL 52 05/08/2019    HDL 55 11/27/2018    HDL 56 09/11/2017     Lab Results   Component Value Date    LDLCALC 65 05/08/2019    LDLCALC 144 (H) 11/27/2018    LDLCALC 75 09/11/2017     Lab Results   Component Value Date    LABVLDL 29 09/06/2013    LABVLDL 17 10/17/2012    LABVLDL 30 05/21/2012      No results found for: BNP  Radiology Review:  Pertinent images / reports were reviewed as a part of this visit and reveals the following:    Last Echo: 10/2020   Summary   Normal left ventricle size, wall thickness and systolic function with an   estimated ejection fraction of 60%. No regional wall motion abnormalities are seen. Indeterminate diastolic function. There is mild-to-moderate mitral regurgitation. Aortic valve appears sclerotic but opens adequately. There is mild tricuspid regurgitation with a RVSP estimation of 45-50 mmHg. Pulmonary hypertension. The right ventricle is normal in size. RV systolic function appears to be at   least moderately reduced. TAPSE is estimated at 1.1 cm. Color flow seen crossing the atrial septum suggesting a left-to-right   intracardiac shunt. Biatrial enlargement. Last Stress ECHO: 10/2020  Summary   Poor exercise tolerance. Normal resting blood pressure with blunted response   to exercise. 1 mm ST horizontal depressions inferolaterally with stress consistent with   ischemia. Ischemic changes extended throughout recovery. Normal echocardiographic response to stress, but abnormal EKG response. Cardiac Cath : 11/2020  Anatomy:   LM-nml   LAD-proximal 90% calcified, distal 90%  Cx-proximal 80%  OM- nml  RCA-dominant. Ostial stent 60% ISR, mid stent 99% ISR  Contrast: 48  Flouro Time: 3.6  Access: R Radial artery. Small size artery.  Do not attempt radial in the future  Impression ~Coronary Angiography w/ severe MVD  Recommendation  ~Aggressive medical treatment and risk factor modification  ~CABG. Check echo. Refer to CT surgery as an outpatient. Cardiac Cath PCI: 2/10/21  Anatomy:   RCA-ostial 50%  RPDA- mid 99% ISR  Intervention  ~Successful PCI to RCA with ELCA laser atherectomy. 2.5 NC balloon, 3.5 NC balloon. 3.5x38 DEE to 20 kirti. Mid stent underexpanded segment. 3.75x8 NC to 22atm. 10% residual stenosis. Excellent Result. Contrast: 55  Flouro Time: 42.8  Access: R CFA. Ultrasound guidance used to determine aforementioned artery patency, size (>2mm), anatomic variations and ideal puncture location. Real-time ultrasound utilized concurrent with vascular needle entry into the artery. Image(s) permanently recorded and reported in the patient chart. Perc stick safe zone   Impression  ~Coronary Angiography w/ severe single vessel CAD  ~Successful complex angioplasty and stenting of RCA  Recommendation  ~Aggressive medical treatment and risk factor modification  ~1. Stop heparin gtt. Post cath IVF. Bedrest.  2. Recommend beta blocker, high potency statin, aspirin coumadin and plavix. Restart coumadin tomorrow. 3. Referral to cardiac rehab placed  4. Patient has been advised on the importance of regular exercise of at least 20-30 minutes daily. 5. Patient counseled about and offered assistance for smoking cessation   6. Follow up in 1-2 weeks with cardiology    Cardiac Cath IVUS PCI: 3/4/21  Anatomy:   LM-distal 70%   LAD-prox 90%, distal 70%  IVUS LAD showed MLA of 1mm2  IVUS of LM showed MLA 5.2mm2   Intervention  ~Successful PCI to LAD with 2.5x32 DEE to 18atm. PCI to LM with 2.75x16 DEE to 18atm. PD with 3.0x8 NC to 24atm. Excellent Result. IVUS showed good stent apposition and expansion. Contrast: 64  Flouro Time: 8.5   Access: L CFA.  Perc stick safe zone  Impression  ~Coronary Angiography w/ Severe Single vessel CAD  ~Successful complex angioplasty and stenting of LAD/LM Recommendation  ~Aggressive medical treatment and risk factor modification  ~1. Stop heparin gtt. Post cath IVF. Bedrest.  2. Recommend beta blocker, high potency statin, coumadin and plavix  3. Referral to cardiac rehab placed  4. Patient has been advised on the importance of regular exercise of at least 20-30 minutes daily. 5. Patient counseled about and offered assistance for smoking cessation   6. Follow up in 1-2 weeks with cardiology. Restart coumadin tonight. Recommend Pulmonary evaluation for COPD. PFT: 12/16/2020  Interpretation:  Essentially normal pulmonary function test.  ABG on room air was normal.    CTA abd aorta with bilat runoff: 3/8/21  No hematoma formation, pseudoaneurysm, or arteriovenous fistula at the left   groin catheterization site.       Mild stranding in the left groin consistent with the recent cardiac   catheterization.  There is slight irregularity of a small arterial branch   without active extravasation.  Findings likely postprocedural and/or   inflammatory in nature.       Moderate stenosis of the celiac artery origin and mild-to-moderate stenosis   of the superior mesenteric artery origin.       Severe stenosis of the right renal artery at its origin, similar in   appearance.       Mild multilevel inflow, outflow, and runoff disease. Dannielle Hives is a short   segment area of moderate stenosis of the right popliteal artery due to   calcified and noncalcified plaque.  There is 2 vessel runoff on the left and   3 vessel runoff on the right.       Small polyp for stone within the gallbladder.  Gallbladder wall appears   mildly thickened but is otherwise unremarkable.  If clinically indicated,   right upper quadrant ultrasound may be helpful. LLE venous US: 3/8/21  Summary        No evidence of deep vein or superficial thrombosis involving the left lower    extremity and the contralateral proximal common femoral vein. EKG: 3/19/21  Atrial fibrillation   Low voltage in limb leads. -Poor R-wave progression -may be secondary to pulmonary disease   consider old anterior infarct. Assessment:     1. Coronary artery disease   ~ stable ; breathing improved slightly   ~ hx of stents to RCA   ~ [facility] 11/2020: reveling severe MVD. She was referred to [facility] for CABG evaluation. Due to a very calcified aorta it was decided upon not to proceed with CABG.   ~ [facility] 2/10/21: s/p PCI to RCA with ELCA laser atherectomy and DEE placement   ~ [facility] 3/4/21: s/p PCI of LAD and LM with DEE  ~ on aspirin and plavix (along with coumadin), BB, statin  ~ pt is not interested in cardiac rehab     2. Shortness of breath   ~ improved   ~ Echo 10/2020: EF 60%,   ~ PFT 12/2020 essentially normal with normal ABG on room air  ~ seen by pulmonary and started on a trial of Trelegy      3. Pulmonary HTN  ~ Echo 11/2014: RVSP 34 mmHg  ~ [facility] 2014 consistent with mild pulm HTN  ~ Echo 4/2019: RVSP 23 mmHg, mild TR  ~ Echo 10/2020: RVSP 45-50 mmHg, mild TR    4. Mitral regurgitation    ~ Echo 4/2019: mod MR   ~ Echo 10/2020: mild-mod MR  ~ stable     5. Hypertension   ~ controlled     6. Hyperlipidemia   ~ panel not up to date - will need to get soon   ~ on Crestor 10    7. Paroxysmal atrial fibrillation   ~ stable ; in A fib rate controlled by EKG today  ~ on coumadin     8. PAD  ~ S/P Scoring PTA, DCB, and DEE to left distal SFA/popliteal March 2019 (May 2019 Left SUKUMAR normalized to 1.24)  ~ on aspirin and statin  ~ has seen Dr. Naif Garcia previously. Has f/u planned with Dr. Scott Dallas 4/7. I had the opportunity to review the clinical symptoms and presentation of Adilene Gill. Plan:     1. Continue current medications   2. Follow up with PCP regarding chest congestion / URI symptoms. Try to get refill of Trelegy from PCP or pulmonary. I will refill albuterol once but further need to come from PCP.    3. Follow up with Dr. Lamonte Duarte in 3 months     Overall the patient is stable from CV standpoint    I have addressed the patient's cardiac risk factors and adjusted pharmacologic treatment as needed. In addition, I have reinforced the need for patient directed risk factor modification. Further evaluation will be based upon the patient's clinical course and testing results. All questions and concerns were addressed to the patient. Alternatives to my treatment were discussed. The patient verbalizes understanding not to stop medications without discussing with us. The patient is not currently smoking. The risks related to smoking were reviewed with the patient. Recommend maintaining a smoke-free lifestyle. Patient is on a beta-blocker  Patient is not on an ACE / ARB ; neg CHF   Patient is on a statin    Dual Antiplatelet therapy / Anticoagulation has been recommended / prescribed for this patient. Education conducted on adverse reactions including bleeding were discussed. Daily weights, low sodium diet were discussed. Patient instructed to call the office with a weight gain: 3lbs over night and/or 5lbs in one week, swelling, shortness of breath, orthopnea, and/or PND. Discussed exercise: 30min of sustained cardiovascular exercise most days of the week   Discussed Low saturated fat / Cholesterol diet. Thank you for allowing us to participate in the care of Ramírez Ugarte.     Sierra NAPIER-CNP  [address]   Office: [phone]    Documentation of today's visit sent to PCP

## 2021-03-22 ENCOUNTER — TELEPHONE (OUTPATIENT)
Dept: CARDIOLOGY CLINIC | Age: 71
End: 2021-03-22

## 2021-03-22 RX ORDER — ROSUVASTATIN CALCIUM 10 MG/1
10 TABLET, COATED ORAL DAILY
Qty: 30 TABLET | Refills: 3 | Status: SHIPPED | OUTPATIENT
Start: 2021-03-22 | End: 2021-03-25 | Stop reason: SDUPTHER

## 2021-03-22 NOTE — TELEPHONE ENCOUNTER
Calling to tell gem that she didn't have her labs done at [facility] , she went to [place] lab inside [address] [facility] last Thursday.

## 2021-03-22 NOTE — TELEPHONE ENCOUNTER
Medication Refill    Medication needing refilled: rosuvastatin (CRESTOR) 10 MG tablet    Dosage of the medication:     How are you taking this medication (QD, BID, TID, QID, PRN):    30 or 90 day supply called in:     When will you run out of your medication: PT WILL BE OUT OF MEDICATION TOMORROW     Which Pharmacy are we sending the medication to?: [facility] [address] [place] [address] [id number] [place] [address]

## 2021-03-23 NOTE — TELEPHONE ENCOUNTER
I called [facility] for results  And the associate told me that the PT INR was not drawn, she had other labs drawn. I told pt that she will have to have it redraw. I tried to call the [address] [place] [address] to see if they could star faxing results- STAT. I called several times and was unable to get a person.

## 2021-03-25 NOTE — TELEPHONE ENCOUNTER
Pt calling she wants her refill for rosuvastatin (CRESTOR) 10 MG tablet  Sent to a different pharmacy because [facility] is out of stock   pls resend to [facility]  pls call to advise thank you

## 2021-03-26 ENCOUNTER — PROCEDURE VISIT (OUTPATIENT)
Dept: AUDIOLOGY | Age: 71
End: 2021-03-26
Payer: MEDICARE

## 2021-03-26 ENCOUNTER — OFFICE VISIT (OUTPATIENT)
Dept: ENT CLINIC | Age: 71
End: 2021-03-26
Payer: MEDICARE

## 2021-03-26 VITALS — TEMPERATURE: 97.1 F | SYSTOLIC BLOOD PRESSURE: 119 MMHG | DIASTOLIC BLOOD PRESSURE: 69 MMHG | HEART RATE: 77 BPM

## 2021-03-26 DIAGNOSIS — H61.21 IMPACTED CERUMEN OF RIGHT EAR: ICD-10-CM

## 2021-03-26 DIAGNOSIS — H90.3 SENSORINEURAL HEARING LOSS (SNHL) OF BOTH EARS: Primary | ICD-10-CM

## 2021-03-26 DIAGNOSIS — J34.89 NASAL MUCOSA DRY: ICD-10-CM

## 2021-03-26 DIAGNOSIS — R04.0 EPISTAXIS: ICD-10-CM

## 2021-03-26 DIAGNOSIS — J34.89 NASAL SEPTAL PERFORATION: ICD-10-CM

## 2021-03-26 DIAGNOSIS — H90.A31 MIXED CONDUCTIVE AND SENSORINEURAL HEARING LOSS OF RIGHT EAR WITH RESTRICTED HEARING OF LEFT EAR: Primary | ICD-10-CM

## 2021-03-26 PROCEDURE — 92567 TYMPANOMETRY: CPT | Performed by: AUDIOLOGIST

## 2021-03-26 PROCEDURE — G8427 DOCREV CUR MEDS BY ELIG CLIN: HCPCS | Performed by: STUDENT IN AN ORGANIZED HEALTH CARE EDUCATION/TRAINING PROGRAM

## 2021-03-26 PROCEDURE — 1036F TOBACCO NON-USER: CPT | Performed by: STUDENT IN AN ORGANIZED HEALTH CARE EDUCATION/TRAINING PROGRAM

## 2021-03-26 PROCEDURE — G8417 CALC BMI ABV UP PARAM F/U: HCPCS | Performed by: STUDENT IN AN ORGANIZED HEALTH CARE EDUCATION/TRAINING PROGRAM

## 2021-03-26 PROCEDURE — 92557 COMPREHENSIVE HEARING TEST: CPT | Performed by: AUDIOLOGIST

## 2021-03-26 PROCEDURE — G8400 PT W/DXA NO RESULTS DOC: HCPCS | Performed by: STUDENT IN AN ORGANIZED HEALTH CARE EDUCATION/TRAINING PROGRAM

## 2021-03-26 PROCEDURE — 3017F COLORECTAL CA SCREEN DOC REV: CPT | Performed by: STUDENT IN AN ORGANIZED HEALTH CARE EDUCATION/TRAINING PROGRAM

## 2021-03-26 PROCEDURE — G8484 FLU IMMUNIZE NO ADMIN: HCPCS | Performed by: STUDENT IN AN ORGANIZED HEALTH CARE EDUCATION/TRAINING PROGRAM

## 2021-03-26 PROCEDURE — 99204 OFFICE O/P NEW MOD 45 MIN: CPT | Performed by: STUDENT IN AN ORGANIZED HEALTH CARE EDUCATION/TRAINING PROGRAM

## 2021-03-26 PROCEDURE — 1123F ACP DISCUSS/DSCN MKR DOCD: CPT | Performed by: STUDENT IN AN ORGANIZED HEALTH CARE EDUCATION/TRAINING PROGRAM

## 2021-03-26 PROCEDURE — 4040F PNEUMOC VAC/ADMIN/RCVD: CPT | Performed by: STUDENT IN AN ORGANIZED HEALTH CARE EDUCATION/TRAINING PROGRAM

## 2021-03-26 PROCEDURE — 1090F PRES/ABSN URINE INCON ASSESS: CPT | Performed by: STUDENT IN AN ORGANIZED HEALTH CARE EDUCATION/TRAINING PROGRAM

## 2021-03-26 PROCEDURE — 69210 REMOVE IMPACTED EAR WAX UNI: CPT | Performed by: STUDENT IN AN ORGANIZED HEALTH CARE EDUCATION/TRAINING PROGRAM

## 2021-03-26 RX ORDER — ROSUVASTATIN CALCIUM 10 MG/1
10 TABLET, COATED ORAL DAILY
Qty: 30 TABLET | Refills: 3 | Status: SHIPPED | OUTPATIENT
Start: 2021-03-26 | End: 2021-07-21

## 2021-03-26 NOTE — PROGRESS NOTES
[address] [facility] SURGERY  NEW PATIENT HISTORY AND PHYSICAL NOTE      Patient Name: Cheyanne Pickens Record Number:  [id number]  Primary Care Physician:  Ondina Sun DO    ChiefComplaint     Chief Complaint   Patient presents with    Ear Problem   Tinnitus episode    History of Present Illness     Demi Cole is an 69 y.o. female presenting with tinnitus and cerumen impaction on the right. Noticed high-pitched isolated incident of tinnitus 6 weeks ago. Denies any noticeable hearing loss. As a child she had a right-sided eardrum rupture. Denies otalgia or otorrhea. No history of otologic surgery. Over the last few weeks her right ear feels like she is hearing underwater. No current tinnitus. She does note loud noise exposure in her lifetime as she worked in factories in the past.    She has known septal perforation for many years. She thinks it started after she fell in the 1990s. She does not use any nasal sprays currently but has used hypertonic nasal saline sprays in the past.  Denies recurrent epistaxis. Takes Coumadin and Plavix daily. No history of nasal surgery. No current or prior intranasal drug use. Past Medical History     Past Medical History:   Diagnosis Date    AF (atrial fibrillation) (HCC)     on anticoagulation    Back pain     compaound fracture    CAD (coronary artery disease)     Centrilobular emphysema ([address] 3/16/2021    Compression fracture 01/01/1994    Hyperlipidemia     Hypertension     Myelolipoma     bilateral adrenals    PVD (peripheral vascular disease) ([address]     Right ear injury 01/01/1983    Per pt.     Shingles     Valvular disease        Past Surgical History     Past Surgical History:   Procedure Laterality Date    CORONARY ANGIOPLASTY WITH STENT PLACEMENT  2014    CORONARY ANGIOPLASTY WITH STENT PLACEMENT  03/14/2003 & 06/02/2003    per pt    HYSTERECTOMY  09/01/1983    fibroids    TUBAL LIGATION  1983    VENTRAL HERNIA REPAIR  2013    VENTRAL HERNIA REPAIR WITH MESH              Family History     Family History   Problem Relation Age of Onset    Heart Attack Father 76    Heart Disease Father         complications from surgery, per pt    Heart Attack Mother 64       Social History     Social History     Tobacco Use    Smoking status: Former Smoker     Years: 40.00     Types: Cigarettes     Quit date: 3/1/2014     Years since quittin.0    Smokeless tobacco: Never Used    Tobacco comment: Trying to quit   Substance Use Topics    Alcohol use: No     Alcohol/week: 3.0 standard drinks     Types: 3 Cans of beer per week    Drug use: No        Allergies     Allergies   Allergen Reactions    Actos [Pioglitazone]     Percocet [Oxycodone-Acetaminophen]      Confused , loopy       Medications     Current Outpatient Medications   Medication Sig Dispense Refill    rosuvastatin (CRESTOR) 10 MG tablet Take 1 tablet by mouth daily Dispense Generic name only per pt request 30 tablet 3    albuterol sulfate HFA (VENTOLIN HFA) 108 (90 Base) MCG/ACT inhaler Inhale 2 puffs into the lungs 4 times daily as needed for Wheezing 1 Inhaler 0    warfarin (COUMADIN) 5 MG tablet Take 1 tablet by mouth daily Indications: Restart on 3/23/19 TAKE ONE TABLET BY MOUTH DAILY or as directed 90 tablet 3    clopidogrel (PLAVIX) 75 MG tablet Take 1 tablet by mouth daily 90 tablet 1    Biotin 1 MG CAPS Take by mouth Indications: Hair volume  medicagion      OMEPRAZOLE PO Take by mouth daily as needed      nitroGLYCERIN (NITROSTAT) 0.4 MG SL tablet Place 1 tablet under the tongue every 5 minutes as needed for Chest pain 25 tablet 3    metOLazone (ZAROXOLYN) 5 MG tablet TAKE ONE TABLET BY MOUTH DAILY AS NEEDED 90 tablet 5    aspirin EC 81 MG EC tablet  90 tablet 1    atenolol (TENORMIN) 25 MG tablet Take 1 tablet by mouth daily 90 tablet 3    terbinafine (LAMISIL) 250 MG tablet Take 250 mg by mouth daily      potassium chloride (KLOR-CON M) 20 MEQ extended release tablet TAKE ONE TABLET BY MOUTH DAILY 90 tablet 9    Specialty Vitamins Products (VITAMINS FOR THE HAIR PO) Take by mouth daily      Magnesium Oxide 250 MG TABS Take 2 tablets by mouth daily 30 tablet 5    Simethicone (GAS RELIEF) 180 MG CAPS Take  by mouth as needed. No current facility-administered medications for this visit. Review of Systems     REVIEW OF SYSTEMS  The following systems were reviewed and revealed the following in addition to any already discussed in the HPI:    CONSTITUTIONAL: no weight loss, no fever, no night sweats, no chills  EYES: no vision changes, no blurry vision  EARS: +changes in hearing, no otalgia  NOSE: +epistaxis, no rhinorrhea  THROAT: No voice changes, no sore throat, no dysphagia      PhysicalExam     Vitals:    03/26/21 1350   BP: 119/69   Pulse: 77   Temp: 97.1 °F (36.2 °C)       PHYSICAL EXAM  /69   Pulse 77   Temp 97.1 °F (36.2 °C)   LMP 01/01/1983     GENERAL: No acute distress, alert and oriented, no hoarseness  EYES: EOMI, Anti-icteric  NOSE: On anterior rhinoscopy there large septal perforation, half dollar size. There is some bloody crusting around the edges of the perforation. EARS: Normal external appearance; on portable otomicroscopy:     -Ad: External auditory canal with dense cerumen impaction, see procedure note below.     -As: External auditory canal without stenosis, tympanic membrane clear, no middle ear effusions or retractions. Pneumatic otoscopy: Bilateral tympanic membranes mobile pneumatic otoscopy  FACE: HB 1/6 bilaterally, symmetric appearing, sensation equal bilaterally  ORAL CAVITY: No masses or lesions palpated, uvula is midline, moist mucous membranes, symmetric 2+ tonsils. Edentulous with dentures in place.   NECK: Normal range of motion, no thyromegaly, trachea is midline, no palpable lymphadenopathy or neck masses, no crepitus  NEURO: Cranial Nerves 2, 3, 4, 5, 6, 7, 11, 12 grossly intact bilaterally     I have performed a head and neck physical exam personally or was physically present during the key or critical portions of the service. Procedure     Procedure: Binocular otomicroscopy with debridement of cerumen impaction    Pre-op: Cerumen impaction of the Right ears. Post op: Same  Procedure : Binocular otomicroscopy with debridement of cerumen  Surgeon: Dr. Bradford Faulkner DO  Estimated Blood Loss: None    Description of Procedure:    After obtaining consent, the patient was placed in the examination chair in the reclined position.      -Right ear: External auditory canal with occluding cerumen limiting visualization of the tympanic membrane which was removed with use of #7 and #5 [place] suction, alligator forceps, and cerumen loop curet. After successful removal of cerumen, the right tympanic membrane was visualized and without significant retractions or cholesteatoma, no middle ear effusions. * Patient tolerated the procedure well with no complications    Data/Imaging Review     Comprehensive audiological evaluation performed in the office today by Leonor MORTON was independently reviewed by myself which demonstrates: Au: Moderate sloping to severe sensorineural hearing loss. There is an isolated conductive component at 500 Hz. WRS 84% right, % left. Type A tympanogram right. Type A tympanogram left. Assessment and Plan     1. Sensorineural hearing loss (SNHL) of both ears  - Would benefit from amplification with hearing aids. Provided with hearing aid clearance form as well as copy of audiogram.  - Offered MRI or ABR for discrepancy in word discrimination scores patient would like to hold off on this for now and just proceed with a repeat audiogram in 1 year. - Encouraged loud noise avoidance and wearing of hearing protection when exposed. 2. Nasal septal perforation  3. Nasal mucosa dry  4.  Epistaxis  Nasal septal
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
## 2021-04-02 ENCOUNTER — INITIAL CONSULT (OUTPATIENT)
Dept: SURGERY | Age: 71
End: 2021-04-02
Payer: MEDICARE

## 2021-04-02 VITALS
WEIGHT: 132 LBS | TEMPERATURE: 97.1 F | DIASTOLIC BLOOD PRESSURE: 78 MMHG | BODY MASS INDEX: 29.59 KG/M2 | SYSTOLIC BLOOD PRESSURE: 120 MMHG

## 2021-04-02 DIAGNOSIS — K44.9 HIATAL HERNIA: Primary | ICD-10-CM

## 2021-04-02 PROCEDURE — G8417 CALC BMI ABV UP PARAM F/U: HCPCS | Performed by: SURGERY

## 2021-04-02 PROCEDURE — 3017F COLORECTAL CA SCREEN DOC REV: CPT | Performed by: SURGERY

## 2021-04-02 PROCEDURE — 1123F ACP DISCUSS/DSCN MKR DOCD: CPT | Performed by: SURGERY

## 2021-04-02 PROCEDURE — G8427 DOCREV CUR MEDS BY ELIG CLIN: HCPCS | Performed by: SURGERY

## 2021-04-02 PROCEDURE — 1036F TOBACCO NON-USER: CPT | Performed by: SURGERY

## 2021-04-02 PROCEDURE — 4040F PNEUMOC VAC/ADMIN/RCVD: CPT | Performed by: SURGERY

## 2021-04-02 PROCEDURE — 1090F PRES/ABSN URINE INCON ASSESS: CPT | Performed by: SURGERY

## 2021-04-02 PROCEDURE — 99213 OFFICE O/P EST LOW 20 MIN: CPT | Performed by: SURGERY

## 2021-04-02 PROCEDURE — G8400 PT W/DXA NO RESULTS DOC: HCPCS | Performed by: SURGERY

## 2021-04-02 ASSESSMENT — ENCOUNTER SYMPTOMS
ABDOMINAL PAIN: 1
ALLERGIC/IMMUNOLOGIC NEGATIVE: 1
RESPIRATORY NEGATIVE: 1
EYES NEGATIVE: 1
NAUSEA: 1

## 2021-04-02 NOTE — PROGRESS NOTES
:     Jovanni Frazier is a 69 y.o. female     CC: Hernia    HPI: 76-year-old female known to me from repair of an incarcerated ventral hernia in May 2013 who presents for evaluation of a hernia. She complains that her abdomen is more protuberant. She denies complaints of abdominal pain. She does report some difficulty and discomfort with swallowing. CAT scan of the abdomen and pelvis showed a small hiatal hernia. No history of nausea, vomiting, anorexia, unexpected weight loss, fevers, chills, change in bowel habits or urinary symptoms. Past Medical History:   Diagnosis Date    AF (atrial fibrillation) (HCC)     on anticoagulation    Back pain     compaound fracture    CAD (coronary artery disease)     [address]rilobular emphysema ([facility] 3/16/2021    Compression fracture 01/01/1994    Hyperlipidemia     Hypertension     Myelolipoma     bilateral adrenals    PVD (peripheral vascular disease) ([facility]     Right ear injury 01/01/1983    Per pt.  Shingles     Valvular disease        Actos [pioglitazone] and Percocet [oxycodone-acetaminophen]     Past Surgical History:   Procedure Laterality Date    CORONARY ANGIOPLASTY WITH STENT PLACEMENT  2014    CORONARY ANGIOPLASTY WITH STENT PLACEMENT  03/14/2003 & 06/02/2003    per pt    HYSTERECTOMY  09/01/1983    fibroids    TUBAL LIGATION  09/1983    VENTRAL HERNIA REPAIR  5-    VENTRAL HERNIA REPAIR WITH MESH               Prior to Visit Medications    Medication Sig Taking?  Authorizing Provider   rosuvastatin (CRESTOR) 10 MG tablet Take 1 tablet by mouth daily Dispense Generic name only per pt request Yes Roberta Agustin MD   albuterol sulfate HFA (VENTOLIN HFA) 108 (90 Base) MCG/ACT inhaler Inhale 2 puffs into the lungs 4 times daily as needed for Wheezing Yes KARTHIKEYAN Alba - CNP   warfarin (COUMADIN) 5 MG tablet Take 1 tablet by mouth daily Indications: Restart on 3/23/19 TAKE ONE TABLET BY MOUTH DAILY or as directed Yes Roberta Agustin MD clopidogrel (PLAVIX) 75 MG tablet Take 1 tablet by mouth daily Yes Roberth Rivera MD   Biotin 1 MG CAPS Take by mouth Indications: Hair volume  medicagion Yes Historical Provider, MD   OMEPRAZOLE PO Take by mouth daily as needed Yes Historical Provider, MD   nitroGLYCERIN (NITROSTAT) 0.4 MG SL tablet Place 1 tablet under the tongue every 5 minutes as needed for Chest pain Yes Igor Pruitt MD   metOLazone (ZAROXOLYN) 5 MG tablet TAKE ONE TABLET BY MOUTH DAILY AS NEEDED Yes Igor Pruitt MD   aspirin EC 81 MG EC tablet  Yes Igor Pruitt MD   atenolol (TENORMIN) 25 MG tablet Take 1 tablet by mouth daily Yes Igor Pruitt MD   terbinafine (LAMISIL) 250 MG tablet Take 250 mg by mouth daily Yes Historical Provider, MD   potassium chloride (KLOR-CON M) 20 MEQ extended release tablet TAKE ONE TABLET BY MOUTH DAILY Yes Igor Pruitt MD   Specialty Vitamins Products (VITAMINS FOR THE HAIR PO) Take by mouth daily Yes Historical Provider, MD   Magnesium Oxide 250 MG TABS Take 2 tablets by mouth daily Yes Igor Pruitt MD   Simethicone (GAS RELIEF) 180 MG CAPS Take  by mouth as needed. Yes Historical Provider, MD       Social History     Socioeconomic History    Marital status:       Spouse name: Not on file    Number of children: Not on file    Years of education: Not on file    Highest education level: Not on file   Occupational History    Occupation: cleans woods   Social Needs    Financial resource strain: Not on file    Food insecurity     Worry: Not on file     Inability: Not on file   [employer] needs     Medical: Not on file     Non-medical: Not on file   Tobacco Use    Smoking status: Former Smoker     Years: 40.00     Types: Cigarettes     Quit date: 3/1/2014     Years since quittin.0    Smokeless tobacco: Never Used    Tobacco comment: Trying to quit   Substance and Sexual Activity    Alcohol use: No     Alcohol/week: 3.0 standard drinks     Types: 3 Cans of beer per week    Drug use: No    Sexual activity: Not Currently   Lifestyle    Physical activity     Days per week: Not on file     Minutes per session: Not on file    Stress: Not on file   Relationships    Social connections     Talks on phone: Not on file     Gets together: Not on file     Attends Mandaen service: Not on file     Active member of club or organization: Not on file     Attends meetings of clubs or organizations: Not on file     Relationship status: Not on file    Intimate partner violence     Fear of current or ex partner: Not on file     Emotionally abused: Not on file     Physically abused: Not on file     Forced sexual activity: Not on file   Other Topics Concern    Not on file   Social History Narrative    Not on file       Review of Systems   Constitutional: Negative. HENT: Negative. Eyes: Negative. Respiratory: Negative. Cardiovascular: Negative. Gastrointestinal: Positive for abdominal pain and nausea. Endocrine: Negative. Genitourinary: Negative. Musculoskeletal: Negative. Skin: Negative. Allergic/Immunologic: Negative. Neurological: Negative. Hematological: Negative. Psychiatric/Behavioral: Negative.        :   Physical Exam  Constitutional:       Appearance: She is well-developed. HENT:      Head: Normocephalic and atraumatic. Right Ear: External ear normal.      Left Ear: External ear normal.   Eyes:      Conjunctiva/sclera: Conjunctivae normal.   Neck:      Musculoskeletal: Normal range of motion and neck supple. Cardiovascular:      Rate and Rhythm: Normal rate and regular rhythm. Pulmonary:      Effort: Pulmonary effort is normal.      Breath sounds: Normal breath sounds. Abdominal:      General: There is no distension. Palpations: Abdomen is soft. Comments: No evidence of a ventral hernia   Musculoskeletal: Normal range of motion. Skin:     General: Skin is warm and dry.    Neurological:      Mental Status: She is alert and oriented to person, place, and time. Psychiatric:         Behavior: Behavior normal.       /78   Temp 97.1 °F (36.2 °C) (Infrared)   Wt 132 lb (59.9 kg)   LMP 01/01/1983   BMI 29.59 kg/m²     :      69-year-old female known to me from repair of an incarcerated ventral hernia with mesh in May 2013. She presents with complaints of abdominal protuberance and discomfort with swallowing. On physical examination, there is no evidence of a ventral, inguinal or femoral hernia. CAT scan of the abdomen and pelvis from March 8th of this year was reviewed. There is no evidence of an abdominal wall hernia. She does have a small hiatal hernia. Plan:      No surgical indications at the present time. A referral was made to Dr. Susie Spence from [facility] for an upper endoscopy for evaluation of dysphagia and the small hiatal hernia. Follow-up with me as needed.

## 2021-04-02 NOTE — LETTER
[address]  [address] [place] [address]  Phone: [phone]  Fax: [phone]    April 2, 2021    Patient: Elodia Zuleta  MRN:  [id number]  YOB: 1950  Date of Visit: 4/2/2021    Dear Dr Keith Sears: Thank you for the request for consultation for [facility]. Below are the relevant portions of my assessment and plan of care. Assessment:  30-year-old female known to me from repair of an incarcerated ventral hernia with mesh in May 2013. She presents with complaints of abdominal protuberance and discomfort with swallowing. On physical examination, there is no evidence of a ventral, inguinal or femoral hernia. CAT scan of the abdomen and pelvis from March 8th of this year was reviewed. There is no evidence of an abdominal wall hernia. She does have a small hiatal hernia. Plan:  No surgical indications at the present time. A referral was made to Dr. Thiago Carter from [facility] for an upper endoscopy for evaluation of dysphagia and the small hiatal hernia. Follow-up with me as needed. If you have questions, please do not hesitate to call me.      Sincerely,    Chata Louis MD    CC providers:    [address] Maria T Carranza DO  [id number]  [address] [place] [address]  Via Fax: [address] [place] [address] [place], [place]  [address] [facility] [address]  Via Fax: [phone]

## 2021-04-06 ENCOUNTER — TELEPHONE (OUTPATIENT)
Dept: VASCULAR SURGERY | Age: 71
End: 2021-04-06

## 2021-04-06 ENCOUNTER — TELEPHONE (OUTPATIENT)
Dept: SURGERY | Age: 71
End: 2021-04-06

## 2021-04-06 NOTE — TELEPHONE ENCOUNTER
Called patient regarding scheduling apt earlier tomorrow as in like 8:00am per Dr Pietro Diez tomorrow. Left message.  [email or web address]

## 2021-04-07 ENCOUNTER — OFFICE VISIT (OUTPATIENT)
Dept: VASCULAR SURGERY | Age: 71
End: 2021-04-07
Payer: MEDICARE

## 2021-04-07 VITALS
SYSTOLIC BLOOD PRESSURE: 80 MMHG | WEIGHT: 137 LBS | HEIGHT: 60 IN | DIASTOLIC BLOOD PRESSURE: 58 MMHG | BODY MASS INDEX: 26.9 KG/M2

## 2021-04-07 DIAGNOSIS — I77.1 CELIAC ARTERY STENOSIS (HCC): ICD-10-CM

## 2021-04-07 DIAGNOSIS — K55.1 SUPERIOR MESENTERIC ARTERY STENOSIS (HCC): ICD-10-CM

## 2021-04-07 DIAGNOSIS — I73.9 PAD (PERIPHERAL ARTERY DISEASE) (HCC): Primary | ICD-10-CM

## 2021-04-07 PROCEDURE — G8417 CALC BMI ABV UP PARAM F/U: HCPCS | Performed by: SURGERY

## 2021-04-07 PROCEDURE — 1123F ACP DISCUSS/DSCN MKR DOCD: CPT | Performed by: SURGERY

## 2021-04-07 PROCEDURE — 99205 OFFICE O/P NEW HI 60 MIN: CPT | Performed by: SURGERY

## 2021-04-07 PROCEDURE — 1090F PRES/ABSN URINE INCON ASSESS: CPT | Performed by: SURGERY

## 2021-04-07 PROCEDURE — 3017F COLORECTAL CA SCREEN DOC REV: CPT | Performed by: SURGERY

## 2021-04-07 PROCEDURE — G8400 PT W/DXA NO RESULTS DOC: HCPCS | Performed by: SURGERY

## 2021-04-07 PROCEDURE — G8427 DOCREV CUR MEDS BY ELIG CLIN: HCPCS | Performed by: SURGERY

## 2021-04-07 PROCEDURE — 1036F TOBACCO NON-USER: CPT | Performed by: SURGERY

## 2021-04-07 PROCEDURE — 4040F PNEUMOC VAC/ADMIN/RCVD: CPT | Performed by: SURGERY

## 2021-04-07 NOTE — TELEPHONE ENCOUNTER
Notified pt I will send new referral to request a GI doctor that will do EGD at [facility].

## 2021-04-07 NOTE — LETTER
[facility]  [address]  Phone: [phone]  Fax: [phone]    Lydia Leung MD        April 7, 2021     [address] [place] [address], [address] [place] [address]    Patient: Adarsh Lopez  MR Number: [id number]  YOB: 1950  Date of Visit: 4/7/2021    Dear Dr. Batsheva Crowe:    I saw Erin Santana in the office today for evaluation of multiple vascular issues. Please see my attached note for details. In summary she was found on a CT scan to have a celiac and superior mesenteric artery stenosis however these are asymptomatic and I would observe them. She also complains of bilateral leg discomfort which by description and in association with my findings may well represent neurogenic claudication. I have suggested she be seen by back surgeon for his evaluation and I will leave this up to you for further work-up and referral.  I would be happy to see her back after she has this evaluation and treatment if appropriate. If you have questions, please do not hesitate to call me. I look forward to following Erin Santana along with you.     Sincerely,        Lydia Leung MD

## 2021-04-07 NOTE — PROGRESS NOTES
Types: Cigarettes     Quit date: 3/1/2014     Years since quittin.1    Smokeless tobacco: Never Used    Tobacco comment: Trying to quit   Substance and Sexual Activity    Alcohol use: No     Alcohol/week: 3.0 standard drinks     Types: 3 Cans of beer per week    Drug use: No    Sexual activity: Not Currently   Lifestyle    Physical activity     Days per week: Not on file     Minutes per session: Not on file    Stress: Not on file   Relationships    Social connections     Talks on phone: Not on file     Gets together: Not on file     Attends Advent service: Not on file     Active member of club or organization: Not on file     Attends meetings of clubs or organizations: Not on file     Relationship status: Not on file    Intimate partner violence     Fear of current or ex partner: Not on file     Emotionally abused: Not on file     Physically abused: Not on file     Forced sexual activity: Not on file   Other Topics Concern    Not on file   Social History Narrative    Not on file     Family History   Problem Relation Age of Onset    Heart Attack Father 76    Heart Disease Father         complications from surgery, per pt    Heart Attack Mother 64         Review of Systems   All other systems reviewed and are negative. 15 pt ROS confirmed personally by this MD.  Demonstrates significant frustration with current medical doctors. Objective:   Physical Exam  Vitals signs and nursing note reviewed. Constitutional:       Appearance: She is normal weight. HENT:      Head: Normocephalic and atraumatic. Right Ear: External ear normal.      Left Ear: External ear normal.      Nose: Nose normal.      Mouth/Throat:      Mouth: Mucous membranes are moist.      Pharynx: Oropharynx is clear. Eyes:      Extraocular Movements: Extraocular movements intact. Conjunctiva/sclera: Conjunctivae normal.   Neck:      Musculoskeletal: Normal range of motion and neck supple.    Cardiovascular:      Rate and Rhythm: Normal rate and regular rhythm. Pulmonary:      Breath sounds: Normal breath sounds. Abdominal:      General: Abdomen is flat. Bowel sounds are normal.      Palpations: Abdomen is soft. There is no mass. Hernia: No hernia is present. Comments: No abdominal bruits   Musculoskeletal:      Left lower leg: No edema. Skin:     General: Skin is warm and dry. Capillary Refill: Capillary refill takes 2 to 3 seconds. Neurological:      General: No focal deficit present. Mental Status: She is alert and oriented to person, place, and time. Cranial Nerves: No cranial nerve deficit. Sensory: No sensory deficit. Motor: No weakness. Coordination: Coordination normal.      Gait: Gait normal.   Psychiatric:         Mood and Affect: Mood normal.         Behavior: Behavior normal.         Thought Content:  Thought content normal.         Judgment: Judgment normal.       Pulses:   R bruit  L bruit   2   carotid 2    2   brachial 2    0   radial 0    2   femoral 2    0   popliteal 0    0   posterior tibial 0    0   dorsalis pedis 0    na   bypass graft na        R SUKUMAR L   0.79 DP 0.71   0.95 PT 0.5    JUSTINE        CTA abd/pelvis 3/8/2021 - personally reviewed - agree with interpretation  VASCULAR       There is stranding in the left inguinal region at the site of prior   catheterization.  No significant hematoma formation.  No pseudoaneurysm or   arteriovenous fistula identified. Genny Hoyles is a small arterial branch in the   region which appears slightly irregular and thickened compared to the left,   likely postinflammatory related to the recent procedure.  This may represent   a small superficial external pudendal artery.  No active extravasation is   identified.  There are few small adjacent hyperenhancing lymph nodes,   reactive.       There is a moderate amount of atherosclerotic plaque at the origin of the   celiac artery causing moderate stenosis. Genny Hoyles is also at least mild  Peroneal artery extends to the   ankle the anterior tibial artery crosses the ankle to supply the foot.           Impression   No hematoma formation, pseudoaneurysm, or arteriovenous fistula at the left   groin catheterization site.       Mild stranding in the left groin consistent with the recent cardiac   catheterization.  There is slight irregularity of a small arterial branch   without active extravasation.  Findings likely postprocedural and/or   inflammatory in nature.       Moderate stenosis of the celiac artery origin and mild-to-moderate stenosis   of the superior mesenteric artery origin.       Severe stenosis of the right renal artery at its origin, similar in   appearance.       Mild multilevel inflow, outflow, and runoff disease. Ephriam Dover is a short   segment area of moderate stenosis of the right popliteal artery due to   calcified and noncalcified plaque.  There is 2 vessel runoff on the left and   3 vessel runoff on the right.       Small polyp for stone within the gallbladder.  Gallbladder wall appears   mildly thickened but is otherwise unremarkable.  If clinically indicated,   right upper quadrant ultrasound may be helpful.           Assessment:      1) Asymptomatic celiac & SMA stenoses  2) Pain B legs - strong possibility that this represents neurogenic pain from chronic back disease based on symptoms, distribution of complaints and physical exam (primary infrainguinal arterial disease)  3) H/O tobacco abuse  4) CAD S/P YEISON  5) COPD  6) HTN      Plan:      Observation at this time with no evidence of limb threatening ischemia. Referral to spine surgeon for evaluation or PCP for basic imaging and refrral.  RTO after spine evaluation. Explained in detail issues at play and the need to r/o primary spine pathology for considering vascular intervention.

## 2021-04-08 ENCOUNTER — TELEPHONE (OUTPATIENT)
Dept: CARDIOLOGY CLINIC | Age: 71
End: 2021-04-08

## 2021-04-08 NOTE — TELEPHONE ENCOUNTER
Pt called Needing a referral  to Dr Rafita Brannon.   Her insurance will not cover Dr Darwin So  Please Advise

## 2021-04-08 NOTE — TELEPHONE ENCOUNTER
I spoke with Kate Craig, she is very agitated over multiple things. She states she cannot breath, her legs hurt and she keeps getting sent to multiple doctors and does not understand why. She states that \"I didn't start having this kind of breathing trouble until Dr Ramona Mcburney did his procedure\". 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 She was referred to Antonietta Phillip for COPD work up and he recommended Trelegy which she just started. She is also being seen by vascular for her leg pain. She has also been recommended to see GI to have her esophagus stretched d/t difficulty swallowing but she is frustrated with that. She is angry that she does not have 'answers' as to why she is short of breath. In her follow up with [facility] she stated she felt better. Recommended that she follow through with [facility] and pulmonology and follow up in office with [facility] in June as scheduled. Discussed her phone call with [facility], he has no new orders for now. Will see in office.  vladislav

## 2021-04-08 NOTE — TELEPHONE ENCOUNTER
She is very upset that [facility] [place] wont call her back about the problems she is having. She always has to speak to [facility] but she isnt in today or tomorrow . Patient is very agitated and wants to speak to [facility] [place] today please .

## 2021-04-19 ENCOUNTER — TELEPHONE (OUTPATIENT)
Dept: CARDIOLOGY CLINIC | Age: 71
End: 2021-04-19

## 2021-04-19 RX ORDER — POTASSIUM CHLORIDE 20 MEQ/1
TABLET, EXTENDED RELEASE ORAL
Qty: 90 TABLET | Refills: 3 | Status: SHIPPED | OUTPATIENT
Start: 2021-04-19 | End: 2022-01-14

## 2021-04-19 NOTE — TELEPHONE ENCOUNTER
Medication Refill    Medication needing refilled:  klor con     Dosage of the medication:  20meq    How are you taking this medication (QD, BID, TID, QID, PRN):    30 or 90 day supply called in:    When will you run out of your medication:    Which Pharmacy are we sending the medication to?: [facility] on [address] !!!!

## 2021-04-22 ENCOUNTER — TELEPHONE (OUTPATIENT)
Dept: CARDIOLOGY CLINIC | Age: 71
End: 2021-04-22

## 2021-04-22 NOTE — TELEPHONE ENCOUNTER
Called for Xiomara at [address] [place] [address] to discuss Ms Amezcua's egd/colonoscopy. Dr Lieutenant Pardo is not comfortable holding plavix at this time d/t recent stents (March '21). [employer] was not available, however, a message was left for her with this information. I did ask if this was an urgent need for the testing and left call back number.

## 2021-04-28 ENCOUNTER — TELEPHONE (OUTPATIENT)
Dept: CARDIOLOGY CLINIC | Age: 71
End: 2021-04-28

## 2021-04-28 ENCOUNTER — OFFICE VISIT (OUTPATIENT)
Dept: CARDIOLOGY CLINIC | Age: 71
End: 2021-04-28
Payer: MEDICARE

## 2021-04-28 VITALS
DIASTOLIC BLOOD PRESSURE: 72 MMHG | HEIGHT: 56 IN | SYSTOLIC BLOOD PRESSURE: 114 MMHG | BODY MASS INDEX: 31.05 KG/M2 | OXYGEN SATURATION: 98 % | WEIGHT: 138 LBS | HEART RATE: 86 BPM

## 2021-04-28 DIAGNOSIS — R06.02 SOB (SHORTNESS OF BREATH): ICD-10-CM

## 2021-04-28 DIAGNOSIS — E78.5 HYPERLIPIDEMIA, UNSPECIFIED HYPERLIPIDEMIA TYPE: ICD-10-CM

## 2021-04-28 DIAGNOSIS — I27.20 PULMONARY HTN (HCC): ICD-10-CM

## 2021-04-28 DIAGNOSIS — I34.0 MITRAL VALVE INSUFFICIENCY, UNSPECIFIED ETIOLOGY: ICD-10-CM

## 2021-04-28 DIAGNOSIS — I25.110 CORONARY ARTERY DISEASE INVOLVING NATIVE CORONARY ARTERY OF NATIVE HEART WITH UNSTABLE ANGINA PECTORIS (HCC): Primary | ICD-10-CM

## 2021-04-28 DIAGNOSIS — I10 ESSENTIAL HYPERTENSION: ICD-10-CM

## 2021-04-28 DIAGNOSIS — I48.0 PAF (PAROXYSMAL ATRIAL FIBRILLATION) (HCC): ICD-10-CM

## 2021-04-28 PROCEDURE — 99214 OFFICE O/P EST MOD 30 MIN: CPT | Performed by: NURSE PRACTITIONER

## 2021-04-28 PROCEDURE — 3017F COLORECTAL CA SCREEN DOC REV: CPT | Performed by: NURSE PRACTITIONER

## 2021-04-28 PROCEDURE — G8427 DOCREV CUR MEDS BY ELIG CLIN: HCPCS | Performed by: NURSE PRACTITIONER

## 2021-04-28 PROCEDURE — 4040F PNEUMOC VAC/ADMIN/RCVD: CPT | Performed by: NURSE PRACTITIONER

## 2021-04-28 PROCEDURE — G8400 PT W/DXA NO RESULTS DOC: HCPCS | Performed by: NURSE PRACTITIONER

## 2021-04-28 PROCEDURE — 1036F TOBACCO NON-USER: CPT | Performed by: NURSE PRACTITIONER

## 2021-04-28 PROCEDURE — 1090F PRES/ABSN URINE INCON ASSESS: CPT | Performed by: NURSE PRACTITIONER

## 2021-04-28 PROCEDURE — 1123F ACP DISCUSS/DSCN MKR DOCD: CPT | Performed by: NURSE PRACTITIONER

## 2021-04-28 PROCEDURE — G8417 CALC BMI ABV UP PARAM F/U: HCPCS | Performed by: NURSE PRACTITIONER

## 2021-04-28 RX ORDER — ISOSORBIDE MONONITRATE 30 MG/1
30 TABLET, EXTENDED RELEASE ORAL DAILY
Qty: 30 TABLET | Refills: 3 | Status: SHIPPED | OUTPATIENT
Start: 2021-04-28 | End: 2022-01-14

## 2021-04-28 NOTE — TELEPHONE ENCOUNTER
She was seen today . She wants npkl to know that she cant  the imdue rx that was given until tomorrow because she had to go to work before the pharmacy could fill rx . She also wants npkl to know she will get labs done tomorrow.

## 2021-04-28 NOTE — PROGRESS NOTES
[address]     Outpatient Follow Up Note      CHIEF COMPLAINT / HPI:  Follow Up secondary to status post coronary artery stenting    Subjective:   Claritza Rain is 69 y.o. female who presents today with a history of DM, HTN, HLD, PAD, a fib, MR, CAD s/p PCI of RCA 2021. Pt having LUCAS. It is hard for her to do her job with her shortness of breath. She works as a  at an office. She has stinging feeling in upper chest and left breast.  This is not exertional and she notices it more at rest. She wonders if left breast pain if from recent mammogram. Pt has been taking Telergy for a month. This inhaler works initially but then an hour later her breathing worsens again. Wt stable and denies swelling. With regard to medication therapy the patient has been compliant with prescribed regimen. They have tolerated therapy to date. Past Medical History:   Diagnosis Date    AF (atrial fibrillation) (HCC)     on anticoagulation    Back pain     compaound fracture    CAD (coronary artery disease)     Centrilobular emphysema ([address] 3/16/2021    Compression fracture 1994    Hyperlipidemia     Hypertension     Myelolipoma     bilateral adrenals    PVD (peripheral vascular disease) ([address]     Right ear injury 1983    Per pt.     Shingles     Valvular disease      Social History:    Social History     Tobacco Use   Smoking Status Former Smoker    Years: 40.00    Types: Cigarettes    Quit date: 3/1/2014    Years since quittin.1   Smokeless Tobacco Never Used   Tobacco Comment    Trying to quit     Current Medications:  Current Outpatient Medications   Medication Sig Dispense Refill    potassium chloride (KLOR-CON M) 20 MEQ extended release tablet TAKE ONE TABLET BY MOUTH DAILY 90 tablet 3    rosuvastatin (CRESTOR) 10 MG tablet Take 1 tablet by mouth daily Dispense Generic name only per pt request 30 tablet 3    albuterol sulfate HFA (VENTOLIN HFA) 108 (90 Base) MCG/ACT inhaler Inhale 2 puffs into the lungs 4 times daily as needed for Wheezing 1 Inhaler 0    warfarin (COUMADIN) 5 MG tablet Take 1 tablet by mouth daily Indications: Restart on 3/23/19 TAKE ONE TABLET BY MOUTH DAILY or as directed 90 tablet 3    clopidogrel (PLAVIX) 75 MG tablet Take 1 tablet by mouth daily 90 tablet 1    Biotin 1 MG CAPS Take by mouth Indications: Hair volume  medicagion      OMEPRAZOLE PO Take by mouth daily as needed      nitroGLYCERIN (NITROSTAT) 0.4 MG SL tablet Place 1 tablet under the tongue every 5 minutes as needed for Chest pain 25 tablet 3    metOLazone (ZAROXOLYN) 5 MG tablet TAKE ONE TABLET BY MOUTH DAILY AS NEEDED 90 tablet 5    aspirin EC 81 MG EC tablet Monday Wed Friday 90 tablet 1    atenolol (TENORMIN) 25 MG tablet Take 1 tablet by mouth daily 90 tablet 3    terbinafine (LAMISIL) 250 MG tablet Take 250 mg by mouth daily      Specialty Vitamins Products (VITAMINS FOR THE HAIR PO) Take by mouth daily      Magnesium Oxide 250 MG TABS Take 2 tablets by mouth daily 30 tablet 5    Simethicone (GAS RELIEF) 180 MG CAPS Take  by mouth as needed. No current facility-administered medications for this visit. REVIEW OF SYSTEMS:    CONSTITUTIONAL: No major weight gain or loss, night sweats, fever, fatigue, or weakness. HEENT: No new vision difficulties or ringing in the ears. RESPIRATORY: No new SOB, PND, orthopnea or cough. CARDIOVASCULAR: See HPI  GI: No N/V/D, constipation, or abdominal pain. No black/tarry stools  : No urinary urgency, incontinence, or hematuria. SKIN: No cyanosis or skin lesions. MUSCULOSKELETAL: No new muscle or joint pain. NEUROLOGICAL: No syncope or TIA-like symptoms.   PSYCHIATRIC: No anxiety, pain, insomnia or depression    Objective:   PHYSICAL EXAM:      VITALS:  /72 (Site: Left Upper Arm, Position: Sitting, Cuff Size: Medium Adult)   Pulse 86   Ht 4' 8\" (1.422 m)   Wt 138 lb (62.6 kg)   LMP 01/01/1983   SpO2 98%   BMI 30.94 kg/m²   CONSTITUTIONAL: Cooperative, no apparent distress, and appears well nourished / developed  NEUROLOGIC:  Awake and orientated to person, place, and time. PSYCH: Calm affect. SKIN: Warm and dry. HEENT: Sclera non-icteric, normocephalic, neck supple. RESPIRATORY:  No increased work of breathing and clear to auscultation, no crackles or wheezing. CARDIOVASCULAR:  Regular rate and rhythm without murmur. Normal S1 and S2. No gallops or rubs. Normal PMI. No elevation of JVP. Normal carotid pulses with no bruits. GI:  Normal bowel sounds. Non-distended. Non-tender to palpation  EXT: No edema. No calf tenderness. Pulses are present bilaterally.     DATA:    Lab Results   Component Value Date    ALT 12 03/08/2021    AST 16 03/08/2021    ALKPHOS 68 03/08/2021    BILITOT 0.3 03/08/2021     Lab Results   Component Value Date    CREATININE 0.7 03/08/2021    BUN 12 03/08/2021     03/08/2021    K 4.0 03/08/2021    CL 99 03/08/2021    CO2 31 03/08/2021     Lab Results   Component Value Date    TSH 1.48 01/20/2017     Lab Results   Component Value Date    WBC 6.0 03/08/2021    HGB 10.0 (L) 03/08/2021    HCT 31.8 (L) 03/08/2021    MCV 77.8 (L) 03/08/2021     03/08/2021     No components found for: CHLPL  Lab Results   Component Value Date    TRIG 94 05/08/2019    TRIG 94 11/27/2018    TRIG 87 09/11/2017     Lab Results   Component Value Date    HDL 52 05/08/2019    HDL 55 11/27/2018    HDL 56 09/11/2017     Lab Results   Component Value Date    LDLCALC 65 05/08/2019    LDLCALC 144 (H) 11/27/2018    LDLCALC 75 09/11/2017     Lab Results   Component Value Date    LABVLDL 29 09/06/2013    LABVLDL 17 10/17/2012    LABVLDL 30 05/21/2012      No results found for: BNP  Radiology Review:  Pertinent images / reports were reviewed as a part of this visit and reveals the following:    Last Echo: 10/2020   Summary   Normal left ventricle size, wall thickness and systolic function with an   estimated ejection fraction of 60%. No regional wall motion abnormalities are seen. Indeterminate diastolic function. There is mild-to-moderate mitral regurgitation. Aortic valve appears sclerotic but opens adequately. There is mild tricuspid regurgitation with a RVSP estimation of 45-50 mmHg. Pulmonary hypertension. The right ventricle is normal in size. RV systolic function appears to be at   least moderately reduced. TAPSE is estimated at 1.1 cm. Color flow seen crossing the atrial septum suggesting a left-to-right   intracardiac shunt. Biatrial enlargement. Last Stress ECHO: 10/2020  Summary   Poor exercise tolerance. Normal resting blood pressure with blunted response   to exercise. 1 mm ST horizontal depressions inferolaterally with stress consistent with   ischemia. Ischemic changes extended throughout recovery. Normal echocardiographic response to stress, but abnormal EKG response. Cardiac Cath : 11/2020  Anatomy:   LM-nml   LAD-proximal 90% calcified, distal 90%  Cx-proximal 80%  OM- nml  RCA-dominant. Ostial stent 60% ISR, mid stent 99% ISR  Contrast: 48  Flouro Time: 3.6  Access: R Radial artery. Small size artery. Do not attempt radial in the future  Impression  ~Coronary Angiography w/ severe MVD  Recommendation  ~Aggressive medical treatment and risk factor modification  ~CABG. Check echo. Refer to CT surgery as an outpatient. Cardiac Cath PCI: 2/10/21  Anatomy:   RCA-ostial 50%  RPDA- mid 99% ISR  Intervention  ~Successful PCI to RCA with ELCA laser atherectomy. 2.5 NC balloon, 3.5 NC balloon. 3.5x38 DEE to 20 kirti. Mid stent underexpanded segment. 3.75x8 NC to 22atm. 10% residual stenosis. Excellent Result. Contrast: 55  Flouro Time: 42.8  Access: R CFA. Ultrasound guidance used to determine aforementioned artery patency, size (>2mm), anatomic variations and ideal puncture location. Real-time ultrasound utilized concurrent with vascular needle entry into the artery.   Image(s) permanently recorded and reported in the patient chart. Perc stick safe zone   Impression  ~Coronary Angiography w/ severe single vessel CAD  ~Successful complex angioplasty and stenting of RCA  Recommendation  ~Aggressive medical treatment and risk factor modification  ~1. Stop heparin gtt. Post cath IVF. Bedrest.  2. Recommend beta blocker, high potency statin, aspirin coumadin and plavix. Restart coumadin tomorrow. 3. Referral to cardiac rehab placed  4. Patient has been advised on the importance of regular exercise of at least 20-30 minutes daily. 5. Patient counseled about and offered assistance for smoking cessation   6. Follow up in 1-2 weeks with cardiology    Cardiac Cath IVUS PCI: 3/4/21  Anatomy:   LM-distal 70%   LAD-prox 90%, distal 70%  IVUS LAD showed MLA of 1mm2  IVUS of LM showed MLA 5.2mm2   Intervention  ~Successful PCI to LAD with 2.5x32 EDE to 18atm. PCI to LM with 2.75x16 DEE to 18atm. PD with 3.0x8 NC to 24atm. Excellent Result. IVUS showed good stent apposition and expansion. Contrast: 64  Flouro Time: 8.5   Access: L CFA. Perc stick safe zone  Impression  ~Coronary Angiography w/ Severe Single vessel CAD  ~Successful complex angioplasty and stenting of LAD/LM  Recommendation  ~Aggressive medical treatment and risk factor modification  ~1. Stop heparin gtt. Post cath IVF. Bedrest.  2. Recommend beta blocker, high potency statin, coumadin and plavix  3. Referral to cardiac rehab placed  4. Patient has been advised on the importance of regular exercise of at least 20-30 minutes daily. 5. Patient counseled about and offered assistance for smoking cessation   6. Follow up in 1-2 weeks with cardiology. Restart coumadin tonight. Recommend Pulmonary evaluation for COPD.     PFT: 12/16/2020  Interpretation:  Essentially normal pulmonary function test.  ABG on room air was normal.    CTA abd aorta with bilat runoff: 3/8/21  No hematoma formation, pseudoaneurysm, or arteriovenous fistula at the left   groin and started on a trial of Trelegy. Patient reports that this helps initially but wears off within the hour    3. Pulmonary HTN  ~ Echo 11/2014: RVSP 34 mmHg  ~ [facility] 2014 consistent with mild pulm HTN  ~ Echo 4/2019: RVSP 23 mmHg, mild TR  ~ Echo 10/2020: RVSP 45-50 mmHg, mild TR    4. Mitral regurgitation    ~ Echo 4/2019: mod MR   ~ Echo 10/2020: mild-mod MR  ~ stable     5. Hypertension   ~ controlled     6. Hyperlipidemia   ~ panel not up to date - will need to get soon   ~ on Crestor 10    7. Paroxysmal atrial fibrillation   ~ stable ; regular to auscultation  ~ on coumadin     8. PAD  ~ S/P Scoring PTA, DCB, and DEE to left distal SFA/popliteal March 2019 (May 2019 Left SUKUMAR normalized to 1.24)  ~ on aspirin and statin  ~ has seen Dr. Samuel Chiu previously. ~ was evaluated by Dr. Suzanne Carolina who recommends patient first be worked up for spinal stenosis    I had the opportunity to review the clinical symptoms and presentation of Nina Roberson. Plan:     1. Start Imdur 30 mg daily to see if this helps with chest/breathing symptoms  2. follow-up with Dr. Rosa Salvador or PCP to see if there can be an adjustment to your inhaler  3. Fasting blood work soon  4. Follow-up as scheduled with Dr. Lydia Downey 6/25    Overall the patient is stable from CV standpoint    I have addressed the patient's cardiac risk factors and adjusted pharmacologic treatment as needed. In addition, I have reinforced the need for patient directed risk factor modification. Further evaluation will be based upon the patient's clinical course and testing results. All questions and concerns were addressed to the patient. Alternatives to my treatment were discussed. The patient verbalizes understanding not to stop medications without discussing with us. The patient is not currently smoking. The risks related to smoking were reviewed with the patient. Recommend maintaining a smoke-free lifestyle.      Patient is on a beta-blocker  Patient is not on an ACE

## 2021-04-28 NOTE — PATIENT INSTRUCTIONS
Start 30 mg of Imdur once a day to see if it helps with your breathing. Call Dr. Rosa Salvador or your primary care provider regarding your inhaler to see if there is a different/increase dose you can try. Complete fasting blood work to check cholesterol. Fast for 12 hours prior, may have black coffee or water.      Follow up with Dr. Lydia Downey as scheduled June 25

## 2021-04-29 ENCOUNTER — TELEPHONE (OUTPATIENT)
Dept: CARDIOLOGY CLINIC | Age: 71
End: 2021-04-29

## 2021-04-29 NOTE — TELEPHONE ENCOUNTER
Spoke with Caron Pedro with GI. She did get my message that [facility] cannot stop her Plavix/warfarin at this time. She has called [facility] to let her know that they will need to postpone her procedure for now. [facility] will re address in her next follow up a time she can safely pause Plavix/warfarin.

## 2021-04-29 NOTE — TELEPHONE ENCOUNTER
Vel Hou call from [address] regarding pt EGD  Asking if pt had hold Plavix and warfarin?  Starting tomorrow for day days pls call to advise thank you

## 2021-04-30 NOTE — TELEPHONE ENCOUNTER
Patient called in again ; requesting to speak with KARL . Would not provide any further information as she states KARL knows because she works with [facility] .  Attempted to receive more information from the patient and she hung up

## 2021-05-03 ENCOUNTER — TELEPHONE (OUTPATIENT)
Dept: CARDIOLOGY CLINIC | Age: 71
End: 2021-05-03

## 2021-05-03 ENCOUNTER — OFFICE VISIT (OUTPATIENT)
Dept: PULMONOLOGY | Age: 71
End: 2021-05-03
Payer: MEDICARE

## 2021-05-03 VITALS
SYSTOLIC BLOOD PRESSURE: 131 MMHG | DIASTOLIC BLOOD PRESSURE: 67 MMHG | TEMPERATURE: 97.4 F | HEART RATE: 78 BPM | OXYGEN SATURATION: 97 %

## 2021-05-03 DIAGNOSIS — J43.2 CENTRILOBULAR EMPHYSEMA (HCC): ICD-10-CM

## 2021-05-03 DIAGNOSIS — R06.02 SOB (SHORTNESS OF BREATH): Primary | ICD-10-CM

## 2021-05-03 DIAGNOSIS — I48.21 PERMANENT ATRIAL FIBRILLATION (HCC): ICD-10-CM

## 2021-05-03 DIAGNOSIS — I25.10 CORONARY ARTERY DISEASE INVOLVING NATIVE CORONARY ARTERY OF NATIVE HEART WITHOUT ANGINA PECTORIS: Primary | ICD-10-CM

## 2021-05-03 DIAGNOSIS — I25.10 CORONARY ARTERY DISEASE INVOLVING NATIVE CORONARY ARTERY OF NATIVE HEART WITHOUT ANGINA PECTORIS: ICD-10-CM

## 2021-05-03 PROCEDURE — G8400 PT W/DXA NO RESULTS DOC: HCPCS | Performed by: INTERNAL MEDICINE

## 2021-05-03 PROCEDURE — 3023F SPIROM DOC REV: CPT | Performed by: INTERNAL MEDICINE

## 2021-05-03 PROCEDURE — G8427 DOCREV CUR MEDS BY ELIG CLIN: HCPCS | Performed by: INTERNAL MEDICINE

## 2021-05-03 PROCEDURE — 3017F COLORECTAL CA SCREEN DOC REV: CPT | Performed by: INTERNAL MEDICINE

## 2021-05-03 PROCEDURE — G8417 CALC BMI ABV UP PARAM F/U: HCPCS | Performed by: INTERNAL MEDICINE

## 2021-05-03 PROCEDURE — 99214 OFFICE O/P EST MOD 30 MIN: CPT | Performed by: INTERNAL MEDICINE

## 2021-05-03 PROCEDURE — 4040F PNEUMOC VAC/ADMIN/RCVD: CPT | Performed by: INTERNAL MEDICINE

## 2021-05-03 PROCEDURE — G8926 SPIRO NO PERF OR DOC: HCPCS | Performed by: INTERNAL MEDICINE

## 2021-05-03 PROCEDURE — 1090F PRES/ABSN URINE INCON ASSESS: CPT | Performed by: INTERNAL MEDICINE

## 2021-05-03 PROCEDURE — 1123F ACP DISCUSS/DSCN MKR DOCD: CPT | Performed by: INTERNAL MEDICINE

## 2021-05-03 PROCEDURE — 1036F TOBACCO NON-USER: CPT | Performed by: INTERNAL MEDICINE

## 2021-05-03 RX ORDER — DOXYCYCLINE HYCLATE 100 MG/1
100 CAPSULE ORAL 2 TIMES DAILY
Qty: 20 CAPSULE | Refills: 0 | Status: SHIPPED | OUTPATIENT
Start: 2021-05-03 | End: 2021-05-13

## 2021-05-03 NOTE — TELEPHONE ENCOUNTER
Pt calling she needs a referral to Cardiac Rehab for her to get started over there. Can this be done?  Pls call to advise Thank you

## 2021-05-03 NOTE — PROGRESS NOTES
[facility]  Consult Note  Delvis Fuller MD       Jesenia Augustin   YOB: 1950    Date of Visit:  5/3/2021    Assessment/Plan:  1. SOB (shortness of breath)  2. Centrilobular emphysema ([facility]  3. Former smoker  4. Permanent atrial fibrillation ([address]  5. Coronary artery disease involving native coronary artery of native heart without angina pectoris    She does have a significant cardiac history with A. fib and coronary artery disease. She had recent stenting in February 2021. She was initially blaming her symptoms on this procedure but the shortness of breath component of this clearly predates the procedure. PFT 12/20:  Spirometry:   Flow volume loops were normal. The FEV-1/FVC ratio was normal.   The  Prebronchodilator FEV-1 was 1.25 liters (82% of predicted),   which was normal. The FVC was 1.71 liters (84% of predicted),   which was normal. Response to inhaled bronchodilators (albuterol)   was not performed. Lung volumes:   Lung volumes were tested by plethysmography. The total lung   capacity was 3.61 liters (113% of predicted), which was normal.   The residual volume was 1.90 liters (147% of predicted), which   was increased. The ratio of residual volume to total lung   capacity (RV/TLC) was 130, which was increased. Specific airway   resistance was increased.      Diffusion capacity was found to be normal.       ABG pH7.43, nAS180, pO2 91, HCO3 27, sats 98%     CT Chest 12/20:    FINDINGS:   Mediastinum: Dense calcification is demonstrated of the ascending aorta,   arch, descending aorta.  Coronary artery calcification.  Calcification of the   tracheobronchial tree.  Within the mediastinum, no pathologic lymphadenopathy   by size criteria.  Hilar evaluation is limited without contrast.   Cardiomegaly.  Nonspecific calcification within the left thyroid lobe.  No   axillary adenopathy.       Lungs/pleura: Emphysema is present. Dong Rogers motion artifact is seen. Calcified left lower lobe pulmonary nodules, in keeping with granulomatous   disease.  Scattered linear and ground-glass opacity bilaterally, likely   atelectasis.  No pneumothorax or pleural effusion.       Upper Abdomen: Calcified granulomas within the liver and spleen.  Low-density   nodules are seen of bilateral adrenal glands, measuring 3.6 x 1.9 cm,   Hounsfield units -11 on the right and 2.5 x 1.3 cm, Hounsfield units -14 on   the left, compatible with adrenal adenomas.  Bilateral nephrolithiasis versus   renal vascular calcifications, incompletely imaged.       Soft Tissues/Bones: Fusion of T10 and T11, similar to prior.  Degenerative   change throughout the spine.           Impression   Moderate atherosclerosis of the thoracic aorta and coronary arteries.       Cardiomegaly.       Scattered bilateral atelectasis.  Sequela of granulomatous disease.       Bilateral adrenal adenomas. She feels like she always has a \"cold\" with productive cough  We can try a round of Doxy  Could consider HHN. She did demonstrate good inhaler technique during the visit today      Chief Complaint   Patient presents with    Shortness of Breath     Trelegy did not help her at all Also has Albuterol Inhaler and feels it doen not help her at all as well Since last here had cardiac stents done Has terrible pain in both her legs       HPI  The patient presents with a chief complaint of moderate shortness of breath related to COPD/emphysema of many years duration. He has mild associated cough. Exertion is a modifying factor. She has been using Trelegy and Albuterol since her first visit without benefit. She had stents placed and \"I didn't have any problem breathing before. \"    Review of Systems  No Chest pain, Nausea or vomiting reported    History  I have reviewed past medical, surgical, social and family history. This is documented elsewhere in themedical record.      Physical Exam:  Well developed, well nourished  Alert and oriented  Sclera is clear  No cervical adenopathy  No JVD. Chest examination is clear. Cardiac examination reveals regular rate and rhythm without murmur, gallop or rub. The abdomen is soft, nontender and nondistended. There is no clubbing, cyanosis or edema of the extremities. There is no obvious skin rash. No focal neuro deficicts  Normal mood and affect      Allergies   Allergen Reactions    Actos [Pioglitazone]     Percocet [Oxycodone-Acetaminophen]      Confused , loopy    Vicodin [Hydrocodone-Acetaminophen]      Prior to Visit Medications    Medication Sig Taking?  Authorizing Provider   isosorbide mononitrate (IMDUR) 30 MG extended release tablet Take 1 tablet by mouth daily  Patient taking differently: Take 30 mg by mouth daily Has not gotten med yet  KARTHIKEYAN Bourgeois CNP   potassium chloride (KLOR-CON M) 20 MEQ extended release tablet TAKE ONE TABLET BY MOUTH DAILY  Ayo Zuniga MD   rosuvastatin (CRESTOR) 10 MG tablet Take 1 tablet by mouth daily Dispense Generic name only per pt request  Ayo Zuniga MD   albuterol sulfate HFA (VENTOLIN HFA) 108 (90 Base) MCG/ACT inhaler Inhale 2 puffs into the lungs 4 times daily as needed for Wheezing  KARTHIKEYAN Bourgeois CNP   warfarin (COUMADIN) 5 MG tablet Take 1 tablet by mouth daily Indications: Restart on 3/23/19 TAKE ONE TABLET BY MOUTH DAILY or as directed  Ayo Zuniga MD   clopidogrel (PLAVIX) 75 MG tablet Take 1 tablet by mouth daily  Ayo Zuniga MD   Biotin 1 MG CAPS Take by mouth Indications: Hair volume  medicagion  Historical Provider, MD   OMEPRAZOLE PO Take by mouth daily as needed  Historical Provider, MD   nitroGLYCERIN (NITROSTAT) 0.4 MG SL tablet Place 1 tablet under the tongue every 5 minutes as needed for Chest pain  Tong Barfield MD   metOLazone (ZAROXOLYN) 5 MG tablet TAKE ONE TABLET BY MOUTH DAILY AS NEEDED  Tong Barfield MD   atenolol (TENORMIN) 25 MG tablet Take 1 tablet by mouth daily  Aida Lugo MD   Specialty Vitamins Products (VITAMINS FOR THE HAIR PO) Take by mouth daily  Historical Provider, MD   Magnesium Oxide 250 MG TABS Take 2 tablets by mouth daily  Patient taking differently: Take 250 mg by mouth daily   Aida Lugo MD   Simethicone (GAS RELIEF) 180 MG CAPS Take  by mouth as needed. Historical Provider, MD       Vitals:    21 1109   BP: 131/67   Pulse: 78   Temp: 97.4 °F (36.3 °C)   TempSrc: Temporal   SpO2: 97%     There is no height or weight on file to calculate BMI.      Wt Readings from Last 3 Encounters:   21 138 lb (62.6 kg)   21 137 lb (62.1 kg)   21 132 lb (59.9 kg)     BP Readings from Last 3 Encounters:   21 131/67   21 114/72   21 (!) 80/58        Social History     Tobacco Use   Smoking Status Former Smoker    Years: 40.00    Types: Cigarettes    Quit date: 3/1/2014    Years since quittin.1   Smokeless Tobacco Never Used   Tobacco Comment    Trying to quit

## 2021-05-04 NOTE — TELEPHONE ENCOUNTER
Pt states she is having leg pain from ankles to hips. States she has swelling in ankles and stomach. States she is still having difficulty breathing, she did see Dr Denise Henriquez yesterday, but didn't feel she got any answers. Please call to advise what else she can do.

## 2021-05-05 NOTE — TELEPHONE ENCOUNTER
Received refill request for atenolol from [facility] pharmacy. Last ov: 4/28/2021 NPKL    Last Refill: 5/1/2020 #90 with 3 refills    Next appointment:6/25/2021 [facility]       Patient notified of NPKL message, verbalized understanding.

## 2021-05-05 NOTE — TELEPHONE ENCOUNTER
Lucinda started her on an antibiotic (doxycycline) to see if this will help her breathing. For swelling she can take an extra dose of zaroxolyn. Pt can reach out to PCP regarding leg pain.

## 2021-05-05 NOTE — TELEPHONE ENCOUNTER
Pt calling back for refill     Medication Refill    Medication needing refilled:  atenolol (TENORMIN)     Dosage of the medication: 25 mg    How are you taking this medication (QD, BID, TID, QID, PRN): 1 tablet by mouth daily    30 or 90 day supply called in: 90 day supply  With refill    When will you run out of your medication:    Which Pharmacy are we sending the medication to?: [address], [address] [place] [address] Road   [address] [place] [address], [address] [place] [address],[id number]   Phone:  [phone]  Fax:  [phone]

## 2021-05-06 ENCOUNTER — TELEPHONE (OUTPATIENT)
Dept: CARDIOLOGY CLINIC | Age: 71
End: 2021-05-06

## 2021-05-06 RX ORDER — ATENOLOL 25 MG/1
25 TABLET ORAL DAILY
Qty: 90 TABLET | Refills: 3 | Status: SHIPPED | OUTPATIENT
Start: 2021-05-06 | End: 2022-04-28

## 2021-05-06 NOTE — TELEPHONE ENCOUNTER
Spoke to patient about her lab results from [facility] per NPKL labs look great continue current medications

## 2021-05-24 NOTE — PROGRESS NOTES
Pt called and canceled initial evaluation for cardiac rehabilitation due to having car trouble. Pt also concerned about being able to exercise with mask on. Pt will call to reschedule when ready.

## 2021-05-25 ENCOUNTER — HOSPITAL ENCOUNTER (OUTPATIENT)
Dept: CARDIAC REHAB | Age: 71
Setting detail: THERAPIES SERIES
Discharge: HOME | End: 2021-05-25
Payer: MEDICARE

## 2021-06-02 ENCOUNTER — TELEPHONE (OUTPATIENT)
Dept: CARDIOLOGY CLINIC | Age: 71
End: 2021-06-02

## 2021-06-03 RX ORDER — MELATONIN/PYRIDOXINE HCL (B6) 5 MG-10 MG
100 TABLET,IMMED, EXTENDED RELEASE, BIPHASIC ORAL DAILY
Qty: 90 CAPSULE | Refills: 3 | Status: SHIPPED | OUTPATIENT
Start: 2021-06-03 | End: 2022-07-25 | Stop reason: SDUPTHER

## 2021-06-16 ENCOUNTER — TELEPHONE (OUTPATIENT)
Dept: CARDIOLOGY CLINIC | Age: 71
End: 2021-06-16

## 2021-06-16 RX ORDER — FUROSEMIDE 20 MG/1
20 TABLET ORAL DAILY
Qty: 90 TABLET | Refills: 3 | Status: SHIPPED | OUTPATIENT
Start: 2021-06-16 | End: 2021-07-19

## 2021-06-16 NOTE — TELEPHONE ENCOUNTER
Pt calling-stated the Metolazone 5 mg dose is not working anymore. Her legs are staying swollen. .   Asking if the dose can be increased, or another medication prescribed.

## 2021-06-16 NOTE — TELEPHONE ENCOUNTER
Per Marko Plasencia ok to trial Lasix 20 mg daily and stop Metolazone. She can call if she doesn't feel well on it. She has OV 6/25/21. Please call her and let her know. She said ok to leave a . Orders placed for Lasix, Metolazone dc'ed.     TY

## 2021-06-16 NOTE — TELEPHONE ENCOUNTER
Kate Craig states that the Metolazone \"isn't working anymore\" and her legs have been swelling for the past week. She has been taking Metolazone 5 mg daily. Per [facility] trial Lasix 20 mg daily and stop Metolazone. She states that before she moved to [place] 30 years ago, she was on Lasix, but \"it made her feel sick\" so she was changed to name brand Zaroxolyn before her doctor at the time had to change her to generic Metolazone due to her insurance. States reaction to Lasix wasn't anaphylactic in nature- no sob, swelling of throat or tongue, ETC. She would like to make sure that [facility] [place] still recommends Lasix knowing that before she trials. Will ask [facility].

## 2021-06-25 NOTE — ASSESSMENT & PLAN NOTE
Type~ paroxysmal  Current Rhythm~   Rate controlled ?   ChadsVasc score~ >4  Current meds~ warfarin  Plan~ no changes

## 2021-06-25 NOTE — PROGRESS NOTES
[address]   Cardiac Evaluation      Patient: Ramana Mayberry  YOB: 1950         Chief Complaint   Patient presents with    Coronary Artery Disease    Hypertension    3 Month Follow-Up        Referring provider: Kaleigh Akhtar III, DO    History of Present Illness:  Ms. Asif Verdugo is a 69 y.o. female here for 3 mo follow up for CAD(s/p PCI), Htn, Hchol, MR, afib, she also has a small PFO and PAD (follows Ashlyn Damon). She is a former patient of Dr. Raeann Smith and was seen in July with c/o sob with exertion. She works as a . She had a positive stress echo and underwent a LHC in November where she was found to have 3VD and referred to CTS. She, unfortunately is not a surgical candidate due to calcification. Rolanda Vergara underwent LHC in February with PCI to RCA with DEE and again in March with PCI to LAD/LM with DEE. Since her stents, she still had complaints of LUCAS and has had difficulty doing her job. She is following Dr Mary Tan for COPD/Emphysema. Today shayla reports that she has stopped smoking. She has multiple concerns regarding her chronic shortness of breath although it has improved since inhalers, sharp pain in her left upper ribs that comes and goes. No associated nausea, diaphoresis or palpitations. The symptoms are infrequent. She has not gone to cardiac rehab because she is not able to wear her mask without feeling SOB. With regard to medication therapy he/she has been compliant with prescribed regimen and has tolerated therapy to date. Past Medical History:   has a past medical history of AF (atrial fibrillation) ([address] Back pain, CAD (coronary artery disease), Centrilobular emphysema ([address] Compression fracture, Hyperlipidemia, Hypertension, Myelolipoma, PVD (peripheral vascular disease) ([address] Right ear injury, Shingles, and Valvular disease.     Surgical History:   has a past surgical history that includes Coronary angioplasty with stent (2014, 3/4/21); · Constitutional: there has been no unanticipated weight loss. +fatigue  · Eyes: No visual changes   · ENT: No Headaches, hearing loss or vertigo. No mouth sores or sore throat. · Cardiovascular: Reviewed in HPI  · Respiratory: +shortness of breath  · Gastrointestinal: No abdominal pain, appetite loss, blood in stools. No change in bowel or bladder habits. · Genitourinary: No nocturia, dysuria, trouble voiding  · Musculoskeletal:  No gait disturbance, weakness or joint complaints. · Integumentary: No rash or pruritis. · Neurological: No headache, change in muscle strength, numbness or tingling. No change in gait, balance, coordination, mood, affect, memory, mentation, behavior. · Psychiatric: No anxiety or depression  · Endocrine: No malaise or fever  · Hematologic/Lymphatic: No abnormal bruising or bleeding, blood clots or swollen lymph nodes. · Allergic/Immunologic: No nasal congestion or hives. Physical Examination:    Vitals:    06/28/21 1331   BP: 124/66   Site: Right Upper Arm   Position: Sitting   Cuff Size: Medium Adult   Pulse: 85   SpO2: 97%   Weight: 139 lb 3.2 oz (63.1 kg)   Height: 4' 8\" (1.422 m)     Body mass index is 31.21 kg/m². Wt Readings from Last 3 Encounters:   06/28/21 139 lb 3.2 oz (63.1 kg)   04/28/21 138 lb (62.6 kg)   04/07/21 137 lb (62.1 kg)      BP Readings from Last 3 Encounters:   06/28/21 124/66   05/03/21 131/67   04/28/21 114/72        Physical Examination:    · CONSTITUTIONAL: Well developed, well nourished  · EYES: PERRLA. No xanthelasma, sclera non icteric  · EARS,NOSE,MOUTH,THROAT:  Mucous membranes moist, normal hearing  · NECK: Supple, JVP normal, thyroid not enlarged. Carotids 2+ without bruits  · RESPIRATORY: Normal effort, no rales or rhonchi  · CARDIOVASCULAR: Normal PMI, regular rate and rhythm, no murmurs, rub or gallop. No edema. Radial pulses present and equal  · CHEST: No scar or masses  · ABDOMEN: Normal bowel sounds. No masses or tenderness.  No artery. Image(s) permanently recorded and reported in the patient chart. Perc stick safe zone     Impression  ~Coronary Angiography w/ severe single vessel CAD  ~Successful complex angioplasty and stenting of RCA     Recommendation  ~Aggressive medical treatment and risk factor modification  ~1. Stop heparin gtt. Post cath IVF. Bedrest.  2. Recommend beta blocker, high potency statin, aspirin coumadin and plavix. Restart coumadin tomorrow. 3. Referral to cardiac rehab placed  4. Patient has been advised on the importance of regular exercise of at least 20-30 minutes daily. 5. Patient counseled about and offered assistance for smoking cessation   6. Follow up in 1-2 weeks with cardiology    Cardiac Cath : 11/17/20  Anatomy:   LM-nml   LAD-proximal 90% calcified, distal 90%  Cx-proximal 80%  OM- nml  RCA-dominant. Ostial stent 60% ISR, mid stent 99% ISR    Contrast: 48  Flouro Time: 3.6  Access: R Radial artery. Small size artery. Do not attempt radial in the future     Impression  ~Coronary Angiography w/ severe MVD  Recommendation  ~Aggressive medical treatment and risk factor modification  ~CABG. Check echo. Refer to CT surgery as an outpatient. ** not surgical candidate    Stress echo 10/26/20  Summary  Poor exercise tolerance. Normal resting blood pressure with blunted response to exercise. 1 mm ST horizontal depressions inferolaterally with stress consistent with ischemia. Ischemic changes extended throughout recovery. Normal echocardiographic response to stress, but abnormal EKG response. Echo  Baseline resting echocardiogram shows normal global LV systolic function  with an ejection fraction of 60% and uniform myocardial segmental wall motion. Following stress there was uniform augmentation of all myocardial segments with appropriate hyperdynamic LV systolic response to stress.       Cardiac Doppler 10/26/20  Summary  Normal left ventricle size, wall thickness and systolic function with an estimated ejection fraction of 60%. No regional wall motion abnormalities are seen. Indeterminate diastolic function. There is mild-to-moderate mitral regurgitation. Aortic valve appears sclerotic but opens adequately. There is mild tricuspid regurgitation with a RVSP estimation of 45-50 mmHg. Pulmonary hypertension. The right ventricle is normal in size. RV systolic function appears to be at least moderately reduced. TAPSE is estimated at 1.1 cm. Color flow seen crossing the atrial septum suggesting a left-to-right intracardiac shunt. Biatrial enlargement. Assessment/Plan  1. Coronary artery disease involving native coronary artery of native heart without angina pectoris    2. Essential hypertension    3. Hyperlipidemia, unspecified hyperlipidemia type    4. Permanent atrial fibrillation ([address]    5. Nonrheumatic mitral valve regurgitation    6. PAD (peripheral artery disease) (HCC)          Coronary artery disease involving native coronary artery of native heart without angina pectoris  Angina - none  CCS class 1  Intervention  Stress Echo - abnormal  LHC~ 11/2020 severe MVD - non surgical candidate  2/2021 PCI to RCA with DEE  3/2021 PCI to LAD/LM with DEE  Current meds~ plavix (on coumadin for afib)  Plan~ no changes  DAPT reviewed            Atrial fibrillation (HCC)  Type~ paroxysmal  Current Rhythm~   Rate controlled ?   ChadsVasc score~ >4  Current meds~ warfarin  Plan~  INR check today        Essential hypertension   Controlled  Meds~ atenolol  Plan~ stable    Hyperlipidemia  Lab Results   Component Value Date    CHOL 135 05/08/2019    CHOL 83 05/08/2019    TRIG 94 05/08/2019    HDL 52 05/08/2019    HDL 52 05/21/2012    LDLCALC 65 05/08/2019     Meds~ crestor / CoQ 10  Plan~ no changes      Mitral regurgitation  Mild -Moderate Per echo 10/2020    PAD (peripheral artery disease) (HCC)  Following Dr Mayra Cuellar  Plans to observe at this time with referral to spine surgeon        Follow up 6 months    Thank you for allowing to me to participate in the care of Desean Campjackelyn. Scribe's Attestation:    This note was scribed in the presence of Sheela Terry by Jett Ruiz RN. 06/28/21

## 2021-06-25 NOTE — ASSESSMENT & PLAN NOTE
Angina - none  CCS class 1  Intervention  Stress Echo - abnormal  LHC~ 11/2020 severe MVD - non surgical candidate  2/2021 PCI to RCA with DEE  3/2021 PCI to LAD/LM with DEE  Current meds~ plavix (on coumadin for afib)  Plan~ no changes  DAPT reviewed

## 2021-06-28 ENCOUNTER — OFFICE VISIT (OUTPATIENT)
Dept: CARDIOLOGY CLINIC | Age: 71
End: 2021-06-28
Payer: MEDICARE

## 2021-06-28 VITALS
WEIGHT: 139.2 LBS | OXYGEN SATURATION: 97 % | HEART RATE: 85 BPM | DIASTOLIC BLOOD PRESSURE: 66 MMHG | HEIGHT: 56 IN | SYSTOLIC BLOOD PRESSURE: 124 MMHG | BODY MASS INDEX: 31.31 KG/M2

## 2021-06-28 DIAGNOSIS — I25.10 CORONARY ARTERY DISEASE INVOLVING NATIVE CORONARY ARTERY OF NATIVE HEART WITHOUT ANGINA PECTORIS: Primary | ICD-10-CM

## 2021-06-28 DIAGNOSIS — E78.5 HYPERLIPIDEMIA, UNSPECIFIED HYPERLIPIDEMIA TYPE: ICD-10-CM

## 2021-06-28 DIAGNOSIS — I48.21 PERMANENT ATRIAL FIBRILLATION (HCC): ICD-10-CM

## 2021-06-28 DIAGNOSIS — I73.9 PAD (PERIPHERAL ARTERY DISEASE) (HCC): ICD-10-CM

## 2021-06-28 DIAGNOSIS — I10 ESSENTIAL HYPERTENSION: ICD-10-CM

## 2021-06-28 DIAGNOSIS — I34.0 NONRHEUMATIC MITRAL VALVE REGURGITATION: ICD-10-CM

## 2021-06-28 PROCEDURE — 4040F PNEUMOC VAC/ADMIN/RCVD: CPT | Performed by: INTERNAL MEDICINE

## 2021-06-28 PROCEDURE — G8417 CALC BMI ABV UP PARAM F/U: HCPCS | Performed by: INTERNAL MEDICINE

## 2021-06-28 PROCEDURE — 1036F TOBACCO NON-USER: CPT | Performed by: INTERNAL MEDICINE

## 2021-06-28 PROCEDURE — 3017F COLORECTAL CA SCREEN DOC REV: CPT | Performed by: INTERNAL MEDICINE

## 2021-06-28 PROCEDURE — 1090F PRES/ABSN URINE INCON ASSESS: CPT | Performed by: INTERNAL MEDICINE

## 2021-06-28 PROCEDURE — 1123F ACP DISCUSS/DSCN MKR DOCD: CPT | Performed by: INTERNAL MEDICINE

## 2021-06-28 PROCEDURE — G8427 DOCREV CUR MEDS BY ELIG CLIN: HCPCS | Performed by: INTERNAL MEDICINE

## 2021-06-28 PROCEDURE — G8400 PT W/DXA NO RESULTS DOC: HCPCS | Performed by: INTERNAL MEDICINE

## 2021-06-28 PROCEDURE — 99213 OFFICE O/P EST LOW 20 MIN: CPT | Performed by: INTERNAL MEDICINE

## 2021-07-02 ENCOUNTER — TELEPHONE (OUTPATIENT)
Dept: CARDIOLOGY CLINIC | Age: 71
End: 2021-07-02

## 2021-07-02 NOTE — TELEPHONE ENCOUNTER
The albuterol inhaler is not working for her SOB . Her pulmonologist called in doxycycline 100mg BID before for this same problem . Could [facility] call in doxycycline for her ? Please call rx to  [facility] on [address] .  She has to leave for work at [employer] . Please send to [facility] and npkl

## 2021-07-02 NOTE — TELEPHONE ENCOUNTER
Spoke to the pt-advised that Dr. Jb Rico will not refill this medication.  It should come from her pulmonologist.

## 2021-07-06 ENCOUNTER — TELEPHONE (OUTPATIENT)
Dept: CARDIOLOGY CLINIC | Age: 71
End: 2021-07-06

## 2021-07-06 NOTE — TELEPHONE ENCOUNTER
Reviewed with Dr. Forest Vences. He would resume Metolazone 5 mg daily as needed which is stronger than furosemide 20 mg she has been taking most recently.

## 2021-07-06 NOTE — TELEPHONE ENCOUNTER
Called patient about the message below and she stated that both of the medications don't work for her and stated she will find a new doctor and hung up.

## 2021-07-19 ENCOUNTER — TELEPHONE (OUTPATIENT)
Dept: CARDIOLOGY CLINIC | Age: 71
End: 2021-07-19

## 2021-07-19 DIAGNOSIS — I25.10 CORONARY ARTERY DISEASE INVOLVING NATIVE CORONARY ARTERY OF NATIVE HEART WITHOUT ANGINA PECTORIS: Primary | ICD-10-CM

## 2021-07-19 RX ORDER — METOLAZONE 5 MG/1
5 TABLET ORAL DAILY
Qty: 30 TABLET | Refills: 3 | Status: SHIPPED | OUTPATIENT
Start: 2021-07-19 | End: 2022-01-14

## 2021-07-19 NOTE — TELEPHONE ENCOUNTER
Called patient, she would like a replacement for the lasix. Does not weigh herself, just feels like she has gained weight. Feels full in abdomen and legs. She previously called and Dr Santa Appiah changed her lasix to metolazone, but she did not do this. \" I through my lasix away\"  \"I dont have have any metolazone\"  Patient admits to not having a protime done \"In a while\" She will go tomorrow. Added renal panel on. Will discuss with Dr Breanne Olivas regarding her future care    Can someone fax the lab request to [facility] in [address]  Thank you

## 2021-07-19 NOTE — TELEPHONE ENCOUNTER
Medication Refill    Medication needing refilled:  nitroGLYCERIN (NITROSTAT) 0.4 MG SL tablet    Dosage of the medication: 0.4 mg sl    How are you taking this medication (QD, BID, TID, QID, PRN): as needed    30 or 90 day supply called in: 30    When will you run out of your medication:    Which Pharmacy are we sending the medication to?:    [address], [address] [place] [address] [address]   [address] [place] [address] Allegra [address] [address]

## 2021-07-19 NOTE — TELEPHONE ENCOUNTER
Pt calling to report the swelling in her feet and legs are still swollen. She states her belly is bigger, weight is up, more short of breath. Asking to stop taking the Furosemide, \"it isn't working, anyway\". Wants to go back on Metolazone, at an increased dose.

## 2021-07-20 ENCOUNTER — TELEPHONE (OUTPATIENT)
Dept: CARDIOLOGY CLINIC | Age: 71
End: 2021-07-20

## 2021-07-20 RX ORDER — NITROGLYCERIN 0.4 MG/1
0.4 TABLET SUBLINGUAL EVERY 5 MIN PRN
Qty: 25 TABLET | Refills: 3 | OUTPATIENT
Start: 2021-07-20

## 2021-07-20 RX ORDER — NITROGLYCERIN 0.4 MG/1
0.4 TABLET SUBLINGUAL EVERY 5 MIN PRN
Qty: 25 TABLET | Refills: 3 | Status: SHIPPED | OUTPATIENT
Start: 2021-07-20 | End: 2021-10-13 | Stop reason: SDUPTHER

## 2021-07-20 NOTE — TELEPHONE ENCOUNTER
black is not okay seeing patient.  He would like her to follow up with Dr Idris Streeter who has recently put her stents in

## 2021-07-20 NOTE — TELEPHONE ENCOUNTER
Received refill request for Nitroglycerin from [address] [place] [address].     Last ov:06/28/2021 FAY    Last Refill:10/21/2020 #25 with 3 refills    Next appointment: 12/10/2021 FAY

## 2021-07-20 NOTE — TELEPHONE ENCOUNTER
Please arrange follow up with DR Forest Vences, he is her primary cardiologist, per Dr Jessi Hutchison

## 2021-07-21 RX ORDER — ROSUVASTATIN CALCIUM 10 MG/1
TABLET, COATED ORAL
Qty: 30 TABLET | Refills: 11 | Status: CANCELLED | OUTPATIENT
Start: 2021-07-21

## 2021-07-21 RX ORDER — ROSUVASTATIN CALCIUM 10 MG/1
TABLET, COATED ORAL
Qty: 30 TABLET | Refills: 11 | Status: SHIPPED | OUTPATIENT
Start: 2021-07-21 | End: 2022-02-24 | Stop reason: SDUPTHER

## 2021-07-21 NOTE — TELEPHONE ENCOUNTER
Last OV 6/28/21 DW    Last Lipids 4/29/21  E-prescribed the Rosuvastatin 10 mg, # 30, one tab po daily, into   [address], [phone]. Pt notified.

## 2021-07-26 ENCOUNTER — TELEPHONE (OUTPATIENT)
Dept: CARDIOLOGY CLINIC | Age: 71
End: 2021-07-26

## 2021-07-26 DIAGNOSIS — I73.9 PAD (PERIPHERAL ARTERY DISEASE) (HCC): Primary | ICD-10-CM

## 2021-07-26 NOTE — TELEPHONE ENCOUNTER
Patient calling insisting on message to be sent to [facility], patient states her left foot feels numb and she feels like she does not have any toes. Patient wants to discuss with [facility] about circulation and is requesting an order for x-ray of her foot. Patient insists on discussing with [facility] as she states her PCP will not order an x-ray.

## 2021-07-26 NOTE — TELEPHONE ENCOUNTER
Sandy Ac states her toes \"sometimes go numb. \" Over the weekend she tried wearing her shoes less to see if it helped and if they were too tight, she did not notice a difference. She states numbness \"doesn't go away. \"    Numbness is worse near her bunion when she removed had a toe nail removed and she has \"had trouble\" ever since. Can bend and wiggle toes ok. Toes are normal color and temperature. She does feel numbness/\"weird feeling\" is primarily in the toes. Seeing podiatrist Friday, though she feels he wont give her good advice regarding her foot.

## 2021-07-26 NOTE — TELEPHONE ENCOUNTER
Spoke with [facility]- recommend Elsie Rascon see vascular surgery in the next week or two. She has seen Dr. Adeline Phillips and Dr. Von River in the past. Elsie Rascon would prefer to see a doctor in [place] as it is easier to drive to, gave Dr. Von River' contact information. Called Dr. Von River' office and LVM with Russell Ngo- his MA to see if she could get in to see him sooner rather than later.

## 2021-07-31 LAB
ALBUMIN SERPL-MCNC: 4.1 G/DL
ALP BLD-CCNC: 65 U/L
ALT SERPL-CCNC: 7 U/L
ANION GAP SERPL CALCULATED.3IONS-SCNC: NORMAL MMOL/L
AST SERPL-CCNC: 12 U/L
BILIRUB SERPL-MCNC: 0.4 MG/DL (ref 0.1–1.4)
BUN BLDV-MCNC: 22 MG/DL
CALCIUM SERPL-MCNC: 10 MG/DL
CHLORIDE BLD-SCNC: 104 MMOL/L
CHOLESTEROL, TOTAL: 118 MG/DL
CHOLESTEROL/HDL RATIO: NORMAL
CO2: 22 MMOL/L
CREAT SERPL-MCNC: 0.76 MG/DL
GFR CALCULATED: NORMAL
GLUCOSE BLD-MCNC: 105 MG/DL
HDLC SERPL-MCNC: 48 MG/DL (ref 35–70)
LDL CHOLESTEROL CALCULATED: 51 MG/DL (ref 0–160)
NONHDLC SERPL-MCNC: 51 MG/DL
POTASSIUM SERPL-SCNC: 4.2 MMOL/L
SODIUM BLD-SCNC: 139 MMOL/L
TOTAL PROTEIN: 6.1
TRIGL SERPL-MCNC: 101 MG/DL
VLDLC SERPL CALC-MCNC: 19 MG/DL

## 2021-08-14 ENCOUNTER — HOSPITAL ENCOUNTER (OUTPATIENT)
Dept: GENERAL RADIOLOGY | Age: 71
Discharge: HOME | End: 2021-08-14
Payer: MEDICARE

## 2021-08-14 ENCOUNTER — HOSPITAL ENCOUNTER (OUTPATIENT)
Age: 71
Discharge: HOME | End: 2021-08-14
Payer: MEDICARE

## 2021-08-14 ENCOUNTER — HOSPITAL ENCOUNTER (OUTPATIENT)
Dept: CT IMAGING | Age: 71
Discharge: HOME | End: 2021-08-14
Payer: MEDICARE

## 2021-08-14 DIAGNOSIS — M25.511 RIGHT SHOULDER PAIN, UNSPECIFIED CHRONICITY: ICD-10-CM

## 2021-08-14 DIAGNOSIS — M25.512 LEFT SHOULDER PAIN, UNSPECIFIED CHRONICITY: ICD-10-CM

## 2021-08-14 DIAGNOSIS — R10.9 STOMACH ACHE: ICD-10-CM

## 2021-08-14 DIAGNOSIS — M54.9 DORSALGIA: ICD-10-CM

## 2021-08-14 PROCEDURE — 73030 X-RAY EXAM OF SHOULDER: CPT

## 2021-08-14 PROCEDURE — 72040 X-RAY EXAM NECK SPINE 2-3 VW: CPT

## 2021-08-14 PROCEDURE — 74160 CT ABDOMEN W/CONTRAST: CPT

## 2021-08-14 PROCEDURE — 6360000004 HC RX CONTRAST MEDICATION: Performed by: INTERNAL MEDICINE

## 2021-08-14 RX ADMIN — IOPAMIDOL 75 ML: 755 INJECTION, SOLUTION INTRAVENOUS at 09:52

## 2021-08-31 ENCOUNTER — OFFICE VISIT (OUTPATIENT)
Dept: VASCULAR SURGERY | Age: 71
End: 2021-08-31
Payer: MEDICARE

## 2021-08-31 VITALS
HEIGHT: 56 IN | BODY MASS INDEX: 30.59 KG/M2 | SYSTOLIC BLOOD PRESSURE: 126 MMHG | WEIGHT: 136 LBS | DIASTOLIC BLOOD PRESSURE: 84 MMHG

## 2021-08-31 DIAGNOSIS — I70.213 ATHEROSCLEROSIS OF NATIVE ARTERIES OF EXTREMITIES WITH INTERMITTENT CLAUDICATION, BILATERAL LEGS (HCC): Primary | ICD-10-CM

## 2021-08-31 PROCEDURE — 4040F PNEUMOC VAC/ADMIN/RCVD: CPT | Performed by: SURGERY

## 2021-08-31 PROCEDURE — G8417 CALC BMI ABV UP PARAM F/U: HCPCS | Performed by: SURGERY

## 2021-08-31 PROCEDURE — 99212 OFFICE O/P EST SF 10 MIN: CPT | Performed by: SURGERY

## 2021-08-31 PROCEDURE — 1123F ACP DISCUSS/DSCN MKR DOCD: CPT | Performed by: SURGERY

## 2021-08-31 PROCEDURE — 1090F PRES/ABSN URINE INCON ASSESS: CPT | Performed by: SURGERY

## 2021-08-31 PROCEDURE — G8400 PT W/DXA NO RESULTS DOC: HCPCS | Performed by: SURGERY

## 2021-08-31 PROCEDURE — 1036F TOBACCO NON-USER: CPT | Performed by: SURGERY

## 2021-08-31 PROCEDURE — G8427 DOCREV CUR MEDS BY ELIG CLIN: HCPCS | Performed by: SURGERY

## 2021-08-31 PROCEDURE — 3017F COLORECTAL CA SCREEN DOC REV: CPT | Performed by: SURGERY

## 2021-08-31 NOTE — PROGRESS NOTES
[facility])   Vascular Surgery Followup    Referring Provider:  Chen Viera DO     Chief Complaint   Patient presents with    Follow-up        History of Present Illness:  79-year-old female with history of coronary artery disease status post intervention, hypertension, hyperlipidemia, atrial fibrillation referred back today for consideration of peripheral vascular disease. She has pain in bilateral lower extremities that has been thought related to neurogenic pain from chronic back disease. Mild diffuse stenosis noted on CT angiogram with runoff in March of this year. She continues to have some mild intermittent discomfort in her toes. No classic claudication. Does complain of some pain in both knees particularly when changing positions. Past Medical History:   has a past medical history of AF (atrial fibrillation) ([address] Back pain, CAD (coronary artery disease), Centrilobular emphysema ([address] Compression fracture, Hyperlipidemia, Hypertension, Myelolipoma, PVD (peripheral vascular disease) ([address] Right ear injury, Shingles, and Valvular disease. Surgical History:   has a past surgical history that includes Coronary angioplasty with stent (2014, 3/4/21); Coronary angioplasty with stent (03/14/2003 & 06/02/2003); Hysterectomy (09/01/1983); Tubal ligation (09/1983); and ventral hernia repair (5-). Social History:   reports that she quit smoking about 7 years ago. Her smoking use included cigarettes. She quit after 40.00 years of use. She has never used smokeless tobacco. She reports that she does not drink alcohol and does not use drugs. Family History:  family history includes Heart Attack (age of onset: 64) in her mother; Heart Attack (age of onset: 76) in her father; Heart Disease in her father.      Home Medications:  Current Outpatient Medications   Medication Sig Dispense Refill    rosuvastatin (CRESTOR) 10 MG tablet TAKE ONE TABLET BY MOUTH DAILY 30 tablet 11    production. No hematemesis. · Gastrointestinal: No abdominal pain, appetite loss, blood in stools. No change in bowel or bladder habits. · Genitourinary: No dysuria, trouble voiding, or hematuria. · Musculoskeletal:  No gait disturbance, weakness or joint complaints. · Integumentary: No rash or pruritis. · Neurological: No headache, diplopia, change in muscle strength, numbness or tingling. No change in gait, balance, coordination, mood, affect, memory, mentation, behavior. · Psychiatric: No anxiety, no depression. · Endocrine: No malaise, fatigue or temperature intolerance. No excessive thirst, fluid intake, or urination. No tremor. · Hematologic/Lymphatic: No abnormal bruising or bleeding, blood clots or swollen lymph nodes. · Allergic/Immunologic: No nasal congestion or hives. Physical Examination:    Vitals:    08/31/21 1025   BP: 126/84          General appearance: alert, appears stated age, cooperative and no distress   Heart: regular rate and rhythm  Abdomen: soft, non-tender. Bowel sounds normal. No masses,  no organomegaly  Extremities: extremities normal, atraumatic, no cyanosis or edema    Pulses: Biphasic pedal signals. Palpable femoral pulses. MEDICAL DECISION MAKING/TESTING  I have reviewed the testing personally and my interpretation is below. Left Impression   No evidence of deep vein or superficial vein thrombosis involving the left   lower extremity and the right common femoral vein.          Assessment:     Patient Active Problem List   Diagnosis    Pure hypercholesterolemia    Coronary artery disease involving native coronary artery of native heart without angina pectoris    Mitral valve disorder    Preop cardiovascular exam    Atrial fibrillation (HCC)    Mitral regurgitation    [facility]schem[facility] chest pain ([facility])    Valvular disease    SOB (shortness of breath)    PAD (peripheral artery disease) ([facility])    Positive cardiac stress test    Essential hypertension    Hyperlipidemia    Unstable angina (HCC)    Centrilobular emphysema ([address]    Former smoker       Plan:  1. Atherosclerosis of native arteries of extremities with intermittent claudication, bilateral legs [facility])  76-year-old female with likely small vessel digital disease. No symptoms consistent with classic claudication however she does have evidence of mild to moderate disease on CT scan and a long history of tobacco abuse. She did quit 1 year ago. Recommend formal arterial duplex of both lower extremities for further evaluation and reassurance. Will contact with results. - VL DUP LOWER EXTREMITY ARTERIES BILATERAL; Future        Thank you for allowing me to participate in the care of this individual.  Please do not hesitate to contact me with any questions. Ciro Maurice M.D., FACS.   8/31/2021  10:39 AM

## 2021-09-03 ENCOUNTER — INITIAL CONSULT (OUTPATIENT)
Dept: SURGERY | Age: 71
End: 2021-09-03
Payer: MEDICARE

## 2021-09-03 VITALS — DIASTOLIC BLOOD PRESSURE: 72 MMHG | WEIGHT: 135 LBS | BODY MASS INDEX: 30.27 KG/M2 | SYSTOLIC BLOOD PRESSURE: 120 MMHG

## 2021-09-03 DIAGNOSIS — R14.0 ABDOMINAL DISTENTION: Primary | ICD-10-CM

## 2021-09-03 PROCEDURE — 4040F PNEUMOC VAC/ADMIN/RCVD: CPT | Performed by: SURGERY

## 2021-09-03 PROCEDURE — 1123F ACP DISCUSS/DSCN MKR DOCD: CPT | Performed by: SURGERY

## 2021-09-03 PROCEDURE — 99214 OFFICE O/P EST MOD 30 MIN: CPT | Performed by: SURGERY

## 2021-09-03 PROCEDURE — G8417 CALC BMI ABV UP PARAM F/U: HCPCS | Performed by: SURGERY

## 2021-09-03 PROCEDURE — G8427 DOCREV CUR MEDS BY ELIG CLIN: HCPCS | Performed by: SURGERY

## 2021-09-03 PROCEDURE — 3017F COLORECTAL CA SCREEN DOC REV: CPT | Performed by: SURGERY

## 2021-09-03 PROCEDURE — 1090F PRES/ABSN URINE INCON ASSESS: CPT | Performed by: SURGERY

## 2021-09-03 PROCEDURE — G8400 PT W/DXA NO RESULTS DOC: HCPCS | Performed by: SURGERY

## 2021-09-03 PROCEDURE — 1036F TOBACCO NON-USER: CPT | Performed by: SURGERY

## 2021-09-03 ASSESSMENT — ENCOUNTER SYMPTOMS
ALLERGIC/IMMUNOLOGIC NEGATIVE: 1
RESPIRATORY NEGATIVE: 1
GASTROINTESTINAL NEGATIVE: 1
EYES NEGATIVE: 1

## 2021-09-03 NOTE — LETTER
[address]  [address] [address]  Phone: [phone]  Fax: [phone]    September 8, 2021    Patient: Tino Mcfarland  MRN:  [id number]  YOB: 1950  Date of Visit: 9/3/2021    Dear Dr Johny Boyer: Thank you for the request for consultation for [facility]. Below are the relevant portions of my assessment and plan of care. Assessment:  79-year-old female who presents for evaluation of complaints of abdominal bloating/protuberance as well as difficulty swallowing. She also reports a sensation of having food stuck in her mid chest.  CAT scan of the abdomen and pelvis from 8/14/2021 shows a distended gallbladder containing a probable gallstone as well as a dilated stomach. The gastric distention may be partially causative of the patient's complaints of abdominal bloating/protuberance. Plan:  No definite indications for laparoscopic cholecystectomy at this point in time given the lack of symptoms. The patient was again referred to Dr. Pancho Olea for upper endoscopy for evaluation of dysphagia and gastric distention. If you have questions, please do not hesitate to call me. I look forward to following Tyrese Garcia along with you.     Sincerely,    Robinson Mckay MD    CC providers:    92 Maria T Carranza DO  [id number]  [address] [place] [address] Drive  Via Fax: [phone] [address] [place] [address] [place], [place]  4990 [facility] [address]  Via Fax: [phone]

## 2021-09-03 NOTE — PROGRESS NOTES
:     Jason Anaya is a 69 y.o. female     CC: Abdominal bloating    HPI: 78-year-old female with complaints of abdominal bloating. She also reports a firmness in the bilateral flank region. No complaints of abdominal pain. She does complain of dysphagia and feels like food gets stuck in her mid chest.  No complaints of nausea, vomiting, fevers, chills, change melena or urinary symptoms. Past Medical History:   Diagnosis Date    AF (atrial fibrillation) (HCC)     on anticoagulation    Back pain     compaound fracture    CAD (coronary artery disease)     [facility]rilobular emphysema ([facility] 3/16/2021    Compression fracture 01/01/1994    Hyperlipidemia     Hypertension     Myelolipoma     bilateral adrenals    PVD [facility]peripheral vascular disease) ([facility]     Right ear injury 01/01/1983    Per pt.  Shingles     Valvular disease        Actos [pioglitazone], Percocet [oxycodone-acetaminophen], and Vicodin [hydrocodone-acetaminophen]     Past Surgical History:   Procedure Laterality Date    CORONARY ANGIOPLASTY WITH STENT PLACEMENT  2014, 3/4/21    CORONARY ANGIOPLASTY WITH STENT PLACEMENT  03/14/2003 & 06/02/2003    per pt    HYSTERECTOMY  09/01/1983    fibroids    TUBAL LIGATION  09/1983    VENTRAL HERNIA REPAIR  5-    VENTRAL HERNIA REPAIR WITH MESH               Prior to Visit Medications    Medication Sig Taking?  Authorizing Provider   rosuvastatin (CRESTOR) 10 MG tablet TAKE ONE TABLET BY MOUTH DAILY Yes Alina Linares MD   nitroGLYCERIN (NITROSTAT) 0.4 MG SL tablet Place 1 tablet under the tongue every 5 minutes as needed for Chest pain Yes Alina Linares MD   metOLazone (ZAROXOLYN) 5 MG tablet Take 1 tablet by mouth daily Yes Alina Linares MD   Coenzyme Q10 (COQ-10) 100 MG CPCR Take 100 mg by mouth daily Yes KARTHIKEYAN Buckley CNP   atenolol (TENORMIN) 25 MG tablet Take 1 tablet by mouth daily Yes KARTHIKEYAN Buckley CNP   isosorbide mononitrate (IMDUR) 30 MG extended release tablet Take 1 tablet by mouth daily  Patient taking differently: Take 30 mg by mouth daily Has not gotten med yet Yes KARTHIKEYAN Buckley CNP   potassium chloride (KLOR-CON M) 20 MEQ extended release tablet TAKE ONE TABLET BY MOUTH DAILY Yes Alina Linares MD   albuterol sulfate HFA (VENTOLIN HFA) 108 (90 Base) MCG/ACT inhaler Inhale 2 puffs into the lungs 4 times daily as needed for Wheezing Yes KARTHIKEYAN Buckley CNP   warfarin (COUMADIN) 5 MG tablet Take 1 tablet by mouth daily Indications: Restart on 3/23/19 TAKE ONE TABLET BY MOUTH DAILY or as directed Yes Alina Linares MD   clopidogrel (PLAVIX) 75 MG tablet Take 1 tablet by mouth daily Yes Alina Linares MD   OMEPRAZOLE PO Take by mouth daily as needed Yes Historical Provider, MD   Specialty Vitamins Products (VITAMINS FOR THE HAIR PO) Take by mouth daily Yes Historical Provider, MD   Magnesium Oxide 250 MG TABS Take 2 tablets by mouth daily  Patient taking differently: Take 250 mg by mouth daily  Yes Bello Ozuna MD   Simethicone (GAS RELIEF) 180 MG CAPS Take  by mouth as needed. Yes Historical Provider, MD       Social History     Socioeconomic History    Marital status:       Spouse name: Not on file    Number of children: Not on file    Years of education: Not on file    Highest education level: Not on file   Occupational History    Occupation: cleans woods   Tobacco Use    Smoking status: Former Smoker     Years: 40.00     Types: Cigarettes     Quit date: 3/1/2014     Years since quittin.5    Smokeless tobacco: Never Used    Tobacco comment: Trying to quit   Vaping Use    Vaping Use: Never used   Substance and Sexual Activity    Alcohol use: No     Alcohol/week: 3.0 standard drinks     Types: 3 Cans of beer per week    Drug use: No    Sexual activity: Not Currently   Other Topics Concern    Not on file   Social History Narrative    Not on file     Social Determinants of Health     Financial Resource Strain:    [facility]

## 2021-09-07 ENCOUNTER — TELEPHONE (OUTPATIENT)
Dept: SURGERY | Age: 71
End: 2021-09-07

## 2021-09-13 NOTE — PROGRESS NOTES
[facility]   Cardiac Evaluation      Patient: Nikki Harris  YOB: 1950         Chief Complaint   Patient presents with    6 Month Follow-Up      CP SOB        Referring provider: Fiona Aviles III, DO    History of Present Illness:  Ms. Oliver Perez is a 69 y.o. female here for 3 mo follow up for CAD(s/p PCI), Htn, Hchol, MR, afib, she also has a small PFO and PAD (follows Junaid Mckeon). She is a former patient of Dr. Jose Brannon and was seen in July with c/o sob with exertion. She works as a . She had a positive stress echo and underwent a LHC in November where she was found to have 3VD and referred to CTS. She, unfortunately is not a surgical candidate due to aortic calcification. Юлия Grayson underwent LHC in February with PCI to RCA with DEE and again in March with PCI to LAD/LM with DEE. Since her stents, she still had complaints of LUCAS and has had difficulty doing her job. She is following Dr Sherren Prayer for COPD/Emphysema. Юлия Grayson reports that she has stopped smoking. She has multiple concerns regarding her chronic shortness of breath although it has improved since inhalers, sharp pain in her left upper ribs that comes and goes. No associated nausea, diaphoresis or palpitations. The symptoms are infrequent. She has not gone to cardiac rehab because she is not able to wear her mask without feeling SOB. Today she reports that she feels worse than before her stents. She is short of breath, has chest pain and always feels tired. This is every day for her. She is unable to describe her pain. She is unhappy with how she was treated during her mammogram in September. She felt it was painful and that she was mistreated. She reports she ran out of Plavix and it said no refills so she just stopped. She would prefer to take an aspirin instead of restarting Plavix. She is still working, but states she has difficulty breathing when she works. This has been a chronic problem for Юлия Grayson. She reports she has quit smoking and drinking. She does not want to have another heart cath. With regard to medication therapy he/she has been compliant with prescribed regimen and has tolerated therapy to date. Past Medical History:   has a past medical history of AF (atrial fibrillation) ([address] Back pain, CAD (coronary artery disease), Centrilobular emphysema ([address] Compression fracture, Hyperlipidemia, Hypertension, Myelolipoma, PVD (peripheral vascular disease) ([address] Right ear injury, Shingles, and Valvular disease. Surgical History:   has a past surgical history that includes Coronary angioplasty with stent (2014, 3/4/21); Coronary angioplasty with stent (03/14/2003 & 06/02/2003); Hysterectomy (09/01/1983); Tubal ligation (09/1983); and ventral hernia repair (5-).      Current Outpatient Medications   Medication Sig Dispense Refill    aspirin EC 81 MG EC tablet Take 1 tablet by mouth daily 90 tablet 3    rosuvastatin (CRESTOR) 10 MG tablet TAKE ONE TABLET BY MOUTH DAILY 30 tablet 11    nitroGLYCERIN (NITROSTAT) 0.4 MG SL tablet Place 1 tablet under the tongue every 5 minutes as needed for Chest pain 25 tablet 3    metOLazone (ZAROXOLYN) 5 MG tablet Take 1 tablet by mouth daily 30 tablet 3    Coenzyme Q10 (COQ-10) 100 MG CPCR Take 100 mg by mouth daily 90 capsule 3    atenolol (TENORMIN) 25 MG tablet Take 1 tablet by mouth daily 90 tablet 3    isosorbide mononitrate (IMDUR) 30 MG extended release tablet Take 1 tablet by mouth daily (Patient taking differently: Take 30 mg by mouth daily Has not gotten med yet) 30 tablet 3    potassium chloride (KLOR-CON M) 20 MEQ extended release tablet TAKE ONE TABLET BY MOUTH DAILY 90 tablet 3    warfarin (COUMADIN) 5 MG tablet Take 1 tablet by mouth daily Indications: Restart on 3/23/19 TAKE ONE TABLET BY MOUTH DAILY or as directed 90 tablet 3    OMEPRAZOLE PO Take by mouth daily as needed      Specialty Vitamins Products (VITAMINS FOR THE HAIR PO) Take by mouth daily      Magnesium Oxide 250 MG TABS Take 2 tablets by mouth daily (Patient taking differently: Take 250 mg by mouth daily ) 30 tablet 5    Simethicone (GAS RELIEF) 180 MG CAPS Take  by mouth as needed. No current facility-administered medications for this visit. Allergies:  Actos [pioglitazone], Percocet [oxycodone-acetaminophen], and Vicodin [hydrocodone-acetaminophen]     Social History:  Social History     Socioeconomic History    Marital status:      Spouse name: Not on file    Number of children: Not on file    Years of education: Not on file    Highest education level: Not on file   Occupational History    Occupation: cleans woods   Tobacco Use    Smoking status: Former Smoker     Years: 40.00     Types: Cigarettes     Quit date: 3/1/2014     Years since quittin.5    Smokeless tobacco: Never Used    Tobacco comment: Trying to quit   Vaping Use    Vaping Use: Never used   Substance and Sexual Activity    Alcohol use: No     Alcohol/week: 3.0 standard drinks     Types: 3 Cans of beer per week    Drug use: No    Sexual activity: Not Currently   Other Topics Concern    Not on file   Social History Narrative    Not on file     Social Determinants of Health     Financial Resource Strain:     Difficulty of Paying Living Expenses:    Food Insecurity:     Worried About Running Out of Food in the Last Year:     Ran Out of Food in the Last Year:    Transportation Needs:     Lack of Transportation (Medical):      Lack of Transportation (Non-Medical):    Physical Activity:     Days of Exercise per Week:     Minutes of Exercise per Session:    Stress:     Feeling of Stress :    Social Connections:     Frequency of Communication with Friends and Family:     Frequency of Social Gatherings with Friends and Family:     Attends Sabianism Services:     Active Member of Clubs or Organizations:     Attends Club or Organization Meetings:     Marital Status: Intimate Partner Violence:     Fear of Current or Ex-Partner:     Emotionally Abused:     Physically Abused:     Sexually Abused:        Family History:   Family History   Problem Relation Age of Onset    Heart Attack Father 76    Heart Disease Father         complications from surgery, per pt    Heart Attack Mother 64     Family history has been reviewed and not pertinent except as noted above. Review of Systems:   · Constitutional: there has been no unanticipated weight loss. +fatigue  · Eyes: No visual changes   · ENT: No Headaches, hearing loss or vertigo. No mouth sores or sore throat. · Cardiovascular: Reviewed in HPI  · Respiratory: +shortness of breath  · Gastrointestinal: No abdominal pain, appetite loss, blood in stools. No change in bowel or bladder habits. · Genitourinary: No nocturia, dysuria, trouble voiding  · Musculoskeletal:  No gait disturbance, weakness or joint complaints. · Integumentary: No rash or pruritis. · Neurological: No headache, change in muscle strength, numbness or tingling. No change in gait, balance, coordination, mood, affect, memory, mentation, behavior. · Psychiatric: No anxiety or depression  · Endocrine: No malaise or fever  · Hematologic/Lymphatic: No abnormal bruising or bleeding, blood clots or swollen lymph nodes. · Allergic/Immunologic: No nasal congestion or hives. Physical Examination:    Vitals:    09/17/21 1054   BP: 112/68   Site: Right Upper Arm   Position: Sitting   Cuff Size: Medium Adult   Pulse: 77   SpO2: 98%   Weight: 135 lb 6.4 oz (61.4 kg)   Height: 4' 8\" (1.422 m)     Body mass index is 30.36 kg/m². Wt Readings from Last 3 Encounters:   09/17/21 135 lb 6.4 oz (61.4 kg)   09/03/21 135 lb (61.2 kg)   08/31/21 136 lb (61.7 kg)      BP Readings from Last 3 Encounters:   09/17/21 112/68   09/03/21 120/72   08/31/21 126/84        Physical Examination:    · CONSTITUTIONAL: Well developed, well nourished  · EYES: PERRLA.  No xanthelasma, sclera non icteric  · EARS,NOSE,MOUTH,THROAT:  Mucous membranes moist, normal hearing  · NECK: Supple, JVP normal, thyroid not enlarged. Carotids 2+ without bruits  · RESPIRATORY: Normal effort, no rales or rhonchi  · CARDIOVASCULAR: Normal PMI, regular rate and rhythm, no murmurs, rub or gallop. No edema. Radial pulses present and equal  · CHEST: No scar or masses  · ABDOMEN: Normal bowel sounds. No masses or tenderness. No bruit  · MUSCULOSKELETAL: No clubbing or cyanosis. Moves all extremities well. Normal gait  · SKIN:  Warm and dry. No rashes  · NEUROLOGIC: Cranial nerves intact. Alert and oriented  · PSYCHIATRIC: Calm affect. Appears to have normal judgement and insight    All testing and labs listed below were personally reviewed by myself. Cardiac Cath IVUS PCI: 3/4/21  Anatomy:   LM-distal 70%   LAD-prox 90%, distal 70%     IVUS LAD showed MLA of 1mm2  IVUS of LM showed MLA 5.2mm2      Intervention  ~Successful PCI to LAD with 2.5x32 DEE to 18atm. PCI to LM with 2.75x16 DEE to 18atm. PD with 3.0x8 NC to 24atm. Excellent Result. IVUS showed good stent apposition and expansion. Contrast: 64  Flouro Time: 8.5   Access: L CFA. Perc stick safe zone     Impression  ~Coronary Angiography w/ Severe Single vessel CAD  ~Successful complex angioplasty and stenting of LAD/LM     Recommendation  ~Aggressive medical treatment and risk factor modification  ~1. Stop heparin gtt. Post cath IVF. Bedrest.  2. Recommend beta blocker, high potency statin, coumadin and plavix  3. Referral to cardiac rehab placed  4. Patient has been advised on the importance of regular exercise of at least 20-30 minutes daily. 5. Patient counseled about and offered assistance for smoking cessation   6. Follow up in 1-2 weeks with cardiology. Restart coumadin tonight. Recommend Pulmonary evaluation for COPD.     Cardiac Cath PCI: 3/4/21  Anatomy:      RCA-ostial 50%  RPDA- mid 99% ISR     Intervention  ~Successful PCI to RCA with ELCA response. Echo  Baseline resting echocardiogram shows normal global LV systolic function  with an ejection fraction of 60% and uniform myocardial segmental wall motion. Following stress there was uniform augmentation of all myocardial segments with appropriate hyperdynamic LV systolic response to stress. Cardiac Doppler 10/26/20  Summary  Normal left ventricle size, wall thickness and systolic function with an estimated ejection fraction of 60%. No regional wall motion abnormalities are seen. Indeterminate diastolic function. There is mild-to-moderate mitral regurgitation. Aortic valve appears sclerotic but opens adequately. There is mild tricuspid regurgitation with a RVSP estimation of 45-50 mmHg. Pulmonary hypertension. The right ventricle is normal in size. RV systolic function appears to be at least moderately reduced. TAPSE is estimated at 1.1 cm. Color flow seen crossing the atrial septum suggesting a left-to-right intracardiac shunt. Biatrial enlargement. Assessment/Plan  1. Coronary artery disease involving native coronary artery of native heart without angina pectoris    2. SOB (shortness of breath)    3. Permanent atrial fibrillation ([address]    4. PAD (peripheral artery disease) ([address]    5. Nonrheumatic mitral valve regurgitation          Coronary artery disease involving native coronary artery of native heart without angina pectoris  Angina - chronic chest pain  CCS class 3  Intervention  Stress Echo - abnormal  [facility]~ 11/2020 severe MVD - non surgical candidate  2/2021 PCI to RCA with DEE  3/2021 PCI to LAD/LM with DEE  Current meds~ plavix (on coumadin for afib)  Plan~ OK to take aspirin 81 mg and stop Plavix  [facility] offered for continued chest pain and dyspnea. She declined but will call if she would like to move forward with heart cath.          Atrial fibrillation (HCC)  Type~ paroxysmal  Rate controlled   ChadsVasc score~ >4  Current meds~ warfarin  Plan~ continue plan      Essential hypertension   Controlled  Meds~ atenolol  Plan~ stable    Hyperlipidemia  Lab Results   Component Value Date    CHOL 135 05/08/2019    CHOL 83 05/08/2019    TRIG 94 05/08/2019    HDL 52 05/08/2019    HDL 52 05/21/2012    LDLCALC 65 05/08/2019     Meds~ crestor / CoQ 10  Plan~ no changes      Mitral regurgitation  Mild -Moderate Per echo 10/2020    PAD (peripheral artery disease) (HCC)  Following Dr Marta Knox  Plans to observe at this time with referral to spine surgeon      Follow up in 1 year       Thank you for allowing to me to participate in the care of Nikki Harris. Raminibe's Attestation: This note was scribed in the presence of Dr. Vik London MD by Alma Montano RN. 09/17/21    I, Dr. Vik oLndon, personally performed the services described in this documentation, as scribed by the above signed scribe in my presence. It is both accurate and complete to my knowledge. I agree with the details independently gathered by the clinical support staff, while the remaining scribed note accurately describes my personal service to the patient.

## 2021-09-17 ENCOUNTER — TELEPHONE (OUTPATIENT)
Dept: CARDIOLOGY CLINIC | Age: 71
End: 2021-09-17

## 2021-09-17 ENCOUNTER — OFFICE VISIT (OUTPATIENT)
Dept: CARDIOLOGY CLINIC | Age: 71
End: 2021-09-17
Payer: MEDICARE

## 2021-09-17 VITALS
OXYGEN SATURATION: 98 % | DIASTOLIC BLOOD PRESSURE: 68 MMHG | HEIGHT: 56 IN | BODY MASS INDEX: 30.46 KG/M2 | HEART RATE: 77 BPM | WEIGHT: 135.4 LBS | SYSTOLIC BLOOD PRESSURE: 112 MMHG

## 2021-09-17 DIAGNOSIS — I48.21 PERMANENT ATRIAL FIBRILLATION (HCC): ICD-10-CM

## 2021-09-17 DIAGNOSIS — I73.9 PAD (PERIPHERAL ARTERY DISEASE) (HCC): ICD-10-CM

## 2021-09-17 DIAGNOSIS — I34.0 NONRHEUMATIC MITRAL VALVE REGURGITATION: ICD-10-CM

## 2021-09-17 DIAGNOSIS — R06.02 SOB (SHORTNESS OF BREATH): ICD-10-CM

## 2021-09-17 DIAGNOSIS — I25.10 CORONARY ARTERY DISEASE INVOLVING NATIVE CORONARY ARTERY OF NATIVE HEART WITHOUT ANGINA PECTORIS: Primary | ICD-10-CM

## 2021-09-17 PROCEDURE — G8427 DOCREV CUR MEDS BY ELIG CLIN: HCPCS | Performed by: INTERNAL MEDICINE

## 2021-09-17 PROCEDURE — 99214 OFFICE O/P EST MOD 30 MIN: CPT | Performed by: INTERNAL MEDICINE

## 2021-09-17 PROCEDURE — G8400 PT W/DXA NO RESULTS DOC: HCPCS | Performed by: INTERNAL MEDICINE

## 2021-09-17 PROCEDURE — 1036F TOBACCO NON-USER: CPT | Performed by: INTERNAL MEDICINE

## 2021-09-17 PROCEDURE — G8417 CALC BMI ABV UP PARAM F/U: HCPCS | Performed by: INTERNAL MEDICINE

## 2021-09-17 PROCEDURE — 3017F COLORECTAL CA SCREEN DOC REV: CPT | Performed by: INTERNAL MEDICINE

## 2021-09-17 PROCEDURE — 1090F PRES/ABSN URINE INCON ASSESS: CPT | Performed by: INTERNAL MEDICINE

## 2021-09-17 PROCEDURE — 1123F ACP DISCUSS/DSCN MKR DOCD: CPT | Performed by: INTERNAL MEDICINE

## 2021-09-17 PROCEDURE — 4040F PNEUMOC VAC/ADMIN/RCVD: CPT | Performed by: INTERNAL MEDICINE

## 2021-09-17 RX ORDER — ASPIRIN 81 MG/1
81 TABLET ORAL DAILY
Qty: 90 TABLET | Refills: 3 | Status: SHIPPED | OUTPATIENT
Start: 2021-09-17 | End: 2022-01-14

## 2021-09-17 NOTE — ASSESSMENT & PLAN NOTE
Angina -   CCS class 1-4  Intervention  Stress Echo - abnormal  [facility]~ 11/2020 severe MVD - non surgical candidate  2/2021 PCI to RCA with DEE  3/2021 PCI to LAD/LM with DEE  Current meds~ plavix (on coumadin for afib)  Plan~ no changes  DAPT reviewed

## 2021-09-17 NOTE — TELEPHONE ENCOUNTER
Dr Susannah Becerra did not prescribe doxycycline originally, she would need to contact the doctor [facility]?) that initiated it. Dr Susannah Becerra offered a heart cath due to her continue symptoms of shortness of breath and chest pain. There were no immediate concerns for a heart issue.  thanks

## 2021-09-17 NOTE — PATIENT INSTRUCTIONS
We will start you on an 81 mg aspirin (in place of Plavix)  Continue your Warfarin and all other medications  Dr Jb Rico can do another heart cath to make sure your arteries are still open. If you decide you can have the procedure or want it, call the office and we can get it scheduled.

## 2021-09-17 NOTE — TELEPHONE ENCOUNTER
Pt called back to let Marko Plasencia know that the RX she taking is Doxycycline Hyclate 100 mg,     she wants to know if Dr Marko Plasencia can call some in?   this made her feel better she could breathe. She also wants to know why Marko Plasencia thinks she should repeat procedure? Does Dr Marko Plasencia think there is something  Wrong with her heart?           [employer] [address] [place] [address] Chani [phone]   [address] [place] [address],[address] [id number]   Phone:  [phone]  Fax:  [phone]

## 2021-09-17 NOTE — ASSESSMENT & PLAN NOTE
Dyspnea ~ chronic  Moderate   Plan~ offered C to evaluate sob. Pt declined but will call if she decides to move forward with it.

## 2021-09-17 NOTE — ASSESSMENT & PLAN NOTE
Type~ paroxysmal  Current Rhythm~   Rate controlled ?   ChadsVasc score~ >4  Current meds~ warfarin  Plan~ no changes Price Per Unit Or Per Cc In $ (Use Numbers Only, No Special Characters Or $): 10.00

## 2021-09-17 NOTE — TELEPHONE ENCOUNTER
Spoke with pt about message below about the medication. Patient verbalized understanding.  Before being able to relay the other part of the message pt hung up phone on me

## 2021-09-22 ENCOUNTER — HOSPITAL ENCOUNTER (OUTPATIENT)
Dept: VASCULAR LAB | Age: 71
Discharge: HOME | End: 2021-09-22
Payer: MEDICARE

## 2021-09-22 DIAGNOSIS — I70.213 ATHEROSCLEROSIS OF NATIVE ARTERIES OF EXTREMITIES WITH INTERMITTENT CLAUDICATION, BILATERAL LEGS (HCC): ICD-10-CM

## 2021-09-22 PROCEDURE — 93925 LOWER EXTREMITY STUDY: CPT

## 2021-09-28 ENCOUNTER — TELEPHONE (OUTPATIENT)
Dept: VASCULAR SURGERY | Age: 71
End: 2021-09-28

## 2021-09-28 NOTE — TELEPHONE ENCOUNTER
Discussed results of BLE arterial duplex which does show some arterial disease in the lower extremities. D/w patient that unsure all her symptoms are related to her arterial disease. Discussed options of continuing conservative measures versus moving forward with peripheral angiogram and possible right leg intervention. Patient would like to move forward with angiogram.  Will have our office call to schedule. Patient is also concerned that she will not have a ride for procedure, will d/w office and someone will be in touch with patient.      Electronically signed by KARTHIKEYAN Chirinos CNP on 9/28/2021 at 12:21 PM

## 2021-09-28 NOTE — TELEPHONE ENCOUNTER
Attempted to call patient with results of BLE arterial duplex but she is unavailable at this time. Will try back at a later time.     Electronically signed by KARTHIKEYAN Parr CNP on 9/28/2021 at 10:16 AM

## 2021-09-29 ENCOUNTER — TELEPHONE (OUTPATIENT)
Dept: VASCULAR SURGERY | Age: 71
End: 2021-09-29

## 2021-09-29 NOTE — TELEPHONE ENCOUNTER
----- Message from KARTHIKEYAN Adams CNP sent at 9/28/2021 12:22 PM EDT -----  Can we schedule patient for aortogram with bilateral runoff possible right leg intervention. She is unsure if she will have a ride, I am not sure on policy or if she can drive herself or get an uber/cab? If you could follow up with this and her and see if she wants to still move forward with angiogram.  If not she can just continue trying to increase her exercise, continue aspirin and statin, work on quitting smoking if she still is and then follow up with Dr. Pola Spence in 6 months. Thanks.

## 2021-09-29 NOTE — TELEPHONE ENCOUNTER
I called patient to schedule her angiogram. She is going to talk to her daughter and have her call me to try and figure out a date that works for her. She is her only means of transportation.

## 2021-10-07 ENCOUNTER — PREP FOR PROCEDURE (OUTPATIENT)
Dept: VASCULAR SURGERY | Age: 71
End: 2021-10-07

## 2021-10-11 RX ORDER — SODIUM CHLORIDE 0.9 % (FLUSH) 0.9 %
5-40 SYRINGE (ML) INJECTION EVERY 12 HOURS SCHEDULED
Status: CANCELLED | OUTPATIENT
Start: 2021-10-11

## 2021-10-11 RX ORDER — SODIUM CHLORIDE 9 MG/ML
INJECTION, SOLUTION INTRAVENOUS CONTINUOUS
Status: CANCELLED | OUTPATIENT
Start: 2021-10-11

## 2021-10-11 RX ORDER — SODIUM CHLORIDE 0.9 % (FLUSH) 0.9 %
5-40 SYRINGE (ML) INJECTION PRN
Status: CANCELLED | OUTPATIENT
Start: 2021-10-11

## 2021-10-11 RX ORDER — SODIUM CHLORIDE 9 MG/ML
25 INJECTION, SOLUTION INTRAVENOUS PRN
Status: CANCELLED | OUTPATIENT
Start: 2021-10-11

## 2021-10-12 ENCOUNTER — TELEPHONE (OUTPATIENT)
Dept: CARDIOLOGY CLINIC | Age: 71
End: 2021-10-12

## 2021-10-12 NOTE — TELEPHONE ENCOUNTER
Medication Refill    Medication needing refilled:  nitroGLYCERIN (NITROSTAT) 0.4 MG - PT STATES SHE ACCIDENTALLY WASHED HER MED IN THE WASHING MACHINE  Dosage of the medication:    How are you taking this medication (QD, BID, TID, QID, PRN):    30 or 90 day supply called in:    When will you run out of your medication:    Which Pharmacy are we sending the medication to?: [address] [place] [address] Drive [address] [place] [address], [address] [place] [address], [address] [place] [address],[address] [address]   Phone:  [phone]  Fax:  [phone]

## 2021-10-12 NOTE — TELEPHONE ENCOUNTER
Medication Refill    Medication needing refilled:nitroGLYCERIN (NITROSTAT) 0.4 MG - PT STATES SHE ACCIDENTALLY WASHED HER MED IN THE WASHING MACHINE      Dosage of the medication:    How are you taking this medication (QD, BID, TID, QID, PRN):    30 or 90 day supply called in:    When will you run out of your medication:    Which Pharmacy are we sending the medication to?:  [facility] [address] [place] [address],[address] [address]   Phone:  [phone]  Fax:  [phone]

## 2021-10-13 ENCOUNTER — TELEPHONE (OUTPATIENT)
Dept: CARDIOLOGY CLINIC | Age: 71
End: 2021-10-13

## 2021-10-13 RX ORDER — NITROGLYCERIN 0.4 MG/1
0.4 TABLET SUBLINGUAL EVERY 5 MIN PRN
Qty: 25 TABLET | Refills: 0 | Status: SHIPPED | OUTPATIENT
Start: 2021-10-13 | End: 2022-08-23

## 2021-10-13 NOTE — TELEPHONE ENCOUNTER
Patient is not due for an appt until 9/2022 with [facility] but she wants to know if she can see another doctor here ? She was told that shw had to see [facility] because B wanted her to see him but she wants to see someone else. Can she see one of the other doctors ? Who would like to see her ?

## 2021-10-14 NOTE — TELEPHONE ENCOUNTER
Spoke to the pt-we are working on getting her an appointment with Dr. Km Jordan. Offered 12/9 at 8:30-pt stated that was fine.

## 2021-10-25 ENCOUNTER — HOSPITAL ENCOUNTER (OUTPATIENT)
Dept: CARDIAC CATH/INVASIVE PROCEDURES | Age: 71
Discharge: HOME | End: 2021-10-25
Attending: SURGERY | Admitting: SURGERY
Payer: MEDICARE

## 2021-10-25 VITALS
OXYGEN SATURATION: 99 % | RESPIRATION RATE: 18 BRPM | HEART RATE: 86 BPM | TEMPERATURE: 98.5 F | WEIGHT: 135 LBS | BODY MASS INDEX: 30.37 KG/M2 | DIASTOLIC BLOOD PRESSURE: 62 MMHG | SYSTOLIC BLOOD PRESSURE: 92 MMHG | HEIGHT: 56 IN

## 2021-10-25 PROBLEM — I70.211 ATHEROSCLEROSIS OF NATIVE ARTERIES OF EXTREMITIES WITH INTERMITTENT CLAUDICATION, RIGHT LEG (HCC): Status: ACTIVE | Noted: 2019-02-26

## 2021-10-25 LAB
ANION GAP SERPL CALCULATED.3IONS-SCNC: 10 MMOL/L (ref 3–16)
BUN BLDV-MCNC: 15 MG/DL (ref 7–20)
CALCIUM SERPL-MCNC: 10.9 MG/DL (ref 8.3–10.6)
CHLORIDE BLD-SCNC: 103 MMOL/L (ref 99–110)
CO2: 24 MMOL/L (ref 21–32)
CREAT SERPL-MCNC: 0.8 MG/DL (ref 0.6–1.2)
GFR AFRICAN AMERICAN: >60
GFR NON-AFRICAN AMERICAN: >60
GLUCOSE BLD-MCNC: 116 MG/DL (ref 70–99)
HCT VFR BLD CALC: 33.5 % (ref 36–48)
HEMOGLOBIN: 10.7 G/DL (ref 12–16)
INR BLD: 1.3 (ref 0.86–1.14)
MCH RBC QN AUTO: 23.7 PG (ref 26–34)
MCHC RBC AUTO-ENTMCNC: 32.1 G/DL (ref 31–36)
MCV RBC AUTO: 74 FL (ref 80–100)
PDW BLD-RTO: 19.1 % (ref 12.4–15.4)
PLATELET # BLD: 166 K/UL (ref 135–450)
PMV BLD AUTO: 6.9 FL (ref 5–10.5)
POC ACT LR: 263 SEC
POC ACT LR: 271 SEC
POTASSIUM REFLEX MAGNESIUM: 4.3 MMOL/L (ref 3.5–5.1)
RBC # BLD: 4.53 M/UL (ref 4–5.2)
SODIUM BLD-SCNC: 137 MMOL/L (ref 136–145)
WBC # BLD: 6.6 K/UL (ref 4–11)

## 2021-10-25 PROCEDURE — C1725 CATH, TRANSLUMIN NON-LASER: HCPCS

## 2021-10-25 PROCEDURE — 85027 COMPLETE CBC AUTOMATED: CPT

## 2021-10-25 PROCEDURE — 6360000002 HC RX W HCPCS

## 2021-10-25 PROCEDURE — 99153 MOD SED SAME PHYS/QHP EA: CPT

## 2021-10-25 PROCEDURE — 2500000003 HC RX 250 WO HCPCS

## 2021-10-25 PROCEDURE — 99152 MOD SED SAME PHYS/QHP 5/>YRS: CPT

## 2021-10-25 PROCEDURE — 36415 COLL VENOUS BLD VENIPUNCTURE: CPT

## 2021-10-25 PROCEDURE — 37229 PR REVSC OPN/PRQ TIB/PERO W/ATHRC/ANGIOP SM VSL: CPT | Performed by: SURGERY

## 2021-10-25 PROCEDURE — 75625 CONTRAST EXAM ABDOMINL AORTA: CPT | Performed by: SURGERY

## 2021-10-25 PROCEDURE — C1769 GUIDE WIRE: HCPCS

## 2021-10-25 PROCEDURE — 99152 MOD SED SAME PHYS/QHP 5/>YRS: CPT | Performed by: SURGERY

## 2021-10-25 PROCEDURE — 6370000000 HC RX 637 (ALT 250 FOR IP)

## 2021-10-25 PROCEDURE — 37229 HC TIB PER TERRITORY ATHER: CPT

## 2021-10-25 PROCEDURE — 75625 CONTRAST EXAM ABDOMINL AORTA: CPT

## 2021-10-25 PROCEDURE — 37233 PR REVSC OPN/PRQ TIB/PERO W/ATHRC/ANGIOP UNI EA VSL: CPT | Performed by: SURGERY

## 2021-10-25 PROCEDURE — 85610 PROTHROMBIN TIME: CPT

## 2021-10-25 PROCEDURE — 76937 US GUIDE VASCULAR ACCESS: CPT | Performed by: SURGERY

## 2021-10-25 PROCEDURE — 75716 ARTERY X-RAYS ARMS/LEGS: CPT | Performed by: SURGERY

## 2021-10-25 PROCEDURE — 37224 PR REVSC OPN/PRG FEM/POP W/ANGIOPLASTY UNI: CPT | Performed by: SURGERY

## 2021-10-25 PROCEDURE — C1724 CATH, TRANS ATHEREC,ROTATION: HCPCS

## 2021-10-25 PROCEDURE — 85347 COAGULATION TIME ACTIVATED: CPT

## 2021-10-25 PROCEDURE — C1894 INTRO/SHEATH, NON-LASER: HCPCS

## 2021-10-25 PROCEDURE — 37224 HC FEM POP TERRITORY PLASTY: CPT

## 2021-10-25 PROCEDURE — 6360000004 HC RX CONTRAST MEDICATION: Performed by: SURGERY

## 2021-10-25 PROCEDURE — 75716 ARTERY X-RAYS ARMS/LEGS: CPT

## 2021-10-25 PROCEDURE — 80048 BASIC METABOLIC PNL TOTAL CA: CPT

## 2021-10-25 PROCEDURE — 37233 HC TIB PER TERR ADDL ATHER: CPT

## 2021-10-25 PROCEDURE — 2709999900 HC NON-CHARGEABLE SUPPLY

## 2021-10-25 PROCEDURE — C1887 CATHETER, GUIDING: HCPCS

## 2021-10-25 PROCEDURE — 75774 ARTERY X-RAY EACH VESSEL: CPT

## 2021-10-25 RX ORDER — SODIUM CHLORIDE 9 MG/ML
INJECTION, SOLUTION INTRAVENOUS CONTINUOUS
Status: DISCONTINUED | OUTPATIENT
Start: 2021-10-25 | End: 2021-10-25 | Stop reason: HOSPADM

## 2021-10-25 RX ORDER — SODIUM CHLORIDE 0.9 % (FLUSH) 0.9 %
5-40 SYRINGE (ML) INJECTION EVERY 12 HOURS SCHEDULED
Status: CANCELLED | OUTPATIENT
Start: 2021-10-25

## 2021-10-25 RX ORDER — SODIUM CHLORIDE 0.9 % (FLUSH) 0.9 %
5-40 SYRINGE (ML) INJECTION PRN
Status: DISCONTINUED | OUTPATIENT
Start: 2021-10-25 | End: 2021-10-25 | Stop reason: HOSPADM

## 2021-10-25 RX ORDER — CLOPIDOGREL BISULFATE 75 MG/1
75 TABLET ORAL DAILY
Qty: 30 TABLET | Refills: 3 | Status: SHIPPED | OUTPATIENT
Start: 2021-10-25 | End: 2022-01-14 | Stop reason: ALTCHOICE

## 2021-10-25 RX ORDER — SODIUM CHLORIDE 9 MG/ML
25 INJECTION, SOLUTION INTRAVENOUS PRN
Status: DISCONTINUED | OUTPATIENT
Start: 2021-10-25 | End: 2021-10-25 | Stop reason: HOSPADM

## 2021-10-25 RX ORDER — ACETAMINOPHEN 325 MG/1
650 TABLET ORAL EVERY 4 HOURS PRN
Status: CANCELLED | OUTPATIENT
Start: 2021-10-25

## 2021-10-25 RX ORDER — SODIUM CHLORIDE 0.9 % (FLUSH) 0.9 %
5-40 SYRINGE (ML) INJECTION EVERY 12 HOURS SCHEDULED
Status: DISCONTINUED | OUTPATIENT
Start: 2021-10-25 | End: 2021-10-25 | Stop reason: HOSPADM

## 2021-10-25 RX ORDER — SODIUM CHLORIDE 9 MG/ML
25 INJECTION, SOLUTION INTRAVENOUS PRN
Status: CANCELLED | OUTPATIENT
Start: 2021-10-25

## 2021-10-25 RX ORDER — SODIUM CHLORIDE 0.9 % (FLUSH) 0.9 %
5-40 SYRINGE (ML) INJECTION PRN
Status: CANCELLED | OUTPATIENT
Start: 2021-10-25

## 2021-10-25 RX ADMIN — IOPAMIDOL 45 ML: 755 INJECTION, SOLUTION INTRAVENOUS at 14:20

## 2021-10-25 NOTE — OP NOTE
[facility]                     [facility], [address] [facility]                                OPERATIVE REPORT    PATIENT NAME: Imer Parker                    :        1950  MED REC NO:   [id number]                          ROOM:  ACCOUNT NO:   [de-identified]                           ADMIT DATE: 10/25/2021  PROVIDER:     Deb Garber MD    DATE OF PROCEDURE:  10/25/2021    PREPROCEDURE DIAGNOSIS:  Right lower extremity claudication. POSTPROCEDURE DIAGNOSIS:  Right lower extremity claudication. PROCEDURE:  1. Ultrasound-guided left common femoral artery access. 2.  Abdominal aortogram with bilateral lower extremity runoff. 3.  Right tibioperoneal trunk and peroneal atherectomy (CSI Diamondback  1.25-mm orbital atherectomy). 4.  Right TP trunk and peroneal PTA (Damascus 2.5 x 60, 3 x 60). 5.  Right anterior tibial artery atherectomy (CSI Diamondback 1.25-mm  orbital atherectomy). 6.  Right anterior tibial artery PTA (Damascus 2.5 x 60). 7.  Right SFA PTA (Leeper 6 x 200). SURGEON:  Deb Garber MD    ANESTHESIA:  Local/IV. ESTIMATED BLOOD LOSS:  Minimal.    COMPLICATIONS:  None. INDICATIONS:  The patient is a 76-year-old female with a combination of  medical issues including lumbar disc disease as well as right lower  extremity claudication with noninvasive studies suggesting arterial  insufficiency. She presents today for angiographic evaluation. All  risks, benefits, and alternatives were discussed in detail. All  questions were answered. PROCEDURE:  After witnessed informed consent was obtained, the patient  was brought to the cath lab where under my supervision Versed and  fentanyl were administered intravenously for moderate sedation. Pulse  oximetry, heart rate and blood pressure were monitored by an independent  trained observer that was present.   I spent an hour and 10 minutes of  face-to-face sedation time with the patient. Her left groin was  carefully prepped and draped. 1% lidocaine was infiltrated locally into  the area overlying left common femoral artery. Ultrasound was used to  identify the left common femoral artery which was patent. An image was  saved to the patient's permanent medical record. It was accessed under  direct visualization with a 4-Cuban micropuncture needle. Bentson wire  was introduced and a 5-Cuban sheath was placed. An Omni Flush catheter  was inserted to the level of the renal arteries and an abdominal  aortogram was performed. It was pulled back to the level of the aortic  bifurcation and a bilateral lower extremity runoff was performed. The  Omni Flush was then used to hook the aortic bifurcation. A stiff  Glidewire was placed up and over the bifurcation into the right SFA. The Omni Flush and 5-Cuban sheath were removed and a 6-Cuban sheath  was placed up and over the bifurcation into the right common femoral  artery. 5000 heparin with an additional 1500 units of heparin were  given to maintain ACTs near 250. A Glidewire and Quick-Cross catheter  were advanced through the SFA and into the popliteal and dedicated  tibial angiogram showed 99% stenosis of proximal third of the right  anterior tibial artery as well as short segment occlusion of the  tibioperoneal trunk. This was able to be traversed with a combination  of wires. An 0.014 Viper wire was advanced distally. A CSI Diamondback  1.25-mm orbital atherectomy was then used on low, medium and high speeds  along the tibioperoneal trunk and proximal peroneal.  This was followed  up with balloon angioplasty using a Lipan 2.5 x 60 followed by 3 x 60  with complete resolution of stenosis and brisk flow into the peroneal  artery. The posterior tibial artery was noted to be occluded. With  this then done, the catheter was withdrawn and selective catheterization  of the anterior tibial was performed.   An 0.014 Viper wire was able to  traverse this lesion. CSI Diamondback 1.25-mm orbital atherectomy was  then used on low, medium and high speeds across the proximal third of  the anterior tibial artery followed up with balloon angioplasty using a  Pool 2.5 x 60 with brisk flow into both the anterior tibial and  tibioperoneal trunk and peroneal artery. The procedure was terminated  at this point. Given the heavy calcification in the left groin, a  6-Macedonian sheath was placed in her left groin. The patient tolerated the  procedure well and was transferred to the recovery room in stable  condition. FINDINGS:  1. Abdominal aortogram:  Marked calcification is seen throughout the  abdominal aorta, visceral vessels and infrarenal abdominal aorta. No  focal high-grade stenosis is noted. Infrarenally, the bilateral common,  external and internal iliac arteries are patent. 2.  Right lower extremity runoff:  Right common femoral and profunda are  patent. SFA shows approximately 60% diffuse stenosis. Popliteal is  patent. There is a 99% stenosis of the proximal third of the right  anterior tibial artery. The tibioperoneal trunk has a short segment  focal occlusion. The posterior tibial artery is occluded. 3.  Left lower extremity runoff:  Left common femoral and profunda are  patent. SFA shows very mild diffuse disease. No high-grade focal  stenosis was identified. The popliteal is patent. There is a  three-vessel runoff on the left. PLAN:  The patient underwent successful intervention of multisegmental  occlusive disease of the right lower extremity. She will be placed on  Plavix in addition to her current medical regimen. She will follow up  in the office to monitor for improvement of symptoms. She will also  undergo routine surveillance imaging with recommendation for repeat  noninvasive studies of her right and left leg in 3 months.         Viraj Soto MD    D: 10/25/2021 13:50:01       T: 10/25/2021 13:52:26 [id number]  Job#: [id number]     Doc#: [id number]    CC:

## 2021-10-25 NOTE — H&P
Consultation/History & Physical    Date of Admission:  10/25/2021  Date of Consultation:  10/25/2021    PCP:  Nataly Mcintosh DO     Chief Complaint: PAD    History of Present Illness:  Alex Hopkins is a 69 y.o. female who presents with BLE claudication. Presents today for peripheral angiogram based on recent duplex results. PMH:   has a past medical history of AF (atrial fibrillation) ([address] Back pain, CAD (coronary artery disease), Centrilobular emphysema ([address] Compression fracture, Hyperlipidemia, Hypertension, Myelolipoma, PVD (peripheral vascular disease) ([address] Right ear injury, Shingles, and Valvular disease. PSH:   has a past surgical history that includes Coronary angioplasty with stent (2014, 3/4/21); Coronary angioplasty with stent (03/14/2003 & 06/02/2003); Hysterectomy (09/01/1983); Tubal ligation (09/1983); and ventral hernia repair (5-). Allergies: Allergies   Allergen Reactions    Actos [Pioglitazone]     Percocet [Oxycodone-Acetaminophen]      Confused , loopy    Vicodin [Hydrocodone-Acetaminophen]         Home Meds:    Prior to Admission medications    Medication Sig Start Date End Date Taking?  Authorizing Provider   rosuvastatin (CRESTOR) 10 MG tablet TAKE ONE TABLET BY MOUTH DAILY 7/21/21  Yes Ashok Mccrary MD   metOLazone (ZAROXOLYN) 5 MG tablet Take 1 tablet by mouth daily 7/19/21  Yes Ashok Mccrary MD   Coenzyme Q10 (COQ-10) 100 MG CPCR Take 100 mg by mouth daily 6/3/21  Yes KARTHIKEYAN Easley CNP   atenolol (TENORMIN) 25 MG tablet Take 1 tablet by mouth daily 5/6/21  Yes KARTHIKEYAN Easley CNP   potassium chloride (KLOR-CON M) 20 MEQ extended release tablet TAKE ONE TABLET BY MOUTH DAILY 4/19/21  Yes Ashok Mccrary MD   warfarin (COUMADIN) 5 MG tablet Take 1 tablet by mouth daily Indications: Restart on 3/23/19 TAKE ONE TABLET BY MOUTH DAILY or as directed 3/4/21  Yes Ashok Mccrary MD   OMEPRAZOLE PO Take by mouth daily as needed   Yes Historical Provider, MD   Specialty Vitamins Products (VITAMINS FOR THE HAIR PO) Take by mouth daily   Yes Historical Provider, MD   Magnesium Oxide 250 MG TABS Take 2 tablets by mouth daily  Patient taking differently: Take 250 mg by mouth daily  16  Yes Adrian Dietrich MD   Simethicone (GAS RELIEF) 180 MG CAPS Take  by mouth as needed. Yes Historical Provider, MD   nitroGLYCERIN (NITROSTAT) 0.4 MG SL tablet Place 1 tablet under the tongue every 5 minutes as needed for Chest pain 10/13/21   Gee Harris MD   aspirin EC 81 MG EC tablet Take 1 tablet by mouth daily 21   Gee Harris MD   isosorbide mononitrate (IMDUR) 30 MG extended release tablet Take 1 tablet by mouth daily  Patient taking differently: Take 30 mg by mouth daily Has not gotten med yet 21   Yasmany Jimenez [address] Meds:    Current Facility-Administered Medications   Medication Dose Route Frequency Provider Last Rate Last Admin    0.9 % sodium chloride infusion   IntraVENous Continuous Talita Walker APRN - CNP        0.9 % sodium chloride infusion  25 mL IntraVENous PRN Talita Walker, APRN - CNP        sodium chloride flush 0.9 % injection 5-40 mL  5-40 mL IntraVENous 2 times per day Talita Aaron, APRN - CNP        sodium chloride flush 0.9 % injection 5-40 mL  5-40 mL IntraVENous PRN Talita Walker APRN - CNP           Social History:       Social History     Socioeconomic History    Marital status:      Spouse name: Not on file    Number of children: Not on file    Years of education: Not on file    Highest education level: Not on file   Occupational History    Occupation: cleans woods   Tobacco Use    Smoking status: Former Smoker     Years: 40.00     Types: Cigarettes     Quit date: 3/1/2014     Years since quittin.6    Smokeless tobacco: Never Used    Tobacco comment: Trying to quit   Vaping Use    Vaping Use: Never used   Substance and Sexual Activity    Alcohol use:  No Alcohol/week: 3.0 standard drinks     Types: 3 Cans of beer per week    Drug use: No    Sexual activity: Not Currently   Other Topics Concern    Not on file   Social History Narrative    Not on file     Social Determinants of Health     Financial Resource Strain:     Difficulty of Paying Living Expenses:    Food Insecurity:     Worried About Running Out of Food in the Last Year:     [address] in the Last Year:    Transportation Needs:     Lack of Transportation (Medical):      Lack of Transportation (Non-Medical):    Physical Activity:     Days of Exercise per Week:     Minutes of Exercise per Session:    Stress:     Feeling of Stress :    Social Connections:     Frequency of Communication with Friends and Family:     Frequency of Social Gatherings with Friends and Family:     Attends [employer] witness Services:     Active Member of Clubs or Organizations:     Attends Club or Organization Meetings:     Marital Status:    Intimate Partner Violence:     Fear of Current or Ex-Partner:     Emotionally Abused:     Physically Abused:     Sexually Abused:        Family Histroy:      Family History   Problem Relation Age of Onset    Heart Attack Father 76    Heart Disease Father         complications from surgery, per pt    Heart Attack Mother 64       Review of Systems:  Review of systems performed and negative with the exception of the above findings        Physical Exam   Vital Signs: BP 92/62   Pulse 86   Temp 98.5 °F (36.9 °C) (Infrared)   Resp 18   Ht 4' 8\" (1.422 m)   Wt 135 lb (61.2 kg)   LMP 01/01/1983   SpO2 99%   BMI 30.27 kg/m²        Admission Weight: 135 lb (61.2 kg)   ASA 3 - Patient with moderate systemic disease with functional limitations    Mallampati Airway Assessment:  Mallampati Class II - (soft palate, fauces & uvula are visible)    General appearance: alert, appears stated age, cooperative and no distress  Head: Normocephalic, without obvious abnormality, atraumatic  Lungs: clear to auscultation bilaterally  Heart: regular rate and rhythm, S1, S2 normal, no murmur, click, rub or gallop  Abdomen: soft, non-tender. Bowel sounds normal. No masses,  no organomegaly  Extremities: extremities normal, atraumatic, no cyanosis or edema  Pulses:      Labs:    BMP:   Lab Results   Component Value Date     10/25/2021    K 4.3 10/25/2021     10/25/2021    CO2 24 10/25/2021    PHOS 3.0 05/08/2019    BUN 15 10/25/2021    CREATININE 0.8 10/25/2021      No components found for: GLU  Lab Results   Component Value Date    MG 1.6 06/20/2017      CBC:   Lab Results   Component Value Date    WBC 6.6 10/25/2021    HGB 10.7 10/25/2021    HCT 33.5 10/25/2021    MCV 74.0 10/25/2021     10/25/2021      Coagulation:   Lab Results   Component Value Date    INR 1.55 03/08/2021    APTT 28.8 03/08/2021     Cardiac markers:   Lab Results   Component Value Date    CKTOTAL 175 01/22/2019     Lab Results   Component Value Date    LABA1C 6.3 01/22/2019    No results found for: ALB    Lab Results   Component Value Date    BILITOT 0.4 07/31/2021    BILIDIR 0.2 05/08/2019    AST 12 07/31/2021    ALT 7 07/31/2021    ALKPHOS 65 07/31/2021      Lab Results   Component Value Date    CHOL 118 07/31/2021    HDL 48 07/31/2021    HDL 52 05/21/2012    LDLCALC 51 07/31/2021    TRIG 101 07/31/2021          Diagnosis:  Patient Active Problem List   Diagnosis    Pure hypercholesterolemia    Coronary artery disease involving native coronary artery of native heart without angina pectoris    Mitral valve disorder    Preop cardiovascular exam    Atrial fibrillation (HCC)    Mitral regurgitation    Ischemic chest pain (HCC)    Valvular disease    SOB (shortness of breath)    PAD (peripheral artery disease) (HCC)    Positive cardiac stress test    Essential hypertension    Hyperlipidemia    Unstable angina (HCC)    Centrilobular emphysema ([address]    Former smoker           Plan:   Aortogram

## 2021-10-26 LAB — POC ACT LR: 154 SEC

## 2021-10-29 ENCOUNTER — TELEPHONE (OUTPATIENT)
Dept: CARDIOLOGY CLINIC | Age: 71
End: 2021-10-29

## 2021-10-29 DIAGNOSIS — I48.91 ATRIAL FIBRILLATION, UNSPECIFIED TYPE (HCC): Primary | ICD-10-CM

## 2021-10-29 DIAGNOSIS — Z79.01 LONG TERM (CURRENT) USE OF ANTICOAGULANTS: ICD-10-CM

## 2021-10-29 NOTE — TELEPHONE ENCOUNTER
Pt calling for a new standing PT-INR order. It has been faxed to Rick elizabeth, at [facility], [phone].

## 2021-11-10 ENCOUNTER — TELEPHONE (OUTPATIENT)
Dept: SURGERY | Age: 71
End: 2021-11-10

## 2021-11-10 NOTE — TELEPHONE ENCOUNTER
PT wants referred to a different GI Dr that comes to [facility] and doesn't charge extra  Please Advise

## 2021-11-11 NOTE — TELEPHONE ENCOUNTER
Notified pt I would send another referral to [address] but we do not have any control over where they would see her or who would see her.

## 2021-11-15 ENCOUNTER — OFFICE VISIT (OUTPATIENT)
Dept: PULMONOLOGY | Age: 71
End: 2021-11-15
Payer: MEDICARE

## 2021-11-15 VITALS — OXYGEN SATURATION: 99 % | HEART RATE: 77 BPM

## 2021-11-15 DIAGNOSIS — Z87.891 FORMER SMOKER: ICD-10-CM

## 2021-11-15 DIAGNOSIS — J43.2 CENTRILOBULAR EMPHYSEMA (HCC): ICD-10-CM

## 2021-11-15 DIAGNOSIS — I48.21 PERMANENT ATRIAL FIBRILLATION (HCC): ICD-10-CM

## 2021-11-15 DIAGNOSIS — R06.02 SOB (SHORTNESS OF BREATH): Primary | ICD-10-CM

## 2021-11-15 PROCEDURE — 3017F COLORECTAL CA SCREEN DOC REV: CPT | Performed by: INTERNAL MEDICINE

## 2021-11-15 PROCEDURE — 1123F ACP DISCUSS/DSCN MKR DOCD: CPT | Performed by: INTERNAL MEDICINE

## 2021-11-15 PROCEDURE — G8484 FLU IMMUNIZE NO ADMIN: HCPCS | Performed by: INTERNAL MEDICINE

## 2021-11-15 PROCEDURE — G8926 SPIRO NO PERF OR DOC: HCPCS | Performed by: INTERNAL MEDICINE

## 2021-11-15 PROCEDURE — 1090F PRES/ABSN URINE INCON ASSESS: CPT | Performed by: INTERNAL MEDICINE

## 2021-11-15 PROCEDURE — 99214 OFFICE O/P EST MOD 30 MIN: CPT | Performed by: INTERNAL MEDICINE

## 2021-11-15 PROCEDURE — 4040F PNEUMOC VAC/ADMIN/RCVD: CPT | Performed by: INTERNAL MEDICINE

## 2021-11-15 PROCEDURE — 3023F SPIROM DOC REV: CPT | Performed by: INTERNAL MEDICINE

## 2021-11-15 PROCEDURE — G8417 CALC BMI ABV UP PARAM F/U: HCPCS | Performed by: INTERNAL MEDICINE

## 2021-11-15 PROCEDURE — 1036F TOBACCO NON-USER: CPT | Performed by: INTERNAL MEDICINE

## 2021-11-15 PROCEDURE — G8400 PT W/DXA NO RESULTS DOC: HCPCS | Performed by: INTERNAL MEDICINE

## 2021-11-15 PROCEDURE — G8427 DOCREV CUR MEDS BY ELIG CLIN: HCPCS | Performed by: INTERNAL MEDICINE

## 2021-11-15 RX ORDER — DOXYCYCLINE HYCLATE 100 MG/1
100 CAPSULE ORAL DAILY
Qty: 10 CAPSULE | Refills: 3 | Status: SHIPPED | OUTPATIENT
Start: 2021-11-15 | End: 2021-11-25

## 2021-11-15 NOTE — PROGRESS NOTES
[facility]  Consult Note  Ronit Harden MD       Maria Fernanda Shen   YOB: 1950    Date of Visit:  11/15/2021    Assessment/Plan:  1. SOB (shortness of breath)  2. Centrilobular emphysema ([address]  3. Former smoker  4. Permanent atrial fibrillation ([address]  5. Coronary artery disease involving native coronary artery of native heart without angina pectoris/ASVD    She does have a significant cardiac history with A. fib and coronary artery disease. She has PVD and sees Dr Felicia Fu. She had recent stenting. PFT 12/20:  Spirometry:   Flow volume loops were normal. The FEV-1/FVC ratio was normal.   The  Prebronchodilator FEV-1 was 1.25 liters (82% of predicted),   which was normal. The FVC was 1.71 liters (84% of predicted),   which was normal. Response to inhaled bronchodilators (albuterol)   was not performed. Lung volumes:   Lung volumes were tested by plethysmography. The total lung   capacity was 3.61 liters (113% of predicted), which was normal.   The residual volume was 1.90 liters (147% of predicted), which   was increased. The ratio of residual volume to total lung   capacity (RV/TLC) was 130, which was increased. Specific airway   resistance was increased. Diffusion capacity was found to be normal.       ABG pH7.43, qNT949, pO2 91, HCO3 27, sats 98%     CT Chest 12/20:    FINDINGS:   Mediastinum: Dense calcification is demonstrated of the ascending aorta,   arch, descending aorta.  Coronary artery calcification.  Calcification of the   tracheobronchial tree.  Within the mediastinum, no pathologic lymphadenopathy   by size criteria.  Hilar evaluation is limited without contrast.   Cardiomegaly.  Nonspecific calcification within the left thyroid lobe.  No   axillary adenopathy.       Lungs/pleura: Emphysema is present.  Respiratory motion artifact is seen. Calcified left lower lobe pulmonary nodules, in keeping with granulomatous   disease.  Scattered linear and ground-glass opacity bilaterally, likely   atelectasis.  No pneumothorax or pleural effusion.       Upper Abdomen: Calcified granulomas within the liver and spleen.  Low-density   nodules are seen of bilateral adrenal glands, measuring 3.6 x 1.9 cm,   Hounsfield units -11 on the right and 2.5 x 1.3 cm, Hounsfield units -14 on   the left, compatible with adrenal adenomas.  Bilateral nephrolithiasis versus   renal vascular calcifications, incompletely imaged.       Soft Tissues/Bones: Fusion of T10 and T11, similar to prior.  Degenerative   change throughout the spine.           Impression   Moderate atherosclerosis of the thoracic aorta and coronary arteries.       Cardiomegaly.       Scattered bilateral atelectasis.  Sequela of granulomatous disease.       Bilateral adrenal adenomas. She feels like she always has a \"cold\" with productive cough  Last year, we gave her Doxy and that helped a lot  We can try another round of Doxy  Could consider HHN  She is not taking any inhaled medication. Had stenting of the Right LE 10/21 by Dr Lissett Lorenz. Chief Complaint   Patient presents with    Shortness of Breath     6 month Having a bad day Foot hurts(seeing Dr Natalie Larose)  and her whole body hurts Had stents pit in her leg Dr Lissett Lorenz       HPI  The patient presents with a chief complaint of moderate shortness of breath related to COPD/emphysema of many years duration. He has mild associated cough. Exertion is a modifying factor. She has been using Trelegy and Albuterol since her first visit without benefit. She had stents placed and \"I didn't have any problem breathing before. \"    Review of Systems  No Chest pain, Nausea or vomiting reported    History  I have reviewed past medical, surgical, social and family history. This is documented elsewhere in themedical record. Physical Exam:  Well developed, well nourished  Alert and oriented  Sclera is clear  No cervical adenopathy  No JVD.   Chest examination is clear. Cardiac examination reveals regular rate and rhythm without murmur, gallop or rub. The abdomen is soft, nontender and nondistended. There is no clubbing, cyanosis or edema of the extremities. There is no obvious skin rash. No focal neuro deficicts  Normal mood and affect      Allergies   Allergen Reactions    Actos [Pioglitazone]     Percocet [Oxycodone-Acetaminophen]      Confused , loopy    Vicodin [Hydrocodone-Acetaminophen]      Prior to Visit Medications    Medication Sig Taking?  Authorizing Provider   clopidogrel (PLAVIX) 75 MG tablet Take 1 tablet by mouth daily  Jennifer Begum II, MD   nitroGLYCERIN (NITROSTAT) 0.4 MG SL tablet Place 1 tablet under the tongue every 5 minutes as needed for Chest pain  Jenene Homans, MD   aspirin EC 81 MG EC tablet Take 1 tablet by mouth daily  Jenene Homans, MD   rosuvastatin (CRESTOR) 10 MG tablet TAKE ONE TABLET BY MOUTH DAILY  Jenene Homans, MD   metOLazone (ZAROXOLYN) 5 MG tablet Take 1 tablet by mouth daily  Jenene Homans, MD   Coenzyme Q10 (COQ-10) 100 MG CPCR Take 100 mg by mouth daily  Brand Due, APRN - YAMILETH   atenolol (TENORMIN) 25 MG tablet Take 1 tablet by mouth daily  Brand Due, APRN - CNP   isosorbide mononitrate (IMDUR) 30 MG extended release tablet Take 1 tablet by mouth daily  Patient taking differently: Take 30 mg by mouth daily Has not gotten med yet  Brand Due, APRN - CNP   potassium chloride (KLOR-CON M) 20 MEQ extended release tablet TAKE ONE TABLET BY MOUTH DAILY  Jenene Homans, MD   warfarin (COUMADIN) 5 MG tablet Take 1 tablet by mouth daily Indications: Restart on 3/23/19 TAKE ONE TABLET BY MOUTH DAILY or as directed  Jenene Homans, MD   OMEPRAZOLE PO Take by mouth daily as needed  Historical Provider, MD   Specialty Vitamins Products (VITAMINS FOR THE HAIR PO) Take by mouth daily  Historical Provider, MD   Magnesium Oxide 250 MG TABS Take 2 tablets by mouth daily  Patient taking differently: Take 250 mg by mouth daily   Michaela Prescott MD   Simethicone (GAS RELIEF) 180 MG CAPS Take  by mouth as needed. Historical Provider, MD       Vitals:    11/15/21 1027   Pulse: 77   SpO2: 99%     There is no height or weight on file to calculate BMI.      Wt Readings from Last 3 Encounters:   10/25/21 135 lb (61.2 kg)   21 135 lb 6.4 oz (61.4 kg)   21 135 lb (61.2 kg)     BP Readings from Last 3 Encounters:   10/25/21 92/62   21 112/68   21 120/72        Social History     Tobacco Use   Smoking Status Former Smoker    Years: 40.00    Types: Cigarettes    Quit date: 3/1/2014    Years since quittin.7   Smokeless Tobacco Never Used   Tobacco Comment    Trying to quit

## 2021-11-16 ENCOUNTER — OFFICE VISIT (OUTPATIENT)
Dept: VASCULAR SURGERY | Age: 71
End: 2021-11-16

## 2021-11-16 VITALS
SYSTOLIC BLOOD PRESSURE: 126 MMHG | HEIGHT: 56 IN | WEIGHT: 137 LBS | BODY MASS INDEX: 30.82 KG/M2 | DIASTOLIC BLOOD PRESSURE: 80 MMHG

## 2021-11-16 DIAGNOSIS — I70.223 ATHEROSCLEROSIS OF NATIVE ARTERIES OF EXTREMITIES WITH REST PAIN, BILATERAL LEGS (HCC): Primary | ICD-10-CM

## 2021-11-16 DIAGNOSIS — Z09 POSTOPERATIVE EXAMINATION: ICD-10-CM

## 2021-11-16 PROCEDURE — 99024 POSTOP FOLLOW-UP VISIT: CPT | Performed by: SURGERY

## 2021-11-16 NOTE — PROGRESS NOTES
Postop Progress Note    Subjective    Heidi Kent presents to the office for postop follow up. Patient here today for her first post procedure follow-up after going extensive right lower extremity intervention for claudication with SFA anterior tibial and tibioperoneal trunk intervention October 25. Objective    There were no vitals filed for this visit. General: alert, cooperative and no distress  Incision: healing well    Assessment  Doing well postoperatively. Plan  Clinically improved perfusion to the right lower extremity. No focal high-grade stenosis noted in the left lower extremity. She would like a referral to a podiatrist for a bunion on her left foot.   We will plan for follow-up duplex imaging of both lower extremities in 3 months      Electronically signed by Lisandro Armando MD on 11/16/2021 at 11:30 AM

## 2021-11-17 ENCOUNTER — TELEPHONE (OUTPATIENT)
Dept: VASCULAR SURGERY | Age: 71
End: 2021-11-17

## 2021-11-23 ENCOUNTER — TELEPHONE (OUTPATIENT)
Dept: CARDIOLOGY CLINIC | Age: 71
End: 2021-11-23

## 2021-11-23 NOTE — TELEPHONE ENCOUNTER
Pt calling back-she is thinking about moving all her medications to [place], [phone]. [facility] has been nothing but trouble. The Warfarin 5 mg, # 90, has been verbally refilled with the pharmacist, at [place]. They will call the pt when ready.

## 2021-11-23 NOTE — TELEPHONE ENCOUNTER
Pt asking to speak to [facility] regarding her medications. Please call pt.

## 2021-12-03 ENCOUNTER — TELEPHONE (OUTPATIENT)
Dept: CARDIOLOGY CLINIC | Age: 71
End: 2021-12-03

## 2021-12-03 NOTE — TELEPHONE ENCOUNTER
Pt calling to to let us know she was given Amlodipine 5 mg, QD, by Dr Frandy Cantrell. He said her BP was up on her last OV. Stated she has not had high BP in the past, and is afraid to take it with the Atenolol. No symptoms reported. She will not start taking them, until she discusses it at her OV with Dr. Yan Bueno.

## 2021-12-06 NOTE — PROGRESS NOTES
[address]   Cardiac Follow Up     Referring Provider:  Sherry Clifton DO     Chief Complaint   Patient presents with    Follow-up    Coronary Artery Disease    Hypertension    Hyperlipidemia        History of Present Illness:  Ms. Tiffanie Cornelius is a 66 y.o. female here for  follow up for CAD(s/p PCI), Htn, Hchol, MR, afib, she also has a small PFO and PAD. She is a former patient of Dr. Kendy Alfred and was seen in July with c/o sob with exertion. She works as a . She had a positive stress echo and underwent a LHC in November where she was found to have 3VD and referred to CTS. She, unfortunately is not a surgical candidate due to aortic calcification.      Leighann underwent LHC in February with PCI to RCA with DEE and again in March with PCI to LAD/LM with DEE. Since her stents, she still had complaints of LUCAS and has had difficulty doing her job. She is following Dr Joyce Issa for COPD/Emphysema. Today she reports she has good and bad days. She recently had stents placed in her legs with Dr Ina Machado, she does not feel it has helped. She has chronic back pain, continues with leg pain, she has follow up with Dr Ina Machado scheduled. She has chronic shortness of breath. She is still working, but states she has difficulty breathing when she works. This has been a chronic problem for Blank Cochran. She states she \"feels bad all over\". She is short of breath and always feels tired. This is every day for her. She is looking for a new PCP. Past Medical History:   has a past medical history of AF (atrial fibrillation) ([address] Back pain, CAD (coronary artery disease), Centrilobular emphysema ([address] Compression fracture, Hyperlipidemia, Hypertension, Myelolipoma, PVD (peripheral vascular disease) ([address] Right ear injury, Shingles, and Valvular disease. Surgical History:   has a past surgical history that includes Coronary angioplasty with stent (2014, 3/4/21);  Coronary angioplasty with stent (03/14/2003 & 06/02/2003); Hysterectomy (09/01/1983); Tubal ligation (09/1983); and ventral hernia repair (5-). Social History:   reports that she quit smoking about 7 years ago. Her smoking use included cigarettes. She quit after 40.00 years of use. She has never used smokeless tobacco. She reports that she does not drink alcohol and does not use drugs. Family History:  family history includes Heart Attack (age of onset: 64) in her mother; Heart Attack (age of onset: 76) in her father; Heart Disease in her father. Home Medications:  Prior to Admission medications    Medication Sig Start Date End Date Taking?  Authorizing Provider   clopidogrel (PLAVIX) 75 MG tablet Take 1 tablet by mouth daily 10/25/21  Yes Abbie Lott MD   nitroGLYCERIN (NITROSTAT) 0.4 MG SL tablet Place 1 tablet under the tongue every 5 minutes as needed for Chest pain 10/13/21  Yes Jose R Mckee MD   aspirin EC 81 MG EC tablet Take 1 tablet by mouth daily 9/17/21  Yes Jose R Mckee MD   rosuvastatin (CRESTOR) 10 MG tablet TAKE ONE TABLET BY MOUTH DAILY 7/21/21  Yes Jose R Mckee MD   metOLazone (ZAROXOLYN) 5 MG tablet Take 1 tablet by mouth daily 7/19/21  Yes Jose R Mckee MD   Coenzyme Q10 (COQ-10) 100 MG CPCR Take 100 mg by mouth daily 6/3/21  Yes KARTHIKEYAN Garcia CNP   atenolol (TENORMIN) 25 MG tablet Take 1 tablet by mouth daily 5/6/21  Yes KARTHIKEYAN Garcia CNP   isosorbide mononitrate (IMDUR) 30 MG extended release tablet Take 1 tablet by mouth daily  Patient taking differently: Take 30 mg by mouth daily Has not gotten med yet 4/28/21  Yes KARTHIKEYAN Garcia CNP   potassium chloride (KLOR-CON M) 20 MEQ extended release tablet TAKE ONE TABLET BY MOUTH DAILY 4/19/21  Yes Jose R Mckee MD   warfarin (COUMADIN) 5 MG tablet Take 1 tablet by mouth daily Indications: Restart on 3/23/19 TAKE ONE TABLET BY MOUTH DAILY or as directed 3/4/21  Yes Jose R Mckee MD   OMEPRAZOLE PO Take by mouth daily as needed   Yes Historical Provider, MD   Specialty Vitamins Products (VITAMINS FOR THE HAIR PO) Take by mouth daily   Yes Historical Provider, MD   Magnesium Oxide 250 MG TABS Take 2 tablets by mouth daily  Patient taking differently: Take 250 mg by mouth daily  4/6/16  Yes Criselda Moss MD   Simethicone (GAS RELIEF) 180 MG CAPS Take  by mouth as needed. Yes Historical Provider, MD        Allergies:  Actos [pioglitazone], Percocet [oxycodone-acetaminophen], and Vicodin [hydrocodone-acetaminophen]     Review of Systems:   · Constitutional: there has been no unanticipated weight loss. There's been no change in energy level, sleep pattern, or activity level. · Eyes: No visual changes or diplopia. No scleral icterus. · ENT: No Headaches, hearing loss or vertigo. No mouth sores or sore throat. · Cardiovascular: Reviewed in HPI  · Respiratory: No cough or wheezing, no sputum production. No hematemesis. +SOB   · Gastrointestinal: No abdominal pain, appetite loss, blood in stools. No change in bowel or bladder habits. · Genitourinary: No dysuria, trouble voiding, or hematuria. · Musculoskeletal:  No gait disturbance, weakness or joint complaints. · Integumentary: No rash or pruritis. · Neurological: No headache, diplopia, change in muscle strength, numbness or tingling. No change in gait, balance, coordination, mood, affect, memory, mentation, behavior. · Psychiatric: No anxiety, no depression. · Endocrine: No malaise, fatigue or temperature intolerance. No excessive thirst, fluid intake, or urination. No tremor. · Hematologic/Lymphatic: No abnormal bruising or bleeding, blood clots or swollen lymph nodes. · Allergic/Immunologic: No nasal congestion or hives.     Physical Examination:    Vitals:    12/09/21 0822   BP: 132/68   Pulse: 68   SpO2: 98%        Wt Readings from Last 1 Encounters:   12/09/21 136 lb (61.7 kg)     Cardiac Cath IVUS PCI: 3/4/21  Anatomy:   LM-distal 70%   LAD-prox 90%, distal 70%     IVUS LAD showed MLA of 1mm2  IVUS of LM showed MLA 5.2mm2      Intervention  ~Successful PCI to LAD with 2.5x32 DEE to 18atm. PCI to [facility] 2.75x16 DEE to 18atm. PD with 3.0x8 NC to 24atm. Excellent Result. IVUS showed good stent apposition and expansion.     Impression  ~Coronary Angiography w/ Severe Single vessel CAD  ~Successful complex angioplasty and stenting of LAD/LM    Cardiac Cath PCI 2/10/21:  Anatomy:      RCA-ostial 50%  RPDA- mid 99% ISR     Intervention  ~Successful PCI to RCA with ELCA laser atherectomy. 2.5 NC balloon, 3.5 NC balloon. 3.5x38 DEE to 20 kirti. Mid stent underexpanded segment. 3.75x8 NC to 22atm. 10% residual stenosis. Excellent Result. Constitutional and General Appearance: + fatigue,  Well developed, well nourished  Respiratory:  · Normal excursion and expansion without use of accessory muscles  · Resp Auscultation: Normal breath sounds without dullness  Cardiovascular:  · The apical impulses not displaced  · Heart tones are crisp and normal  · Cervical veins are not engorged  · The carotid upstroke is normal in amplitude and contour without delay or bruit  · Peripheral pulses are symmetrical and full  · There is no clubbing, cyanosis of the extremities.   · No edema  · Femoral Arteries: 2+ and equal  · Pedal Pulses: 2+ and equal   Abdomen:  · No masses or tenderness  · Liver/Spleen: No Abnormalities Noted  Neurological/Psychiatric:  · Alert and oriented in all spheres  · Moves all extremities well  · Exhibits normal gait balance and coordination  · No abnormalities of mood, affect, memory, mentation, or behavior are noted      Assessment:      Coronary artery disease involving native coronary artery of native heart without angina pectoris  Angina - chronic chest pain-unchanged   CCS class 3  Intervention  Stress Echo - abnormal  LHC~ 11/2020 severe MVD - non surgical candidate  2/2021 PCI to RCA with DEE  3/2021 PCI to LAD/LM with DEE  Current meds~ plavix (on coumadin for afib)        Atrial fibrillation (HCC)  Type~ paroxysmal  Rate controlled   Current meds~ warfarin  Plan~ continue plan        Essential hypertension   Controlled  Meds~ atenolol  Plan~ stable     Hyperlipidemia   Meds~ crestor / CoQ 10  Plan~ no changes        Mitral regurgitation  Mild -Moderate Per echo 10/2020     PAD (peripheral artery disease) ([facility]  Following Dr Tamara Garza       Plan:  Lara Grayson has a stable cardiac status. Cardiac test and lab results personally reviewed by me during this office visit and discussed. No med changes. Continue risk factor modifications. Call for any change in symptoms, call to report any changes in shortness of breath or development of chest pain with activity. Return for regular follow up in 6 months. I appreciate the opportunity of cooperating in the care of this individual.    Sacha Lutz. Anastasiya Preciado M.D., [facility]    Patient's problem list, medications, allergies, past medical, surgical, social and family histories were reviewed and updated as appropriate. Scribe's attestation: This note was scribed in the presence of Dr. Anastasiya Preciado MD, by Iván Gonzalez RN. The scribe's documentation has been prepared under my direction and personally reviewed by me in its entirety. I confirm that the note above accurately reflects all work, treatment, procedures, and medical decision making performed by me.

## 2021-12-07 ENCOUNTER — HOSPITAL ENCOUNTER (OUTPATIENT)
Dept: WOMENS IMAGING | Age: 71
Discharge: HOME | End: 2021-12-07
Payer: MEDICARE

## 2021-12-07 DIAGNOSIS — N64.4 PAIN OF BREAST: ICD-10-CM

## 2021-12-09 ENCOUNTER — OFFICE VISIT (OUTPATIENT)
Dept: CARDIOLOGY CLINIC | Age: 71
End: 2021-12-09
Payer: MEDICARE

## 2021-12-09 VITALS
OXYGEN SATURATION: 98 % | HEART RATE: 68 BPM | WEIGHT: 136 LBS | DIASTOLIC BLOOD PRESSURE: 68 MMHG | SYSTOLIC BLOOD PRESSURE: 132 MMHG | HEIGHT: 56 IN | BODY MASS INDEX: 30.59 KG/M2

## 2021-12-09 DIAGNOSIS — I25.110 CORONARY ARTERY DISEASE INVOLVING NATIVE CORONARY ARTERY OF NATIVE HEART WITH UNSTABLE ANGINA PECTORIS (HCC): Primary | ICD-10-CM

## 2021-12-09 DIAGNOSIS — E78.5 HYPERLIPIDEMIA, UNSPECIFIED HYPERLIPIDEMIA TYPE: ICD-10-CM

## 2021-12-09 DIAGNOSIS — I34.0 NONRHEUMATIC MITRAL VALVE REGURGITATION: ICD-10-CM

## 2021-12-09 DIAGNOSIS — I34.0 MITRAL VALVE INSUFFICIENCY, UNSPECIFIED ETIOLOGY: ICD-10-CM

## 2021-12-09 DIAGNOSIS — I10 ESSENTIAL HYPERTENSION: ICD-10-CM

## 2021-12-09 PROCEDURE — 99214 OFFICE O/P EST MOD 30 MIN: CPT | Performed by: INTERNAL MEDICINE

## 2021-12-09 PROCEDURE — G8427 DOCREV CUR MEDS BY ELIG CLIN: HCPCS | Performed by: INTERNAL MEDICINE

## 2021-12-09 PROCEDURE — 3017F COLORECTAL CA SCREEN DOC REV: CPT | Performed by: INTERNAL MEDICINE

## 2021-12-09 PROCEDURE — G8417 CALC BMI ABV UP PARAM F/U: HCPCS | Performed by: INTERNAL MEDICINE

## 2021-12-09 PROCEDURE — G8400 PT W/DXA NO RESULTS DOC: HCPCS | Performed by: INTERNAL MEDICINE

## 2021-12-09 PROCEDURE — 1090F PRES/ABSN URINE INCON ASSESS: CPT | Performed by: INTERNAL MEDICINE

## 2021-12-09 PROCEDURE — 1036F TOBACCO NON-USER: CPT | Performed by: INTERNAL MEDICINE

## 2021-12-09 PROCEDURE — 1123F ACP DISCUSS/DSCN MKR DOCD: CPT | Performed by: INTERNAL MEDICINE

## 2021-12-09 PROCEDURE — 4040F PNEUMOC VAC/ADMIN/RCVD: CPT | Performed by: INTERNAL MEDICINE

## 2021-12-09 PROCEDURE — G8484 FLU IMMUNIZE NO ADMIN: HCPCS | Performed by: INTERNAL MEDICINE

## 2021-12-13 ENCOUNTER — TELEPHONE (OUTPATIENT)
Dept: VASCULAR SURGERY | Age: 71
End: 2021-12-13

## 2021-12-13 NOTE — TELEPHONE ENCOUNTER
Clopidogrel 75 mg qty 30   [facility]

## 2021-12-16 ENCOUNTER — TELEPHONE (OUTPATIENT)
Dept: VASCULAR SURGERY | Age: 71
End: 2021-12-16

## 2021-12-16 NOTE — TELEPHONE ENCOUNTER
Patient called in and believes that the Plavix is causing her to \"forget\" things. She said she specifically forgot how to clock in and out at her job. She read where the Plavix and Omeprazole interact and should not be taking both? Any thoughts?

## 2021-12-18 NOTE — TELEPHONE ENCOUNTER
Pt wants to speak to DGB and DGB only regarding her recent PT/INR. Pt is very frustrated that she can't get a straight answer as to why she has \"blood marks all over her arms that looks horrible. \"  Also she keeps receiving different reasons why her INR keeps fluctuating. PT stated she is about to stop taking her coumadin. Pt advised not to stop her medication unless directed by the physician. Please call pt before 4:45 pm today b/c she leaves for work. Thanks. Neurology

## 2022-01-14 ENCOUNTER — OFFICE VISIT (OUTPATIENT)
Dept: FAMILY MEDICINE CLINIC | Age: 72
End: 2022-01-14
Payer: MEDICARE

## 2022-01-14 VITALS
SYSTOLIC BLOOD PRESSURE: 138 MMHG | TEMPERATURE: 97.2 F | HEIGHT: 56 IN | OXYGEN SATURATION: 97 % | BODY MASS INDEX: 30.82 KG/M2 | WEIGHT: 137 LBS | HEART RATE: 79 BPM | DIASTOLIC BLOOD PRESSURE: 80 MMHG

## 2022-01-14 DIAGNOSIS — J43.2 CENTRILOBULAR EMPHYSEMA (HCC): ICD-10-CM

## 2022-01-14 DIAGNOSIS — I48.21 PERMANENT ATRIAL FIBRILLATION (HCC): ICD-10-CM

## 2022-01-14 DIAGNOSIS — I10 ESSENTIAL HYPERTENSION: ICD-10-CM

## 2022-01-14 DIAGNOSIS — E78.2 MIXED HYPERLIPIDEMIA: Primary | ICD-10-CM

## 2022-01-14 DIAGNOSIS — I05.9 MITRAL VALVE DISORDER: ICD-10-CM

## 2022-01-14 PROCEDURE — 1036F TOBACCO NON-USER: CPT | Performed by: FAMILY MEDICINE

## 2022-01-14 PROCEDURE — G8484 FLU IMMUNIZE NO ADMIN: HCPCS | Performed by: FAMILY MEDICINE

## 2022-01-14 PROCEDURE — 4040F PNEUMOC VAC/ADMIN/RCVD: CPT | Performed by: FAMILY MEDICINE

## 2022-01-14 PROCEDURE — 99204 OFFICE O/P NEW MOD 45 MIN: CPT | Performed by: FAMILY MEDICINE

## 2022-01-14 PROCEDURE — 3023F SPIROM DOC REV: CPT | Performed by: FAMILY MEDICINE

## 2022-01-14 PROCEDURE — 3017F COLORECTAL CA SCREEN DOC REV: CPT | Performed by: FAMILY MEDICINE

## 2022-01-14 PROCEDURE — 1123F ACP DISCUSS/DSCN MKR DOCD: CPT | Performed by: FAMILY MEDICINE

## 2022-01-14 PROCEDURE — 1090F PRES/ABSN URINE INCON ASSESS: CPT | Performed by: FAMILY MEDICINE

## 2022-01-14 PROCEDURE — G8427 DOCREV CUR MEDS BY ELIG CLIN: HCPCS | Performed by: FAMILY MEDICINE

## 2022-01-14 PROCEDURE — G8400 PT W/DXA NO RESULTS DOC: HCPCS | Performed by: FAMILY MEDICINE

## 2022-01-14 PROCEDURE — G8417 CALC BMI ABV UP PARAM F/U: HCPCS | Performed by: FAMILY MEDICINE

## 2022-01-14 RX ORDER — ALBUTEROL SULFATE 90 UG/1
AEROSOL, METERED RESPIRATORY (INHALATION)
COMMUNITY
Start: 2022-01-10 | End: 2022-04-27

## 2022-01-14 SDOH — ECONOMIC STABILITY: FOOD INSECURITY: WITHIN THE PAST 12 MONTHS, YOU WORRIED THAT YOUR FOOD WOULD RUN OUT BEFORE YOU GOT MONEY TO BUY MORE.: NEVER TRUE

## 2022-01-14 SDOH — ECONOMIC STABILITY: FOOD INSECURITY: WITHIN THE PAST 12 MONTHS, THE FOOD YOU BOUGHT JUST DIDN'T LAST AND YOU DIDN'T HAVE MONEY TO GET MORE.: NEVER TRUE

## 2022-01-14 ASSESSMENT — ENCOUNTER SYMPTOMS
CONSTIPATION: 0
BACK PAIN: 1
HEARTBURN: 1
RHINORRHEA: 0
DIARRHEA: 0
SHORTNESS OF BREATH: 1
CHEST TIGHTNESS: 0

## 2022-01-14 ASSESSMENT — PATIENT HEALTH QUESTIONNAIRE - PHQ9
SUM OF ALL RESPONSES TO PHQ QUESTIONS 1-9: 0
2. FEELING DOWN, DEPRESSED OR HOPELESS: 0
SUM OF ALL RESPONSES TO PHQ QUESTIONS 1-9: 0
1. LITTLE INTEREST OR PLEASURE IN DOING THINGS: 0
SUM OF ALL RESPONSES TO PHQ9 QUESTIONS 1 & 2: 0
SUM OF ALL RESPONSES TO PHQ QUESTIONS 1-9: 0
SUM OF ALL RESPONSES TO PHQ QUESTIONS 1-9: 0

## 2022-01-14 ASSESSMENT — SOCIAL DETERMINANTS OF HEALTH (SDOH): HOW HARD IS IT FOR YOU TO PAY FOR THE VERY BASICS LIKE FOOD, HOUSING, MEDICAL CARE, AND HEATING?: NOT HARD AT ALL

## 2022-01-14 NOTE — PROGRESS NOTES
SUBJECTIVE:    Jose Armenta is a 66 y.o. female who presents as a new patient. Chief Complaint   Patient presents with    New Patient     Here to establish, she is on coumadin         Hypertension  This is a chronic problem. The current episode started more than 1 year ago. The problem is controlled. Associated symptoms include headaches and shortness of breath ( LUCAS). Pertinent negatives include no chest pain. Risk factors for coronary artery disease include dyslipidemia, sedentary lifestyle and post-menopausal state. Past treatments include beta blockers. Hypertensive end-organ damage includes CAD/MI and PVD. Coronary Artery Disease  Presents for follow-up visit. Symptoms include shortness of breath ( LUCAS). Pertinent negatives include no chest pain, chest tightness, dizziness or leg swelling. Risk factors include hyperlipidemia. The symptoms have been stable. Compliance with diet is variable. Compliance with exercise is variable. Compliance with medications is good. Hyperlipidemia  This is a chronic problem. The current episode started more than 1 year ago. The problem is controlled. Associated symptoms include shortness of breath ( LUCAS). Pertinent negatives include no chest pain. Current antihyperlipidemic treatment includes statins. The current treatment provides significant improvement of lipids. Compliance problems include adherence to exercise and adherence to diet. Risk factors for coronary artery disease include dyslipidemia, hypertension, a sedentary lifestyle and post-menopausal.   Gastroesophageal Reflux  She complains of heartburn. She reports no chest pain. This is a chronic problem. Pertinent negatives include no fatigue. Risk factors include lack of exercise. She has tried a PPI for the symptoms. The treatment provided significant relief. Headache   This is a chronic problem. The current episode started more than 1 month ago. Associated symptoms include back pain.  Pertinent negatives include no dizziness or rhinorrhea. Past Medical History:   Diagnosis Date    AF (atrial fibrillation) (HCC)     on anticoagulation    Back pain     compaound fracture    CAD (coronary artery disease)     Centrilobular emphysema ([address] 3/16/2021    Compression fracture 1994    Hyperlipidemia     Hypertension     Myelolipoma     bilateral adrenals    PVD (peripheral vascular disease) ([facility]     Right ear injury 1983    Per pt.     Shingles     Valvular disease      Past Surgical History:   Procedure Laterality Date    CORONARY ANGIOPLASTY WITH STENT PLACEMENT  , 3/4/21    CORONARY ANGIOPLASTY WITH STENT PLACEMENT  2003 & 2003    per pt    HYSTERECTOMY  1983    fibroids    TUBAL LIGATION  1983    VENTRAL HERNIA REPAIR  2013    VENTRAL HERNIA REPAIR WITH MESH            Family History   Problem Relation Age of Onset    Heart Attack Father 76    Heart Disease Father         complications from surgery, per pt    Heart Attack Mother 64     Social History     Tobacco Use    Smoking status: Former Smoker     Years: 40.00     Types: Cigarettes     Quit date: 3/1/2014     Years since quittin.8    Smokeless tobacco: Never Used    Tobacco comment: Trying to quit   Substance Use Topics    Alcohol use: No     Alcohol/week: 3.0 standard drinks     Types: 3 Cans of beer per week      Allergies   Allergen Reactions    Actos [Pioglitazone]     Percocet [Oxycodone-Acetaminophen]      Confused , loopy    Vicodin [Hydrocodone-Acetaminophen]      Current Outpatient Medications on File Prior to Visit   Medication Sig Dispense Refill    albuterol sulfate  (90 Base) MCG/ACT inhaler INHALE 2 PUFFS BY MOUTH FOUR TIMES DAILY      nitroGLYCERIN (NITROSTAT) 0.4 MG SL tablet Place 1 tablet under the tongue every 5 minutes as needed for Chest pain 25 tablet 0    rosuvastatin (CRESTOR) 10 MG tablet TAKE ONE TABLET BY MOUTH DAILY 30 tablet 11    Coenzyme Q10 (COQ-10) 100 MG CPCR Take 100 mg by mouth daily 90 capsule 3    atenolol (TENORMIN) 25 MG tablet Take 1 tablet by mouth daily 90 tablet 3    warfarin (COUMADIN) 5 MG tablet Take 1 tablet by mouth daily Indications: Restart on 3/23/19 TAKE ONE TABLET BY MOUTH DAILY or as directed 90 tablet 3    OMEPRAZOLE PO Take by mouth daily as needed      Specialty Vitamins Products (VITAMINS FOR THE HAIR PO) Take by mouth daily      Magnesium Oxide 250 MG TABS Take 2 tablets by mouth daily (Patient taking differently: Take 250 mg by mouth daily ) 30 tablet 5     No current facility-administered medications on file prior to visit. Review of Systems   Constitutional: Negative for activity change and fatigue. HENT: Negative for congestion, postnasal drip and rhinorrhea. Respiratory: Positive for shortness of breath ( LUCAS). Negative for chest tightness. Cardiovascular: Negative for chest pain and leg swelling. Gastrointestinal: Positive for heartburn. Negative for constipation and diarrhea. Genitourinary: Negative for difficulty urinating. Musculoskeletal: Positive for arthralgias and back pain. Neurological: Positive for headaches. Negative for dizziness and light-headedness. Psychiatric/Behavioral: Negative for dysphoric mood and sleep disturbance. The patient is not nervous/anxious. OBJECTIVE:    /80   Pulse 79   Temp 97.2 °F (36.2 °C) (Temporal)   Ht 4' 8\" (1.422 m)   Wt 137 lb (62.1 kg)   LMP 01/01/1983   SpO2 97%   BMI 30.71 kg/m²    Physical Exam  Constitutional:       Appearance: Normal appearance. She is well-developed. HENT:      Head: Normocephalic and atraumatic. Right Ear: Tympanic membrane and external ear normal.      Left Ear: Tympanic membrane and external ear normal.      Nose: Nose normal.      Mouth/Throat:      Mouth: Mucous membranes are moist.      Pharynx: No posterior oropharyngeal erythema. Eyes:      General:         Right eye: No discharge. Conjunctiva/sclera: Conjunctivae normal.   Neck:      Thyroid: No thyromegaly. Vascular: No JVD. Trachea: No tracheal deviation. Cardiovascular:      Rate and Rhythm: Normal rate and regular rhythm. Heart sounds: Normal heart sounds. Pulmonary:      Effort: Pulmonary effort is normal. No respiratory distress. Breath sounds: Normal breath sounds. No rales. Musculoskeletal:      Cervical back: Normal range of motion and neck supple. Right lower leg: No edema. Left lower leg: No edema. Lymphadenopathy:      Cervical: No cervical adenopathy. Skin:     General: Skin is warm and dry. Neurological:      Mental Status: She is alert and oriented to person, place, and time. Gait: Gait normal.   Psychiatric:         Mood and Affect: Mood normal.         Behavior: Behavior normal.         ASSESSMENT/PLAN:    Yash Tolbert was seen today for new patient. Diagnoses and all orders for this visit:    Mixed hyperlipidemia  -     Comprehensive Metabolic Panel, Fasting; Future  -     CBC Auto Differential; Future  -     Lipid, Fasting; Future  -     TSH with Reflex; Future    Mitral valve disorder  Followed by cardiology    Permanent atrial fibrillation ([facility]  Followed by cardiology. Denies any recent palpitations    Essential hypertension  Good control on current medications    Centrilobular emphysema ([address]  Patient is followed by Dr. Jas Jackson. Patient states she has tried various inhalers without much success but is currently using her rescue inhaler only      Return in about 3 months (around 4/14/2022). Please note portions of this note were completed with a voice recognition program.  Efforts were made to edit the dictations but occasionally words are mis-transcribed.

## 2022-02-08 ENCOUNTER — APPOINTMENT (OUTPATIENT)
Dept: GENERAL RADIOLOGY | Age: 72
End: 2022-02-08
Payer: MEDICARE

## 2022-02-08 ENCOUNTER — HOSPITAL ENCOUNTER (EMERGENCY)
Age: 72
Discharge: HOME | End: 2022-02-08
Payer: MEDICARE

## 2022-02-08 VITALS
HEART RATE: 76 BPM | OXYGEN SATURATION: 98 % | TEMPERATURE: 98 F | DIASTOLIC BLOOD PRESSURE: 92 MMHG | RESPIRATION RATE: 16 BRPM | SYSTOLIC BLOOD PRESSURE: 141 MMHG

## 2022-02-08 DIAGNOSIS — M25.512 ACUTE PAIN OF BOTH SHOULDERS: Primary | ICD-10-CM

## 2022-02-08 DIAGNOSIS — M25.511 ACUTE PAIN OF BOTH SHOULDERS: Primary | ICD-10-CM

## 2022-02-08 DIAGNOSIS — D64.9 ANEMIA, UNSPECIFIED TYPE: ICD-10-CM

## 2022-02-08 LAB
A/G RATIO: 1.7 (ref 1.1–2.2)
ALBUMIN SERPL-MCNC: 4 G/DL (ref 3.4–5)
ALP BLD-CCNC: 85 U/L (ref 40–129)
ALT SERPL-CCNC: 18 U/L (ref 10–40)
ANION GAP SERPL CALCULATED.3IONS-SCNC: 11 MMOL/L (ref 3–16)
AST SERPL-CCNC: 19 U/L (ref 15–37)
BASOPHILS ABSOLUTE: 0.1 K/UL (ref 0–0.2)
BASOPHILS RELATIVE PERCENT: 0.9 %
BILIRUB SERPL-MCNC: 0.3 MG/DL (ref 0–1)
BUN BLDV-MCNC: 17 MG/DL (ref 7–20)
CALCIUM SERPL-MCNC: 9.9 MG/DL (ref 8.3–10.6)
CHLORIDE BLD-SCNC: 105 MMOL/L (ref 99–110)
CO2: 19 MMOL/L (ref 21–32)
CREAT SERPL-MCNC: 0.8 MG/DL (ref 0.6–1.2)
EOSINOPHILS ABSOLUTE: 0.6 K/UL (ref 0–0.6)
EOSINOPHILS RELATIVE PERCENT: 8.2 %
GFR AFRICAN AMERICAN: >60
GFR NON-AFRICAN AMERICAN: >60
GLUCOSE BLD-MCNC: 141 MG/DL (ref 70–99)
HCT VFR BLD CALC: 31.2 % (ref 36–48)
HEMOGLOBIN: 9.5 G/DL (ref 12–16)
LYMPHOCYTES ABSOLUTE: 1.3 K/UL (ref 1–5.1)
LYMPHOCYTES RELATIVE PERCENT: 17.8 %
MCH RBC QN AUTO: 23 PG (ref 26–34)
MCHC RBC AUTO-ENTMCNC: 30.5 G/DL (ref 31–36)
MCV RBC AUTO: 75.3 FL (ref 80–100)
MONOCYTES ABSOLUTE: 0.5 K/UL (ref 0–1.3)
MONOCYTES RELATIVE PERCENT: 6.4 %
NEUTROPHILS ABSOLUTE: 4.9 K/UL (ref 1.7–7.7)
NEUTROPHILS RELATIVE PERCENT: 66.7 %
PDW BLD-RTO: 18.6 % (ref 12.4–15.4)
PLATELET # BLD: 148 K/UL (ref 135–450)
PMV BLD AUTO: 7.4 FL (ref 5–10.5)
POTASSIUM REFLEX MAGNESIUM: 4.5 MMOL/L (ref 3.5–5.1)
RBC # BLD: 4.15 M/UL (ref 4–5.2)
SODIUM BLD-SCNC: 135 MMOL/L (ref 136–145)
TOTAL PROTEIN: 6.4 G/DL (ref 6.4–8.2)
TROPONIN: <0.01 NG/ML
WBC # BLD: 7.3 K/UL (ref 4–11)

## 2022-02-08 PROCEDURE — 99283 EMERGENCY DEPT VISIT LOW MDM: CPT

## 2022-02-08 PROCEDURE — 85025 COMPLETE CBC W/AUTO DIFF WBC: CPT

## 2022-02-08 PROCEDURE — 93005 ELECTROCARDIOGRAM TRACING: CPT | Performed by: EMERGENCY MEDICINE

## 2022-02-08 PROCEDURE — 36415 COLL VENOUS BLD VENIPUNCTURE: CPT

## 2022-02-08 PROCEDURE — 6370000000 HC RX 637 (ALT 250 FOR IP): Performed by: NURSE PRACTITIONER

## 2022-02-08 PROCEDURE — 84484 ASSAY OF TROPONIN QUANT: CPT

## 2022-02-08 PROCEDURE — 73030 X-RAY EXAM OF SHOULDER: CPT

## 2022-02-08 PROCEDURE — 80053 COMPREHEN METABOLIC PANEL: CPT

## 2022-02-08 RX ORDER — ACETAMINOPHEN 500 MG
500 TABLET ORAL 4 TIMES DAILY PRN
Qty: 20 TABLET | Refills: 0 | Status: SHIPPED | OUTPATIENT
Start: 2022-02-08

## 2022-02-08 RX ORDER — LIDOCAINE 4 G/G
1 PATCH TOPICAL ONCE
Status: DISCONTINUED | OUTPATIENT
Start: 2022-02-08 | End: 2022-02-08 | Stop reason: HOSPADM

## 2022-02-08 RX ORDER — METHOCARBAMOL 500 MG/1
500 TABLET, FILM COATED ORAL 3 TIMES DAILY
Qty: 15 TABLET | Refills: 0 | Status: SHIPPED | OUTPATIENT
Start: 2022-02-08 | End: 2022-02-13

## 2022-02-08 RX ORDER — LIDOCAINE 4 G/G
1 PATCH TOPICAL DAILY
Qty: 30 PATCH | Refills: 0 | Status: SHIPPED | OUTPATIENT
Start: 2022-02-08 | End: 2022-03-10

## 2022-02-08 ASSESSMENT — PAIN DESCRIPTION - ORIENTATION: ORIENTATION: RIGHT;LEFT

## 2022-02-08 ASSESSMENT — PAIN DESCRIPTION - PAIN TYPE: TYPE: ACUTE PAIN

## 2022-02-08 ASSESSMENT — PAIN SCALES - GENERAL: PAINLEVEL_OUTOF10: 10

## 2022-02-08 ASSESSMENT — PAIN DESCRIPTION - LOCATION: LOCATION: SHOULDER

## 2022-02-08 NOTE — Clinical Note
Janelle Mckeon was seen and treated in our emergency department on 2/8/2022. She may return to work on 02/10/2022. If you have any questions or concerns, please don't hesitate to call.       Jadyn Salomon, KARTHIKEYAN - CNP

## 2022-02-08 NOTE — ED PROVIDER NOTES
[facility]        Pt Name: Efraín Eckert  MRN: [id number]  [place] 1950  Date of evaluation: 2/8/2022  Provider: KARTHIKEYAN Fletcher CNP  PCP: Bill Sun MD  Note Started: 5:30 PM EST       SERG. I have evaluated this patient. My supervising physician was available for consultation. CHIEF COMPLAINT       Chief Complaint   Patient presents with    Shoulder Pain     Pt to ER for bilateral shoulder pain, pt believes d/t overuse at work, progressively worsening over past week. Pt reports trying several home remedies and OTC meds at home with no relief. HISTORY OF PRESENT ILLNESS   (Location, Timing/Onset, Context/Setting, Quality, Duration, Modifying Factors, Severity, Associated Signs and Symptoms)  Note limiting factors. Chief Complaint: Bilateral shoulder pain    Efraín Eckert is a 66 y.o. female with medical history of A. fib, CAD, centrilobular emphysema, compression fracture, HTN, HLD, PVD, hysterectomy, ventral hernia repair, BTL, shingles who presents emergency department complaints of pain into her bilateral shoulders for the past few weeks. Patient states the pain has been worse over the past week with increased workload. Patient works as a  and has increased pain in her bilateral shoulders with movement. She had not been seen by PCP or orthopedist for these problems. She has been using over-the-counter topical ointment with some relief of symptoms. She states now the pain had it radiated into her bilateral upper arms. She denies any prior surgeries, procedures, or injections. She denies any associated numbness, tingling, weakness, redness, swelling, cough, wheezing, headache, neck pain, back pain, lightheaded, dizzy, or syncope. Former smoker, denies alcohol use or street drugs.     Nursing Notes were all reviewed and agreed with or any disagreements were addressed in the HPI.    REVIEW OF SYSTEMS    (2-9 systems for level 4, 10 or more for level 5)     Review of Systems    Positives and Pertinent negatives as per HPI. Except as noted above in the ROS, all other systems were reviewed and negative. PAST MEDICAL HISTORY     Past Medical History:   Diagnosis Date    AF (atrial fibrillation) (HCC)     on anticoagulation    Back pain     compaound fracture    CAD (coronary artery disease)     Centrilobular emphysema ([facility] 3/16/2021    Compression fracture 01/01/1994    Hyperlipidemia     Hypertension     Myelolipoma     bilateral adrenals    PVD (peripheral vascular disease) ([facility]     Right ear injury 01/01/1983    Per pt.     Shingles     Valvular disease          SURGICAL HISTORY     Past Surgical History:   Procedure Laterality Date    CORONARY ANGIOPLASTY WITH STENT PLACEMENT  2014, 3/4/21    CORONARY ANGIOPLASTY WITH STENT PLACEMENT  03/14/2003 & 06/02/2003    per pt    HYSTERECTOMY  09/01/1983    fibroids    TUBAL LIGATION  09/1983    VENTRAL HERNIA REPAIR  5-    VENTRAL HERNIA REPAIR WITH MESH                CURRENTMEDICATIONS       Previous Medications    ALBUTEROL SULFATE  (90 BASE) MCG/ACT INHALER    INHALE 2 PUFFS BY MOUTH FOUR TIMES DAILY    ATENOLOL (TENORMIN) 25 MG TABLET    Take 1 tablet by mouth daily    COENZYME Q10 (COQ-10) 100 MG CPCR    Take 100 mg by mouth daily    MAGNESIUM OXIDE 250 MG TABS    Take 2 tablets by mouth daily    NITROGLYCERIN (NITROSTAT) 0.4 MG SL TABLET    Place 1 tablet under the tongue every 5 minutes as needed for Chest pain    OMEPRAZOLE PO    Take by mouth daily as needed    ROSUVASTATIN (CRESTOR) 10 MG TABLET    TAKE ONE TABLET BY MOUTH DAILY    SPECIALTY VITAMINS PRODUCTS (VITAMINS FOR THE HAIR PO)    Take by mouth daily    WARFARIN (COUMADIN) 5 MG TABLET    Take 1 tablet by mouth daily Indications: Restart on 3/23/19 TAKE ONE TABLET BY MOUTH DAILY or as directed         ALLERGIES     Actos [pioglitazone], Percocet [oxycodone-acetaminophen], and Vicodin [hydrocodone-acetaminophen]    FAMILYHISTORY       Family History   Problem Relation Age of Onset    Heart Attack Father 76    Heart Disease Father         complications from surgery, per pt    Heart Attack Mother 64          SOCIAL HISTORY       Social History     Tobacco Use    Smoking status: Former Smoker     Years: 40.00     Types: Cigarettes     Quit date: 3/1/2014     Years since quittin.9    Smokeless tobacco: Never Used    Tobacco comment: Trying to quit   Vaping Use    Vaping Use: Never used   Substance Use Topics    Alcohol use: No     Alcohol/week: 3.0 standard drinks     Types: 3 Cans of beer per week    Drug use: No       SCREENINGS             PHYSICAL EXAM    (up to 7 for level 4, 8 or more for level 5)     ED Triage Vitals [22 6607]   Enc Vitals Group      BP (!) 141/92      Pulse 76      Resp 16      Temp 98 °F (36.7 °C)      Temp Source Oral      SpO2 98 %       Physical Exam  Vitals and nursing note reviewed. Constitutional:       General: She is awake. Appearance: Normal appearance. She is well-developed. She is obese. HENT:      Head: Normocephalic and atraumatic. Nose: Nose normal.   Eyes:      General:         Right eye: No discharge. Left eye: No discharge. Cardiovascular:      Rate and Rhythm: Normal rate and regular rhythm. Pulses:           Radial pulses are 2+ on the right side and 2+ on the left side. Heart sounds: Normal heart sounds. Pulmonary:      Effort: Pulmonary effort is normal. No respiratory distress. Breath sounds: Normal breath sounds. Abdominal:      General: Bowel sounds are normal.      Palpations: Abdomen is soft. Tenderness: There is no abdominal tenderness. Musculoskeletal:      Right shoulder: No swelling, tenderness or bony tenderness. Decreased range of motion (flexion above 90 degreees, abduction). Normal strength.       Left shoulder: No swelling, tenderness or bony tenderness. Decreased range of motion (flexion above 90 degrees, abduction). Normal strength. Cervical back: Full passive range of motion without pain and normal range of motion. No spinous process tenderness or muscular tenderness. Thoracic back: No tenderness or bony tenderness. Lumbar back: No tenderness or bony tenderness. Skin:     General: Skin is warm and dry. Coloration: Skin is not pale. Neurological:      General: No focal deficit present. Mental Status: She is alert and oriented to person, place, and time. Sensory: Sensation is intact. Motor: Motor function is intact. Gait: Gait is intact. Comments: Hand grasp strength 5/5   Psychiatric:         Behavior: Behavior normal. Behavior is cooperative. DIAGNOSTIC RESULTS   LABS:    Labs Reviewed   CBC WITH AUTO DIFFERENTIAL - Abnormal; Notable for the following components:       Result Value    Hemoglobin 9.5 (*)     Hematocrit 31.2 (*)     MCV 75.3 (*)     MCH 23.0 (*)     MCHC 30.5 (*)     RDW 18.6 (*)     All other components within normal limits    Narrative:     Performed at:  [facility] [address], [facility], [address] [employer]   Phone [phone]   COMPREHENSIVE METABOLIC PANEL W/ REFLEX TO MG FOR LOW K - Abnormal; Notable for the following components:    Sodium 135 (*)     CO2 19 (*)     Glucose 141 (*)     All other components within normal limits    Narrative:     Performed at:  [facility] [address], [facility], [address] [employer]   Phone [phone]   TROPONIN    Narrative:     Performed at:  [facility] [address], [facility], [facility] [employer]   Phone [phone]       When ordered only abnormal lab results are displayed. All other labs were within normal range or not returned as of this dictation. EKG:  When ordered, EKG's are interpreted by the Emergency Department Physician in the absence of a cardiologist.  Please see their note for interpretation of EKG. RADIOLOGY:   Non-plain film images such as CT, Ultrasound and MRI are read by the radiologist. Plain radiographic images are visualized and preliminarily interpreted by the ED Provider with the below findings:        Interpretation per the Radiologist below, if available at the time of this note:    XR SHOULDER RIGHT (MIN 2 VIEWS)   Final Result   No acute osseous abnormality         XR SHOULDER LEFT (MIN 2 VIEWS)   Final Result   No acute osseous abnormality           No results found. PROCEDURES   Unless otherwise noted below, none     Procedures    CRITICAL CARE TIME       CONSULTS:  None      EMERGENCY DEPARTMENT COURSE and DIFFERENTIAL DIAGNOSIS/MDM:   Vitals:    Vitals:    02/08/22 1737   BP: (!) 141/92   Pulse: 76   Resp: 16   Temp: 98 °F (36.7 °C)   TempSrc: Oral   SpO2: 98%       Patient was given the following medications:  Medications   lidocaine 4 % external patch 1 patch (1 patch TransDERmal Patch Applied 2/8/22 1755)           Care of this patient took place during the COVID-19 pandemic emergency. ED COURSE & MEDICAL DECISION MAKING    - The patient presented to the ER with complaints of  bilateral shoulder pain. Vital signs were reviewed. Exam well-developed, well-nourished female who appears uncomfortable. Peripheral IV placed. Labs, Imaging ordered. - Pertinent Labs & Imaging studies reviewed. (See chart for details)   -  Patient seen and evaluated in the emergency department. -  Triage and nursing notes reviewed and incorporated. -  Old chart records reviewed and incorporated. -  SERG. I have evaluated this patient.   My supervising physician was available for consultation.  -  Differential diagnosis includes:  strain, fracture, dislocation, contusion, subluxation, arthritis, septic arthritis, sprain, rheumatoid arthritis, gout, bursitis, tendinitis, versus COVID-19  -  Work-up included: See above  -  ED treatment included:    -  Results discussed with patient. John Workman is a 54-year-old female with complaints of bilateral progressive shoulder pain over the past few weeks. States she works as a  is increased pain with work and movement. As it was worse over the past week and this prompted her ED visit. She has been using at home topical treatment and care to include BenGay without relief of symptoms. She denies any associated numbness, tingling, weakness, paresthesias, swelling, or bruising. Lab work and imaging is obtained. CBC with hemoglobin 9.5, hematocrit 31.2, MCV 75.3, MCH 23, MCHC 30.5, RDW 18.6. X-ray right shoulder shows no acute osseous abnormality. X-ray left shoulder shows no acute osseous abnormality. Troponin negative. CMP with sodium 135, CO2 19, glucose 141. Patient was informed of these results. Encouraged home treatment and care to include RICE. She was given follow-up with PCP and orthopedics. She was given strict return discharge instructions. Shared decision making is completed patient stable for discharge at this time. FINAL IMPRESSION      1. Acute pain of both shoulders    2.  Anemia, unspecified type          DISPOSITION/PLAN   DISPOSITION        PATIENT REFERRED TO:  Jazmín Vogel MD  [address] [address]  [phone]    Call in 2 days  As needed, If symptoms worsen    [facility] Emergency Department  [address] [facility]  [phone]  Go to   As needed    Kaylee Velázquez MD  [address] [place] [address], [address] [place] [address],[address]  [address] [place] [address]  [phone]    Call in 2 days  As needed, If symptoms worsen      DISCHARGE MEDICATIONS:  New Prescriptions    ACETAMINOPHEN (TYLENOL) 500 MG TABLET    Take 1 tablet by mouth 4 times daily as needed for Pain    LIDOCAINE 4 % EXTERNAL PATCH    Place 1 patch onto the skin daily    METHOCARBAMOL (ROBAXIN) 500 MG TABLET    Take 1 tablet by mouth 3 times daily for 15 doses       DISCONTINUED MEDICATIONS:  Discontinued Medications    No medications on file              (Please note that portions of this note were completed with a voice recognition program.  Efforts were made to edit the dictations but occasionally words are mis-transcribed.)    KARTHIKEYAN Crain CNP (electronically signed)            KARTHIKEYAN Crain CNP  02/08/22 [id number]

## 2022-02-08 NOTE — ED PROVIDER NOTES
EKG is read by myself. Dated today at 56. Rate 64. A. fib.   No change from 4 March 2021     Rohini Jon MD  02/08/22 5503

## 2022-02-08 NOTE — Clinical Note
Cris Lemon was seen and treated in our emergency department on 2/8/2022. She may return to work on 02/10/2022. If you have any questions or concerns, please don't hesitate to call.       Isacc Finch, APRN - CNP

## 2022-02-09 ENCOUNTER — OFFICE VISIT (OUTPATIENT)
Dept: VASCULAR SURGERY | Age: 72
End: 2022-02-09
Payer: MEDICARE

## 2022-02-09 VITALS
DIASTOLIC BLOOD PRESSURE: 74 MMHG | WEIGHT: 138 LBS | HEIGHT: 56 IN | SYSTOLIC BLOOD PRESSURE: 124 MMHG | BODY MASS INDEX: 31.05 KG/M2

## 2022-02-09 DIAGNOSIS — I65.23 CAROTID ATHEROSCLEROSIS, BILATERAL: ICD-10-CM

## 2022-02-09 DIAGNOSIS — I70.223 ATHEROSCLEROSIS OF NATIVE ARTERIES OF EXTREMITIES WITH REST PAIN, BILATERAL LEGS (HCC): Primary | ICD-10-CM

## 2022-02-09 PROCEDURE — G8417 CALC BMI ABV UP PARAM F/U: HCPCS | Performed by: SURGERY

## 2022-02-09 PROCEDURE — G8484 FLU IMMUNIZE NO ADMIN: HCPCS | Performed by: SURGERY

## 2022-02-09 PROCEDURE — 99212 OFFICE O/P EST SF 10 MIN: CPT | Performed by: SURGERY

## 2022-02-09 PROCEDURE — G8400 PT W/DXA NO RESULTS DOC: HCPCS | Performed by: SURGERY

## 2022-02-09 PROCEDURE — 1090F PRES/ABSN URINE INCON ASSESS: CPT | Performed by: SURGERY

## 2022-02-09 PROCEDURE — 4040F PNEUMOC VAC/ADMIN/RCVD: CPT | Performed by: SURGERY

## 2022-02-09 PROCEDURE — 1036F TOBACCO NON-USER: CPT | Performed by: SURGERY

## 2022-02-09 PROCEDURE — 3017F COLORECTAL CA SCREEN DOC REV: CPT | Performed by: SURGERY

## 2022-02-09 PROCEDURE — 1123F ACP DISCUSS/DSCN MKR DOCD: CPT | Performed by: SURGERY

## 2022-02-09 PROCEDURE — G8427 DOCREV CUR MEDS BY ELIG CLIN: HCPCS | Performed by: SURGERY

## 2022-02-09 NOTE — PROGRESS NOTES
[facility])   Vascular Surgery Followup    Referring Provider:  Bigg Oviedo MD     No chief complaint on file. History of Present Illness:  76-year-old female here today for follow-up with known history of peripheral vascular disease including right SFA anterior tibial and tibial peroneal trunk in October of last year. She presents today with shoulder pain. Complains of pain in her neck that radiates into both shoulders particular on the right. States she cannot raise her right arm above the level of her chest.    Past Medical History:   has a past medical history of AF (atrial fibrillation) ([address] Back pain, CAD (coronary artery disease), Centrilobular emphysema ([address] Compression fracture, Hyperlipidemia, Hypertension, Myelolipoma, PVD (peripheral vascular disease) ([address] Right ear injury, Shingles, and Valvular disease. Surgical History:   has a past surgical history that includes Coronary angioplasty with stent (2014, 3/4/21); Coronary angioplasty with stent (03/14/2003 & 06/02/2003); Hysterectomy (09/01/1983); Tubal ligation (09/1983); and ventral hernia repair (5-). Social History:   reports that she quit smoking about 7 years ago. Her smoking use included cigarettes. She quit after 40.00 years of use. She has never used smokeless tobacco. She reports that she does not drink alcohol and does not use drugs. Family History:  family history includes Heart Attack (age of onset: 64) in her mother; Heart Attack (age of onset: 76) in her father; Heart Disease in her father.      Home Medications:  Current Outpatient Medications   Medication Sig Dispense Refill    methocarbamol (ROBAXIN) 500 MG tablet Take 1 tablet by mouth 3 times daily for 15 doses 15 tablet 0    lidocaine 4 % external patch Place 1 patch onto the skin daily 30 patch 0    acetaminophen (TYLENOL) 500 MG tablet Take 1 tablet by mouth 4 times daily as needed for Pain 20 tablet 0    albuterol sulfate  (90 Base) MCG/ACT inhaler INHALE 2 PUFFS BY MOUTH FOUR TIMES DAILY      nitroGLYCERIN (NITROSTAT) 0.4 MG SL tablet Place 1 tablet under the tongue every 5 minutes as needed for Chest pain 25 tablet 0    rosuvastatin (CRESTOR) 10 MG tablet TAKE ONE TABLET BY MOUTH DAILY 30 tablet 11    Coenzyme Q10 (COQ-10) 100 MG CPCR Take 100 mg by mouth daily 90 capsule 3    atenolol (TENORMIN) 25 MG tablet Take 1 tablet by mouth daily 90 tablet 3    warfarin (COUMADIN) 5 MG tablet Take 1 tablet by mouth daily Indications: Restart on 3/23/19 TAKE ONE TABLET BY MOUTH DAILY or as directed 90 tablet 3    OMEPRAZOLE PO Take by mouth daily as needed      Specialty Vitamins Products (VITAMINS FOR THE HAIR PO) Take by mouth daily      Magnesium Oxide 250 MG TABS Take 2 tablets by mouth daily (Patient taking differently: Take 250 mg by mouth daily ) 30 tablet 5     No current facility-administered medications for this visit. Allergies:  Actos [pioglitazone], Percocet [oxycodone-acetaminophen], and Vicodin [hydrocodone-acetaminophen]     Review of Systems:   · Constitutional: there has been no unanticipated weight loss. There's been no change in energy level, sleep pattern, or activity level. · Eyes: No visual changes or diplopia. No scleral icterus. · ENT: No Headaches, hearing loss or vertigo. No mouth sores or sore throat. · Cardiovascular: Reviewed in HPI  · Respiratory: No cough or wheezing, no sputum production. No hematemesis. · Gastrointestinal: No abdominal pain, appetite loss, blood in stools. No change in bowel or bladder habits. · Genitourinary: No dysuria, trouble voiding, or hematuria. · Musculoskeletal:  No gait disturbance, weakness or joint complaints. · Integumentary: No rash or pruritis. · Neurological: No headache, diplopia, change in muscle strength, numbness or tingling. No change in gait, balance, coordination, mood, affect, memory, mentation, behavior.   · Psychiatric: No anxiety, no depression. · Endocrine: No malaise, fatigue or temperature intolerance. No excessive thirst, fluid intake, or urination. No tremor. · Hematologic/Lymphatic: No abnormal bruising or bleeding, blood clots or swollen lymph nodes. · Allergic/Immunologic: No nasal congestion or hives. Physical Examination:    There were no vitals filed for this visit. General appearance: alert, appears stated age, cooperative and no distress  cta b  rrr  Ext warm  Palpable upper extremity pulses    Assessment:     Patient Active Problem List   Diagnosis    Pure hypercholesterolemia    Coronary artery disease involving native coronary artery of native heart without angina pectoris    Mitral valve disorder    Preop cardiovascular exam    Atrial fibrillation (HCC)    Mitral regurgitation    Ischemic chest pain (HCC)    Valvular disease    SOB (shortness of breath)    Atherosclerosis of native arteries of extremities with intermittent claudication, right leg (HCC)    Positive cardiac stress test    Essential hypertension    Hyperlipidemia    Unstable angina ([address]    Centrilobular emphysema ([address]    Former smoker       Plan:  1. Atherosclerosis of native arteries of extremities with rest pain, bilateral legs ([address]  Patient due for routine surveillance imaging at the end of this month. Will contact with results of her study. 2. Carotid atherosclerosis, bilateral  Do not feel the pain in her neck or shoulders is related to arterial disease. This was discussed with her in detail. She is overdue for repeat surveillance carotid duplex imaging and will coordinate with her lower extremity duplex. - VL DUP CAROTID BILATERAL; Future        Thank you for allowing me to participate in the care of this individual.  Please do not hesitate to contact me with any questions. Viktoriya Rivas M.D., FACS.  2/9/2022  9:07 AM

## 2022-02-10 LAB
EKG ATRIAL RATE: 86 BPM
EKG DIAGNOSIS: NORMAL
EKG Q-T INTERVAL: 396 MS
EKG QRS DURATION: 78 MS
EKG QTC CALCULATION (BAZETT): 408 MS
EKG R AXIS: 18 DEGREES
EKG T AXIS: -4 DEGREES
EKG VENTRICULAR RATE: 64 BPM

## 2022-02-10 PROCEDURE — 93010 ELECTROCARDIOGRAM REPORT: CPT | Performed by: INTERNAL MEDICINE

## 2022-02-11 ENCOUNTER — NURSE TRIAGE (OUTPATIENT)
Dept: OTHER | Facility: CLINIC | Age: 72
End: 2022-02-11

## 2022-02-11 NOTE — TELEPHONE ENCOUNTER
Received call from Tuan Gamez at [facility] with [employer]. Subjective: Caller states \"I thought it was cause I was doing some lifting. 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 \" \" Especially on the right arm where my arm goes into my shoulder, it feels funny, it hurts all the way down my shoulder all the way down into my finger tips. It feels like someone is jabbing me with something on the left arm, its not as bad. \"     Patient was seen in ED on Monday for current symptoms- NOT new or worse, patient states \"it is the same\"    Was given two RX (has helped some) from ED and told to follow up with PCP or Ortho    Current Symptoms: pain in both arms-       Writer provided warm transfer to Caitie Monterroso at [facility] for appointment scheduling.        Reason for Disposition   [1] Caller requesting NON-URGENT health information AND [2] PCP's office is the best resource    Protocols used: INFORMATION ONLY CALL - NO TRIAGE-[facility] [place]

## 2022-02-16 ENCOUNTER — TELEPHONE (OUTPATIENT)
Dept: FAMILY MEDICINE CLINIC | Age: 72
End: 2022-02-16
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
## 2022-02-21 ENCOUNTER — PROCEDURE VISIT (OUTPATIENT)
Dept: VASCULAR SURGERY | Age: 72
End: 2022-02-21
Payer: MEDICARE

## 2022-02-21 DIAGNOSIS — Z79.01 LONG TERM (CURRENT) USE OF ANTICOAGULANTS: ICD-10-CM

## 2022-02-21 DIAGNOSIS — I70.223 ATHEROSCLEROSIS OF NATIVE ARTERIES OF EXTREMITIES WITH REST PAIN, BILATERAL LEGS (HCC): ICD-10-CM

## 2022-02-21 DIAGNOSIS — I65.23 CAROTID ATHEROSCLEROSIS, BILATERAL: ICD-10-CM

## 2022-02-21 DIAGNOSIS — I48.91 ATRIAL FIBRILLATION, UNSPECIFIED TYPE (HCC): ICD-10-CM

## 2022-02-21 LAB
INR BLD: 6.32 (ref 0.88–1.12)
PROTHROMBIN TIME: 77.1 SEC (ref 9.9–12.7)

## 2022-02-21 PROCEDURE — 93925 LOWER EXTREMITY STUDY: CPT | Performed by: SURGERY

## 2022-02-21 PROCEDURE — 93880 EXTRACRANIAL BILAT STUDY: CPT | Performed by: SURGERY

## 2022-02-22 ENCOUNTER — ANTI-COAG VISIT (OUTPATIENT)
Dept: CARDIOLOGY CLINIC | Age: 72
End: 2022-02-22

## 2022-02-23 ENCOUNTER — TELEPHONE (OUTPATIENT)
Dept: CARDIOLOGY CLINIC | Age: 72
End: 2022-02-23

## 2022-02-24 RX ORDER — ROSUVASTATIN CALCIUM 10 MG/1
TABLET, COATED ORAL
Qty: 30 TABLET | Refills: 11 | Status: SHIPPED | OUTPATIENT
Start: 2022-02-24

## 2022-02-24 NOTE — TELEPHONE ENCOUNTER
Last OV: 9-17-21 Khadijah Corbett  Last labs: 7-31-21  Appt scheduled : 9-17-22  Last refill:7-21-21

## 2022-02-25 DIAGNOSIS — Z79.01 LONG TERM (CURRENT) USE OF ANTICOAGULANTS: ICD-10-CM

## 2022-02-25 DIAGNOSIS — I48.91 ATRIAL FIBRILLATION, UNSPECIFIED TYPE (HCC): ICD-10-CM

## 2022-02-25 LAB
INR BLD: 2.73 (ref 0.88–1.12)
PROTHROMBIN TIME: 32.2 SEC (ref 9.9–12.7)

## 2022-03-02 ENCOUNTER — TELEPHONE (OUTPATIENT)
Dept: VASCULAR SURGERY | Age: 72
End: 2022-03-02

## 2022-03-02 DIAGNOSIS — I65.23 CAROTID ATHEROSCLEROSIS, BILATERAL: ICD-10-CM

## 2022-03-02 DIAGNOSIS — M51.9 LUMBAR DISC DISEASE: Primary | ICD-10-CM

## 2022-03-02 NOTE — TELEPHONE ENCOUNTER
Discussed results with patient. Carotid duplex shows 50-69% stenosis of bilateral ICAs. This is not causing her shoulder or neck pain. Would recommend repeat carotid duplex in 6 months for continued surveillance. Continue ASA and statin therapy. BLE arterial duplex shows right CATALINA occlusion and bilateral SFA stenosis > 50%. Patient complains of pain in both legs, starts in her buttocks and radiates down both legs. Worse in the morning and when she walks a lot. Both legs are the same. She underwent right AT, peroneal and tibioperoneal trunk intervention in October 2021 and reports no improvement. Discussed with patient that I do not think all her symptoms are related to arterial disease and could have some underlying lumbar disc disease. Recommended follow up with PCP to further evaluate this but she does not have a PCP at this time. She also reports she cannot have a closed MRI due to claustrophobia and will not drive to any other location except [facility]. I will order lumbar x-ray to further evaluate and may need referral to ortho for further evaluation. Also discussed option of moving forward with repeat peripheral angiogram to ensure no further arterial disease contributing to her symptoms. She is agreeable to this but unsure she will be able to have someone take her for the procedure. I will have our office contact patient to assist with scheduling.     Electronically signed by KARTHIKEYAN Martinez CNP on 3/2/2022 at 2:38 PM

## 2022-03-03 ENCOUNTER — PREP FOR PROCEDURE (OUTPATIENT)
Dept: VASCULAR SURGERY | Age: 72
End: 2022-03-03

## 2022-03-03 ENCOUNTER — TELEPHONE (OUTPATIENT)
Dept: VASCULAR SURGERY | Age: 72
End: 2022-03-03

## 2022-03-03 NOTE — TELEPHONE ENCOUNTER
----- Message from KATRHIKEYAN Shelley CNP sent at 3/2/2022  2:45 PM EST -----  Patient needs repeat carotid duplex in 6 months (order entered)  She needs lumbar x-ray (order entered). She needs to be set up for abdominal aortogram with bilateral runoff with possible intervention.       Thanks

## 2022-03-04 RX ORDER — SODIUM CHLORIDE 0.9 % (FLUSH) 0.9 %
5-40 SYRINGE (ML) INJECTION EVERY 12 HOURS SCHEDULED
Status: CANCELLED | OUTPATIENT
Start: 2022-03-04

## 2022-03-04 RX ORDER — SODIUM CHLORIDE 9 MG/ML
25 INJECTION, SOLUTION INTRAVENOUS PRN
Status: CANCELLED | OUTPATIENT
Start: 2022-03-04

## 2022-03-04 RX ORDER — SODIUM CHLORIDE 9 MG/ML
INJECTION, SOLUTION INTRAVENOUS CONTINUOUS
Status: CANCELLED | OUTPATIENT
Start: 2022-03-04

## 2022-03-04 RX ORDER — SODIUM CHLORIDE 0.9 % (FLUSH) 0.9 %
5-40 SYRINGE (ML) INJECTION PRN
Status: CANCELLED | OUTPATIENT
Start: 2022-03-04

## 2022-03-07 ENCOUNTER — TELEPHONE (OUTPATIENT)
Dept: FAMILY MEDICINE CLINIC | Age: 72
End: 2022-03-07

## 2022-03-07 NOTE — TELEPHONE ENCOUNTER
----- Message from Pascual Noland sent at 3/4/2022 11:52 AM EST -----  Subject: Referral Request    QUESTIONS   Reason for referral request? pt is asking for a referral to a   dermatologist--ts has moles om her stomach that have turned grey and 1   has bleed some--is asking for a specialist in the [facility]   only. ..dont leave vm plz keep calling until pt answers   Has the physician seen you for this condition before? No   Preferred Specialist (if applicable)? Do you already have an appointment scheduled? No  Additional Information for Provider?   ---------------------------------------------------------------------------  --------------  CALL BACK INFO  What is the best way for the office to contact you? Do not leave any   message, patient will call back for answer  Preferred Call Back Phone Number?  [phone]

## 2022-03-08 ENCOUNTER — TELEPHONE (OUTPATIENT)
Dept: WOMENS IMAGING | Age: 72
End: 2022-03-08

## 2022-03-08 NOTE — TELEPHONE ENCOUNTER
Left message for patient on  requesting return call. Reaching out to clarify what happened today - she left after registering for a left mammogram and US, frustrated with having to wait but also seemed confused on why it was scheduled. NN called current PCP office, Dr Cori Burns and spoke with Chaparrita Cedeno and their office did not place any order and recent visit did not indicated any breast symptoms.      Pt's history form from today indicated no current breast problems so NN reaching out to see if eligible and would prefer to schedule a screening mammogram.

## 2022-03-08 NOTE — TELEPHONE ENCOUNTER
Gave pt info for Dr. Lisseth Junior office, offered other numbers , but pt refuses to go outside of [facility] area.

## 2022-03-08 NOTE — TELEPHONE ENCOUNTER
Patient is calling stating that this office that we referred her to doesn't take her insurance. I asked if she has tried to call her insurance and see who they would be covered with. She states she tried.      Please advise

## 2022-03-09 ENCOUNTER — TELEPHONE (OUTPATIENT)
Dept: WOMENS IMAGING | Age: 72
End: 2022-03-09

## 2022-03-09 NOTE — TELEPHONE ENCOUNTER
Patient returning phone call from yesterday. Patient stated that when she arrived they did not have any information on her and that she needed to go back out front and register. When she did go to register, she stated that they could not find her in the system and was told they were too busy to do. She had to be at work at 11:30 and left. Patient rescheduled 3/17/22. Only screening mammogram needed. No pain or issues at this time. Pt told she would be here approximately 30-45 minutes depending on schedule of that day.

## 2022-03-17 ENCOUNTER — TELEPHONE (OUTPATIENT)
Dept: CARDIOLOGY CLINIC | Age: 72
End: 2022-03-17

## 2022-03-17 NOTE — TELEPHONE ENCOUNTER
I spoke with pt. She stated that she received a letter from [employer] and [employer] [facility]. They wanted more information. I told pt that I would call on Monday and see where the issue is and call her back on Monday.  She was agreeable for me to leave a VM if unable to reach

## 2022-03-21 ENCOUNTER — TELEPHONE (OUTPATIENT)
Dept: VASCULAR SURGERY | Age: 72
End: 2022-03-21

## 2022-03-21 NOTE — TELEPHONE ENCOUNTER
Please call patient she has a question about the relationship between her heart and her leg problems. A nurse at [facility] told her nothing could be done for her heart trouble because of her legs. She is confused. Please call her at [phone] until 4:30 and then going to work. It is okay to call her tomorrow.

## 2022-03-21 NOTE — TELEPHONE ENCOUNTER
Confirmed with patient again about angiogram on 3/30 and Xray placed for spine. Patient understands and said she will go get x-ray tomorrow.

## 2022-03-23 ENCOUNTER — TELEPHONE (OUTPATIENT)
Dept: WOMENS IMAGING | Age: 72
End: 2022-03-23

## 2022-03-23 ENCOUNTER — TELEPHONE (OUTPATIENT)
Dept: FAMILY MEDICINE CLINIC | Age: 72
End: 2022-03-23

## 2022-03-23 DIAGNOSIS — Z12.31 ENCOUNTER FOR SCREENING MAMMOGRAM FOR MALIGNANT NEOPLASM OF BREAST: Primary | ICD-10-CM

## 2022-03-23 NOTE — TELEPHONE ENCOUNTER
Aris Phelan from [facility] [phone] is requesting an Order for a Mammogram for the PT. Sanjeev Farah

## 2022-03-23 NOTE — TELEPHONE ENCOUNTER
Called patient regarding upcoming appointment. Wanted to verify conversation from last week. Patient now stating she has pain off and on on her left breast.  Keeping order as diagnostic and ultrasound.

## 2022-03-24 ENCOUNTER — HOSPITAL ENCOUNTER (OUTPATIENT)
Dept: WOMENS IMAGING | Age: 72
Discharge: HOME | End: 2022-03-24
Payer: MEDICARE

## 2022-03-24 ENCOUNTER — HOSPITAL ENCOUNTER (OUTPATIENT)
Dept: ULTRASOUND IMAGING | Age: 72
End: 2022-03-24
Payer: MEDICARE

## 2022-03-24 VITALS — HEIGHT: 56 IN | BODY MASS INDEX: 29.25 KG/M2 | WEIGHT: 130 LBS

## 2022-03-24 DIAGNOSIS — Z12.31 ENCOUNTER FOR SCREENING MAMMOGRAM FOR MALIGNANT NEOPLASM OF BREAST: ICD-10-CM

## 2022-03-24 PROCEDURE — G0279 TOMOSYNTHESIS, MAMMO: HCPCS

## 2022-03-28 ENCOUNTER — TELEPHONE (OUTPATIENT)
Dept: CARDIOLOGY CLINIC | Age: 72
End: 2022-03-28

## 2022-03-28 NOTE — TELEPHONE ENCOUNTER
I called pt to see where we are in the process for financial assistance for Xarelto. She state that she turned in paperwork and she was given samples. Unsure whether approved or denied. I called [employer] and asked of the status of her a[pplication. He stated that they have all the info and will make high priority to get a determination.

## 2022-03-30 ENCOUNTER — HOSPITAL ENCOUNTER (OUTPATIENT)
Dept: CARDIAC CATH/INVASIVE PROCEDURES | Age: 72
Discharge: HOME | End: 2022-03-30
Attending: SURGERY | Admitting: SURGERY
Payer: MEDICARE

## 2022-03-30 VITALS
BODY MASS INDEX: 29.25 KG/M2 | SYSTOLIC BLOOD PRESSURE: 149 MMHG | RESPIRATION RATE: 18 BRPM | TEMPERATURE: 98 F | HEART RATE: 82 BPM | WEIGHT: 130 LBS | OXYGEN SATURATION: 96 % | DIASTOLIC BLOOD PRESSURE: 84 MMHG | HEIGHT: 56 IN

## 2022-03-30 LAB
ANION GAP SERPL CALCULATED.3IONS-SCNC: 9 MMOL/L (ref 3–16)
BUN BLDV-MCNC: 19 MG/DL (ref 7–20)
CALCIUM SERPL-MCNC: 10.8 MG/DL (ref 8.3–10.6)
CHLORIDE BLD-SCNC: 102 MMOL/L (ref 99–110)
CO2: 26 MMOL/L (ref 21–32)
CREAT SERPL-MCNC: 0.9 MG/DL (ref 0.6–1.2)
GFR AFRICAN AMERICAN: >60
GFR NON-AFRICAN AMERICAN: >60
GLUCOSE BLD-MCNC: 113 MG/DL (ref 70–99)
HCT VFR BLD CALC: 35.7 % (ref 36–48)
HEMOGLOBIN: 11.3 G/DL (ref 12–16)
MCH RBC QN AUTO: 23.6 PG (ref 26–34)
MCHC RBC AUTO-ENTMCNC: 31.5 G/DL (ref 31–36)
MCV RBC AUTO: 74.9 FL (ref 80–100)
PDW BLD-RTO: 18 % (ref 12.4–15.4)
PLATELET # BLD: 156 K/UL (ref 135–450)
PMV BLD AUTO: 7.4 FL (ref 5–10.5)
POTASSIUM SERPL-SCNC: 4.6 MMOL/L (ref 3.5–5.1)
RBC # BLD: 4.77 M/UL (ref 4–5.2)
SODIUM BLD-SCNC: 137 MMOL/L (ref 136–145)
WBC # BLD: 6.5 K/UL (ref 4–11)

## 2022-03-30 PROCEDURE — C1887 CATHETER, GUIDING: HCPCS

## 2022-03-30 PROCEDURE — 99152 MOD SED SAME PHYS/QHP 5/>YRS: CPT

## 2022-03-30 PROCEDURE — 80048 BASIC METABOLIC PNL TOTAL CA: CPT

## 2022-03-30 PROCEDURE — 6360000004 HC RX CONTRAST MEDICATION: Performed by: SURGERY

## 2022-03-30 PROCEDURE — 75716 ARTERY X-RAYS ARMS/LEGS: CPT

## 2022-03-30 PROCEDURE — 76937 US GUIDE VASCULAR ACCESS: CPT | Performed by: SURGERY

## 2022-03-30 PROCEDURE — 36415 COLL VENOUS BLD VENIPUNCTURE: CPT

## 2022-03-30 PROCEDURE — 99153 MOD SED SAME PHYS/QHP EA: CPT

## 2022-03-30 PROCEDURE — C1769 GUIDE WIRE: HCPCS

## 2022-03-30 PROCEDURE — 85027 COMPLETE CBC AUTOMATED: CPT

## 2022-03-30 PROCEDURE — 75625 CONTRAST EXAM ABDOMINL AORTA: CPT

## 2022-03-30 PROCEDURE — 2500000003 HC RX 250 WO HCPCS

## 2022-03-30 PROCEDURE — 75625 CONTRAST EXAM ABDOMINL AORTA: CPT | Performed by: SURGERY

## 2022-03-30 PROCEDURE — C1894 INTRO/SHEATH, NON-LASER: HCPCS

## 2022-03-30 PROCEDURE — 99152 MOD SED SAME PHYS/QHP 5/>YRS: CPT | Performed by: SURGERY

## 2022-03-30 PROCEDURE — 36200 PLACE CATHETER IN AORTA: CPT

## 2022-03-30 PROCEDURE — 75716 ARTERY X-RAYS ARMS/LEGS: CPT | Performed by: SURGERY

## 2022-03-30 PROCEDURE — 6360000002 HC RX W HCPCS

## 2022-03-30 RX ORDER — IODIXANOL 320 MG/ML
50 INJECTION, SOLUTION INTRAVASCULAR
Status: COMPLETED | OUTPATIENT
Start: 2022-03-30 | End: 2022-03-30

## 2022-03-30 RX ORDER — SODIUM CHLORIDE 9 MG/ML
25 INJECTION, SOLUTION INTRAVENOUS PRN
Status: DISCONTINUED | OUTPATIENT
Start: 2022-03-30 | End: 2022-03-30 | Stop reason: HOSPADM

## 2022-03-30 RX ORDER — SODIUM CHLORIDE 0.9 % (FLUSH) 0.9 %
5-40 SYRINGE (ML) INJECTION EVERY 12 HOURS SCHEDULED
Status: DISCONTINUED | OUTPATIENT
Start: 2022-03-30 | End: 2022-03-30 | Stop reason: HOSPADM

## 2022-03-30 RX ORDER — SODIUM CHLORIDE 9 MG/ML
INJECTION, SOLUTION INTRAVENOUS CONTINUOUS
Status: DISCONTINUED | OUTPATIENT
Start: 2022-03-30 | End: 2022-03-30 | Stop reason: HOSPADM

## 2022-03-30 RX ORDER — SODIUM CHLORIDE 0.9 % (FLUSH) 0.9 %
5-40 SYRINGE (ML) INJECTION PRN
Status: DISCONTINUED | OUTPATIENT
Start: 2022-03-30 | End: 2022-03-30 | Stop reason: HOSPADM

## 2022-03-30 RX ADMIN — IODIXANOL 50 ML: 320 INJECTION, SOLUTION INTRAVASCULAR at 12:36

## 2022-03-30 NOTE — H&P
Consultation/History & Physical    Date of Admission:  3/30/2022  Date of Consultation:  3/30/2022    PCP:  Christin Stanley MD     Chief Complaint: BLE rest pain    History of Present Illness:  Waylon Vincent is a 66 y.o. female who presents with BLE rest pain. She presents today for peripheral angiogram.     PMH:   has a past medical history of AF (atrial fibrillation) ([address] Back pain, CAD (coronary artery disease), Centrilobular emphysema ([address] Compression fracture, Hyperlipidemia, Hypertension, Myelolipoma, PVD (peripheral vascular disease) ([address] Right ear injury, Shingles, and Valvular disease. PSH:   has a past surgical history that includes Coronary angioplasty with stent (2014, 3/4/21); Coronary angioplasty with stent (03/14/2003 & 06/02/2003); Hysterectomy (09/01/1983); Tubal ligation (09/1983); and ventral hernia repair (5-). Allergies: Allergies   Allergen Reactions    Actos [Pioglitazone]     Percocet [Oxycodone-Acetaminophen]      Confused , loopy    Vicodin [Hydrocodone-Acetaminophen]         Home Meds:    Prior to Admission medications    Medication Sig Start Date End Date Taking?  Authorizing Provider   rivaroxaban (XARELTO) 20 MG TABS tablet Take 1 tablet by mouth daily (with breakfast) 2/25/22   KARTHIKEYAN Gomez CNP   rosuvastatin (CRESTOR) 10 MG tablet TAKE ONE TABLET BY MOUTH DAILY 2/24/22   Dinora Bentley MD   acetaminophen (TYLENOL) 500 MG tablet Take 1 tablet by mouth 4 times daily as needed for Pain 2/8/22   KARTHIKEYAN Starr CNP   albuterol sulfate  (90 Base) MCG/ACT inhaler INHALE 2 PUFFS BY MOUTH FOUR TIMES DAILY 1/10/22   Historical Provider, MD   nitroGLYCERIN (NITROSTAT) 0.4 MG SL tablet Place 1 tablet under the tongue every 5 minutes as needed for Chest pain 10/13/21   Dinora Bentley MD   Coenzyme Q10 (COQ-10) 100 MG CPCR Take 100 mg by mouth daily 6/3/21   KARTHIKEYAN Aguilar CNP   atenolol (TENORMIN) 25 MG tablet Take 1 tablet by mouth daily 21   Annette Alonso APRN - CNP   OMEPRAZOLE PO Take by mouth daily as needed    Historical Provider, MD   Specialty Vitamins Products (VITAMINS FOR THE HAIR PO) Take by mouth daily    Historical Provider, MD   Magnesium Oxide 250 MG TABS Take 2 tablets by mouth daily  Patient taking differently: Take 250 mg by mouth daily  16   Kathy Cleveland MD        [facility] Meds:    Current Facility-Administered Medications   Medication Dose Route Frequency Provider Last Rate Last Admin    0.9 % sodium chloride infusion   IntraVENous Continuous Francine Marie APRN - CNP        0.9 % sodium chloride infusion  25 mL IntraVENous PRN Francine Frank APRN - CNP        sodium chloride flush 0.9 % injection 5-40 mL  5-40 mL IntraVENous 2 times per day Francine Marie APRN - CNP        sodium chloride flush 0.9 % injection 5-40 mL  5-40 mL IntraVENous PRN Francine Frank APRN - CNP           Social History:       Social History     Socioeconomic History    Marital status:       Spouse name: Not on file    Number of children: Not on file    Years of education: Not on file    Highest education level: Not on file   Occupational History    Occupation: cleans woods   Tobacco Use    Smoking status: Former Smoker     Years: 40.00     Types: Cigarettes     Quit date: 3/1/2014     Years since quittin.0    Smokeless tobacco: Never Used    Tobacco comment: Trying to quit   Vaping Use    Vaping Use: Never used   Substance and Sexual Activity    Alcohol use: No     Alcohol/week: 3.0 standard drinks     Types: 3 Cans of beer per week    Drug use: No    Sexual activity: Not Currently   Other Topics Concern    Not on file   Social History Narrative    Not on file     Social Determinants of Health     Financial Resource Strain: Low Risk     Difficulty of Paying Living Expenses: Not hard at all   Food Insecurity: No Food Insecurity    Worried About [address] in the Last Year: Never true    Aubrey of Food in the Last Year: Never true   Transportation Needs:     Lack of Transportation (Medical): Not on file    Lack of Transportation (Non-Medical):  Not on file   Physical Activity:     Days of Exercise per Week: Not on file    Minutes of Exercise per Session: Not on file   Stress:     Feeling of Stress : Not on file   Social Connections:     Frequency of Communication with Friends and Family: Not on file    Frequency of Social Gatherings with Friends and Family: Not on file    Attends [employer] Services: Not on file    Active Member of [address] [place] [address] [employer] or Organizations: Not on file    Attends Club or Organization Meetings: Not on file    Marital Status: Not on file   Intimate Partner Violence:     Fear of Current or Ex-Partner: Not on file    Emotionally Abused: Not on file    Physically Abused: Not on file    Sexually Abused: Not on file   Housing Stability:     Unable to Pay for Housing in the Last Year: Not on file    Number of [place] in the Last Year: Not on file    Unstable Housing in the Last Year: Not on file       Family Histroy:      Family History   Problem Relation Age of Onset    Heart Attack Father 76    Heart Disease Father         complications from surgery, per pt    Heart Attack Mother 64       Review of Systems:  Review of systems performed and negative with the exception of the above findings        Physical Exam   Vital Signs: BP (!) 149/84   Pulse 82   Temp 98 °F (36.7 °C)   Resp 18   Ht 4' 8\" (1.422 m)   Wt 130 lb (59 kg)   LMP 01/01/1983   SpO2 96%   BMI 29.15 kg/m²        Admission Weight: 130 lb (59 kg)   ASA 3 - Patient with moderate systemic disease with functional limitations    Mallampati Airway Assessment:  Mallampati Class II - (soft palate, fauces & uvula are visible)    General appearance: alert, appears stated age, cooperative and no distress  Head: Normocephalic, without obvious abnormality, atraumatic  Lungs: clear to auscultation bilaterally  Heart: regular rate and rhythm, S1, S2 normal, no murmur, click, rub or gallop  Abdomen: soft, non-tender.  Bowel sounds normal. No masses,  no organomegaly  Extremities: extremities normal, atraumatic, no cyanosis or edema  Pulses:      Labs:    BMP:   Lab Results   Component Value Date     02/08/2022    K 4.5 02/08/2022     02/08/2022    CO2 19 02/08/2022    PHOS 3.0 05/08/2019    BUN 17 02/08/2022    CREATININE 0.8 02/08/2022      No results found for: GLU  Lab Results   Component Value Date    MG 1.6 06/20/2017      CBC:   Lab Results   Component Value Date    WBC 6.5 03/30/2022    HGB 11.3 03/30/2022    HCT 35.7 03/30/2022    MCV 74.9 03/30/2022     03/30/2022      Coagulation:   Lab Results   Component Value Date    INR 2.73 02/25/2022    APTT 28.8 03/08/2021     Cardiac markers:   Lab Results   Component Value Date    CKTOTAL 175 01/22/2019    TROPONINI <0.01 02/08/2022     Lab Results   Component Value Date    LABA1C 6.3 01/22/2019    No results found for: ALB    Lab Results   Component Value Date    BILITOT 0.3 02/08/2022    BILIDIR 0.2 05/08/2019    AST 19 02/08/2022    ALT 18 02/08/2022    ALKPHOS 85 02/08/2022      Lab Results   Component Value Date    CHOL 118 07/31/2021    HDL 48 07/31/2021    HDL 52 05/21/2012    LDLCALC 51 07/31/2021    TRIG 101 07/31/2021          Diagnosis:  Patient Active Problem List   Diagnosis    Pure hypercholesterolemia    Coronary artery disease involving native coronary artery of native heart without angina pectoris    Mitral valve disorder    Preop cardiovascular exam    Atrial fibrillation (HCC)    Mitral regurgitation    Ischemic chest pain (HCC)    Valvular disease    SOB (shortness of breath)    Atherosclerosis of native arteries of extremities with intermittent claudication, right leg (HCC)    Positive cardiac stress test    Essential hypertension    Hyperlipidemia    Unstable angina ([address]    Centrilobular emphysema (HCC)  Former smoker           Plan: Aortogram with runoff        Jeovany Gamez M.D., FACS.   3/30/2022  11:34 AM

## 2022-03-31 NOTE — OP NOTE
[address]                     [address] [facility], [facility]                                OPERATIVE REPORT    PATIENT NAME: Issa Neff                    :        1950  MED REC NO:   [id number]                          ROOM:  ACCOUNT NO:   [de-identified]                           ADMIT DATE: 2022  PROVIDER:     Nilo Hernandez Ii, MD    DATE OF PROCEDURE:  2022    PREPROCEDURE DIAGNOSIS:  Bilateral lower extremity claudication. POSTPROCEDURE DIAGNOSIS:  Bilateral lower extremity claudication. PROCEDURE:  1. Ultrasound-guided left common femoral artery access. 2.  Abdominal aortogram with bilateral lower extremity runoff. SURGEON:  Loida Raygoza MD    ANESTHESIA:  Local/IV. ESTIMATED BLOOD LOSS:  Minimal.    COMPLICATIONS:  None. INDICATIONS:  The patient is a 79-year-old female with long history of  tobacco abuse and complaints of lower extremity discomfort at rest and  with activity. She does have a known history of lumbar disease. Her  noninvasive studies would suggest mild arterial insufficiency. She  presents today for further imaging and possible intervention. All  risks, benefits, and alternatives were discussed in detail. All  questions were answered. PROCEDURE:  After witnessed informed consent was obtained, the patient  was brought to the cath lab where under my supervision Versed and  fentanyl were administered intravenously for moderate sedation. Pulse  oximetry, heart rate and blood pressure were monitored by an independent  trained observer that was present. I spent 28 minutes of face-to-face  sedation time with the patient. Her left groin was carefully prepped  and draped. Ultrasound was used to identify the left common femoral  artery which was noted to be patent. An image was saved to the  patient's permanent medical record.   Under direct visualization, it was  accessed with a 4-Mexican micropuncture needle. Bentson wire was  introduced and a 5-Thai sheath was placed. An Omni Flush catheter was  inserted to the level of the renal arteries and an abdominal aortogram  was performed. It was pulled back to the level of the aortic  bifurcation. Oblique iliac views and a bilateral lower extremity runoff  was performed. There was very mild diffuse disease with moderate  calcification throughout. No critical stenosis that would explain the  patient's symptoms likely related to her lumbar disc etiology. Procedure was terminated at this point. Manual pressure was held on her  left groin and she was transferred to recovery room in stable condition. FINDINGS:  1. Abdominal aortogram:  Right renal with approximately 40% stenosis. Left renal is patent and heavily calcified. Infrarenal abdominal aorta  is widely patent. Bilateral common, external and internal iliac  arteries showed diffuse very mild atherosclerosis. No focal significant  stenosis. Heavy calcification is noted throughout. 2.  Right lower extremity runoff:  Right common femoral and profunda are  patent. SFA shows intermittent areas of approximately 20-30% stenosis. No critical stenosis identified. Popliteal is patent and there is a  two-vessel posterior tibial and peroneal runoff on the right. 3.  Left lower extremity runoff:  Left common femoral and profunda are  patent. SFA is patent. Popliteal is patent. There is a two-vessel  runoff on the left. PLAN:  The patient with no significant arterial insufficiency on  angiogram today. I do feel that her symptoms are stemming from her  lumbar disc disease which is well established. We will continue with  current medical management.         Nya Nicholson MD    D: 03/30/2022 12:33:15       T: 03/30/2022 12:35:37     [email or web address]  Job#: [id number]     Doc#: [id number]    CC:

## 2022-04-14 ENCOUNTER — NURSE TRIAGE (OUTPATIENT)
Dept: OTHER | Facility: CLINIC | Age: 72
End: 2022-04-14

## 2022-04-14 ENCOUNTER — TELEPHONE (OUTPATIENT)
Dept: CARDIOLOGY CLINIC | Age: 72
End: 2022-04-14

## 2022-04-14 NOTE — TELEPHONE ENCOUNTER
Received call from Radha Dominguez at [facility] with Red Flag Complaint. Subjective: Caller states \"Left shoulder pain\"     Current Symptoms: Left shoulder pain and went to ED in Feb 8, continues to have pain. Onset: a few months ago; intermittent    Associated Symptoms: NA    Pain Severity: 10/10; sharp; intermittent    Temperature: denies fever    What has been tried: None    LMP: NA Pregnant: NA    Recommended disposition: See in Office Within 2 Weeks    Care advice provided, patient verbalizes understanding; denies any other questions or concerns; instructed to call back for any new or worsening symptoms. Patient/Caller agrees with recommended disposition; writer provided warm transfer to [facility] at [facility] for appointment scheduling     Attention Provider: Thank you for allowing me to participate in the care of your patient. The patient was connected to triage in response to information provided to the ECC/PSC. Please do not respond through this encounter as the response is not directed to a shared pool.         Reason for Disposition   Shoulder pain is a chronic symptom (recurrent or ongoing AND present > 4 weeks)    Protocols used: SHOULDER PAIN-ADULT-[place]

## 2022-04-14 NOTE — TELEPHONE ENCOUNTER
Called Joselito Hernandez for update on patients assistance paperwork. We should hear something within 24-48 hrs. Called patient relayed information. Patient verbalized understanding.

## 2022-04-14 NOTE — TELEPHONE ENCOUNTER
Please provide Xarelto 20 mg samples for this pt. Demetrice Hammer have been contacted for patient assistance. Please call to check on the progress. [phone].

## 2022-04-18 ENCOUNTER — HOSPITAL ENCOUNTER (OUTPATIENT)
Age: 72
Discharge: HOME | End: 2022-04-18
Payer: MEDICARE

## 2022-04-18 ENCOUNTER — HOSPITAL ENCOUNTER (OUTPATIENT)
Dept: GENERAL RADIOLOGY | Age: 72
Discharge: HOME | End: 2022-04-18
Payer: MEDICARE

## 2022-04-18 DIAGNOSIS — E78.2 MIXED HYPERLIPIDEMIA: ICD-10-CM

## 2022-04-18 DIAGNOSIS — M51.9 LUMBAR DISC DISEASE: ICD-10-CM

## 2022-04-18 DIAGNOSIS — I48.91 ATRIAL FIBRILLATION, UNSPECIFIED TYPE (HCC): ICD-10-CM

## 2022-04-18 DIAGNOSIS — Z79.01 LONG TERM (CURRENT) USE OF ANTICOAGULANTS: ICD-10-CM

## 2022-04-18 PROCEDURE — 72100 X-RAY EXAM L-S SPINE 2/3 VWS: CPT

## 2022-04-19 ENCOUNTER — TELEPHONE (OUTPATIENT)
Dept: CARDIOLOGY CLINIC | Age: 72
End: 2022-04-19

## 2022-04-19 NOTE — TELEPHONE ENCOUNTER
I spoke with Flor Wright at [employer]. I was checking in the status of pt application. She stated still being reviewed. I told her that it has been over a month.

## 2022-04-25 ENCOUNTER — TELEPHONE (OUTPATIENT)
Dept: VASCULAR SURGERY | Age: 72
End: 2022-04-25

## 2022-04-25 NOTE — TELEPHONE ENCOUNTER
Discussed results of lumbar x-ray with patient which does show evidence of lumbar disc disease which is likely contributing to her symptoms. Would recommend referral to Dr. Madhavi Moses office.   Will have our office send referral.      Electronically signed by KARTHIKEYAN Londono CNP on 4/25/2022 at 3:32 PM

## 2022-04-26 ENCOUNTER — TELEPHONE (OUTPATIENT)
Dept: VASCULAR SURGERY | Age: 72
End: 2022-04-26

## 2022-04-27 ENCOUNTER — TELEPHONE (OUTPATIENT)
Dept: CARDIOLOGY CLINIC | Age: 72
End: 2022-04-27

## 2022-04-27 ENCOUNTER — OFFICE VISIT (OUTPATIENT)
Dept: FAMILY MEDICINE CLINIC | Age: 72
End: 2022-04-27
Payer: MEDICARE

## 2022-04-27 VITALS
DIASTOLIC BLOOD PRESSURE: 70 MMHG | WEIGHT: 130 LBS | SYSTOLIC BLOOD PRESSURE: 110 MMHG | TEMPERATURE: 97.5 F | BODY MASS INDEX: 29.15 KG/M2

## 2022-04-27 DIAGNOSIS — M25.512 BILATERAL SHOULDER PAIN, UNSPECIFIED CHRONICITY: ICD-10-CM

## 2022-04-27 DIAGNOSIS — J01.90 ACUTE BACTERIAL SINUSITIS: Primary | ICD-10-CM

## 2022-04-27 DIAGNOSIS — H61.23 BILATERAL IMPACTED CERUMEN: ICD-10-CM

## 2022-04-27 DIAGNOSIS — B96.89 ACUTE BACTERIAL SINUSITIS: Primary | ICD-10-CM

## 2022-04-27 DIAGNOSIS — M25.511 BILATERAL SHOULDER PAIN, UNSPECIFIED CHRONICITY: ICD-10-CM

## 2022-04-27 PROCEDURE — 1090F PRES/ABSN URINE INCON ASSESS: CPT | Performed by: FAMILY MEDICINE

## 2022-04-27 PROCEDURE — 1123F ACP DISCUSS/DSCN MKR DOCD: CPT | Performed by: FAMILY MEDICINE

## 2022-04-27 PROCEDURE — G8400 PT W/DXA NO RESULTS DOC: HCPCS | Performed by: FAMILY MEDICINE

## 2022-04-27 PROCEDURE — 1036F TOBACCO NON-USER: CPT | Performed by: FAMILY MEDICINE

## 2022-04-27 PROCEDURE — 3017F COLORECTAL CA SCREEN DOC REV: CPT | Performed by: FAMILY MEDICINE

## 2022-04-27 PROCEDURE — 99214 OFFICE O/P EST MOD 30 MIN: CPT | Performed by: FAMILY MEDICINE

## 2022-04-27 PROCEDURE — 4040F PNEUMOC VAC/ADMIN/RCVD: CPT | Performed by: FAMILY MEDICINE

## 2022-04-27 PROCEDURE — G8427 DOCREV CUR MEDS BY ELIG CLIN: HCPCS | Performed by: FAMILY MEDICINE

## 2022-04-27 PROCEDURE — G8417 CALC BMI ABV UP PARAM F/U: HCPCS | Performed by: FAMILY MEDICINE

## 2022-04-27 RX ORDER — AMOXICILLIN AND CLAVULANATE POTASSIUM 875; 125 MG/1; MG/1
1 TABLET, FILM COATED ORAL 2 TIMES DAILY
Qty: 20 TABLET | Refills: 0 | Status: SHIPPED | OUTPATIENT
Start: 2022-04-27 | End: 2022-04-29

## 2022-04-27 RX ORDER — PREDNISONE 10 MG/1
TABLET ORAL
Qty: 30 TABLET | Refills: 0 | Status: SHIPPED | OUTPATIENT
Start: 2022-04-27 | End: 2022-05-27

## 2022-04-27 ASSESSMENT — ENCOUNTER SYMPTOMS
SORE THROAT: 1
COUGH: 1
RHINORRHEA: 1
SHORTNESS OF BREATH: 1

## 2022-04-27 NOTE — TELEPHONE ENCOUNTER
I'm callin [employer] to check the status of pt application for financial assistance, This is the 4th call. IO spoke with [employer] at [employer]. I asked if there was someone higher that could push this through since no one seems to know what the delay is. I spoke with Rafita at [employer], she stated that the application needed pt signature and pt must have a total of $1072 out of pocket expenses to qualify.

## 2022-04-27 NOTE — PROGRESS NOTES
SUBJECTIVE:    Dylan Hernandez is a 66 y.o. female who presents for a follow up visit. Chief Complaint   Patient presents with    Shoulder Pain     C/O left shoulder pain, does a lot of lifting at work, but no specific injury. Ongoing cough/congestion the past couple weeks. No fever. Shoulder Pain   The pain is present in the left shoulder and right shoulder. This is a chronic problem. The current episode started more than 1 month ago. The problem occurs daily. The quality of the pain is described as aching. The pain is mild. Pertinent negatives include no fever or limited range of motion. She has tried acetaminophen and rest for the symptoms. The treatment provided moderate relief. Ear Fullness   There is pain in both ears. The problem occurs constantly. There has been no fever. Associated symptoms include coughing, hearing loss, rhinorrhea and a sore throat. She has tried nothing for the symptoms. The treatment provided no relief. Cough  This is a recurrent problem. The current episode started 1 to 4 weeks ago. The problem has been waxing and waning. The cough is productive of sputum. Associated symptoms include nasal congestion, rhinorrhea, a sore throat and shortness of breath. Pertinent negatives include no chest pain or fever. The symptoms are aggravated by exercise. Patient's medications, allergies, past medical,surgical, social and family histories were reviewed and updated as appropriate. Past Medical History:   Diagnosis Date    AF (atrial fibrillation) (HCC)     on anticoagulation    Back pain     compaound fracture    CAD (coronary artery disease)     Centrilobular emphysema ([address] 3/16/2021    Compression fracture 01/01/1994    Hyperlipidemia     Hypertension     Myelolipoma     bilateral adrenals    PVD (peripheral vascular disease) ([address]     Right ear injury 01/01/1983    Per pt.     Shingles     Valvular disease      Past Surgical History:   Procedure Laterality Date    CORONARY ANGIOPLASTY WITH STENT PLACEMENT  2014, 3/4/21    CORONARY ANGIOPLASTY WITH STENT PLACEMENT  2003 & 2003    per pt    HYSTERECTOMY  1983    fibroids    TUBAL LIGATION  1983    VENTRAL HERNIA REPAIR  2013    VENTRAL HERNIA REPAIR WITH MESH            Family History   Problem Relation Age of Onset    Heart Attack Father 76    Heart Disease Father         complications from surgery, per pt    Heart Attack Mother 64     Social History     Tobacco Use    Smoking status: Former Smoker     Years: 40.00     Types: Cigarettes     Quit date: 3/1/2014     Years since quittin.1    Smokeless tobacco: Never Used    Tobacco comment: Trying to quit   Substance Use Topics    Alcohol use: No     Alcohol/week: 3.0 standard drinks     Types: 3 Cans of beer per week      Allergies   Allergen Reactions    Actos [Pioglitazone]     Percocet [Oxycodone-Acetaminophen]      Confused , loopy    Vicodin [Hydrocodone-Acetaminophen]      Current Outpatient Medications on File Prior to Visit   Medication Sig Dispense Refill    rivaroxaban (XARELTO) 20 MG TABS tablet Take 1 tablet by mouth daily (with breakfast) 90 tablet 1    rosuvastatin (CRESTOR) 10 MG tablet TAKE ONE TABLET BY MOUTH DAILY 30 tablet 11    acetaminophen (TYLENOL) 500 MG tablet Take 1 tablet by mouth 4 times daily as needed for Pain 20 tablet 0    nitroGLYCERIN (NITROSTAT) 0.4 MG SL tablet Place 1 tablet under the tongue every 5 minutes as needed for Chest pain 25 tablet 0    Coenzyme Q10 (COQ-10) 100 MG CPCR Take 100 mg by mouth daily 90 capsule 3    atenolol (TENORMIN) 25 MG tablet Take 1 tablet by mouth daily 90 tablet 3    OMEPRAZOLE PO Take by mouth daily as needed      Specialty Vitamins Products (VITAMINS FOR THE HAIR PO) Take by mouth daily      Magnesium Oxide 250 MG TABS Take 2 tablets by mouth daily (Patient taking differently: Take 250 mg by mouth daily ) 30 tablet 5     No current facility-administered medications on file prior to visit. Review of Systems   Constitutional: Negative for fever. HENT: Positive for congestion, hearing loss, rhinorrhea and sore throat. Respiratory: Positive for cough and shortness of breath. Cardiovascular: Negative for chest pain. OBJECTIVE:    /70   Temp 97.5 °F (36.4 °C)   Wt 130 lb (59 kg)   LMP 01/01/1983   BMI 29.15 kg/m²    Physical Exam  Constitutional:       Appearance: She is well-developed. HENT:      Head: Normocephalic and atraumatic. Right Ear: External ear normal. There is impacted cerumen. Left Ear: External ear normal. There is impacted cerumen. Nose: Nose normal.   Eyes:      General:         Right eye: No discharge. Conjunctiva/sclera: Conjunctivae normal.   Neck:      Thyroid: No thyromegaly. Vascular: No JVD. Trachea: No tracheal deviation. Cardiovascular:      Rate and Rhythm: Normal rate and regular rhythm. Heart sounds: Normal heart sounds. Pulmonary:      Effort: Pulmonary effort is normal. No respiratory distress. Breath sounds: Normal breath sounds. No rales. Musculoskeletal:      Cervical back: Normal range of motion and neck supple. Lymphadenopathy:      Cervical: No cervical adenopathy. Skin:     General: Skin is warm and dry. Neurological:      Mental Status: She is alert and oriented to person, place, and time. ASSESSMENT/PLAN:    Shanique Glynn was seen today for shoulder pain. Diagnoses and all orders for this visit:    Acute bacterial sinusitis  -     amoxicillin-clavulanate (AUGMENTIN) 875-125 MG per tablet; Take 1 tablet by mouth 2 times daily for 10 days  -     predniSONE (DELTASONE) 10 MG tablet; Take 4 tablets daily for 3 days, then 3 tablets daily for 3 days, then 2 tablets daily for 3 days then 1 tablet daily until finished.     Bilateral impacted cerumen  -     carbamide peroxide (DEBROX) 6.5 % otic solution; Place 5 drops into both ears 2 times daily    Bilateral shoulder pain, unspecified chronicity  Continue Tylenol. Symptoms are improved we will have her just avoid any heavy lifting for another couple of weeks. Return in about 15 days (around 5/12/2022), or red visit. Please note portions of this note were completed with a voicerecognition program.  Efforts were made to edit the dictations but occasionally words are mis-transcribed.

## 2022-04-28 RX ORDER — ATENOLOL 25 MG/1
25 TABLET ORAL DAILY
Qty: 90 TABLET | Refills: 3 | Status: SHIPPED | OUTPATIENT
Start: 2022-04-28

## 2022-04-29 ENCOUNTER — HOSPITAL ENCOUNTER (EMERGENCY)
Age: 72
Discharge: HOME | End: 2022-04-29
Payer: MEDICARE

## 2022-04-29 ENCOUNTER — APPOINTMENT (OUTPATIENT)
Dept: GENERAL RADIOLOGY | Age: 72
End: 2022-04-29
Payer: MEDICARE

## 2022-04-29 ENCOUNTER — TELEPHONE (OUTPATIENT)
Dept: FAMILY MEDICINE CLINIC | Age: 72
End: 2022-04-29

## 2022-04-29 VITALS
HEART RATE: 92 BPM | RESPIRATION RATE: 16 BRPM | DIASTOLIC BLOOD PRESSURE: 73 MMHG | OXYGEN SATURATION: 98 % | BODY MASS INDEX: 29.4 KG/M2 | WEIGHT: 130.7 LBS | HEIGHT: 56 IN | TEMPERATURE: 97.6 F | SYSTOLIC BLOOD PRESSURE: 153 MMHG

## 2022-04-29 DIAGNOSIS — S61.031A PUNCTURE WOUND OF RIGHT THUMB, INITIAL ENCOUNTER: Primary | ICD-10-CM

## 2022-04-29 DIAGNOSIS — Z23 TETANUS TOXOID VACCINATION ADMINISTERED AT CURRENT VISIT: ICD-10-CM

## 2022-04-29 PROCEDURE — 99284 EMERGENCY DEPT VISIT MOD MDM: CPT

## 2022-04-29 PROCEDURE — 6360000002 HC RX W HCPCS: Performed by: PHYSICIAN ASSISTANT

## 2022-04-29 PROCEDURE — 90471 IMMUNIZATION ADMIN: CPT | Performed by: PHYSICIAN ASSISTANT

## 2022-04-29 PROCEDURE — 90715 TDAP VACCINE 7 YRS/> IM: CPT | Performed by: PHYSICIAN ASSISTANT

## 2022-04-29 PROCEDURE — 73140 X-RAY EXAM OF FINGER(S): CPT

## 2022-04-29 RX ORDER — DOXYCYCLINE HYCLATE 100 MG
100 TABLET ORAL 2 TIMES DAILY
Qty: 20 TABLET | Refills: 0 | Status: SHIPPED | OUTPATIENT
Start: 2022-04-29 | End: 2022-04-29 | Stop reason: SDUPTHER

## 2022-04-29 RX ORDER — DOXYCYCLINE HYCLATE 100 MG
100 TABLET ORAL 2 TIMES DAILY
Qty: 20 TABLET | Refills: 0 | Status: SHIPPED | OUTPATIENT
Start: 2022-04-29 | End: 2022-05-09

## 2022-04-29 RX ADMIN — TETANUS TOXOID, REDUCED DIPHTHERIA TOXOID AND ACELLULAR PERTUSSIS VACCINE, ADSORBED 0.5 ML: 5; 2.5; 8; 8; 2.5 SUSPENSION INTRAMUSCULAR at 08:24

## 2022-04-29 ASSESSMENT — PAIN DESCRIPTION - PAIN TYPE: TYPE: ACUTE PAIN

## 2022-04-29 ASSESSMENT — PAIN SCALES - GENERAL: PAINLEVEL_OUTOF10: 5

## 2022-04-29 ASSESSMENT — PAIN DESCRIPTION - DESCRIPTORS: DESCRIPTORS: DISCOMFORT

## 2022-04-29 ASSESSMENT — ENCOUNTER SYMPTOMS
RESPIRATORY NEGATIVE: 1
COLOR CHANGE: 1
NAUSEA: 0
COUGH: 0
BACK PAIN: 0
SHORTNESS OF BREATH: 0
ABDOMINAL PAIN: 0
VOMITING: 0

## 2022-04-29 ASSESSMENT — PAIN DESCRIPTION - ONSET: ONSET: ON-GOING

## 2022-04-29 ASSESSMENT — PAIN DESCRIPTION - ORIENTATION: ORIENTATION: RIGHT

## 2022-04-29 ASSESSMENT — PAIN - FUNCTIONAL ASSESSMENT: PAIN_FUNCTIONAL_ASSESSMENT: 0-10

## 2022-04-29 ASSESSMENT — PAIN DESCRIPTION - FREQUENCY: FREQUENCY: CONTINUOUS

## 2022-04-29 ASSESSMENT — PAIN DESCRIPTION - LOCATION: LOCATION: FINGER (COMMENT WHICH ONE)

## 2022-04-29 NOTE — ED TRIAGE NOTES
Pt walked in [facility] ED with complaint of splinter in her R thumb that she got 2 days ago. The thumb swollen. No drainage.

## 2022-04-29 NOTE — TELEPHONE ENCOUNTER
Patient came into office after visiting ER with medication questions. She has an injury on her right thumb. She would like to know if she should continue with the medication WB prescribed or if she should switch to the medication that the ER prescribed. C/o injury on thumb not getting any better.      Please advise

## 2022-04-29 NOTE — ED PROVIDER NOTES
[facility]        Pt Name: Ольга Felix  MRN: [id number]  [place] 1950  Date of evaluation: 4/29/2022  Provider: LUCINDA Fish  PCP: Jake Washington MD  Note Started: 10:30 AM EDT       SERG. I have evaluated this patient. My supervising physician was available for consultation. CHIEF COMPLAINT       Chief Complaint   Patient presents with    Foreign Body in Skin     Splinter in R thumb fro 2 days. HISTORY OF PRESENT ILLNESS   (Location, Timing/Onset, Context/Setting, Quality, Duration, Modifying Factors, Severity, Associated Signs and Symptoms)  Note limiting factors. Chief Complaint: Puncture Wound Right Thumb     Ольга Felix is a 66 y.o. female with past medical history of atrial fibrillation anticoagulated on Xarelto, CAD, hyperlipidemia, hypertension and peripheral vascular disease who presents to the ED with complaint of a puncture wound. Patient states 2 days ago she was walking in her house and was running her hand along the wooden handrail. Patient states she sustained a puncture wound to the pad of the right thumb. Patient states she thought maybe she had a splinter but she never saw a piece of wood. She states she tried soaking the area and using a needle to get out the suspected splinter but states she never got anything out. Patient states now she has pain and swelling to the right thumb with associated erythema. Denies any warmth, fever/chills or numbness/tingling. Denies decreased range of motion or strength. She became concerned and came to the ED for further evaluation and treatment. She states she is concerned something is in there but denies any significant foreign body sensation at this time. She states she is concerned it is \"a very small piece of wood\". Unsure of last tetanus vaccine. She states she is right-hand dominant.     Nursing Notes were all reviewed and agreed with or any disagreements were addressed in the HPI. REVIEW OF SYSTEMS    (2-9 systems for level 4, 10 or more for level 5)     Review of Systems   Constitutional: Negative for activity change, appetite change, chills and fever. Respiratory: Negative. Negative for cough and shortness of breath. Cardiovascular: Negative. Negative for chest pain. Gastrointestinal: Negative for abdominal pain, nausea and vomiting. Genitourinary: Negative for difficulty urinating and dysuria. Musculoskeletal: Positive for joint swelling and myalgias. Negative for arthralgias, back pain, gait problem, neck pain and neck stiffness. Skin: Positive for color change and wound. Negative for pallor and rash. Neurological: Negative for dizziness, weakness, light-headedness and numbness. Positives and Pertinent negatives as per HPI. Except as noted above in the ROS, all other systems were reviewed and negative. PAST MEDICAL HISTORY     Past Medical History:   Diagnosis Date    AF (atrial fibrillation) (HCC)     on anticoagulation    Back pain     compaound fracture    CAD (coronary artery disease)     Centrilobular emphysema ([address] 3/16/2021    Compression fracture 01/01/1994    Hyperlipidemia     Hypertension     Myelolipoma     bilateral adrenals    PVD (peripheral vascular disease) ([address]     Right ear injury 01/01/1983    Per pt.     Shingles     Valvular disease          SURGICAL HISTORY     Past Surgical History:   Procedure Laterality Date    CORONARY ANGIOPLASTY WITH STENT PLACEMENT  2014, 3/4/21    CORONARY ANGIOPLASTY WITH STENT PLACEMENT  03/14/2003 & 06/02/2003    per pt    HYSTERECTOMY  09/01/1983    fibroids    TUBAL LIGATION  09/1983    VENTRAL HERNIA REPAIR  5-    VENTRAL HERNIA REPAIR WITH MESH                CURRENTMEDICATIONS       Current Discharge Medication List      CONTINUE these medications which have NOT CHANGED    Details   atenolol (TENORMIN) 25 MG tablet TAKE 1 TABLET BY MOUTH DAILY  Qty: 90 tablet, Refills: 3      predniSONE (DELTASONE) 10 MG tablet Take 4 tablets daily for 3 days, then 3 tablets daily for 3 days, then 2 tablets daily for 3 days then 1 tablet daily until finished. Qty: 30 tablet, Refills: 0    Associated Diagnoses: Acute bacterial sinusitis      carbamide peroxide (DEBROX) 6.5 % otic solution Place 5 drops into both ears 2 times daily  Qty: 15 mL, Refills: 0    Associated Diagnoses: Bilateral impacted cerumen      rivaroxaban (XARELTO) 20 MG TABS tablet Take 1 tablet by mouth daily (with breakfast)  Qty: 90 tablet, Refills: 1      rosuvastatin (CRESTOR) 10 MG tablet TAKE ONE TABLET BY MOUTH DAILY  Qty: 30 tablet, Refills: 11      acetaminophen (TYLENOL) 500 MG tablet Take 1 tablet by mouth 4 times daily as needed for Pain  Qty: 20 tablet, Refills: 0      nitroGLYCERIN (NITROSTAT) 0.4 MG SL tablet Place 1 tablet under the tongue every 5 minutes as needed for Chest pain  Qty: 25 tablet, Refills: 0      Coenzyme Q10 (COQ-10) 100 MG CPCR Take 100 mg by mouth daily  Qty: 90 capsule, Refills: 3      OMEPRAZOLE PO Take by mouth daily as needed      Specialty Vitamins Products (VITAMINS FOR THE HAIR PO) Take by mouth daily      Magnesium Oxide 250 MG TABS Take 2 tablets by mouth daily  Qty: 30 tablet, Refills: 5               ALLERGIES     Actos [pioglitazone], Percocet [oxycodone-acetaminophen], and Vicodin [hydrocodone-acetaminophen]    FAMILYHISTORY       Family History   Problem Relation Age of Onset    Heart Attack Father 76    Heart Disease Father         complications from surgery, per pt    Heart Attack Mother 64          SOCIAL HISTORY       Social History     Tobacco Use    Smoking status: Former Smoker     Years: 40.00     Types: Cigarettes     Quit date: 3/1/2014     Years since quittin.1    Smokeless tobacco: Never Used    Tobacco comment: Trying to quit   Vaping Use    Vaping Use: Never used   Substance Use Topics    Alcohol use:  No Alcohol/week: 3.0 standard drinks     Types: 3 Cans of beer per week    Drug use: No       SCREENINGS    [place] Coma Scale  Eye Opening: Spontaneous  Best Verbal Response: Oriented  Best Motor Response: Obeys commands  [place] Coma Scale Score: 15        PHYSICAL EXAM    (up to 7 for level 4, 8 or more for level 5)     ED Triage Vitals [04/29/22 0745]   BP Temp Temp Source Pulse Resp SpO2 Height Weight   (!) 153/73 97.6 °F (36.4 °C) Oral 92 16 98 % 4' 8\" (1.422 m) 130 lb 11.2 oz (59.3 kg)       Physical Exam  Constitutional:       General: She is not in acute distress. Appearance: Normal appearance. She is well-developed. She is not ill-appearing, toxic-appearing or diaphoretic. HENT:      Head: Normocephalic and atraumatic. Right Ear: External ear normal.      Left Ear: External ear normal.   Eyes:      General:         Right eye: No discharge. Left eye: No discharge. Pulmonary:      Effort: Pulmonary effort is normal. No respiratory distress. Breath sounds: No stridor. Musculoskeletal:         General: Normal range of motion. Cervical back: Normal range of motion and neck supple. Comments: Upon examination of the right hand there appears to be puncture wound noted to the pad of the right thumb. There is some edema and erythema noted. No warmth. No palpable visual foreign body noted. Upon palpation of the puncture wound there appears to be some bloody serous drainage noted. No purulent drainage. Compartments are soft. There is no crepitus. Capillary refill and radial pulse brisk. Sensation intact to the radial ulnar aspect distal fingertips. Full range of motion and strength at the MCP and IP joint. No lymphangitic streaking. No fixed flexion. No fusiform swelling. Skin:     General: Skin is warm and dry. Coloration: Skin is not pale. Findings: No erythema or rash.    Neurological:      Mental Status: She is alert and oriented to person, place, and time.   Psychiatric:         Behavior: Behavior normal.         DIAGNOSTIC RESULTS   LABS:    Labs Reviewed - No data to display    When ordered only abnormal lab results are displayed. All other labs were within normal range or not returned as of this dictation. EKG: When ordered, EKG's are interpreted by the Emergency Department Physician in the absence of a cardiologist.  Please see their note for interpretation of EKG. RADIOLOGY:   Non-plain film images such as CT, Ultrasound and MRI are read by the radiologist. Plain radiographic images are visualized and preliminarily interpreted by the ED Provider with the below findings:        Interpretation per the Radiologist below, if available at the time of this note:    XR FINGER RIGHT (MIN 2 VIEWS)   Final Result   No acute osseous injury. No radiopaque foreign body is evident. Polyarticular osteoarthritis, disproportionally developed at the 1st   carpometacarpal joint. XR FINGER RIGHT (MIN 2 VIEWS)    Result Date: 4/29/2022  EXAMINATION: THREE XRAY VIEWS OF THE RIGHT FINGERS 4/29/2022 8:24 am COMPARISON: None. HISTORY: ORDERING SYSTEM PROVIDED HISTORY: inj TECHNOLOGIST PROVIDED HISTORY: Reason for exam:->inj Reason for Exam: Foreign Body in Skin (Splinter in R thumb fro 2 days.) FINDINGS: There are multiple arterial vascular calcifications. 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 No radiopaque foreign body is identified. There is severe deformity of the 1st carpometacarpal joint with osseous hypertrophy and mild lateral subluxation. There is generalized interphalangeal joint space narrowing. No fractures are identified. No acute osseous injury. No radiopaque foreign body is evident. Polyarticular osteoarthritis, disproportionally developed at the 1st carpometacarpal joint.            PROCEDURES   Unless otherwise noted below, none     Procedures    CRITICAL CARE TIME CONSULTS:  None      EMERGENCY DEPARTMENT COURSE and DIFFERENTIAL DIAGNOSIS/MDM:   Vitals:    Vitals:    04/29/22 0745   BP: (!) 153/73   Pulse: 92   Resp: 16   Temp: 97.6 °F (36.4 °C)   TempSrc: Oral   SpO2: 98%   Weight: 130 lb 11.2 oz (59.3 kg)   Height: 4' 8\" (1.422 m)       Patient was given the following medications:  Medications   Tetanus-Diphth-Acell Pertussis (BOOSTRIX) injection 0.5 mL (0.5 mLs IntraMUSCular Given 4/29/22 1160)           Patient is a 76-year-old female who presents to the ED with complaint of a puncture wound. Patient has puncture wound noted to the right thumb. There is no palpable visual foreign body. X-ray obtained showed no acute osseous injury. There is no radiopaque foreign body noted. Patient states she never actually saw a piece of wood/splinter to the wound. Given the fact that patient has no obvious foreign body on x-ray, no palpable/visual foreign body and no visualized foreign body per patient low suspicion the patient has retained foreign body at this time. Concern patient suffering from puncture wound with now what appears to be skin infection/cellulitis. Is already open and draining at this time. Do not believe further incision and drainage indicated. Tetanus was updated. Patient is on antibiotic with Augmentin by PCP for upper respiratory infection. States she just prescribed this yesterday. We will change to doxycycline which should cover for upper respiratory illness and also cover for skin cellulitis/infection. Patient instructed on frequent soaks and warm compresses. Follow-up with PCP. Close return precautions discussed.   Low suspicion for acute fracture, dislocation, retained foreign body, significant cellulitis, deep space abscess required incision and drainage, felon, flexor tenosynovitis, paronychia, septic arthritis, gout, DVT, arterial occlusion, tendon injury, nerve injury, vascular injury, compartment syndrome or other emergent etiology at this time. FINAL IMPRESSION      1. Puncture wound of right thumb, initial encounter    2. Tetanus toxoid vaccination administered at current visit          DISPOSITION/PLAN   DISPOSITION Decision To Discharge 04/29/2022 10:19:36 AM      PATIENT REFERRED TO:  MD Emily AbdulBrooke Bates [phone]  [phone]    Schedule an appointment as soon as possible for a visit   For a Re-check in   3-5   days.     [facility] Emergency Department  [place] [address]  [phone]  Go to   As needed, If symptoms worsen      DISCHARGE MEDICATIONS:  Current Discharge Medication List      START taking these medications    Details   doxycycline hyclate (VIBRA-TABS) 100 MG tablet Take 1 tablet by mouth 2 times daily for 10 days  Qty: 20 tablet, Refills: 0             DISCONTINUED MEDICATIONS:  Current Discharge Medication List      STOP taking these medications       amoxicillin-clavulanate (AUGMENTIN) 875-125 MG per tablet Comments:   Reason for Stopping:                      (Please note that portions of this note were completed with a voice recognition program.  Efforts were made to edit the dictations but occasionally words are mis-transcribed.)    LUCINDA Owen (electronically signed)          LUCINDA Lassiter  04/29/22 1037

## 2022-05-02 ENCOUNTER — OFFICE VISIT (OUTPATIENT)
Dept: VASCULAR SURGERY | Age: 72
End: 2022-05-02
Payer: MEDICARE

## 2022-05-02 VITALS
HEIGHT: 55 IN | WEIGHT: 131 LBS | BODY MASS INDEX: 30.32 KG/M2 | SYSTOLIC BLOOD PRESSURE: 118 MMHG | DIASTOLIC BLOOD PRESSURE: 74 MMHG

## 2022-05-02 DIAGNOSIS — I70.223 ATHEROSCLEROSIS OF NATIVE ARTERIES OF EXTREMITIES WITH REST PAIN, BILATERAL LEGS (HCC): Primary | ICD-10-CM

## 2022-05-02 PROCEDURE — 4040F PNEUMOC VAC/ADMIN/RCVD: CPT | Performed by: SURGERY

## 2022-05-02 PROCEDURE — 1123F ACP DISCUSS/DSCN MKR DOCD: CPT | Performed by: SURGERY

## 2022-05-02 PROCEDURE — 1036F TOBACCO NON-USER: CPT | Performed by: SURGERY

## 2022-05-02 PROCEDURE — G8400 PT W/DXA NO RESULTS DOC: HCPCS | Performed by: SURGERY

## 2022-05-02 PROCEDURE — 1090F PRES/ABSN URINE INCON ASSESS: CPT | Performed by: SURGERY

## 2022-05-02 PROCEDURE — 99212 OFFICE O/P EST SF 10 MIN: CPT | Performed by: SURGERY

## 2022-05-02 PROCEDURE — G8427 DOCREV CUR MEDS BY ELIG CLIN: HCPCS | Performed by: SURGERY

## 2022-05-02 PROCEDURE — 3017F COLORECTAL CA SCREEN DOC REV: CPT | Performed by: SURGERY

## 2022-05-02 PROCEDURE — G8417 CALC BMI ABV UP PARAM F/U: HCPCS | Performed by: SURGERY

## 2022-05-02 NOTE — LETTER
[facility]) - Vascular and Endovascular Surgeons  [place] [address]  Phone: [phone]  Fax: [phone]           Bebo Charles MD      May 2, 2022     Patient: Waylon Vincent   MR Number: [id number]   YOB: 1950   Date of Visit: 5/2/2022       Dear Dr. Angelo Umana: Thank you for referring Raza Simpson to me for evaluation/treatment. Below are the relevant portions of my assessment and plan of care. If you have questions, please do not hesitate to call me. I look forward to following Priscilla Chung along with you.     Sincerely,        Bebo Charles MD    CC providers:  Yessenia Mondragon MD  [address] [facility] [address]  [address] [place] [address]

## 2022-05-02 NOTE — PROGRESS NOTES
[facility])   Vascular Surgery Followup    Referring Provider:  Sue Moses MD     Chief Complaint   Patient presents with    Follow-up        History of Present Illness:  67-year-old female here today for follow-up after recent peripheral angiography March 31 revealed no significant arterial insufficiency. She had mild diffuse plaque but widely patent arteries throughout bilateral lower extremity. She has some difficulties with balance as well as low back pain. Also complains of pain in her left shoulder. Past Medical History:   has a past medical history of AF (atrial fibrillation) ([address] Back pain, CAD (coronary artery disease), Centrilobular emphysema ([address] Compression fracture, Hyperlipidemia, Hypertension, Myelolipoma, PVD (peripheral vascular disease) ([address] Right ear injury, Shingles, and Valvular disease. Surgical History:   has a past surgical history that includes Coronary angioplasty with stent (2014, 3/4/21); Coronary angioplasty with stent (03/14/2003 & 06/02/2003); Hysterectomy (09/01/1983); Tubal ligation (09/1983); and ventral hernia repair (5-). Social History:   reports that she quit smoking about 8 years ago. Her smoking use included cigarettes. She quit after 40.00 years of use. She has never used smokeless tobacco. She reports that she does not drink alcohol and does not use drugs. Family History:  family history includes Heart Attack (age of onset: 64) in her mother; Heart Attack (age of onset: 76) in her father; Heart Disease in her father.      Home Medications:  Current Outpatient Medications   Medication Sig Dispense Refill    doxycycline hyclate (VIBRA-TABS) 100 MG tablet Take 1 tablet by mouth 2 times daily for 10 days 20 tablet 0    atenolol (TENORMIN) 25 MG tablet TAKE 1 TABLET BY MOUTH DAILY 90 tablet 3    predniSONE (DELTASONE) 10 MG tablet Take 4 tablets daily for 3 days, then 3 tablets daily for 3 days, then 2 tablets daily for 3 days then 1 tablet daily until finished. 30 tablet 0    carbamide peroxide (DEBROX) 6.5 % otic solution Place 5 drops into both ears 2 times daily 15 mL 0    rivaroxaban (XARELTO) 20 MG TABS tablet Take 1 tablet by mouth daily (with breakfast) 90 tablet 1    rosuvastatin (CRESTOR) 10 MG tablet TAKE ONE TABLET BY MOUTH DAILY 30 tablet 11    acetaminophen (TYLENOL) 500 MG tablet Take 1 tablet by mouth 4 times daily as needed for Pain 20 tablet 0    nitroGLYCERIN (NITROSTAT) 0.4 MG SL tablet Place 1 tablet under the tongue every 5 minutes as needed for Chest pain 25 tablet 0    Coenzyme Q10 (COQ-10) 100 MG CPCR Take 100 mg by mouth daily 90 capsule 3    OMEPRAZOLE PO Take by mouth daily as needed      Specialty Vitamins Products (VITAMINS FOR THE HAIR PO) Take by mouth daily      Magnesium Oxide 250 MG TABS Take 2 tablets by mouth daily (Patient taking differently: Take 250 mg by mouth daily ) 30 tablet 5     No current facility-administered medications for this visit. Allergies:  Actos [pioglitazone], Percocet [oxycodone-acetaminophen], and Vicodin [hydrocodone-acetaminophen]     Review of Systems:   · Constitutional: there has been no unanticipated weight loss. There's been no change in energy level, sleep pattern, or activity level. · Eyes: No visual changes or diplopia. No scleral icterus. · ENT: No Headaches, hearing loss or vertigo. No mouth sores or sore throat. · Cardiovascular: Reviewed in HPI  · Respiratory: No cough or wheezing, no sputum production. No hematemesis. · Gastrointestinal: No abdominal pain, appetite loss, blood in stools. No change in bowel or bladder habits. · Genitourinary: No dysuria, trouble voiding, or hematuria. · Musculoskeletal:  No gait disturbance, weakness or joint complaints. · Integumentary: No rash or pruritis. · Neurological: No headache, diplopia, change in muscle strength, numbness or tingling.  No change in gait, balance, coordination, mood, affect, memory, mentation, behavior. · Psychiatric: No anxiety, no depression. · Endocrine: No malaise, fatigue or temperature intolerance. No excessive thirst, fluid intake, or urination. No tremor. · Hematologic/Lymphatic: No abnormal bruising or bleeding, blood clots or swollen lymph nodes. · Allergic/Immunologic: No nasal congestion or hives. Physical Examination:    There were no vitals filed for this visit. General appearance: alert, appears stated age, cooperative and no distress  Palpable distal pulses. No distal cyanosis or ischemia      Assessment:     Patient Active Problem List   Diagnosis    Pure hypercholesterolemia    Coronary artery disease involving native coronary artery of native heart without angina pectoris    Mitral valve disorder    Preop cardiovascular exam    Atrial fibrillation (HCC)    Mitral regurgitation    Ischemic chest pain (HCC)    Valvular disease    SOB (shortness of breath)    Atherosclerosis of native arteries of extremities with intermittent claudication, right leg (HCC)    Positive cardiac stress test    Essential hypertension    Hyperlipidemia    Unstable angina ([address]    Centrilobular emphysema ([address]    Former smoker    Atherosclerosis of native arteries of extremities with intermittent claudication, bilateral legs ([address]       Plan:  Recommend repeat lower extremity noninvasive studies in 6 months. We will try to coordinate with her upcoming carotid duplex scan at that time. Recommend referral to orthopedic surgery given her left shoulder and low back pain    Thank you for allowing me to participate in the care of this individual.  Please do not hesitate to contact me with any questions. Ivy Oconnor M.D., FACS.  5/2/2022  11:18 AM

## 2022-05-04 ENCOUNTER — TELEPHONE (OUTPATIENT)
Dept: VASCULAR SURGERY | Age: 72
End: 2022-05-04

## 2022-05-04 NOTE — TELEPHONE ENCOUNTER
I offered to refer patient to Ortho outside of [facility] or to Dr. Gay Reis , but patient said she could not drive outside of [facility]. I advised her that maybe her PCP could make a referral. I called Ortho in  and they said the only Dr. Herberth Kimble see's patient's for backs will be going on maternity leave and is not seeing any new patient's.

## 2022-05-10 ENCOUNTER — TELEPHONE (OUTPATIENT)
Dept: CARDIOTHORACIC SURGERY | Age: 72
End: 2022-05-10

## 2022-05-10 NOTE — TELEPHONE ENCOUNTER
Patient called and stated she received a letter from [address] [place] [address] and Spine clinic to set up her referral appt from Dr. Umberto Ellis. Patient stated she was upset that she received this referral when their office is not located in [place], she states she made it clear she will not drive out of [place]. I read through the notes in the patients chart and reviewed all the notes to her from Dr. Umberto Ellis, Andrés La CNP, and Marylen Sergeant MA. I explained as stated By Marylen Sergeant MA,  that if she does not want to be seen by [facility], she will need to call her PCP. She stated she will call them.

## 2022-05-12 ENCOUNTER — TELEPHONE (OUTPATIENT)
Dept: FAMILY MEDICINE CLINIC | Age: 72
End: 2022-05-12

## 2022-05-12 DIAGNOSIS — M54.50 LOW BACK PAIN, UNSPECIFIED BACK PAIN LATERALITY, UNSPECIFIED CHRONICITY, UNSPECIFIED WHETHER SCIATICA PRESENT: Primary | ICD-10-CM

## 2022-05-12 NOTE — TELEPHONE ENCOUNTER
Patient reports having a doubled compound fracture many years ago. Being advised to ask PCP for a referral to a specialist for intermittent pain. *Would like to stay in the [place] area    Please advise.

## 2022-05-20 ENCOUNTER — TELEPHONE (OUTPATIENT)
Dept: CARDIOLOGY CLINIC | Age: 72
End: 2022-05-20

## 2022-05-20 RX ORDER — POTASSIUM CHLORIDE 20 MEQ/1
TABLET, EXTENDED RELEASE ORAL
Qty: 90 TABLET | OUTPATIENT
Start: 2022-05-20

## 2022-05-23 NOTE — TELEPHONE ENCOUNTER
Spoke to the pt-stated she does not taking a water pill or any Lasix. Dr. Hi Kendall ordered the Lasix for her, when the Metolazone 5 mg cost was too much. It made her go to the bathroom too often, and she stopped taking it. Office notes from 12/9/21, with LES, say Metolazone 5 mg daily-not taking this. Just taking the potassium.

## 2022-05-23 NOTE — TELEPHONE ENCOUNTER
Then I should not have been asked about a refill of lasix and she definitely does not need potassium refilled

## 2022-05-26 ENCOUNTER — TELEPHONE (OUTPATIENT)
Dept: CARDIOLOGY CLINIC | Age: 72
End: 2022-05-26

## 2022-05-26 NOTE — TELEPHONE ENCOUNTER
Last OV : 12/9/21 LES  Next OV : 6/13/22 LES    Provided the patient with 2 samples/bottles of Xarelto 20mg. LOT # : [id number]  EXP : 4/2024    Call placed to the patient and advised him/her that samples have been placed up front . Pt voiced understanding.  Call complete

## 2022-05-27 ENCOUNTER — OFFICE VISIT (OUTPATIENT)
Dept: FAMILY MEDICINE CLINIC | Age: 72
End: 2022-05-27
Payer: MEDICARE

## 2022-05-27 ENCOUNTER — TELEPHONE (OUTPATIENT)
Dept: FAMILY MEDICINE CLINIC | Age: 72
End: 2022-05-27

## 2022-05-27 VITALS
DIASTOLIC BLOOD PRESSURE: 80 MMHG | WEIGHT: 133 LBS | TEMPERATURE: 97.7 F | SYSTOLIC BLOOD PRESSURE: 126 MMHG | BODY MASS INDEX: 30.91 KG/M2

## 2022-05-27 DIAGNOSIS — H61.23 BILATERAL IMPACTED CERUMEN: Primary | ICD-10-CM

## 2022-05-27 PROCEDURE — G8400 PT W/DXA NO RESULTS DOC: HCPCS | Performed by: FAMILY MEDICINE

## 2022-05-27 PROCEDURE — G8417 CALC BMI ABV UP PARAM F/U: HCPCS | Performed by: FAMILY MEDICINE

## 2022-05-27 PROCEDURE — G8427 DOCREV CUR MEDS BY ELIG CLIN: HCPCS | Performed by: FAMILY MEDICINE

## 2022-05-27 PROCEDURE — 3017F COLORECTAL CA SCREEN DOC REV: CPT | Performed by: FAMILY MEDICINE

## 2022-05-27 PROCEDURE — 1123F ACP DISCUSS/DSCN MKR DOCD: CPT | Performed by: FAMILY MEDICINE

## 2022-05-27 PROCEDURE — 99213 OFFICE O/P EST LOW 20 MIN: CPT | Performed by: FAMILY MEDICINE

## 2022-05-27 PROCEDURE — 1090F PRES/ABSN URINE INCON ASSESS: CPT | Performed by: FAMILY MEDICINE

## 2022-05-27 PROCEDURE — 1036F TOBACCO NON-USER: CPT | Performed by: FAMILY MEDICINE

## 2022-05-27 NOTE — PROGRESS NOTES
SUBJECTIVE:    Jason Anaya is a 66 y.o. female who presents for a follow up visit. Chief Complaint   Patient presents with    Ear Fullness     Having issues hearing/wax build up        Ear Fullness   There is pain in both ears. This is a chronic problem. The current episode started more than 1 year ago. The problem occurs constantly. There has been no fever. The patient is experiencing no pain. Associated symptoms include hearing loss. She has tried nothing for the symptoms. The treatment provided no relief. Her past medical history is significant for hearing loss. Ear irrigation was attempted by Camille Garza LPN. Right ear irrigation was unsuccessful. Patient's medications, allergies, past medical,surgical, social and family histories were reviewed and updated as appropriate. Past Medical History:   Diagnosis Date    AF (atrial fibrillation) (HCC)     on anticoagulation    Back pain     compaound fracture    CAD (coronary artery disease)     Centrilobular emphysema ([address] 3/16/2021    Compression fracture 1994    Hyperlipidemia     Hypertension     Myelolipoma     bilateral adrenals    PVD (peripheral vascular disease) ([address]     Right ear injury 1983    Per pt.     Shingles     Valvular disease      Past Surgical History:   Procedure Laterality Date    CORONARY ANGIOPLASTY WITH STENT PLACEMENT  , 3/4/21    CORONARY ANGIOPLASTY WITH STENT PLACEMENT  2003 & 2003    per pt    HYSTERECTOMY  1983    fibroids    TUBAL LIGATION  1983    VENTRAL HERNIA REPAIR  2013    VENTRAL HERNIA REPAIR WITH MESH            Family History   Problem Relation Age of Onset    Heart Attack Father 76    Heart Disease Father         complications from surgery, per pt    Heart Attack Mother 64     Social History     Tobacco Use    Smoking status: Former Smoker     Years: 40.00     Types: Cigarettes     Quit date: 3/1/2014     Years since quittin.2    Smokeless tobacco: Never Used    Tobacco comment: Trying to quit   Substance Use Topics    Alcohol use: No     Alcohol/week: 3.0 standard drinks     Types: 3 Cans of beer per week      Allergies   Allergen Reactions    Actos [Pioglitazone]     Percocet [Oxycodone-Acetaminophen]      Confused , loopy    Vicodin [Hydrocodone-Acetaminophen]      Current Outpatient Medications on File Prior to Visit   Medication Sig Dispense Refill    atenolol (TENORMIN) 25 MG tablet TAKE 1 TABLET BY MOUTH DAILY 90 tablet 3    carbamide peroxide (DEBROX) 6.5 % otic solution Place 5 drops into both ears 2 times daily 15 mL 0    rivaroxaban (XARELTO) 20 MG TABS tablet Take 1 tablet by mouth daily (with breakfast) 90 tablet 1    rosuvastatin (CRESTOR) 10 MG tablet TAKE ONE TABLET BY MOUTH DAILY 30 tablet 11    acetaminophen (TYLENOL) 500 MG tablet Take 1 tablet by mouth 4 times daily as needed for Pain 20 tablet 0    nitroGLYCERIN (NITROSTAT) 0.4 MG SL tablet Place 1 tablet under the tongue every 5 minutes as needed for Chest pain 25 tablet 0    Coenzyme Q10 (COQ-10) 100 MG CPCR Take 100 mg by mouth daily 90 capsule 3    OMEPRAZOLE PO Take by mouth daily as needed      Specialty Vitamins Products (VITAMINS FOR THE HAIR PO) Take by mouth daily      Magnesium Oxide 250 MG TABS Take 2 tablets by mouth daily (Patient taking differently: Take 250 mg by mouth daily ) 30 tablet 5     No current facility-administered medications on file prior to visit. Review of Systems   HENT: Positive for hearing loss. OBJECTIVE:    /80   Temp 97.7 °F (36.5 °C)   Wt 133 lb (60.3 kg)   LMP 01/01/1983   BMI 30.91 kg/m²    Physical Exam  Constitutional:       Appearance: Normal appearance. HENT:      Head: Normocephalic and atraumatic. Right Ear: Decreased hearing noted. There is impacted cerumen. Left Ear: Decreased hearing noted. Neurological:      Mental Status: She is alert.    Psychiatric:         Mood and

## 2022-05-27 NOTE — TELEPHONE ENCOUNTER
Dr. Meg Prado would like this patient dismissed from the practice. When leaving the office today she yelled at the front staff/using profanity . Said no one here ever helps her.

## 2022-06-01 ENCOUNTER — TELEPHONE (OUTPATIENT)
Dept: CARDIOLOGY CLINIC | Age: 72
End: 2022-06-01

## 2022-06-01 NOTE — TELEPHONE ENCOUNTER
Patient called in wanting to know if she still needs to be taking potassium pills. Please call and advise patient can be reached at [phone]. Patients stated if she doesn't answer please leave a message.

## 2022-06-03 NOTE — TELEPHONE ENCOUNTER
Left message on [phone] to ask patient to call me /   I would like to discuss recent activity in the office . On 05/27/2022    Patient has an appointment on 06/03 . I will ask to speak to her then as well .      Thank You

## 2022-06-06 ENCOUNTER — OFFICE VISIT (OUTPATIENT)
Dept: ENT CLINIC | Age: 72
End: 2022-06-06
Payer: MEDICARE

## 2022-06-06 VITALS — TEMPERATURE: 96.8 F | SYSTOLIC BLOOD PRESSURE: 147 MMHG | HEART RATE: 88 BPM | DIASTOLIC BLOOD PRESSURE: 83 MMHG

## 2022-06-06 DIAGNOSIS — H69.83 DYSFUNCTION OF BOTH EUSTACHIAN TUBES: Primary | ICD-10-CM

## 2022-06-06 DIAGNOSIS — J34.89 NASAL SEPTAL PERFORATION: ICD-10-CM

## 2022-06-06 DIAGNOSIS — H93.13 TINNITUS OF BOTH EARS: ICD-10-CM

## 2022-06-06 DIAGNOSIS — H90.3 SENSORINEURAL HEARING LOSS (SNHL) OF BOTH EARS: ICD-10-CM

## 2022-06-06 PROCEDURE — 1090F PRES/ABSN URINE INCON ASSESS: CPT | Performed by: STUDENT IN AN ORGANIZED HEALTH CARE EDUCATION/TRAINING PROGRAM

## 2022-06-06 PROCEDURE — 1036F TOBACCO NON-USER: CPT | Performed by: STUDENT IN AN ORGANIZED HEALTH CARE EDUCATION/TRAINING PROGRAM

## 2022-06-06 PROCEDURE — G8427 DOCREV CUR MEDS BY ELIG CLIN: HCPCS | Performed by: STUDENT IN AN ORGANIZED HEALTH CARE EDUCATION/TRAINING PROGRAM

## 2022-06-06 PROCEDURE — G8417 CALC BMI ABV UP PARAM F/U: HCPCS | Performed by: STUDENT IN AN ORGANIZED HEALTH CARE EDUCATION/TRAINING PROGRAM

## 2022-06-06 PROCEDURE — 3017F COLORECTAL CA SCREEN DOC REV: CPT | Performed by: STUDENT IN AN ORGANIZED HEALTH CARE EDUCATION/TRAINING PROGRAM

## 2022-06-06 PROCEDURE — 1123F ACP DISCUSS/DSCN MKR DOCD: CPT | Performed by: STUDENT IN AN ORGANIZED HEALTH CARE EDUCATION/TRAINING PROGRAM

## 2022-06-06 PROCEDURE — G8400 PT W/DXA NO RESULTS DOC: HCPCS | Performed by: STUDENT IN AN ORGANIZED HEALTH CARE EDUCATION/TRAINING PROGRAM

## 2022-06-06 PROCEDURE — 99214 OFFICE O/P EST MOD 30 MIN: CPT | Performed by: STUDENT IN AN ORGANIZED HEALTH CARE EDUCATION/TRAINING PROGRAM

## 2022-06-06 RX ORDER — PREDNISONE 10 MG/1
TABLET ORAL
Qty: 21 TABLET | Refills: 0 | Status: SHIPPED | OUTPATIENT
Start: 2022-06-06 | End: 2022-08-22

## 2022-06-06 NOTE — PROGRESS NOTES
[address] VISIT      Patient Name: Cheyanne Pickens Record Number:  [id number]  Primary Care Physician:  Nydia Bustamante MD    ChiefComplaint     Chief Complaint   Patient presents with    Ear Problem     got water on her ear. History of Present Illness     Nikki Harris is an 66 y.o. female previously seen for bilateral sensorineural hearing loss, nasal septal perforation, epistaxis, impacted cerumen of right ear. Last seen 3/26/2021. Interval History:   Bilateral tinnitus starting once month ago. Feels like she has had muffled hearing in both ears from the same timeframe. Sounded like listening in a seashell. When she speaks she can hear her voice in her head like she has a bad head cold. No current nasal spray use, does not like using nasal sprays. She wakes up in the morning her head feels like it is in a vice. No other sinus symptoms including nasal congestion, mucopurulent nasal discharge. No recent epistaxis    Past Medical History     Past Medical History:   Diagnosis Date    AF (atrial fibrillation) (HCC)     on anticoagulation    Back pain     compaound fracture    CAD (coronary artery disease)     Centrilobular emphysema ([address] 3/16/2021    Compression fracture 01/01/1994    Hyperlipidemia     Hypertension     Myelolipoma     bilateral adrenals    PVD (peripheral vascular disease) ([address]     Right ear injury 01/01/1983    Per pt.     Shingles     Valvular disease        Past Surgical History     Past Surgical History:   Procedure Laterality Date    CORONARY ANGIOPLASTY WITH STENT PLACEMENT  2014, 3/4/21    CORONARY ANGIOPLASTY WITH STENT PLACEMENT  03/14/2003 & 06/02/2003    per pt    HYSTERECTOMY  09/01/1983    fibroids    TUBAL LIGATION  09/1983    VENTRAL HERNIA REPAIR  5-    VENTRAL HERNIA REPAIR WITH MESH              Family History     Family History   Problem Relation Age of Onset    Heart Attack Father 76    Heart Disease Father         complications from surgery, per pt    Heart Attack Mother 64       Social History     Social History     Tobacco Use    Smoking status: Former Smoker     Years: 40.00     Types: Cigarettes     Quit date: 3/1/2014     Years since quittin.2    Smokeless tobacco: Never Used    Tobacco comment: Trying to quit   Vaping Use    Vaping Use: Never used   Substance Use Topics    Alcohol use: No     Alcohol/week: 3.0 standard drinks     Types: 3 Cans of beer per week    Drug use: No        Allergies     Allergies   Allergen Reactions    Actos [Pioglitazone]     Percocet [Oxycodone-Acetaminophen]      Confused , loopy    Vicodin [Hydrocodone-Acetaminophen]        Medications     Current Outpatient Medications   Medication Sig Dispense Refill    predniSONE (DELTASONE) 10 MG tablet 1 tablet 3 times/day x 5 days, 1 tablet 2 times/days x 2 days, 1 tablet daily for 2 days. Take with food. 21 tablet 0    atenolol (TENORMIN) 25 MG tablet TAKE 1 TABLET BY MOUTH DAILY 90 tablet 3    rivaroxaban (XARELTO) 20 MG TABS tablet Take 1 tablet by mouth daily (with breakfast) 90 tablet 1    rosuvastatin (CRESTOR) 10 MG tablet TAKE ONE TABLET BY MOUTH DAILY 30 tablet 11    acetaminophen (TYLENOL) 500 MG tablet Take 1 tablet by mouth 4 times daily as needed for Pain 20 tablet 0    nitroGLYCERIN (NITROSTAT) 0.4 MG SL tablet Place 1 tablet under the tongue every 5 minutes as needed for Chest pain 25 tablet 0    Coenzyme Q10 (COQ-10) 100 MG CPCR Take 100 mg by mouth daily 90 capsule 3    OMEPRAZOLE PO Take by mouth daily as needed      Specialty Vitamins Products (VITAMINS FOR THE HAIR PO) Take by mouth daily      Magnesium Oxide 250 MG TABS Take 2 tablets by mouth daily (Patient taking differently: Take 250 mg by mouth daily ) 30 tablet 5     No current facility-administered medications for this visit.        Review of Systems     REVIEW OF SYSTEM: See HPI above    PhysicalExam     Vitals:    06/06/22 1132   BP: (!) 147/83   Site: Right Upper Arm   Position: Sitting   Cuff Size: Medium Adult   Pulse: 88   Temp: 96.8 °F (36 °C)   TempSrc: Temporal       PHYSICAL EXAM  BP (!) 147/83 (Site: Right Upper Arm, Position: Sitting, Cuff Size: Medium Adult)   Pulse 88   Temp 96.8 °F (36 °C) (Temporal)   LMP 01/01/1983     GENERAL: No acute distress, alert and oriented, no hoarseness  EYES: EOMI, Anti-icteric  NOSE: On anterior rhinoscopy there large septal perforation, quarter sized, unchanged from prior examination. Little bit of bloody crusting around the edges. EARS: Normal external appearance; on portable otomicroscopy:     -Ad: External auditory canal with mild cerumen impaction that was removed with alligator forceps. Tympanic membrane clear, no middle ear effusion or retractions.     -As: External auditory canal without stenosis, tympanic membrane clear, no middle ear effusions or retractions. Pneumatic otoscopy: Bilateral tympanic membranes mobile pneumatic otoscopy  FACE: HB 1/6 bilaterally, symmetric appearing, sensation equal bilaterally  ORAL CAVITY: No masses or lesions palpated, uvula is midline, moist mucous membranes, symmetric 2+ tonsils. Edentulous with dentures in place. NECK: Normal range of motion, no thyromegaly, trachea is midline, no palpable lymphadenopathy or neck masses, no crepitus  NEURO: Cranial Nerves 2, 3, 4, 5, 6, 7, 11, 12 grossly intact bilaterally     I have performed a head and neck physical exam personally or was physically present during the key or critical portions of the service. Assessment and Plan     1. Dysfunction of both eustachian tubes  -Symptoms likely secondary to underlying eustachian tube dysfunction.   We will treat with oral steroids and she will follow-up in 4 weeks for repeat audiogram.  - predniSONE (DELTASONE) 10 MG tablet; 1 tablet 3 times/day x 5 days, 1 tablet 2 times/days x 2 days, 1 tablet daily for 2 days. Take with food. Dispense: 21 tablet; Refill: 0    2. Sensorineural hearing loss (SNHL) of both ears  3. Tinnitus of both ears  - Audiometry with tympanometry; Future    4. Nasal septal perforation  -Restart nasal saline spray  -Hold off on Flonase nasal spray secondary to nasal septal perforation      Follow-Up     Return in about 1 month (around 7/6/2022) for audiogram prior to appointment. Dr. Jacquelyn Smith [place] [id number]  Department of Otolaryngology/Head and Neck Surgery  6/6/22    Medical Decision Making: The following items were considered in medical decision making:  Independent review of images  Review / order clinical lab tests  Review / order radiology tests  Decision to obtain old records      This note was generated completely or in part utilizing Dragon dictation speech recognition software. Occasionally, words are mistranscribed and despite editing, the text may contain inaccuracies due to incorrect word recognition. If further clarification is needed please contact the office at [phone].

## 2022-06-10 RX ORDER — ALBUTEROL SULFATE 90 UG/1
2 AEROSOL, METERED RESPIRATORY (INHALATION) 4 TIMES DAILY PRN
Qty: 54 G | Refills: 1 | Status: SHIPPED | OUTPATIENT
Start: 2022-06-10

## 2022-06-10 NOTE — TELEPHONE ENCOUNTER
Med refill     Pt has notice passed couple days she has been SOB, pt wants to know if she can RX for the albuterol?  NPKL originally filled     Pt has appt with LES 6/13    albuterol sulfate  (90 Base) MCG/ACT inhaler   As Directed  INHALE 2 PUFFS BY MOUTH FOUR TIMES DAILY    [id number] - [address] [facility], [address] [place] [address], [address] [place] [address],[address] [facility] [address]   Phone:  [phone]  Fax:  [phone]

## 2022-06-13 ENCOUNTER — TELEPHONE (OUTPATIENT)
Dept: CARDIOLOGY CLINIC | Age: 72
End: 2022-06-13

## 2022-06-13 NOTE — TELEPHONE ENCOUNTER
----- Message from Jaen Samuels RN sent at 6/13/2022  9:58 AM EDT -----  Please call patient and schedule patient for [facility] 15 at 2:45 pm.  She had to cancel her June 13 apt. This is OK per LES.

## 2022-06-21 ENCOUNTER — TELEPHONE (OUTPATIENT)
Dept: CARDIOLOGY CLINIC | Age: 72
End: 2022-06-21

## 2022-06-21 NOTE — TELEPHONE ENCOUNTER
Pt states she is out of xarelto samples and asking if she can get additional 20 mg tabs taking once daily. Please call pt to advise.

## 2022-06-21 NOTE — TELEPHONE ENCOUNTER
Tried to reach patient, [facility] for patient to stop by and  samples.  No Xarelto in closet patient will be put on a waiting list.

## 2022-06-22 NOTE — TELEPHONE ENCOUNTER
She was supposed to be getting assistance for DOAC because her INRs were so inconsistent with coumadin. If this is not done and no samples available then we have not choice but to go back to warfarin. She has to take something.  Skip Castleman

## 2022-06-22 NOTE — TELEPHONE ENCOUNTER
We are out of Xarelto samples at this time. I called patient to let her know this yesterday. I have her on a waiting list for xarelto samples.    Please advise

## 2022-06-22 NOTE — TELEPHONE ENCOUNTER
Pt called stating she has one pill left and   Xarelto pt wants to know what she should do? Should she stop them or what? Pt wants to know what else can she take?     Pls advise thank you

## 2022-06-28 ENCOUNTER — TELEPHONE (OUTPATIENT)
Dept: CARDIOLOGY CLINIC | Age: 72
End: 2022-06-28

## 2022-06-28 DIAGNOSIS — R60.1 GENERALIZED EDEMA: Primary | ICD-10-CM

## 2022-06-28 NOTE — TELEPHONE ENCOUNTER
She needs lab work with complete metabolic profile(CMP),proBNP,CBC then can decide if she needs stronge rdiuretic

## 2022-06-28 NOTE — TELEPHONE ENCOUNTER
Pt called asking if she can see LES before Aug at the [facility], pt stated she is on furosomide 20 mg  pt stated she goes to bathroom and just dribbles, pt stated ankles are swollen, hands a little, patient stated  she is retaining fluids in her stomach she stated she is looks like she is pregnant. pt stated she wants to go back on the water pills [place] had her on Metolazone   pt stated that worked the best for her, or if there is something that will work. Pt stated if she does not answer, leave a message what she needs to do.     Pls advise thank you

## 2022-06-28 NOTE — TELEPHONE ENCOUNTER
Called pt and relayed message per Dr Mendez Lara with pt verbal that she would do labs but she is very upset that she has to have labs just to get back on the Metolazone and doesn't want to be on lasix. Pt agreed to have them done and labs are pending.

## 2022-06-29 NOTE — LETTER
[facility] [place] [id number] [address]  Phone: [phone]  Fax: [phone]        March 19, 2021     Patient: Aquiles Canseco   YOB: 1950   Date of Visit: 3/19/2021       To Whom It May Concern:    Halima Santosh was seen in office 3/19/21. It is my medical opinion that Santiago Willis may return to work on 3/20/21 without restrictions. If you have any questions or concerns, please don't hesitate to call.     Sincerely,        KARTHIKEYAN Fernandez - CNP No

## 2022-07-01 ENCOUNTER — HOSPITAL ENCOUNTER (OUTPATIENT)
Age: 72
Discharge: HOME | End: 2022-07-01
Payer: MEDICARE

## 2022-07-01 LAB
A/G RATIO: 1.8 (ref 1.1–2.2)
ALBUMIN SERPL-MCNC: 3.9 G/DL (ref 3.4–5)
ALP BLD-CCNC: 81 U/L (ref 40–129)
ALT SERPL-CCNC: 15 U/L (ref 10–40)
ANION GAP SERPL CALCULATED.3IONS-SCNC: 14 MMOL/L (ref 3–16)
AST SERPL-CCNC: 16 U/L (ref 15–37)
BILIRUB SERPL-MCNC: 0.7 MG/DL (ref 0–1)
BUN BLDV-MCNC: 17 MG/DL (ref 7–20)
CALCIUM SERPL-MCNC: 9.4 MG/DL (ref 8.3–10.6)
CHLORIDE BLD-SCNC: 105 MMOL/L (ref 99–110)
CO2: 22 MMOL/L (ref 21–32)
CREAT SERPL-MCNC: 0.8 MG/DL (ref 0.6–1.2)
GFR AFRICAN AMERICAN: >60
GFR NON-AFRICAN AMERICAN: >60
GLUCOSE BLD-MCNC: 123 MG/DL (ref 70–99)
HCT VFR BLD CALC: 30.1 % (ref 36–48)
HEMOGLOBIN: 9.6 G/DL (ref 12–16)
MCH RBC QN AUTO: 23.8 PG (ref 26–34)
MCHC RBC AUTO-ENTMCNC: 32 G/DL (ref 31–36)
MCV RBC AUTO: 74.4 FL (ref 80–100)
PDW BLD-RTO: 18.9 % (ref 12.4–15.4)
PLATELET # BLD: 119 K/UL (ref 135–450)
PMV BLD AUTO: 8.6 FL (ref 5–10.5)
POTASSIUM SERPL-SCNC: 4.1 MMOL/L (ref 3.5–5.1)
PRO-BNP: 3949 PG/ML (ref 0–124)
RBC # BLD: 4.05 M/UL (ref 4–5.2)
SODIUM BLD-SCNC: 141 MMOL/L (ref 136–145)
TOTAL PROTEIN: 6.1 G/DL (ref 6.4–8.2)
WBC # BLD: 5.9 K/UL (ref 4–11)

## 2022-07-01 PROCEDURE — 83880 ASSAY OF NATRIURETIC PEPTIDE: CPT

## 2022-07-01 PROCEDURE — 85027 COMPLETE CBC AUTOMATED: CPT

## 2022-07-01 PROCEDURE — 36415 COLL VENOUS BLD VENIPUNCTURE: CPT

## 2022-07-01 PROCEDURE — 80053 COMPREHEN METABOLIC PANEL: CPT

## 2022-07-05 ENCOUNTER — OFFICE VISIT (OUTPATIENT)
Dept: AUDIOLOGY | Age: 72
End: 2022-07-05
Payer: MEDICARE

## 2022-07-05 ENCOUNTER — OFFICE VISIT (OUTPATIENT)
Dept: ENT CLINIC | Age: 72
End: 2022-07-05
Payer: MEDICARE

## 2022-07-05 VITALS
TEMPERATURE: 97.3 F | SYSTOLIC BLOOD PRESSURE: 147 MMHG | HEART RATE: 82 BPM | DIASTOLIC BLOOD PRESSURE: 69 MMHG | BODY MASS INDEX: 31.38 KG/M2 | WEIGHT: 135 LBS

## 2022-07-05 DIAGNOSIS — H90.A21 SENSORINEURAL HEARING LOSS (SNHL) OF RIGHT EAR WITH RESTRICTED HEARING OF LEFT EAR: Primary | ICD-10-CM

## 2022-07-05 DIAGNOSIS — H69.83 DYSFUNCTION OF BOTH EUSTACHIAN TUBES: Primary | ICD-10-CM

## 2022-07-05 DIAGNOSIS — H74.8X2 TYPE B TYMPANOGRAM, LEFT: ICD-10-CM

## 2022-07-05 DIAGNOSIS — H90.6 MIXED CONDUCTIVE AND SENSORINEURAL HEARING LOSS OF BOTH EARS: ICD-10-CM

## 2022-07-05 PROCEDURE — 3017F COLORECTAL CA SCREEN DOC REV: CPT | Performed by: STUDENT IN AN ORGANIZED HEALTH CARE EDUCATION/TRAINING PROGRAM

## 2022-07-05 PROCEDURE — G8427 DOCREV CUR MEDS BY ELIG CLIN: HCPCS | Performed by: STUDENT IN AN ORGANIZED HEALTH CARE EDUCATION/TRAINING PROGRAM

## 2022-07-05 PROCEDURE — 99214 OFFICE O/P EST MOD 30 MIN: CPT | Performed by: STUDENT IN AN ORGANIZED HEALTH CARE EDUCATION/TRAINING PROGRAM

## 2022-07-05 PROCEDURE — 1090F PRES/ABSN URINE INCON ASSESS: CPT | Performed by: STUDENT IN AN ORGANIZED HEALTH CARE EDUCATION/TRAINING PROGRAM

## 2022-07-05 PROCEDURE — 69433 CREATE EARDRUM OPENING: CPT | Performed by: STUDENT IN AN ORGANIZED HEALTH CARE EDUCATION/TRAINING PROGRAM

## 2022-07-05 PROCEDURE — G8400 PT W/DXA NO RESULTS DOC: HCPCS | Performed by: STUDENT IN AN ORGANIZED HEALTH CARE EDUCATION/TRAINING PROGRAM

## 2022-07-05 PROCEDURE — 1123F ACP DISCUSS/DSCN MKR DOCD: CPT | Performed by: STUDENT IN AN ORGANIZED HEALTH CARE EDUCATION/TRAINING PROGRAM

## 2022-07-05 PROCEDURE — 92567 TYMPANOMETRY: CPT | Performed by: AUDIOLOGIST

## 2022-07-05 PROCEDURE — G8417 CALC BMI ABV UP PARAM F/U: HCPCS | Performed by: STUDENT IN AN ORGANIZED HEALTH CARE EDUCATION/TRAINING PROGRAM

## 2022-07-05 PROCEDURE — 92557 COMPREHENSIVE HEARING TEST: CPT | Performed by: AUDIOLOGIST

## 2022-07-05 PROCEDURE — 1036F TOBACCO NON-USER: CPT | Performed by: STUDENT IN AN ORGANIZED HEALTH CARE EDUCATION/TRAINING PROGRAM

## 2022-07-05 RX ORDER — OFLOXACIN 3 MG/ML
SOLUTION/ DROPS OPHTHALMIC
Qty: 1 EACH | Refills: 0 | Status: SHIPPED | OUTPATIENT
Start: 2022-07-05

## 2022-07-05 NOTE — PROGRESS NOTES
[address] [facility]    7/5/2022     Patient name: Heidi Kent  Primary Care Physician: Piero Hayes MD   Medical Record Number: [id number]     Heidi Kent   1950, 66 y.o. female   [id number]       Referring Provider: Shereen Smith DO  Referral Type: In an order in [address] [place] [address]    Reason for Visit: Evaluation of the cause of disorders of hearing, tinnitus, or balance. ADULT AUDIOLOGIC EVALUATION                    Heidi Kent is a 66 y.o. female seen today, 7/5/2022 , for a same-day comprehensive audiologic evaluation. Patient was seen by Shereen Smith DO following today's evaluation. AUDIOLOGIC AND OTHER PERTINENT MEDICAL HISTORY:      Heidi Kent noted bilateral aural fullness, sounds like \"seashells\". Feels right ear hearing is worse. Notes occasional disequilibrium, sways on occasions. She notes there was right sided head contact as a result of hitting her head on a parking meter, and history of ruptured TM. IMPRESSIONS:      Today's results are consistent with bilateral sensorineural hearing loss that is asymmetrical in right ear with poor word recognition for both ears, normal middle ear function of right ear, evidence of middle ear dysfunction in left ear. Hearing loss is significant enough to result in difficulty understanding speech in most listening environments. Patient to follow medical recommendations per  Shereen Smith DO    ASSESSMENT AND FINDINGS:     Otoscopy revealed: Visible tympanic membrane bilaterally    Reliability: Good   Transducer: Inserts    RIGHT EAR:  Hearing Sensitivity: Mild to profound sensorineural  hearing loss; isolated 15 dB air-bone gap at 500K. Asymmetry from 3-8K. Speech Recognition Threshold: 45 dB HL  Word Recognition: Poor (60-69%), based on NU-6   Tympanometry: Normal peak pressure and compliance, Type A tympanogram, consistent with normal middle ear function.   Acoustic Reflexes: Ipsilateral: Present at elevated sensation levels and Absent at isolated frequencies. LEFT EAR:  Hearing Sensitivity: Mild to severe sensorineural  hearing loss; isolated 20 dB air-bone gap at 500K  Speech Recognition Threshold: 45 dB HL  Word Recognition: Poor (60-69%), based on NU-6   Tympanometry: Flat, no peak pressure or compliance, Type B tympanogram, with typical ear canal volume, consistent with middle ear dysfunction. Acoustic Reflexes: Ipsilateral: Present at elevated sensation levels and Absent at isolated frequencies. COUNSELING:      Reviewed purpose of testing completed today, general auditory system function, and results obtained today. The following items are recommended based on patient report and results from today's appointment:   - Continue medical follow-up with Patrica Del Rio DO.   - Retest hearing as medically indicated and/or sooner if a change in hearing is noted. Chart CC'd to: Patrica Del Rio DO      Degree of   Hearing Sensitivity dB Range   Within Normal Limits (WNL) 0 - 20   Mild 20 - 40   Moderate 40 - 55   Moderately-Severe 55 - 70   Severe 70 - 90   Profound 90 +        RECOMMENDATIONS:        Patrica Del Rio DO to medically advise.     Pam Francois  Audiologist    Electronically signed by Pam Francois on 7/5/22 at 8:05 AM.

## 2022-07-05 NOTE — PROGRESS NOTES
[address] VISIT      Patient Name: Cheyanne Pickens Record Number:  [id number]  Primary Care Physician:  Devang Tomlinson MD    ChiefComplaint     Chief Complaint   Patient presents with    Follow-up     ears pop and crack, sounds like a bad head cold when she talks , hearing test review, balance issues,        History of Present Illness     Fozia Menchaca is an 66 y.o. female previously seen for bilateral eustachian tube dysfunction, sensorineural hearing loss of both ears, tenderness, nasal septal perforation. Interval History:   No recent hearing changes. Feels like he balance is \"out of wack\". Will be standing and falls to one side randomly. No room spinning vertigo. No hearing aids. Feels like she has a constant head cold and both ears feel congested. Past Medical History     Past Medical History:   Diagnosis Date    AF (atrial fibrillation) (HCC)     on anticoagulation    Back pain     compaound fracture    CAD (coronary artery disease)     Centrilobular emphysema ([address] 3/16/2021    Compression fracture 01/01/1994    Hyperlipidemia     Hypertension     Myelolipoma     bilateral adrenals    PVD (peripheral vascular disease) ([address]     Right ear injury 01/01/1983    Per pt.     Shingles     Valvular disease        Past Surgical History     Past Surgical History:   Procedure Laterality Date    CORONARY ANGIOPLASTY WITH STENT PLACEMENT  2014, 3/4/21    CORONARY ANGIOPLASTY WITH STENT PLACEMENT  03/14/2003 & 06/02/2003    per pt    HYSTERECTOMY (CERVIX STATUS UNKNOWN)  09/01/1983    fibroids    TUBAL LIGATION  09/1983    VENTRAL HERNIA REPAIR  5-    VENTRAL HERNIA REPAIR WITH MESH              Family History     Family History   Problem Relation Age of Onset    Heart Attack Father 76    Heart Disease Father         complications from surgery, per pt    Heart Attack Mother 64       Social History Social History     Tobacco Use    Smoking status: Former Smoker     Years: 40.00     Types: Cigarettes     Quit date: 3/1/2014     Years since quittin.3    Smokeless tobacco: Never Used    Tobacco comment: Trying to quit   Vaping Use    Vaping Use: Never used   Substance Use Topics    Alcohol use: No     Alcohol/week: 3.0 standard drinks     Types: 3 Cans of beer per week    Drug use: No        Allergies     Allergies   Allergen Reactions    Actos [Pioglitazone]     Percocet [Oxycodone-Acetaminophen]      Confused , loopy    Vicodin [Hydrocodone-Acetaminophen]        Medications     Current Outpatient Medications   Medication Sig Dispense Refill    ofloxacin (OCUFLOX) 0.3 % solution Place 4 drops in left ear twice a day for the next 5 days. 1 each 0    albuterol sulfate HFA (VENTOLIN HFA) 108 (90 Base) MCG/ACT inhaler Inhale 2 puffs into the lungs 4 times daily as needed for Wheezing 54 g 1    predniSONE (DELTASONE) 10 MG tablet 1 tablet 3 times/day x 5 days, 1 tablet 2 times/days x 2 days, 1 tablet daily for 2 days. Take with food.  21 tablet 0    atenolol (TENORMIN) 25 MG tablet TAKE 1 TABLET BY MOUTH DAILY 90 tablet 3    rivaroxaban (XARELTO) 20 MG TABS tablet Take 1 tablet by mouth daily (with breakfast) 90 tablet 1    rosuvastatin (CRESTOR) 10 MG tablet TAKE ONE TABLET BY MOUTH DAILY 30 tablet 11    acetaminophen (TYLENOL) 500 MG tablet Take 1 tablet by mouth 4 times daily as needed for Pain 20 tablet 0    nitroGLYCERIN (NITROSTAT) 0.4 MG SL tablet Place 1 tablet under the tongue every 5 minutes as needed for Chest pain 25 tablet 0    Coenzyme Q10 (COQ-10) 100 MG CPCR Take 100 mg by mouth daily 90 capsule 3    OMEPRAZOLE PO Take by mouth daily as needed      Specialty Vitamins Products (VITAMINS FOR THE HAIR PO) Take by mouth daily      Magnesium Oxide 250 MG TABS Take 2 tablets by mouth daily (Patient taking differently: Take 250 mg by mouth daily ) 30 tablet 5     No current facility-administered medications for this visit. Review of Systems     REVIEW OF SYSTEM: See HPI above    PhysicalExam     Vitals:    07/05/22 0845   BP: (!) 147/69   Site: Left Upper Arm   Position: Sitting   Cuff Size: Medium Adult   Pulse: 82   Temp: 97.3 °F (36.3 °C)   TempSrc: Temporal   Weight: 135 lb (61.2 kg)       PHYSICAL EXAM  BP (!) 147/69 (Site: Left Upper Arm, Position: Sitting, Cuff Size: Medium Adult)   Pulse 82   Temp 97.3 °F (36.3 °C) (Temporal)   Wt 135 lb (61.2 kg)   LMP 01/01/1983   BMI 31.38 kg/m²     GENERAL: No acute distress, alert and oriented, no hoarseness  EYES: EOMI, Anti-icteric  NOSE: On anterior rhinoscopy there large septal perforation, quarter sized, unchanged from prior examination. EARS: Normal external appearance; on portable otomicroscopy:     -Ad: External auditory canal with mild cerumen impaction that was removed with alligator forceps. Tympanic membrane clear, no middle ear effusion or retractions.     -As: External auditory canal without stenosis, tympanic membrane clear, no middle ear effusions or retractions. Pneumatic otoscopy: Bilateral tympanic membranes mobile pneumatic otoscopy  FACE: HB 1/6 bilaterally, symmetric appearing, sensation equal bilaterally  ORAL CAVITY: No masses or lesions palpated, uvula is midline, moist mucous membranes, symmetric 2+ tonsils.  Edentulous with dentures in place. NECK: Normal range of motion, no thyromegaly, trachea is midline, no palpable lymphadenopathy or neck masses, no crepitus  NEURO: Cranial Nerves 2, 3, 4, 5, 6, 7, 11, 12 grossly intact bilaterally     I have performed a head and neck physical exam personally or was physically present during the key or critical portions of the service.       Procedure     Procedure: Myringotomy with placement of left-sided pressure equalization tube (CPT 16190)    Pre op Dx: Bilateral eustachian tube dysfunction, mixed hearing loss bilaterally  Post Op: Same  Procedure: days      Follow-Up     Return in about 1 month (around 8/5/2022). Percy Campbell   Department of Otolaryngology/Head and Neck Surgery  7/5/22    Medical Decision Making: The following items were considered in medical decision making:  Independent review of images  Review / order clinical lab tests  Review / order radiology tests  Decision to obtain old records      This note was generated completely or in part utilizing Dragon dictation speech recognition software. Occasionally, words are mistranscribed and despite editing, the text may contain inaccuracies due to incorrect word recognition. If further clarification is needed please contact the office at [phone].

## 2022-07-07 ENCOUNTER — TELEPHONE (OUTPATIENT)
Dept: CARDIOLOGY CLINIC | Age: 72
End: 2022-07-07

## 2022-07-07 DIAGNOSIS — R06.02 SHORTNESS OF BREATH: Primary | ICD-10-CM

## 2022-07-07 RX ORDER — METOLAZONE 5 MG/1
5 TABLET ORAL DAILY
Qty: 90 TABLET | Refills: 3 | Status: SHIPPED | OUTPATIENT
Start: 2022-07-07 | End: 2022-08-22

## 2022-07-07 NOTE — TELEPHONE ENCOUNTER
----- Message from Rafael Hernandez MD sent at 7/7/2022  6:18 AM EDT -----  Check with her. I do not see lasix on her medication list. Start lasix 40 mg daily.  Have renal profile and proBNP in 4 weeks

## 2022-07-08 NOTE — TELEPHONE ENCOUNTER
Spoke with the patient and advised her of the message below per LES . Pt would like to know if she is to be taking Metolazone 5 mg po QD and Lasix 40 mg po QD. with Pseudotumor Cerebri  Exam noted to have diffuse scattered IRH and Mitchell spots both eyes, also elevated optic nerves OU, and tortuous vessels OU.  Retinal findings consistent with leukemic retinopathy. Optic nerve elevation could be secondary to leukemic infiltrate vs increased ICP (recent pseudotumor cerebri diagnosis)  Continue Diamox   Visual changes consisting of red spots and lines in both eyes  Neuro exam with no deficits  Appreciate opthalmology recommendations

## 2022-07-08 NOTE — TELEPHONE ENCOUNTER
Patient returned call back to office upset due to not understanding the message that was sent to Dr Warden Macias. She was confused on what Diuretic to take . Was on the call for 25.06a while explaining to her what and how to take her med's and to still have labs drawn and to keep her appointment with Dr Warden Macias.

## 2022-07-13 NOTE — TELEPHONE ENCOUNTER
Pt states she is taking an OTC Magnesium 250 mg and her new bottle has a the term  Citrate on bottle. She thinks she picked up the wrong type. Asking if it is ok to take these or does she need to re-buy them. Please call her before 345 pm today to advise.

## 2022-07-13 NOTE — TELEPHONE ENCOUNTER
Called to speak with patient about message below, but no answer. Left message to call back and our phone number provided.

## 2022-07-14 NOTE — TELEPHONE ENCOUNTER
Patient called back  Gave results    Patient stated that she has never seen glycinate and if she can't find it she just won't take any.

## 2022-07-22 NOTE — TELEPHONE ENCOUNTER
Received refill request for COQ-10 from [employer].     Last ov: 12/09/2021 LES    Last Refill: 06/03/2021 #90 w/ 3 refills    Next appointment: 08/22/2022 LES

## 2022-07-22 NOTE — TELEPHONE ENCOUNTER
PLEASE CALL PT BACK AT [phone] PLEASE LEAVE A MESSAGE IF SHE DOES NOT ANSWER  Medication Refill    Medication needing refilled:  Coenzyme Q10 (COQ-10)    Dosage of the medication:  100 MG CPCR    How are you taking this medication (QD, BID, TID, QID, PRN):  Take 100 mg by mouth daily           30 or 90 day supply called in:  90 capsule    When will you run out of your medication:   Out now    Which Pharmacy are we sending the medication to?:      [facility] [id number] [facility] [phone]   [address] [facility] [address]   Phone:  [phone]  Fax:  [phone]        Patient also is requesting a script be sent over for Vitamin D-3

## 2022-07-25 RX ORDER — MELATONIN/PYRIDOXINE HCL (B6) 5 MG-10 MG
100 TABLET,IMMED, EXTENDED RELEASE, BIPHASIC ORAL DAILY
Qty: 90 CAPSULE | Refills: 3 | Status: SHIPPED | OUTPATIENT
Start: 2022-07-25

## 2022-08-01 ENCOUNTER — TELEPHONE (OUTPATIENT)
Dept: CARDIOLOGY CLINIC | Age: 72
End: 2022-08-01

## 2022-08-01 NOTE — TELEPHONE ENCOUNTER
I supplied:    20 mg 1 bottle   Lot: [id number]    10 mg 6 bottles  Lot; [id number]  Exp:9/23    LMOM for pt to let her know, also stated in message that she will take 2 of the 10 mg tablets instead of 1.  Also left instructions in the bag with sig

## 2022-08-01 NOTE — TELEPHONE ENCOUNTER
Medication Samples    Medication:  rivaroxaban (XARELTO)    Dosage of the medication:  20 MG TABS tablet    How are you taking this medication (QD, BID, TID, QID, PRN):  Take 1 tablet by mouth daily (with breakfast)     in the office or Mail to your home? Pt will  tomorrow when she comes up here for her Echo      Pt is requesting enough to get her through the month.

## 2022-08-02 ENCOUNTER — HOSPITAL ENCOUNTER (OUTPATIENT)
Dept: NON INVASIVE DIAGNOSTICS | Age: 72
Discharge: HOME | End: 2022-08-02
Payer: MEDICARE

## 2022-08-02 ENCOUNTER — TELEPHONE (OUTPATIENT)
Dept: CARDIOLOGY CLINIC | Age: 72
End: 2022-08-02

## 2022-08-02 DIAGNOSIS — R06.02 SHORTNESS OF BREATH: ICD-10-CM

## 2022-08-02 LAB
LV EF: 63 %
LVEF MODALITY: NORMAL

## 2022-08-02 PROCEDURE — 93306 TTE W/DOPPLER COMPLETE: CPT

## 2022-08-02 NOTE — TELEPHONE ENCOUNTER
----- Message from Katie Sahu MD sent at 8/2/2022  3:26 PM EDT -----  Will review at [facility]

## 2022-08-02 NOTE — TELEPHONE ENCOUNTER
LMOM for patient to return call in regards to message below   LES will review results of echo at next OV

## 2022-08-12 NOTE — PROGRESS NOTES
ear injury, Shingles, and Valvular disease. Surgical History:   has a past surgical history that includes Coronary angioplasty with stent (2014, 3/4/21); Coronary angioplasty with stent (03/14/2003 & 06/02/2003); Hysterectomy (09/01/1983); Tubal ligation (09/1983); and ventral hernia repair (5-). Social History:   reports that she quit smoking about 8 years ago. Her smoking use included cigarettes. She has never used smokeless tobacco. She reports that she does not drink alcohol and does not use drugs. Family History:  family history includes Heart Attack (age of onset: 64) in her mother; Heart Attack (age of onset: 76) in her father; Heart Disease in her father. Home Medications:  Prior to Admission medications    Medication Sig Start Date End Date Taking? Authorizing Provider   escitalopram (LEXAPRO) 10 MG tablet TAKE 1 TABLET BY MOUTH EVERY DAY 8/11/22  Yes Historical Provider, MD   metOLazone (ZAROXOLYN) 5 MG tablet Take 1 tablet by mouth daily 7/7/22  Yes Ino Morrison MD   atenolol (TENORMIN) 25 MG tablet TAKE 1 TABLET BY MOUTH DAILY 4/28/22  Yes Ino Morrison MD   rivaroxaban (XARELTO) 20 MG TABS tablet Take 1 tablet by mouth daily (with breakfast) 2/25/22  Yes KARTHIKEYAN Poe - YAMILETH   rosuvastatin (CRESTOR) 10 MG tablet TAKE ONE TABLET BY MOUTH DAILY 2/24/22  Yes Gee Harris MD   Magnesium Oxide 250 MG TABS Take 2 tablets by mouth daily  Patient taking differently: Take 250 mg by mouth daily 4/6/16  Yes Adrian Dietrich MD   Coenzyme Q10 (COQ-10) 100 MG CPCR Take 100 mg by mouth daily 7/25/22   Ino Morrison MD   ofloxacin (OCUFLOX) 0.3 % solution Place 4 drops in left ear twice a day for the next 5 days.  7/5/22   Polo Olivarez DO   albuterol sulfate HFA (VENTOLIN HFA) 108 (90 Base) MCG/ACT inhaler Inhale 2 puffs into the lungs 4 times daily as needed for Wheezing 6/10/22   Ino Morrison MD   acetaminophen (TYLENOL) 500 MG tablet Take 1 tablet by mouth 4 times daily as needed for Pain 2/8/22   KARTHIKEYAN Ji - CNP   nitroGLYCERIN (NITROSTAT) 0.4 MG SL tablet Place 1 tablet under the tongue every 5 minutes as needed for Chest pain 10/13/21   Cesar Sadler MD   OMEPRAZOLE PO Take by mouth daily as needed    Historical Provider, MD   Specialty Vitamins Products (VITAMINS FOR THE HAIR PO) Take by mouth daily    Historical Provider, MD        Allergies:  Actos [pioglitazone], Percocet [oxycodone-acetaminophen], and Vicodin [hydrocodone-acetaminophen]     Review of Systems:   Constitutional: there has been no unanticipated weight loss. There's been no change in energy level, sleep pattern, or activity level. Eyes: No visual changes or diplopia. No scleral icterus. ENT: No Headaches, hearing loss or vertigo. No mouth sores or sore throat. Cardiovascular: Reviewed in HPI  Respiratory: No cough or wheezing, no sputum production. No hematemesis. +SOB   Gastrointestinal: No abdominal pain, appetite loss, blood in stools. No change in bowel or bladder habits. Genitourinary: No dysuria, trouble voiding, or hematuria. Musculoskeletal:  No gait disturbance, weakness or joint complaints. Integumentary: No rash or pruritis. Neurological: No headache, diplopia, change in muscle strength, numbness or tingling. No change in gait, balance, coordination, mood, affect, memory, mentation, behavior. Psychiatric: No anxiety, no depression. Endocrine: No malaise, fatigue or temperature intolerance. No excessive thirst, fluid intake, or urination. No tremor. Hematologic/Lymphatic: No abnormal bruising or bleeding, blood clots or swollen lymph nodes. Allergic/Immunologic: No nasal congestion or hives.     Physical Examination:    Vitals:    08/22/22 0858   BP: 132/68   Pulse: 79   SpO2: 95%          Wt Readings from Last 1 Encounters:   08/22/22 130 lb (59 kg)     Cardiac Cath IVUS PCI: 3/4/21  Anatomy:   LM-distal 70%   LAD-prox 90%, distal 70%     IVUS LAD showed MLA of 1mm2  IVUS of LM showed MLA 5.2mm2      Intervention  ~Successful PCI to LAD with 2.5x32 DEE to 18atm. PCI to LM with 2.75x16 DEE to 18atm. PD with 3.0x8 NC to 24atm. Excellent Result. IVUS showed good stent apposition and expansion. Impression  ~Coronary Angiography w/ Severe Single vessel CAD  ~Successful complex angioplasty and stenting of LAD/LM    Cardiac Cath PCI 2/10/21:  Anatomy:      RCA-ostial 50%  RPDA- mid 99% ISR     Intervention  ~Successful PCI to RCA with ELCA laser atherectomy. 2.5 NC balloon, 3.5 NC balloon. 3.5x38 DEE to 20 kirti. Mid stent underexpanded segment. 3.75x8 NC to 22atm. 10% residual stenosis. Excellent Result. Constitutional and General Appearance: + fatigue,  Well developed, well nourished  Respiratory:  Normal excursion and expansion without use of accessory muscles  Resp Auscultation: Normal breath sounds without dullness  Cardiovascular: The apical impulses not displaced  Heart tones are crisp and normal  Cervical veins are not engorged  The carotid upstroke is normal in amplitude and contour without delay or bruit  Peripheral pulses are symmetrical and full  There is no clubbing, cyanosis of the extremities. No edema  Femoral Arteries: 2+ and equal  Pedal Pulses: 2+ and equal   Abdomen:  No masses or tenderness  Liver/Spleen: No Abnormalities Noted  Neurological/Psychiatric:  Alert and oriented in all spheres  Moves all extremities well  Exhibits normal gait balance and coordination  No abnormalities of mood, affect, memory, mentation, or behavior are noted    Echo 8/2/22  -Normal left ventricular cavity size, wall thickness, and systolic function with an estimated ejection fraction of 60-65%. No regional wall  motion abnormalities seen.  -Indeterminate diastolic function.  -The right ventricle is normal in size.  Moderately diminished right ventricular systolic function with a TAPSE of 1.31 cm and RV s'  velocity of 8.81 cm/s.  -Bi-atrial enlargement.  -The aortic valve is calcified with reduced excursion. Mild aortic stenosis with a PV of 2.23 m/s and MPG of 11 mmHg. -The mitral valve is thickened with normal excursion. Mild to moderate mitral regurgitation.  -Mild to moderate tricuspid regurgitation with an estimated PASP of 35 mmHg. -Mild pulmonic insufficiency.  -There is continuous flow seen shunting predominately left to right across the interatrial septum consistent with an ASD    Assessment:      Coronary artery disease involving native coronary artery of native heart without angina pectoris  Angina - no c/o chronic chest pain   CCS class 3  Intervention  Stress Echo - abnormal  LHC~ 11/2020 severe MVD - non surgical candidate  2/2021 PCI to RCA with DEE  3/2021 PCI to LAD/LM with DEE  Current meds~ pt is on Xarelto         Atrial fibrillation (HCC)  Type~ paroxysmal  Rate controlled 79 today 8/22/22  Current meds~ continue Xarelto         Essential hypertension  BP today 132/68  Meds~ continue atenolol  stable     Hyperlipidemia   Continue crestor / CoQ 10  7/31/21   HDL 48 LDL calc 51  Plan~ no changes        Mitral regurgitation  Mild -Moderate Per echo 10/2020  Echo 8/2/22 The mitral valve is thickened with normal excursion. Mild to moderate mitral regurgitation. PAD (peripheral artery disease) (HCC)  Following Dr etienne  stents placed in her legs with Dr Mag Dave    Right sided neck pain  Pulse palpable  Periodic carotid US     Plan:  Cardiac test and lab results personally reviewed by me during this office visit and discussed. 8/2022 Echo results reviewed with Taylor Leon (mild - mod mitral regurgitation, treat medically). metolazone as needed once a week for shortness of breath , to decreased risk of low potassium  Start Torsemide 20 mg daily to help with breathing  Renal and bnp in 1 mos  Carotid doppler before next visit with me  Periodic echo and carotid US  Continue risk factor modifications.    Call for any change in symptoms, call to report any changes in shortness of breath or development of chest pain with activity. Follow up in 6 mos with carotids before next visit          I appreciate the opportunity of cooperating in the care of this individual.    Coco Chaudhari M.D., [facility]    Patient's problem list, medications, allergies, past medical, surgical, social and family histories were reviewed and updated as appropriate. Scribe's attestation: This note was scribed in the presence of Dr Castro Chaudhari by Jovanny Murphy RN. The scribe's documentation has been prepared under my direction and personally reviewed by me in its entirety. I confirm that the note above accurately reflects all work, treatment, procedures, and medical decision making performed by me.

## 2022-08-22 ENCOUNTER — OFFICE VISIT (OUTPATIENT)
Dept: CARDIOLOGY CLINIC | Age: 72
End: 2022-08-22
Payer: MEDICARE

## 2022-08-22 VITALS
SYSTOLIC BLOOD PRESSURE: 132 MMHG | OXYGEN SATURATION: 95 % | WEIGHT: 130 LBS | DIASTOLIC BLOOD PRESSURE: 68 MMHG | HEIGHT: 55 IN | HEART RATE: 79 BPM | BODY MASS INDEX: 30.09 KG/M2

## 2022-08-22 DIAGNOSIS — I77.9 BILATERAL CAROTID ARTERY DISEASE, UNSPECIFIED TYPE (HCC): ICD-10-CM

## 2022-08-22 DIAGNOSIS — E78.00 PURE HYPERCHOLESTEROLEMIA: ICD-10-CM

## 2022-08-22 DIAGNOSIS — I10 ESSENTIAL HYPERTENSION: ICD-10-CM

## 2022-08-22 DIAGNOSIS — I25.10 CORONARY ARTERY DISEASE INVOLVING NATIVE CORONARY ARTERY OF NATIVE HEART WITHOUT ANGINA PECTORIS: Primary | ICD-10-CM

## 2022-08-22 DIAGNOSIS — I34.0 NONRHEUMATIC MITRAL VALVE REGURGITATION: ICD-10-CM

## 2022-08-22 DIAGNOSIS — I48.21 PERMANENT ATRIAL FIBRILLATION (HCC): ICD-10-CM

## 2022-08-22 PROCEDURE — G8400 PT W/DXA NO RESULTS DOC: HCPCS | Performed by: INTERNAL MEDICINE

## 2022-08-22 PROCEDURE — 1123F ACP DISCUSS/DSCN MKR DOCD: CPT | Performed by: INTERNAL MEDICINE

## 2022-08-22 PROCEDURE — 1036F TOBACCO NON-USER: CPT | Performed by: INTERNAL MEDICINE

## 2022-08-22 PROCEDURE — 1090F PRES/ABSN URINE INCON ASSESS: CPT | Performed by: INTERNAL MEDICINE

## 2022-08-22 PROCEDURE — G8427 DOCREV CUR MEDS BY ELIG CLIN: HCPCS | Performed by: INTERNAL MEDICINE

## 2022-08-22 PROCEDURE — G8417 CALC BMI ABV UP PARAM F/U: HCPCS | Performed by: INTERNAL MEDICINE

## 2022-08-22 PROCEDURE — 99214 OFFICE O/P EST MOD 30 MIN: CPT | Performed by: INTERNAL MEDICINE

## 2022-08-22 PROCEDURE — 3017F COLORECTAL CA SCREEN DOC REV: CPT | Performed by: INTERNAL MEDICINE

## 2022-08-22 RX ORDER — TORSEMIDE 20 MG/1
20 TABLET ORAL DAILY
Qty: 90 TABLET | Refills: 3 | Status: SHIPPED | OUTPATIENT
Start: 2022-08-22

## 2022-08-22 RX ORDER — METOLAZONE 5 MG/1
TABLET ORAL
Qty: 15 TABLET | Refills: 3
Start: 2022-08-22

## 2022-08-22 RX ORDER — ESCITALOPRAM OXALATE 10 MG/1
TABLET ORAL
COMMUNITY
Start: 2022-08-11

## 2022-08-22 NOTE — PATIENT INSTRUCTIONS
Metolazone as needed once a week for shortness of breath   Start Torsemide 20 mg daily   Labs in 1 month  Carotid US before next office visit  Periodic echo and carotid US  Continue risk factor modifications. Call for any change in symptoms, call to report any changes in shortness of breath or development of chest pain with activity.     Follow up in 6 mos

## 2022-08-23 RX ORDER — NITROGLYCERIN 0.4 MG/1
TABLET SUBLINGUAL
Qty: 25 TABLET | Refills: 0 | Status: SHIPPED | OUTPATIENT
Start: 2022-08-23

## 2022-08-25 ENCOUNTER — OFFICE VISIT (OUTPATIENT)
Dept: ENT CLINIC | Age: 72
End: 2022-08-25
Payer: MEDICARE

## 2022-08-25 VITALS — DIASTOLIC BLOOD PRESSURE: 72 MMHG | TEMPERATURE: 97.3 F | SYSTOLIC BLOOD PRESSURE: 116 MMHG | HEART RATE: 64 BPM

## 2022-08-25 DIAGNOSIS — H90.3 ASYMMETRIC SNHL (SENSORINEURAL HEARING LOSS): Primary | ICD-10-CM

## 2022-08-25 DIAGNOSIS — H93.12 TINNITUS OF LEFT EAR: ICD-10-CM

## 2022-08-25 PROCEDURE — G8427 DOCREV CUR MEDS BY ELIG CLIN: HCPCS | Performed by: STUDENT IN AN ORGANIZED HEALTH CARE EDUCATION/TRAINING PROGRAM

## 2022-08-25 PROCEDURE — G8400 PT W/DXA NO RESULTS DOC: HCPCS | Performed by: STUDENT IN AN ORGANIZED HEALTH CARE EDUCATION/TRAINING PROGRAM

## 2022-08-25 PROCEDURE — G8417 CALC BMI ABV UP PARAM F/U: HCPCS | Performed by: STUDENT IN AN ORGANIZED HEALTH CARE EDUCATION/TRAINING PROGRAM

## 2022-08-25 PROCEDURE — 3017F COLORECTAL CA SCREEN DOC REV: CPT | Performed by: STUDENT IN AN ORGANIZED HEALTH CARE EDUCATION/TRAINING PROGRAM

## 2022-08-25 PROCEDURE — 1123F ACP DISCUSS/DSCN MKR DOCD: CPT | Performed by: STUDENT IN AN ORGANIZED HEALTH CARE EDUCATION/TRAINING PROGRAM

## 2022-08-25 PROCEDURE — 99213 OFFICE O/P EST LOW 20 MIN: CPT | Performed by: STUDENT IN AN ORGANIZED HEALTH CARE EDUCATION/TRAINING PROGRAM

## 2022-08-25 PROCEDURE — 1090F PRES/ABSN URINE INCON ASSESS: CPT | Performed by: STUDENT IN AN ORGANIZED HEALTH CARE EDUCATION/TRAINING PROGRAM

## 2022-08-25 PROCEDURE — 1036F TOBACCO NON-USER: CPT | Performed by: STUDENT IN AN ORGANIZED HEALTH CARE EDUCATION/TRAINING PROGRAM

## 2022-08-25 RX ORDER — NITROGLYCERIN 0.4 MG/1
TABLET SUBLINGUAL
Qty: 25 TABLET | Refills: 0 | OUTPATIENT
Start: 2022-08-25

## 2022-08-25 NOTE — PROGRESS NOTES
[address] VISIT      Patient Name: Cheyanne Pickens Record Number:  [id number]  Primary Care Physician:  Lori Franco DO    ChiefComplaint     Chief Complaint   Patient presents with    Other     some days Lt eat still feels clogged, has a rumble noise on Lt ear        History of Present Illness     Natalie Sadler is an 66 y.o. female previously seen for bilateral eustachian tube dysfunction, mixed hearing loss of both ears. Left PE tube placed under local anesthesia at last visit on 2022. Interval History:   Not much improvement after left ear tube placement. Continues to have left ear fullness and roaring tinnitus. Past Medical History     Past Medical History:   Diagnosis Date    AF (atrial fibrillation) (HCC)     on anticoagulation    Back pain     compaound fracture    CAD (coronary artery disease)     Centrilobular emphysema ([address] 3/16/2021    Compression fracture 1994    Hyperlipidemia     Hypertension     Myelolipoma     bilateral adrenals    PVD (peripheral vascular disease) ([address]     Right ear injury 1983    Per pt.     Shingles     Valvular disease        Past Surgical History     Past Surgical History:   Procedure Laterality Date    CORONARY ANGIOPLASTY WITH STENT PLACEMENT  , 3/4/21    CORONARY ANGIOPLASTY WITH STENT PLACEMENT  2003 & 2003    per pt    HYSTERECTOMY (CERVIX STATUS UNKNOWN)  1983    fibroids    TUBAL LIGATION  1983    VENTRAL HERNIA REPAIR  2013    VENTRAL HERNIA REPAIR WITH MESH              Family History     Family History   Problem Relation Age of Onset    Heart Attack Father 76    Heart Disease Father         complications from surgery, per pt    Heart Attack Mother 64       Social History     Social History     Tobacco Use    Smoking status: Former     Years: 40.00     Types: Cigarettes     Quit date: 3/1/2014     Years since quittin.4 Smokeless tobacco: Never    Tobacco comments:     Trying to quit   Vaping Use    Vaping Use: Never used   Substance Use Topics    Alcohol use: No     Alcohol/week: 3.0 standard drinks     Types: 3 Cans of beer per week    Drug use: No        Allergies     Allergies   Allergen Reactions    Actos [Pioglitazone]     Percocet [Oxycodone-Acetaminophen]      Confused , loopy    Vicodin [Hydrocodone-Acetaminophen]        Medications     Current Outpatient Medications   Medication Sig Dispense Refill    nitroGLYCERIN (NITROSTAT) 0.4 MG SL tablet ONE TABLET UNDER TONGUE AS NEEDED FOR CHEST PAIN. IF PAIN REMAINS AFTER 5 MINUTES CALL 911 AND REPEAT. MAX 3 TABLETS IN 15 MINUTES 25 tablet 0    escitalopram (LEXAPRO) 10 MG tablet TAKE 1 TABLET BY MOUTH EVERY DAY      torsemide (DEMADEX) 20 MG tablet Take 1 tablet by mouth daily 90 tablet 3    metOLazone (ZAROXOLYN) 5 MG tablet Take as needed. once a week, for shortness of breath 15 tablet 3    Coenzyme Q10 (COQ-10) 100 MG CPCR Take 100 mg by mouth daily 90 capsule 3    ofloxacin (OCUFLOX) 0.3 % solution Place 4 drops in left ear twice a day for the next 5 days.  1 each 0    albuterol sulfate HFA (VENTOLIN HFA) 108 (90 Base) MCG/ACT inhaler Inhale 2 puffs into the lungs 4 times daily as needed for Wheezing 54 g 1    atenolol (TENORMIN) 25 MG tablet TAKE 1 TABLET BY MOUTH DAILY 90 tablet 3    rivaroxaban (XARELTO) 20 MG TABS tablet Take 1 tablet by mouth daily (with breakfast) 90 tablet 1    rosuvastatin (CRESTOR) 10 MG tablet TAKE ONE TABLET BY MOUTH DAILY 30 tablet 11    acetaminophen (TYLENOL) 500 MG tablet Take 1 tablet by mouth 4 times daily as needed for Pain 20 tablet 0    OMEPRAZOLE PO Take by mouth daily as needed      Specialty Vitamins Products (VITAMINS FOR THE HAIR PO) Take by mouth daily      Magnesium Oxide 250 MG TABS Take 2 tablets by mouth daily (Patient taking differently: Take 250 mg by mouth daily) 30 tablet 5     No current facility-administered medications for this visit. Review of Systems     REVIEW OF SYSTEM: See HPI above    PhysicalExam     Vitals:    08/25/22 1310   BP: 116/72   Pulse: 64   Temp: 97.3 °F (36.3 °C)   TempSrc: Temporal       PHYSICAL EXAM  /72   Pulse 64   Temp 97.3 °F (36.3 °C) (Temporal)   LMP 01/01/1983     GENERAL: No acute distress, alert and oriented, no hoarseness  EYES: EOMI, Anti-icteric  NOSE: On anterior rhinoscopy there large septal perforation, quarter sized, unchanged from prior examination. EARS: Normal external appearance; on portable otomicroscopy:     -Ad: External auditory canal without stenosis, tympanic membrane clear and intact. No evidence of effusion, retraction, perforation. No otorrhea.     -As: External auditory canal without stenosis, tympanic membrane clear and intact with PE tube in proper position in anterior inferior quadrant. No otorrhea. Pneumatic otoscopy: Bilateral tympanic membranes mobile pneumatic otoscopy  FACE: HB 1/6 bilaterally, symmetric appearing, sensation equal bilaterally  ORAL CAVITY: No masses or lesions palpated, uvula is midline, moist mucous membranes, symmetric 2+ tonsils. Edentulous with dentures in place. NECK: Normal range of motion, no thyromegaly, trachea is midline, no palpable lymphadenopathy or neck masses, no crepitus  NEURO: Cranial Nerves 2, 3, 4, 5, 6, 7, 11, 12 grossly intact bilaterally     I have performed a head and neck physical exam personally or was physically present during the key or critical portions of the service. Assessment and Plan     1. Asymmetric SNHL (sensorineural hearing loss)  2. Tinnitus of left ear  -Order for MRI IAC with and without contrast placed for asymmetrical sensorineural hearing loss with persistent left-sided tinnitus. Follow-up after imaging. Would be candidate for amplification with hearing aids but patient would like to hold off on pursuing this for now. - MRI IAC POSTERIOR FOSSA W WO CONTRAST;  Future      Follow-Up Return for after imaging. Percy Ogden   Department of Otolaryngology/Head and Neck Surgery  8/25/22    Medical Decision Making: The following items were considered in medical decision making:  Independent review of images  Review / order clinical lab tests  Review / order radiology tests  Decision to obtain old records      This note was generated completely or in part utilizing Dragon dictation speech recognition software. Occasionally, words are mistranscribed and despite editing, the text may contain inaccuracies due to incorrect word recognition. If further clarification is needed please contact the office at [phone].

## 2022-08-31 ENCOUNTER — TELEPHONE (OUTPATIENT)
Dept: ENT CLINIC | Age: 72
End: 2022-08-31

## 2022-08-31 NOTE — TELEPHONE ENCOUNTER
Pt.states she cant do any test because she receive two letters stating  don't accept [employer] plus O so she wont be able to pay out of pocket. She would like a call back as soon as possible she still wouldn't like to be seen by him but she can't pay out of pocket. Please leave a message if she doesn't answer.

## 2022-09-02 NOTE — TELEPHONE ENCOUNTER
Spoke to pt, she stated that she spoke with her insurance and Dr. Dora Oliver is in network but when she called here she was told Dr. Dora Oliver does not take her insurance, Pt can not afford to pay out of pocket for visit or MRI.

## 2022-09-07 ENCOUNTER — TELEPHONE (OUTPATIENT)
Dept: ENT CLINIC | Age: 72
End: 2022-09-07

## 2022-09-07 NOTE — TELEPHONE ENCOUNTER
Pt.calling yelling on the phone asking if Dr. Jose Mcintosh [place] [facility] [place] not. ?  I Informed the patient what  left on his note. Pt. didn't want to make an appointment she states it needs to be sooner than the Sept.29. I informed her I would ask Dr. Jose Mcintosh about an earlier Apt. Pt.states she would like a call back as soon as possible.

## 2022-09-07 NOTE — TELEPHONE ENCOUNTER
Spoke with patient. I am [facility] the [facility]. Notified her that we are working on a solution to find out why it is coming back out of network . Requested that patient get a referral from PCP for the dates of service that she was seen .

## 2022-09-07 NOTE — TELEPHONE ENCOUNTER
Called and spoke with the patient. She states that she still has hearing loss and she has been having issues with having insurance pay for her MRI and visits. She would like to be seen she will make an appointment for follow-up.

## 2022-09-08 ENCOUNTER — TELEPHONE (OUTPATIENT)
Dept: CARDIOLOGY CLINIC | Age: 72
End: 2022-09-08

## 2022-09-08 NOTE — TELEPHONE ENCOUNTER
Medication Refill    Medication needing refilled:  rivaroxaban (XARELTO)      Dosage of the medication: 20mg    How are you taking this medication (QD, BID, TID, QID, PRN):     Take 1 tablet by mouth daily (with breakfast)    30 or 90 day supply called in: 90    When will you run out of your medication:    Which Pharmacy are we sending the medication to?:    [facility] [id number] [address]   [address] [facility], [address] [place] [address],[address] [id number]   Phone:  [phone]  Fax:  [phone]      NOTE:  Pt called concerned if she is still to take this med. Per Pt she is requesting a call back from the office to discuss this.   Please advise

## 2022-09-08 NOTE — TELEPHONE ENCOUNTER
NOTE:  Pt called concerned if she is still to take this med. Per Pt she is requesting a call back from the office to discuss this.   Please advise  Spoke to patient she will like a new prescription sent to her pharmacy [facility]

## 2022-09-09 ENCOUNTER — TELEPHONE (OUTPATIENT)
Dept: CARDIOLOGY CLINIC | Age: 72
End: 2022-09-09

## 2022-09-09 NOTE — TELEPHONE ENCOUNTER
Pt called wanting to schedule an appt with LES. There's no availability soon. Please see where Pt can be scheduled in at.   Please advise

## 2022-09-12 ENCOUNTER — TELEPHONE (OUTPATIENT)
Dept: CARDIOLOGY CLINIC | Age: 72
End: 2022-09-12

## 2022-09-12 NOTE — TELEPHONE ENCOUNTER
Pt states she only has a couple doses left on xarelto samples. Would like a script called into her local [facility] [address] [facility], [address] [facility] [address] [facility] [address]   [address] [facility], [address]   Phone:  [phone]  Fax:  [phone]    Also states she is out of metolazone 5 mg and pharmacy wont fill until 9/20/22. Please call pt before 4 pm to discuss.

## 2022-09-13 NOTE — TELEPHONE ENCOUNTER
Called pt to evaluate reason for wanting to be seen. She states she had questions about her current medication regimen, especially her Demadex and zaroxolyn and why she would be taking them. Medications reviewed and clarified for pt. Pt states she has had increase in swelling lately, but no complaints of sob.

## 2022-09-20 NOTE — TELEPHONE ENCOUNTER
Pt states she is  out of xarelto and not able to afford and needs samples or another medication. Also states she started taking rosuvastatin since she is out of xarelto. Please call pt to discuss further. Pt goes to work at 430 pm and request a call before she leaves.

## 2022-09-20 NOTE — TELEPHONE ENCOUNTER
I spoke with pt , to let her know that we have samples for her at the .     I provided 6 bottles    Lot [id number]  Exp: 2/25    She stated that she was unsure the cost and wanted a script sent

## 2022-09-23 NOTE — TELEPHONE ENCOUNTER
Script was already sent on 9/12/22 to [facility] for Laurita Colvin notifying her of this info.

## 2022-10-03 NOTE — TELEPHONE ENCOUNTER
Patient is wanting to know if Dr. Marine Monterroso has figured out anything with her insurance not covering her tube placement.

## 2022-10-17 ENCOUNTER — TELEPHONE (OUTPATIENT)
Dept: CARDIOLOGY CLINIC | Age: 72
End: 2022-10-17

## 2022-10-17 NOTE — TELEPHONE ENCOUNTER
Pt states she is taking xarelto and thought that was in place of rosuvastatin. Please call pt to clarify. If you call after 330 pm, please leave message on her Vmail.

## 2022-10-18 NOTE — TELEPHONE ENCOUNTER
Called and spoke to Sarah Ravi and clarified below medications. Pt verbalizes understanding. Please call and offer an appointment in February for a follow up apt after her carotid doppler study. Thanks.

## 2022-10-24 ENCOUNTER — TELEPHONE (OUTPATIENT)
Dept: ENT CLINIC | Age: 72
End: 2022-10-24

## 2022-10-24 NOTE — TELEPHONE ENCOUNTER
Pt.states she thought her apt. Was at a different time today. She states she wont be able to make it on time with her driving. She would like to know if she can come in sooner because she can't hear out of her ear.  I informed her I will send a message to the team        Next office visit 11/14/22

## 2022-10-25 NOTE — TELEPHONE ENCOUNTER
Spoke to pt to schedule doppler and pt states she has been having left sided neck pain and pain shoots up into her head occasionally for about a month. Asking if she should move her carotid doppler sooner. Please advise.

## 2022-10-26 ENCOUNTER — TELEPHONE (OUTPATIENT)
Dept: ENT CLINIC | Age: 72
End: 2022-10-26

## 2022-10-26 NOTE — TELEPHONE ENCOUNTER
Pt.states does she still need to do her MRI she states her insurance is not going to cover it due to her [employer] the hospital told her they were going to write it off.

## 2022-10-27 NOTE — TELEPHONE ENCOUNTER
Advised pt we could send an order to get her MRI to an outside facility, pt stated she would call her insurance and let us know

## 2022-11-07 ENCOUNTER — TELEPHONE (OUTPATIENT)
Dept: VASCULAR SURGERY | Age: 72
End: 2022-11-07

## 2022-11-07 DIAGNOSIS — H93.12 TINNITUS OF LEFT EAR: Primary | ICD-10-CM

## 2022-11-07 RX ORDER — ALBUTEROL SULFATE 90 UG/1
AEROSOL, METERED RESPIRATORY (INHALATION)
Qty: 54 G | Refills: 1 | Status: SHIPPED | OUTPATIENT
Start: 2022-11-07

## 2022-11-07 NOTE — TELEPHONE ENCOUNTER
Please see and advise.
Pt states she has no idea what happened during cath on 3/4/21. Asking if psc could call her to discuss.
None

## 2022-11-07 NOTE — TELEPHONE ENCOUNTER
Patient called again today, asking about stents for MRI. I have faxed over OP note to [facility] dept and had this discussion with pt multiple times. She yells and doesn't want to listen. She started complaining about how bad her legs hurt, I tried explaining that's why I scheduled her to have arterial duplex this Thursday. Her response is always \"nobody knows what they are talking about\". In not so nice words.

## 2022-11-07 NOTE — TELEPHONE ENCOUNTER
Received refill request for Albuterol from [facility].     Last ov: 08/22/2022 LES    Last Refill: 06/10/2022     Next appointment: 02/18/2023 TANIKA                                   (RECALL)

## 2022-11-10 ENCOUNTER — TELEPHONE (OUTPATIENT)
Dept: ENT CLINIC | Age: 72
End: 2022-11-10

## 2022-11-10 NOTE — TELEPHONE ENCOUNTER
[facility] called stating They tried to schedule her MRI but she has Cardiac Stents. States tried to schedule her at the [facility] location and explain to her what this is for. States she can't go to that location. Central Scheduling states there are no more open appts. At [facility] for this MRI. Patient might reach out She also states patient is claustrophobic so she would like some medication for this MRI.     [phone]

## 2022-11-14 ENCOUNTER — HOSPITAL ENCOUNTER (OUTPATIENT)
Age: 72
Discharge: HOME | End: 2022-11-14
Payer: MEDICARE

## 2022-11-14 DIAGNOSIS — I77.9 BILATERAL CAROTID ARTERY DISEASE, UNSPECIFIED TYPE (HCC): ICD-10-CM

## 2022-11-14 DIAGNOSIS — H93.12 TINNITUS OF LEFT EAR: ICD-10-CM

## 2022-11-14 DIAGNOSIS — I25.10 CORONARY ARTERY DISEASE INVOLVING NATIVE CORONARY ARTERY OF NATIVE HEART WITHOUT ANGINA PECTORIS: ICD-10-CM

## 2022-11-14 DIAGNOSIS — E78.00 PURE HYPERCHOLESTEROLEMIA: ICD-10-CM

## 2022-11-14 LAB
ANION GAP SERPL CALCULATED.3IONS-SCNC: 13 MMOL/L (ref 3–16)
BUN BLDV-MCNC: 42 MG/DL (ref 7–20)
CALCIUM SERPL-MCNC: 10.2 MG/DL (ref 8.3–10.6)
CHLORIDE BLD-SCNC: 93 MMOL/L (ref 99–110)
CO2: 33 MMOL/L (ref 21–32)
CREAT SERPL-MCNC: 1.1 MG/DL (ref 0.6–1.2)
GFR SERPL CREATININE-BSD FRML MDRD: 53 ML/MIN/{1.73_M2}
GLUCOSE BLD-MCNC: 127 MG/DL (ref 70–99)
HCT VFR BLD CALC: 36.2 % (ref 36–48)
HEMOGLOBIN: 11.8 G/DL (ref 12–16)
MCH RBC QN AUTO: 26.5 PG (ref 26–34)
MCHC RBC AUTO-ENTMCNC: 32.6 G/DL (ref 31–36)
MCV RBC AUTO: 81.3 FL (ref 80–100)
PDW BLD-RTO: 19.5 % (ref 12.4–15.4)
PLATELET # BLD: 152 K/UL (ref 135–450)
PMV BLD AUTO: 8.3 FL (ref 5–10.5)
POTASSIUM SERPL-SCNC: 3.3 MMOL/L (ref 3.5–5.1)
PRO-BNP: 2538 PG/ML (ref 0–124)
RBC # BLD: 4.45 M/UL (ref 4–5.2)
SODIUM BLD-SCNC: 139 MMOL/L (ref 136–145)
WBC # BLD: 8.6 K/UL (ref 4–11)

## 2022-11-14 PROCEDURE — 80048 BASIC METABOLIC PNL TOTAL CA: CPT

## 2022-11-14 PROCEDURE — 36415 COLL VENOUS BLD VENIPUNCTURE: CPT

## 2022-11-14 PROCEDURE — 85027 COMPLETE CBC AUTOMATED: CPT

## 2022-11-14 PROCEDURE — 83880 ASSAY OF NATRIURETIC PEPTIDE: CPT

## 2022-11-15 ENCOUNTER — OFFICE VISIT (OUTPATIENT)
Dept: PULMONOLOGY | Age: 72
End: 2022-11-15
Payer: MEDICARE

## 2022-11-15 VITALS — OXYGEN SATURATION: 99 % | HEART RATE: 87 BPM

## 2022-11-15 DIAGNOSIS — Z87.891 FORMER SMOKER: ICD-10-CM

## 2022-11-15 DIAGNOSIS — I25.10 CORONARY ARTERY DISEASE INVOLVING NATIVE CORONARY ARTERY OF NATIVE HEART WITHOUT ANGINA PECTORIS: ICD-10-CM

## 2022-11-15 DIAGNOSIS — I48.21 PERMANENT ATRIAL FIBRILLATION (HCC): ICD-10-CM

## 2022-11-15 DIAGNOSIS — J43.2 CENTRILOBULAR EMPHYSEMA (HCC): Primary | ICD-10-CM

## 2022-11-15 PROCEDURE — G8427 DOCREV CUR MEDS BY ELIG CLIN: HCPCS | Performed by: INTERNAL MEDICINE

## 2022-11-15 PROCEDURE — G8484 FLU IMMUNIZE NO ADMIN: HCPCS | Performed by: INTERNAL MEDICINE

## 2022-11-15 PROCEDURE — 99214 OFFICE O/P EST MOD 30 MIN: CPT | Performed by: INTERNAL MEDICINE

## 2022-11-15 PROCEDURE — 3023F SPIROM DOC REV: CPT | Performed by: INTERNAL MEDICINE

## 2022-11-15 PROCEDURE — G8417 CALC BMI ABV UP PARAM F/U: HCPCS | Performed by: INTERNAL MEDICINE

## 2022-11-15 PROCEDURE — 1090F PRES/ABSN URINE INCON ASSESS: CPT | Performed by: INTERNAL MEDICINE

## 2022-11-15 PROCEDURE — 3017F COLORECTAL CA SCREEN DOC REV: CPT | Performed by: INTERNAL MEDICINE

## 2022-11-15 PROCEDURE — G8400 PT W/DXA NO RESULTS DOC: HCPCS | Performed by: INTERNAL MEDICINE

## 2022-11-15 PROCEDURE — 1036F TOBACCO NON-USER: CPT | Performed by: INTERNAL MEDICINE

## 2022-11-15 PROCEDURE — 1123F ACP DISCUSS/DSCN MKR DOCD: CPT | Performed by: INTERNAL MEDICINE

## 2022-11-15 NOTE — PROGRESS NOTES
[facility]  Consult Note  MD Vince Champion   YOB: 1950    Date of Visit:  11/15/2022    Assessment/Plan:  1. SOB (shortness of breath)  Stable      2. Centrilobular emphysema (HCC)  Stable  CT imaging documents centrilobular emphysema  PFT 12/20:  Spirometry:   Flow volume loops were normal. The FEV-1/FVC ratio was normal.   The  Prebronchodilator FEV-1 was 1.25 liters (82% of predicted),   which was normal. The FVC was 1.71 liters (84% of predicted),   which was normal. Response to inhaled bronchodilators (albuterol)   was not performed. Lung volumes:   Lung volumes were tested by plethysmography. The total lung   capacity was 3.61 liters (113% of predicted), which was normal.   The residual volume was 1.90 liters (147% of predicted), which   was increased. The ratio of residual volume to total lung   capacity (RV/TLC) was 130, which was increased. Specific airway   resistance was increased. Diffusion capacity was found to be normal.       ABG pH7.43, bBV678, pO2 91, HCO3 27, sats 98%     Medication Management:  The patient benefits from the medical regimen and reports no complications. Albuterol HFA    3. Former smoker  \"Light smoker\" in the past  Has not qualified for screening LDCT  Last CT was 2020 and was ok    4. Permanent atrial fibrillation ([address] & 5. Coronary artery disease involving native coronary artery of native heart without angina pectoris/ASVD  Stable  She does have a significant cardiac history with A. fib and coronary artery disease. She has PVD and sees Dr Valentino Pollard. She had recent stenting. 6 months      Chief Complaint   Patient presents with    Shortness of Breath     Annual f.u.  Had tube put in her ear by Dr Franck Frost and feels it is worse He ordered MRI of brain but pt has not had this done yet        HPI  The patient presents with a chief complaint of moderate shortness of breath related to COPD/emphysema of many years duration. He has mild associated cough. Exertion is a modifying factor. She has been using Trelegy and Albuterol since her first visit without benefit. She had stents placed and \"I didn't have any problem breathing before. \"    Review of Systems  No Chest pain, Nausea or vomiting reported    History  I have reviewed past medical, surgical, social and family history. This is documented elsewhere in themedical record. Physical Exam:  Well developed, well nourished  Alert and oriented  Sclera is clear  No cervical adenopathy  No JVD. Chest examination is clear. Cardiac examination reveals regular rate and rhythm without murmur, gallop or rub. The abdomen is soft, nontender and nondistended. There is no clubbing, cyanosis or edema of the extremities. There is no obvious skin rash. No focal neuro deficicts  Normal mood and affect      Allergies   Allergen Reactions    Actos [Pioglitazone]     Percocet [Oxycodone-Acetaminophen]      Confused , loopy    Vicodin [Hydrocodone-Acetaminophen]      Prior to Visit Medications    Medication Sig Taking? Authorizing Provider   albuterol sulfate HFA (PROVENTIL;VENTOLIN;PROAIR) 108 (90 Base) MCG/ACT inhaler INHALE 2 PUFFS INTO THE LUNGS FOUR TIMES DAILY AS NEEDED FOR WHEEZING  Geoff Thompson MD   rivaroxaban (XARELTO) 20 MG TABS tablet Take 1 tablet by mouth daily (with breakfast)  Geoff Thompson MD   nitroGLYCERIN (NITROSTAT) 0.4 MG SL tablet ONE TABLET UNDER TONGUE AS NEEDED FOR CHEST PAIN. IF PAIN REMAINS AFTER 5 MINUTES CALL 911 AND REPEAT. MAX 3 TABLETS IN 15 MINUTES  Geoff Thompson MD   escitalopram (LEXAPRO) 10 MG tablet TAKE 1 TABLET BY MOUTH EVERY DAY  Historical Provider, MD   torsemide (DEMADEX) 20 MG tablet Take 1 tablet by mouth daily  Geoff Thompson MD   metOLazone (ZAROXOLYN) 5 MG tablet Take as needed.  once a week, for shortness of breath  Geoff Thompson MD   Coenzyme Q10 (COQ-10) 100 MG CPCR Take 100 mg by mouth daily  Gracie Price Jeff Smalls MD   ofloxacin (OCUFLOX) 0.3 % solution Place 4 drops in left ear twice a day for the next 5 days. Zeke Holguin DO   atenolol (TENORMIN) 25 MG tablet TAKE 1 TABLET BY MOUTH DAILY  Janel Murphy MD   rosuvastatin (CRESTOR) 10 MG tablet TAKE ONE TABLET BY MOUTH DAILY  Victor Hugo Denise MD   acetaminophen (TYLENOL) 500 MG tablet Take 1 tablet by mouth 4 times daily as needed for Pain  Elihu Patches, APRN - CNP   OMEPRAZOLE PO Take by mouth daily as needed  Historical Provider, MD   Specialty Vitamins Products (VITAMINS FOR THE HAIR PO) Take by mouth daily  Historical Provider, MD   Magnesium Oxide 250 MG TABS Take 2 tablets by mouth daily  Patient taking differently: Take 250 mg by mouth daily  Amador Guardado MD       Vitals:    11/15/22 1042   Pulse: 87   SpO2: 99%     There is no height or weight on file to calculate BMI.      Wt Readings from Last 3 Encounters:   22 130 lb (59 kg)   22 135 lb (61.2 kg)   22 133 lb (60.3 kg)     BP Readings from Last 3 Encounters:   22 116/72   22 132/68   22 (!) 147/69        Social History     Tobacco Use   Smoking Status Former    Years: 40.00    Types: Cigarettes    Quit date: 3/1/2014    Years since quittin.7   Smokeless Tobacco Never   Tobacco Comments    Trying to quit

## 2022-11-17 ENCOUNTER — PROCEDURE VISIT (OUTPATIENT)
Dept: VASCULAR SURGERY | Age: 72
End: 2022-11-17

## 2022-11-17 DIAGNOSIS — I65.23 CAROTID ATHEROSCLEROSIS, BILATERAL: ICD-10-CM

## 2022-11-17 DIAGNOSIS — I70.223 ATHEROSCLEROSIS OF NATIVE ARTERIES OF EXTREMITIES WITH REST PAIN, BILATERAL LEGS (HCC): ICD-10-CM

## 2022-11-21 ENCOUNTER — PREP FOR PROCEDURE (OUTPATIENT)
Dept: VASCULAR SURGERY | Age: 72
End: 2022-11-21

## 2022-11-21 ENCOUNTER — TELEPHONE (OUTPATIENT)
Dept: VASCULAR SURGERY | Age: 72
End: 2022-11-21

## 2022-11-21 DIAGNOSIS — I65.23 CAROTID ATHEROSCLEROSIS, BILATERAL: Primary | ICD-10-CM

## 2022-11-21 RX ORDER — SODIUM CHLORIDE 9 MG/ML
INJECTION, SOLUTION INTRAVENOUS PRN
OUTPATIENT
Start: 2022-11-21

## 2022-11-21 RX ORDER — SODIUM CHLORIDE 0.9 % (FLUSH) 0.9 %
5-40 SYRINGE (ML) INJECTION PRN
OUTPATIENT
Start: 2022-11-21

## 2022-11-21 RX ORDER — SODIUM CHLORIDE 0.9 % (FLUSH) 0.9 %
5-40 SYRINGE (ML) INJECTION EVERY 12 HOURS SCHEDULED
OUTPATIENT
Start: 2022-11-21

## 2022-11-21 NOTE — TELEPHONE ENCOUNTER
Discussed results of carotid duplex which show stable carotid artery disease 50-69% stenosis bilateral ICAs. Plan to repeat carotid duplex in 6 months for continued surveillance. Discussed results of BLE arterial duplex which shows bilateral femoral artery stenosis and calcified vessels in lower extremities and absence of doppler signals of left leg. Patient reports her legs have been hurting more and mostly in the morning in the upper parts of legs. She does report left leg pain above ankle. Unsure if patient's symptoms are related to arterial disease but based on findings on noninvasive testing and given her symptoms I think it would be beneficial to move forward with peripheral angiogram.  Patient is in agreement. Will have our office call to schedule. Patient is on statin therapy and Xarelto 20 mg daily for A fib.

## 2022-11-21 NOTE — TELEPHONE ENCOUNTER
Pt stated she dose not have anyone to take her to the mri if she takes the medication to calm her down, pt stated they told her since she has stents she would have to use a reg Mri machine and not an open one.  Pt has an appt on 12/1/22 to see you, please advise

## 2022-12-01 ENCOUNTER — OFFICE VISIT (OUTPATIENT)
Dept: ENT CLINIC | Age: 72
End: 2022-12-01
Payer: MEDICARE

## 2022-12-01 VITALS — DIASTOLIC BLOOD PRESSURE: 58 MMHG | HEART RATE: 66 BPM | TEMPERATURE: 97.3 F | SYSTOLIC BLOOD PRESSURE: 106 MMHG

## 2022-12-01 DIAGNOSIS — H90.3 ASYMMETRIC SNHL (SENSORINEURAL HEARING LOSS): Primary | ICD-10-CM

## 2022-12-01 DIAGNOSIS — H61.23 BILATERAL IMPACTED CERUMEN: ICD-10-CM

## 2022-12-01 DIAGNOSIS — H90.3 SENSORINEURAL HEARING LOSS (SNHL) OF BOTH EARS: ICD-10-CM

## 2022-12-01 PROCEDURE — 1036F TOBACCO NON-USER: CPT | Performed by: STUDENT IN AN ORGANIZED HEALTH CARE EDUCATION/TRAINING PROGRAM

## 2022-12-01 PROCEDURE — G8427 DOCREV CUR MEDS BY ELIG CLIN: HCPCS | Performed by: STUDENT IN AN ORGANIZED HEALTH CARE EDUCATION/TRAINING PROGRAM

## 2022-12-01 PROCEDURE — 69210 REMOVE IMPACTED EAR WAX UNI: CPT | Performed by: STUDENT IN AN ORGANIZED HEALTH CARE EDUCATION/TRAINING PROGRAM

## 2022-12-01 PROCEDURE — G8484 FLU IMMUNIZE NO ADMIN: HCPCS | Performed by: STUDENT IN AN ORGANIZED HEALTH CARE EDUCATION/TRAINING PROGRAM

## 2022-12-01 PROCEDURE — 3074F SYST BP LT 130 MM HG: CPT | Performed by: STUDENT IN AN ORGANIZED HEALTH CARE EDUCATION/TRAINING PROGRAM

## 2022-12-01 PROCEDURE — 99213 OFFICE O/P EST LOW 20 MIN: CPT | Performed by: STUDENT IN AN ORGANIZED HEALTH CARE EDUCATION/TRAINING PROGRAM

## 2022-12-01 PROCEDURE — G8400 PT W/DXA NO RESULTS DOC: HCPCS | Performed by: STUDENT IN AN ORGANIZED HEALTH CARE EDUCATION/TRAINING PROGRAM

## 2022-12-01 PROCEDURE — 1123F ACP DISCUSS/DSCN MKR DOCD: CPT | Performed by: STUDENT IN AN ORGANIZED HEALTH CARE EDUCATION/TRAINING PROGRAM

## 2022-12-01 PROCEDURE — 3017F COLORECTAL CA SCREEN DOC REV: CPT | Performed by: STUDENT IN AN ORGANIZED HEALTH CARE EDUCATION/TRAINING PROGRAM

## 2022-12-01 PROCEDURE — 3078F DIAST BP <80 MM HG: CPT | Performed by: STUDENT IN AN ORGANIZED HEALTH CARE EDUCATION/TRAINING PROGRAM

## 2022-12-01 PROCEDURE — G8417 CALC BMI ABV UP PARAM F/U: HCPCS | Performed by: STUDENT IN AN ORGANIZED HEALTH CARE EDUCATION/TRAINING PROGRAM

## 2022-12-01 PROCEDURE — 1090F PRES/ABSN URINE INCON ASSESS: CPT | Performed by: STUDENT IN AN ORGANIZED HEALTH CARE EDUCATION/TRAINING PROGRAM

## 2022-12-01 NOTE — PROGRESS NOTES
Vaping Use    Vaping Use: Never used   Substance Use Topics    Alcohol use: No     Alcohol/week: 3.0 standard drinks     Types: 3 Cans of beer per week    Drug use: No        Allergies     Allergies   Allergen Reactions    Actos [Pioglitazone]     Percocet [Oxycodone-Acetaminophen]      Confused , loopy    Vicodin [Hydrocodone-Acetaminophen]        Medications     Current Outpatient Medications   Medication Sig Dispense Refill    albuterol sulfate HFA (PROVENTIL;VENTOLIN;PROAIR) 108 (90 Base) MCG/ACT inhaler INHALE 2 PUFFS INTO THE LUNGS FOUR TIMES DAILY AS NEEDED FOR WHEEZING 54 g 1    rivaroxaban (XARELTO) 20 MG TABS tablet Take 1 tablet by mouth daily (with breakfast) 90 tablet 1    nitroGLYCERIN (NITROSTAT) 0.4 MG SL tablet ONE TABLET UNDER TONGUE AS NEEDED FOR CHEST PAIN. IF PAIN REMAINS AFTER 5 MINUTES CALL 911 AND REPEAT. MAX 3 TABLETS IN 15 MINUTES 25 tablet 0    torsemide (DEMADEX) 20 MG tablet Take 1 tablet by mouth daily 90 tablet 3    Coenzyme Q10 (COQ-10) 100 MG CPCR Take 100 mg by mouth daily 90 capsule 3    ofloxacin (OCUFLOX) 0.3 % solution Place 4 drops in left ear twice a day for the next 5 days. 1 each 0    atenolol (TENORMIN) 25 MG tablet TAKE 1 TABLET BY MOUTH DAILY 90 tablet 3    rosuvastatin (CRESTOR) 10 MG tablet TAKE ONE TABLET BY MOUTH DAILY 30 tablet 11    acetaminophen (TYLENOL) 500 MG tablet Take 1 tablet by mouth 4 times daily as needed for Pain 20 tablet 0    OMEPRAZOLE PO Take by mouth daily as needed      Specialty Vitamins Products (VITAMINS FOR THE HAIR PO) Take by mouth daily      Magnesium Oxide 250 MG TABS Take 2 tablets by mouth daily (Patient taking differently: Take 250 mg by mouth daily) 30 tablet 5    escitalopram (LEXAPRO) 10 MG tablet TAKE 1 TABLET BY MOUTH EVERY DAY (Patient not taking: Reported on 12/1/2022)      metOLazone (ZAROXOLYN) 5 MG tablet Take as needed.  once a week, for shortness of breath 15 tablet 3     No current facility-administered medications for this visit. Review of Systems     REVIEW OF SYSTEM: See HPI above    PhysicalExam     Vitals:    12/01/22 1350   BP: (!) 106/58   Pulse: 66   Temp: 97.3 °F (36.3 °C)   TempSrc: Temporal       PHYSICAL EXAM  BP (!) 106/58   Pulse 66   Temp 97.3 °F (36.3 °C) (Temporal)   LMP 01/01/1983     GENERAL: No acute distress, alert and oriented, no hoarseness  EYES: EOMI, Anti-icteric  NOSE: On anterior rhinoscopy there large septal perforation, half dollar size. There is some bloody crusting around the edges of the perforation. No purulence. EARS: Normal external appearance; bilateral cerumen impaction, see procedure note below  Pneumatic otoscopy: Bilateral tympanic membranes mobile pneumatic otoscopy  FACE: HB 1/6 bilaterally, symmetric appearing, sensation equal bilaterally  ORAL CAVITY: No masses or lesions palpated, uvula is midline, moist mucous membranes, symmetric 2+ tonsils. Edentulous with dentures in place. NECK: Normal range of motion, no thyromegaly, trachea is midline, no palpable lymphadenopathy or neck masses, no crepitus  NEURO: Cranial Nerves 2, 3, 4, 5, 6, 7, 11, 12 grossly intact bilaterally     I have performed a head and neck physical exam personally or was physically present during the key or critical portions of the service. Procedure     Procedure: Binocular otomicroscopy with debridement of cerumen impaction    Pre-op: Cerumen impaction of the bilateral external auditory canals.    Post op: Same  Procedure : Binocular otomicroscopy with debridement of cerumen of bilateral external auditory canals  Surgeon: Dr. Caitlyn De Souza,   Estimated Blood Loss: None    Description of Procedure:    After obtaining verbal consent, the patient was placed in the examination chair in the reclined position.      -Right ear: External auditory canal with occluding cerumen limiting visualization of the tympanic membrane which was removed with use of #7 and #5 Latvian suction, alligator forceps, and cerumen loop curet. After successful removal of cerumen, the right tympanic membrane was noted to have an intact PE tube in the anterior-inferior quadrant, patent, no otorrhea.  -Left ear: External auditory canal with occluding cerumen limiting visualization of the tympanic membrane which was removed with use of #7 and #5 french suction, alligator forceps, and cerumen loop curet. After successful removal of cerumen, the left tympanic membrane was noted to have an intact PE tube in the anterior-inferior quadrant, patent, no otorrhea. * Patient tolerated the procedure well with no complications      Data/Imaging Review         Assessment and Plan     1. Asymmetric SNHL (sensorineural hearing loss)  -Borderline asymmetry. Patient did not obtain MRI secondary to cost.  Recommend repeat audiogram in 1 year. 2. Sensorineural hearing loss (SNHL) of both ears  - Would likely benefit from amplification with hearing aids. Provided with copy of audiogram and instructions for pursuing hearing aids. - Encouraged loud noise avoidance and wearing of hearing protection when exposed. 3. Bilateral impacted cerumen  - Cerumen debrided in the office today  - Avoid Q-tip and self instrumentation of ears  - Follow-up as needed for repeat debridement      Follow-Up     Return in about 1 year (around 12/1/2023) for audiogram prior to appointment. Dr. Bala Mo [place] [id number]  Department of Otolaryngology/Head and Neck Surgery  12/1/22    Medical Decision Making: The following items were considered in medical decision making:  Independent review of images  Review / order clinical lab tests  Review / order radiology tests  Decision to obtain old records      This note was generated completely or in part utilizing Dragon dictation speech recognition software. Occasionally, words are mistranscribed and despite editing, the text may contain inaccuracies due to incorrect word recognition. If further clarification is needed please contact the office at [phone].

## 2022-12-05 ENCOUNTER — TELEPHONE (OUTPATIENT)
Dept: VASCULAR SURGERY | Age: 72
End: 2022-12-05

## 2022-12-05 NOTE — TELEPHONE ENCOUNTER
Patient confused about her medications and what she should stop. Angio/stent scheduled for Wednesday morning. Please call patient at [phone]. She has a dr appointment at 11:30 am.  She wants you to know she is not longer on Warfarin. Taking Xarelto.

## 2022-12-07 ENCOUNTER — HOSPITAL ENCOUNTER (OUTPATIENT)
Dept: CARDIAC CATH/INVASIVE PROCEDURES | Age: 72
Discharge: HOME | End: 2022-12-07
Attending: SURGERY | Admitting: SURGERY
Payer: MEDICARE

## 2022-12-07 VITALS
HEART RATE: 73 BPM | OXYGEN SATURATION: 98 % | DIASTOLIC BLOOD PRESSURE: 55 MMHG | RESPIRATION RATE: 16 BRPM | WEIGHT: 130 LBS | HEIGHT: 55 IN | BODY MASS INDEX: 30.09 KG/M2 | SYSTOLIC BLOOD PRESSURE: 127 MMHG

## 2022-12-07 PROBLEM — I70.223 ATHEROSCLEROSIS OF NATIVE ARTERIES OF EXTREMITIES WITH REST PAIN, BILATERAL LEGS (HCC): Status: ACTIVE | Noted: 2019-02-26

## 2022-12-07 LAB
ANION GAP SERPL CALCULATED.3IONS-SCNC: 8 MMOL/L (ref 3–16)
BUN BLDV-MCNC: 46 MG/DL (ref 7–20)
CALCIUM SERPL-MCNC: 10.7 MG/DL (ref 8.3–10.6)
CHLORIDE BLD-SCNC: 92 MMOL/L (ref 99–110)
CO2: 36 MMOL/L (ref 21–32)
CREAT SERPL-MCNC: 1.3 MG/DL (ref 0.6–1.2)
GFR SERPL CREATININE-BSD FRML MDRD: 43 ML/MIN/{1.73_M2}
GLUCOSE BLD-MCNC: 140 MG/DL (ref 70–99)
HCT VFR BLD CALC: 35 % (ref 36–48)
HEMOGLOBIN: 11.6 G/DL (ref 12–16)
MCH RBC QN AUTO: 27.3 PG (ref 26–34)
MCHC RBC AUTO-ENTMCNC: 33.3 G/DL (ref 31–36)
MCV RBC AUTO: 82.1 FL (ref 80–100)
PDW BLD-RTO: 18.5 % (ref 12.4–15.4)
PLATELET # BLD: 152 K/UL (ref 135–450)
PMV BLD AUTO: 8 FL (ref 5–10.5)
POC ACT LR: 262 SEC
POTASSIUM SERPL-SCNC: 2.8 MMOL/L (ref 3.5–5.1)
RBC # BLD: 4.26 M/UL (ref 4–5.2)
SODIUM BLD-SCNC: 136 MMOL/L (ref 136–145)
WBC # BLD: 7.8 K/UL (ref 4–11)

## 2022-12-07 PROCEDURE — 6360000004 HC RX CONTRAST MEDICATION: Performed by: SURGERY

## 2022-12-07 PROCEDURE — C9766 REVASC INTRA LITHOTRIP-ATHER: HCPCS

## 2022-12-07 PROCEDURE — 85027 COMPLETE CBC AUTOMATED: CPT

## 2022-12-07 PROCEDURE — 99153 MOD SED SAME PHYS/QHP EA: CPT

## 2022-12-07 PROCEDURE — 85347 COAGULATION TIME ACTIVATED: CPT

## 2022-12-07 PROCEDURE — 6360000002 HC RX W HCPCS

## 2022-12-07 PROCEDURE — 2580000003 HC RX 258

## 2022-12-07 PROCEDURE — 80048 BASIC METABOLIC PNL TOTAL CA: CPT

## 2022-12-07 PROCEDURE — 75774 ARTERY X-RAY EACH VESSEL: CPT

## 2022-12-07 PROCEDURE — 75625 CONTRAST EXAM ABDOMINL AORTA: CPT

## 2022-12-07 PROCEDURE — 99152 MOD SED SAME PHYS/QHP 5/>YRS: CPT

## 2022-12-07 PROCEDURE — 36415 COLL VENOUS BLD VENIPUNCTURE: CPT

## 2022-12-07 PROCEDURE — 6370000000 HC RX 637 (ALT 250 FOR IP)

## 2022-12-07 PROCEDURE — 75716 ARTERY X-RAYS ARMS/LEGS: CPT

## 2022-12-07 PROCEDURE — 2500000003 HC RX 250 WO HCPCS

## 2022-12-07 RX ORDER — CLOPIDOGREL BISULFATE 75 MG/1
75 TABLET ORAL DAILY
Qty: 30 TABLET | Refills: 3 | Status: SHIPPED | OUTPATIENT
Start: 2022-12-07

## 2022-12-07 RX ORDER — SODIUM CHLORIDE 0.9 % (FLUSH) 0.9 %
5-40 SYRINGE (ML) INJECTION EVERY 12 HOURS SCHEDULED
Status: DISCONTINUED | OUTPATIENT
Start: 2022-12-07 | End: 2022-12-08 | Stop reason: HOSPADM

## 2022-12-07 RX ORDER — SODIUM CHLORIDE 9 MG/ML
INJECTION, SOLUTION INTRAVENOUS PRN
Status: DISCONTINUED | OUTPATIENT
Start: 2022-12-07 | End: 2022-12-08 | Stop reason: HOSPADM

## 2022-12-07 RX ORDER — SODIUM CHLORIDE 0.9 % (FLUSH) 0.9 %
5-40 SYRINGE (ML) INJECTION PRN
Status: DISCONTINUED | OUTPATIENT
Start: 2022-12-07 | End: 2022-12-08 | Stop reason: HOSPADM

## 2022-12-07 RX ORDER — CLOPIDOGREL BISULFATE 75 MG/1
75 TABLET ORAL DAILY
Qty: 90 TABLET | OUTPATIENT
Start: 2022-12-07

## 2022-12-07 RX ADMIN — IOPAMIDOL 70 ML: 755 INJECTION, SOLUTION INTRAVENOUS at 14:45

## 2022-12-07 NOTE — H&P
Consultation/History & Physical    Date of Admission:  12/7/2022  Date of Consultation:  12/7/2022    PCP:  Horace Campos DO     Chief Complaint: BLE rest pain    History of Present Illness:  Tila Lopez is a 67 y.o. female who presents with BLE rest pain. Increasing pain in the left leg. Presents for peripheral angiogram.   PMH:   has a past medical history of AF (atrial fibrillation) ([address] Back pain, CAD (coronary artery disease), Centrilobular emphysema ([address] Compression fracture, Hyperlipidemia, Hypertension, Myelolipoma, PVD (peripheral vascular disease) ([address] Right ear injury, Shingles, and Valvular disease. PSH:   has a past surgical history that includes Coronary angioplasty with stent (2014, 3/4/21); Coronary angioplasty with stent (03/14/2003 & 06/02/2003); Hysterectomy (09/01/1983); Tubal ligation (09/1983); and ventral hernia repair (5-). Allergies: Allergies   Allergen Reactions    Actos [Pioglitazone]     Percocet [Oxycodone-Acetaminophen]      Confused , loopy    Vicodin [Hydrocodone-Acetaminophen]         Home Meds:    Prior to Admission medications    Medication Sig Start Date End Date Taking? Authorizing Provider   albuterol sulfate HFA (PROVENTIL;VENTOLIN;PROAIR) 108 (90 Base) MCG/ACT inhaler INHALE 2 PUFFS INTO THE LUNGS FOUR TIMES DAILY AS NEEDED FOR WHEEZING 11/7/22   Bryn Huddleston MD   rivaroxaban (XARELTO) 20 MG TABS tablet Take 1 tablet by mouth daily (with breakfast) 9/12/22   Bryn Huddleston MD   nitroGLYCERIN (NITROSTAT) 0.4 MG SL tablet ONE TABLET UNDER TONGUE AS NEEDED FOR CHEST PAIN. IF PAIN REMAINS AFTER 5 MINUTES CALL 911 AND REPEAT.  MAX 3 TABLETS IN 15 MINUTES 8/23/22   Bryn Huddleston MD   escitalopram (LEXAPRO) 10 MG tablet TAKE 1 TABLET BY MOUTH EVERY DAY  Patient not taking: No sig reported 8/11/22   Historical Provider, MD   torsemide (DEMADEX) 20 MG tablet Take 1 tablet by mouth daily 8/22/22   Bryn Huddleston MD   metOLazone (Simone Thurman) 5 MG tablet Take as needed. once a week, for shortness of breath 22   Berry Nyhan, MD   Coenzyme Q10 (COQ-10) 100 MG CPCR Take 100 mg by mouth daily 22   Berry Nyhan, MD   ofloxacin (OCUFLOX) 0.3 % solution Place 4 drops in left ear twice a day for the next 5 days. 22   Rodrigo Dowell DO   atenolol (TENORMIN) 25 MG tablet TAKE 1 TABLET BY MOUTH DAILY 22   Berry Nyhan, MD   rosuvastatin (CRESTOR) 10 MG tablet TAKE ONE TABLET BY MOUTH DAILY 22   Khari Clifford MD   acetaminophen (TYLENOL) 500 MG tablet Take 1 tablet by mouth 4 times daily as needed for Pain 22   KARTHIKEYAN Quiñonez CNP   OMEPRAZOLE PO Take by mouth daily as needed    Historical Provider, MD   Specialty Vitamins Products (VITAMINS FOR THE HAIR PO) Take by mouth daily    Historical Provider, MD   Magnesium Oxide 250 MG TABS Take 2 tablets by mouth daily  Patient taking differently: Take 250 mg by mouth daily 16   Alyson Kelsey MD        [facility] Meds:    Current Facility-Administered Medications   Medication Dose Route Frequency Provider Last Rate Last Admin    sodium chloride flush 0.9 % injection 5-40 mL  5-40 mL IntraVENous 2 times per day KARTHIKEYAN Josue CNP        sodium chloride flush 0.9 % injection 5-40 mL  5-40 mL IntraVENous PRN KARTHIKEYAN Josue CNP        0.9 % sodium chloride infusion   IntraVENous PRN KARTHIKEYAN Josue CNP           Social History:       Social History     Socioeconomic History    Marital status:     Occupational History    Occupation: cleans woods   Tobacco Use    Smoking status: Former     Years: 40.00     Types: Cigarettes     Quit date: 3/1/2014     Years since quittin.7    Smokeless tobacco: Never    Tobacco comments:     Trying to quit   Vaping Use    Vaping Use: Never used   Substance and Sexual Activity    Alcohol use: No     Alcohol/week: 3.0 standard drinks     Types: 3 Cans of beer per week    Drug use: No    Sexual activity: Not Currently     Social Determinants of Health     Financial Resource Strain: Low Risk     Difficulty of Paying Living Expenses: Not hard at all   Food Insecurity: No Food Insecurity    Worried About Running Out of Food in the Last Year: Never true    Ran Out of Food in the Last Year: Never true       Family Histroy:      Family History   Problem Relation Age of Onset    Heart Attack Father 76    Heart Disease Father         complications from surgery, per pt    Heart Attack Mother 64       Review of Systems:  Review of systems performed and negative with the exception of the above findings        Physical Exam   Vital Signs: BP (!) 127/55   Pulse 73   Resp 16   Ht 4' 7\" (1.397 m)   Wt 130 lb (59 kg)   LMP 01/01/1983   SpO2 98%   BMI 30.21 kg/m²        Admission Weight: 130 lb (59 kg)   ASA 3 - Patient with moderate systemic disease with functional limitations    Mallampati Airway Assessment:  Mallampati Class II - (soft palate, fauces & uvula are visible)    General appearance: alert, appears stated age, cooperative, and no distress  Head: Normocephalic, without obvious abnormality, atraumatic  Lungs: clear to auscultation bilaterally  Heart: regular rate and rhythm, S1, S2 normal, no murmur, click, rub or gallop  Abdomen: soft, non-tender.  Bowel sounds normal. No masses,  no organomegaly  Extremities: extremities normal, atraumatic, no cyanosis or edema  Pulses:      Labs:    BMP:   Lab Results   Component Value Date/Time     12/07/2022 10:10 AM    K 2.8 12/07/2022 10:10 AM    K 4.5 02/08/2022 06:12 PM    CL 92 12/07/2022 10:10 AM    CO2 36 12/07/2022 10:10 AM    PHOS 3.0 05/08/2019 01:05 PM    BUN 46 12/07/2022 10:10 AM    CREATININE 1.3 12/07/2022 10:10 AM      No results found for: GLU  Lab Results   Component Value Date/Time    MG 1.6 06/20/2017 02:06 PM      CBC:   Lab Results   Component Value Date/Time    WBC 7.8 12/07/2022 10:10 AM    HGB 11.6 12/07/2022 10:10 AM    HCT 35.0 12/07/2022 10:10 AM MCV 82.1 12/07/2022 10:10 AM     12/07/2022 10:10 AM      Coagulation:   Lab Results   Component Value Date/Time    INR 2.73 02/25/2022 12:55 PM    APTT 28.8 03/08/2021 10:05 AM     Cardiac markers:   Lab Results   Component Value Date/Time    CKTOTAL 175 01/22/2019 01:20 PM    TROPONINI <0.01 02/08/2022 06:12 PM     Lab Results   Component Value Date/Time    LABA1C 6.3 01/22/2019 01:20 PM    No results found for: ALB    Lab Results   Component Value Date/Time    BILITOT 0.7 07/01/2022 11:20 AM    BILIDIR 0.2 05/08/2019 01:05 PM    AST 16 07/01/2022 11:20 AM    ALT 15 07/01/2022 11:20 AM    ALKPHOS 81 07/01/2022 11:20 AM      Lab Results   Component Value Date/Time    CHOL 118 07/31/2021 12:00 AM    HDL 48 07/31/2021 12:00 AM    HDL 52 05/21/2012 12:36 PM    LDLCALC 51 07/31/2021 12:00 AM    TRIG 101 07/31/2021 12:00 AM          Diagnosis:  Patient Active Problem List   Diagnosis    Pure hypercholesterolemia    Coronary artery disease involving native coronary artery of native heart without angina pectoris    Mitral valve disorder    Preop cardiovascular exam    Atrial fibrillation (HCC)    Mitral regurgitation    Ischemic chest pain (HCC)    Valvular disease    SOB (shortness of breath)    Atherosclerosis of native arteries of extremities with intermittent claudication, right leg (HCC)    Positive cardiac stress test    Essential hypertension    Hyperlipidemia    Unstable angina ([address]    Centrilobular emphysema (HCC)    Former smoker    Atherosclerosis of native arteries of extremities with intermittent claudication, bilateral legs ([address]           Plan:  aortogram with runoff        Jeovany Abdullahi M.D., FACS.   12/7/2022  12:54 PM

## 2022-12-07 NOTE — DISCHARGE INSTRUCTIONS
PERIPHERAL ANGIOGRAM    Care of your puncture site:  Remove bandage 24 hours after the procedure. May shower in 24 hours but do not sit in a bathtub/pool of water for 5 days or until the wound is healed. Inspect the site daily and gently clean using soap and water while standing in the shower. Dry thoroughly and apply a Band-Aid that covers the entire site. Do not apply powder or lotion. Normal Observations:  Soreness or tenderness which may last one week. Mild oozing from the incision site. Possible bruising that could last 2 weeks. Activity:  You may resume driving 24 hours following the procedure. You may resume normal activity in 5 days or after the wound heals. Avoid lifting more than 10 pounds for 5 days or until the wound heals. Avoid strenuous exercise or activity for 1 week. Nutrition:  Regular diet . Drink at least 8 to 10 glasses of decaffeinated, non-alcoholic fluid for the next 24 hours to flush the x-ray dye used for your angiogram out of your body. Call your doctor immediately if your condition worsens, for any other concerns, for a follow-up appointment or if you experience any of the following:  Significant bleeding that does not stop after 10 minutes of applying firm pressure on the puncture site. Increased swelling on the groin or leg. Unusual pain, numbness, or tingling of the groin or down the leg. Any signs of infection such as: redness, yellow drainage at the site, swelling or pain.

## 2022-12-08 NOTE — OP NOTE
[facility]                     [address] [facility], [address] [facility]                                OPERATIVE REPORT    PATIENT NAME: Sharda Caceres                    :        1950  MED REC NO:   [id number]                          ROOM:  ACCOUNT NO:   [de-identified]                           ADMIT DATE: 2022  PROVIDER:     Chirag Tse MD    DATE OF PROCEDURE:  2022    PREOPERATIVE DIAGNOSIS:  Bilateral lower extremity rest pain. POSTOPERATIVE DIAGNOSIS:  Bilateral lower extremity rest pain. OPERATION PERFORMED:  1. Ultrasound-guided right common femoral artery access. 2.  Abdominal aortogram with bilateral lower extremity runoff. 3.  Selective left anterior tibial artery catheterization. 4.  Left anterior tibial artery atherectomy (CSI Diamondback 1.25-mm  orbital atherectomy). 5.  Left anterior tibial PTA (Anderson 2.5 x 220). 6.  Left SFA intravascular lithotripsy (shockwave 6.5 x 60). SURGEON:  Chirag Tse MD    ANESTHESIA:  Local/IV. ESTIMATED BLOOD LOSS:  Minimal.    COMPLICATIONS:  None. INDICATIONS:  The patient is a 76-year-old female with progressive lower  extremity discomfort and vascular studies indicating progressive  arterial disease. She presents for angiographic evaluation. All risks,  benefits, and alternatives were discussed in detail. All questions were  answered. PROCEDURE:  After witnessed form consent was obtained, the patient  brought to cath lab where under my supervision Versed and fentanyl were  administered intravenously for moderate sedation. Pulse oximetry, heart  rate, and blood pressure were monitored by an independent trained  observer that was present. I spent 131 minutes of face-to-face sedation  time with the patient. The right groin was carefully prepped and  draped.   Ultrasound was used to identify the right common femoral  artery, which was noted to be patent and image was saved to the  patient's permanent medical record. Under direct visualization, it was  accessed with a 4-South African micropuncture needle. Bentson wire was  introduced and a 5-South African sheath was placed. An Omni Flush catheter was  inserted to the level of the renal arteries and then an abdominal  aortogram was performed. It was pulled back to the level of the aortic  bifurcation. A bilateral lower extremity runoff was performed. With  this then done, the Omni Flush was used to hook the aortic bifurcation. A stiff Glidewire was placed up and over the bifurcation into the left  SFA. The Omni Flush and 5-South African sheath were removed and a 6-South African  sheath was placed up and over the bifurcation into the left common  femoral artery. 5000 heparin was given with an additional 1000 to  maintain ACTs greater than 250. With this then done, a Glidewire and  Lynita Favor catheter advanced through the SFA, popliteal and dedicated  tibial angiograms were performed. It was directed into the anterior  tibial artery and into the dorsalis pedis artery. With this then done,  an 0.014 Viper wire was advanced distally. The CSI Diamondback was used  on low and medium speeds across the proximal anterior tibial artery. Balloon angioplasty was performed using a Paia 2.5 x 220 inflated to  nominal pressure for 2 minutes with resolution of the proximal  two-thirds stenosis, which was noted to be diffuse. There was a short  focal occlusion just distal to the ankle that was able to be crossed. However, unable to pass any balloons or atherectomy device distally in  this location. There was also noted to be an 80% eccentric lesion of  the proximal SFA that was calcified. As a result, an intravascular  lithotripsy was performed using a shockwave 6.5 x 60 with complete  resolution of stenosis and brisk flow.   A 6-South African Mynx was placed in  the right groin with excellent hemostasis and she was transferred to the  recovery room in stable condition. FINDINGS:  Abdominal aortogram.  Right and left renal patent. There is noted to be  heavy calcification in the visceral vessels. The aorta is patent with  mild diffuse atherosclerosis extending into the common and external  iliac arteries bilaterally. Right lower extremity runoff. Right common femoral and profunda patent. SFA shows several focal areas of stenosis of approximately 60% extending  into the proximal popliteal.  There is a two-vessel runoff on the right. Left lower extremity runoff. Left common femoral and profunda patent. SFA shows approximately 80% proximal stenosis. There is mild diffuse  atherosclerosis throughout the entire SFA into the popliteal.  There is  a high takeoff of the anterior tibial on the P2 section of the  popliteal.  There is diffuse 99% stenosis along the proximal two-thirds  of this. The anterior tibial occludes at the ankle. There is a  reconstitution of the dorsalis pedis. The peroneal is patent and there  is a diminutive posterior tibial with no significant runoff into the  left foot through the posterior tibial artery. PLAN:  The patient underwent successful intervention of an anterior  tibial and proximal SFA lesion. She does have an occluded short segment  dorsalis pedis. We will have her follow up in the office in the next  several weeks to see if she has had improvement in her symptoms. If she  continues to persist with symptoms, we would recommend consideration for  an antegrade approach to her left lower extremity and possible  retrograde pedal access to recanalize the dorsalis pedis and pedal loop.         Cayla Young MD    D: 12/07/2022 14:55:21       T: 12/07/2022 15:00:47     [email or web address]  Job#: [id number]     Doc#: [id number]    CC:

## 2022-12-15 ENCOUNTER — TELEPHONE (OUTPATIENT)
Dept: ENT CLINIC | Age: 72
End: 2022-12-15

## 2022-12-15 NOTE — TELEPHONE ENCOUNTER
[facility] informing pt she has an active order for her MRI and to call central scheduling to get it scheduled.

## 2022-12-16 NOTE — TELEPHONE ENCOUNTER
Medication Refill    Medication needing refilled:  nitroGLYCERIN (NITROSTAT)      Dosage of the medication:  0.4 MG SL tablet       How are you taking this medication (QD, BID, TID, QID, PRN):  ONE TABLET UNDER TONGUE AS NEEDED FOR CHEST PAIN. IF PAIN REMAINS AFTER 5 MINUTES CALL 911 AND REPEAT.  MAX 3 TABLETS IN 15 MINUTES    30 or 90 day supply called in:  25 tablet    When will you run out of your medication:  Today    Which Pharmacy are we sending the medication to?:  [facility] [address] [place] [address], [id number] [place] [address],[id number]   Phone:  [phone]  Fax:  [phone]

## 2022-12-19 ENCOUNTER — TELEPHONE (OUTPATIENT)
Dept: ENT CLINIC | Age: 72
End: 2022-12-19

## 2022-12-19 RX ORDER — NITROGLYCERIN 0.4 MG/1
TABLET SUBLINGUAL
Qty: 25 TABLET | Refills: 0 | Status: SHIPPED | OUTPATIENT
Start: 2022-12-19

## 2022-12-19 NOTE — TELEPHONE ENCOUNTER
Pt.states she keeps receiving a bill from [facility] she states every time she comes here she is being billed and she does not know why she is asking can she receive a call back to know why?       She states a good time to call is before 4:30pm

## 2022-12-28 ENCOUNTER — OFFICE VISIT (OUTPATIENT)
Dept: VASCULAR SURGERY | Age: 72
End: 2022-12-28
Payer: MEDICARE

## 2022-12-28 VITALS
DIASTOLIC BLOOD PRESSURE: 82 MMHG | WEIGHT: 127 LBS | SYSTOLIC BLOOD PRESSURE: 128 MMHG | HEIGHT: 55 IN | BODY MASS INDEX: 29.39 KG/M2

## 2022-12-28 DIAGNOSIS — I70.223 ATHEROSCLEROSIS OF NATIVE ARTERIES OF EXTREMITIES WITH REST PAIN, BILATERAL LEGS (HCC): Primary | ICD-10-CM

## 2022-12-28 PROCEDURE — 3017F COLORECTAL CA SCREEN DOC REV: CPT | Performed by: SURGERY

## 2022-12-28 PROCEDURE — 99212 OFFICE O/P EST SF 10 MIN: CPT | Performed by: SURGERY

## 2022-12-28 PROCEDURE — 1090F PRES/ABSN URINE INCON ASSESS: CPT | Performed by: SURGERY

## 2022-12-28 PROCEDURE — G8400 PT W/DXA NO RESULTS DOC: HCPCS | Performed by: SURGERY

## 2022-12-28 PROCEDURE — G8427 DOCREV CUR MEDS BY ELIG CLIN: HCPCS | Performed by: SURGERY

## 2022-12-28 PROCEDURE — G8484 FLU IMMUNIZE NO ADMIN: HCPCS | Performed by: SURGERY

## 2022-12-28 PROCEDURE — 3074F SYST BP LT 130 MM HG: CPT | Performed by: SURGERY

## 2022-12-28 PROCEDURE — G8417 CALC BMI ABV UP PARAM F/U: HCPCS | Performed by: SURGERY

## 2022-12-28 PROCEDURE — 1123F ACP DISCUSS/DSCN MKR DOCD: CPT | Performed by: SURGERY

## 2022-12-28 PROCEDURE — 3078F DIAST BP <80 MM HG: CPT | Performed by: SURGERY

## 2022-12-28 PROCEDURE — 1036F TOBACCO NON-USER: CPT | Performed by: SURGERY

## 2022-12-28 NOTE — PROGRESS NOTES
[facility])   Vascular Surgery Followup    Referring Provider:  Ben Woods DO     No chief complaint on file. History of Present Illness:  65-year-old female here today for follow-up after recent peripheral angiography with left anterior tibial and SFA intervention on December 7. She states she continues to struggle with discomfort in her left foot. She states he feels like she fractured her foot. She has had several recent trauma with it and is worried that it might be broken. Past Medical History:   has a past medical history of AF (atrial fibrillation) ([address] Back pain, CAD (coronary artery disease), Centrilobular emphysema ([address] Compression fracture, Hyperlipidemia, Hypertension, Myelolipoma, PVD (peripheral vascular disease) ([address] Right ear injury, Shingles, and Valvular disease. Surgical History:   has a past surgical history that includes Coronary angioplasty with stent (2014, 3/4/21); Coronary angioplasty with stent (03/14/2003 & 06/02/2003); Hysterectomy (09/01/1983); Tubal ligation (09/1983); and ventral hernia repair (5-). Social History:   reports that she quit smoking about 8 years ago. Her smoking use included cigarettes. She has never used smokeless tobacco. She reports that she does not drink alcohol and does not use drugs. Family History:  family history includes Heart Attack (age of onset: 64) in her mother; Heart Attack (age of onset: 76) in her father; Heart Disease in her father. Home Medications:  Current Outpatient Medications   Medication Sig Dispense Refill    nitroGLYCERIN (NITROSTAT) 0.4 MG SL tablet ONE TABLET UNDER TONGUE AS NEEDED FOR CHEST PAIN. IF PAIN REMAINS AFTER 5 MINUTES CALL 911 AND REPEAT.  MAX 3 TABLETS IN 15 MINUTES 25 tablet 0    clopidogrel (PLAVIX) 75 MG tablet Take 1 tablet by mouth daily 30 tablet 3    albuterol sulfate HFA (PROVENTIL;VENTOLIN;PROAIR) 108 (90 Base) MCG/ACT inhaler INHALE 2 PUFFS INTO THE LUNGS FOUR TIMES DAILY AS NEEDED FOR WHEEZING 54 g 1    rivaroxaban (XARELTO) 20 MG TABS tablet Take 1 tablet by mouth daily (with breakfast) 90 tablet 1    escitalopram (LEXAPRO) 10 MG tablet TAKE 1 TABLET BY MOUTH EVERY DAY (Patient not taking: No sig reported)      torsemide (DEMADEX) 20 MG tablet Take 1 tablet by mouth daily 90 tablet 3    metOLazone (ZAROXOLYN) 5 MG tablet Take as needed. once a week, for shortness of breath 15 tablet 3    Coenzyme Q10 (COQ-10) 100 MG CPCR Take 100 mg by mouth daily 90 capsule 3    ofloxacin (OCUFLOX) 0.3 % solution Place 4 drops in left ear twice a day for the next 5 days. 1 each 0    atenolol (TENORMIN) 25 MG tablet TAKE 1 TABLET BY MOUTH DAILY 90 tablet 3    rosuvastatin (CRESTOR) 10 MG tablet TAKE ONE TABLET BY MOUTH DAILY 30 tablet 11    acetaminophen (TYLENOL) 500 MG tablet Take 1 tablet by mouth 4 times daily as needed for Pain 20 tablet 0    OMEPRAZOLE PO Take by mouth daily as needed      Specialty Vitamins Products (VITAMINS FOR THE HAIR PO) Take by mouth daily      Magnesium Oxide 250 MG TABS Take 2 tablets by mouth daily (Patient taking differently: Take 250 mg by mouth daily) 30 tablet 5     No current facility-administered medications for this visit. Allergies:  Actos [pioglitazone], Percocet [oxycodone-acetaminophen], and Vicodin [hydrocodone-acetaminophen]     Review of Systems:   Constitutional: there has been no unanticipated weight loss. There's been no change in energy level, sleep pattern, or activity level. Eyes: No visual changes or diplopia. No scleral icterus. ENT: No Headaches, hearing loss or vertigo. No mouth sores or sore throat. Cardiovascular: Reviewed in HPI  Respiratory: No cough or wheezing, no sputum production. No hematemesis. Gastrointestinal: No abdominal pain, appetite loss, blood in stools. No change in bowel or bladder habits. Genitourinary: No dysuria, trouble voiding, or hematuria.   Musculoskeletal:  No gait disturbance, weakness or joint complaints. Integumentary: No rash or pruritis. Neurological: No headache, diplopia, change in muscle strength, numbness or tingling. No change in gait, balance, coordination, mood, affect, memory, mentation, behavior. Psychiatric: No anxiety, no depression. Endocrine: No malaise, fatigue or temperature intolerance. No excessive thirst, fluid intake, or urination. No tremor. Hematologic/Lymphatic: No abnormal bruising or bleeding, blood clots or swollen lymph nodes. Allergic/Immunologic: No nasal congestion or hives. Physical Examination:    There were no vitals filed for this visit. General appearance: alert, appears stated age, cooperative, and no distress  Head: Normocephalic, without obvious abnormality, atraumatic  Neck: no adenopathy, no carotid bruit, no JVD, supple, symmetrical, trachea midline, and thyroid: not enlarged, symmetric, no tenderness/mass/nodules  Lungs: clear to auscultation bilaterally  Heart: regular rate and rhythm, S1, S2 normal, no murmur, click, rub or gallop  Abdomen: soft, non-tender.  Bowel sounds normal. No masses,  no organomegaly  Extremities: extremities normal, atraumatic, no cyanosis or edema    Pulses: Monophasic left PT  Neurologic: Grossly normal    Assessment:     Patient Active Problem List   Diagnosis    Pure hypercholesterolemia    Coronary artery disease involving native coronary artery of native heart without angina pectoris    Mitral valve disorder    Preop cardiovascular exam    Atrial fibrillation (HCC)    Mitral regurgitation    Ischemic chest pain (HCC)    Valvular disease    SOB (shortness of breath)    Atherosclerosis of native arteries of extremities with rest pain, bilateral legs (HCC)    Positive cardiac stress test    Essential hypertension    Hyperlipidemia    Unstable angina ([address]    Centrilobular emphysema ([address]    Former smoker    Atherosclerosis of native arteries of extremities with intermittent claudication, bilateral legs ([address] Plan:  76-year-old female with continued discomfort in the left foot. We will obtain a 2 view x-ray to ensure no fracture given recent trauma. Additionally we will plan to move forward with antegrade retrograde pedal access and attempts at recanalizing her pedal loop. Thank you for allowing me to participate in the care of this individual.  Please do not hesitate to contact me with any questions. Diana Bryan M.D., FACS.  12/28/2022  11:38 AM

## 2022-12-29 ENCOUNTER — PREP FOR PROCEDURE (OUTPATIENT)
Dept: VASCULAR SURGERY | Age: 72
End: 2022-12-29

## 2022-12-30 ENCOUNTER — HOSPITAL ENCOUNTER (OUTPATIENT)
Age: 72
Discharge: HOME | End: 2022-12-30
Payer: MEDICARE

## 2022-12-30 ENCOUNTER — HOSPITAL ENCOUNTER (OUTPATIENT)
Dept: GENERAL RADIOLOGY | Age: 72
Discharge: HOME | End: 2022-12-30
Payer: MEDICARE

## 2022-12-30 DIAGNOSIS — R60.1 GENERALIZED EDEMA: ICD-10-CM

## 2022-12-30 DIAGNOSIS — I70.223 ATHEROSCLEROSIS OF NATIVE ARTERIES OF EXTREMITIES WITH REST PAIN, BILATERAL LEGS (HCC): ICD-10-CM

## 2022-12-30 LAB
HCT VFR BLD CALC: 34.1 % (ref 36–48)
HEMOGLOBIN: 11.4 G/DL (ref 12–16)
MCH RBC QN AUTO: 28.1 PG (ref 26–34)
MCHC RBC AUTO-ENTMCNC: 33.4 G/DL (ref 31–36)
MCV RBC AUTO: 84 FL (ref 80–100)
PDW BLD-RTO: 17.5 % (ref 12.4–15.4)
PLATELET # BLD: 198 K/UL (ref 135–450)
PMV BLD AUTO: 8.5 FL (ref 5–10.5)
RBC # BLD: 4.06 M/UL (ref 4–5.2)
WBC # BLD: 7 K/UL (ref 4–11)

## 2022-12-30 PROCEDURE — 73620 X-RAY EXAM OF FOOT: CPT

## 2022-12-30 NOTE — TELEPHONE ENCOUNTER
Patient walked into our Office this am, she was talking about getting a MRI,  she mentioned a few times needing a ref, ?     She said she had a appointment for her MRI on Thurs at [facility] at 11:00    But needed a ref,  I called Central Scheduling  to verify appointment  I spoke to [facility] , she confirmed patient's appointment  , she said she only needed Bun and creatine  blood work,  I asked Kaci Asencio about this , she gave me the blood work order     I took the patient downstairs to the lab,  so she could get her blood work,  she is all set and understands all she needs to do now is go to [facility] thurs at 10:00 to get her MRI

## 2023-01-03 ENCOUNTER — TELEPHONE (OUTPATIENT)
Dept: VASCULAR SURGERY | Age: 73
End: 2023-01-03

## 2023-01-03 RX ORDER — SODIUM CHLORIDE 0.9 % (FLUSH) 0.9 %
5-40 SYRINGE (ML) INJECTION PRN
Status: CANCELLED | OUTPATIENT
Start: 2023-01-03

## 2023-01-03 RX ORDER — SODIUM CHLORIDE 9 MG/ML
INJECTION, SOLUTION INTRAVENOUS PRN
Status: CANCELLED | OUTPATIENT
Start: 2023-01-03

## 2023-01-03 RX ORDER — SODIUM CHLORIDE 0.9 % (FLUSH) 0.9 %
5-40 SYRINGE (ML) INJECTION EVERY 12 HOURS SCHEDULED
Status: CANCELLED | OUTPATIENT
Start: 2023-01-03

## 2023-01-03 NOTE — TELEPHONE ENCOUNTER
Patient is calling asking for the results of her foot x ray that was done on 12/28/22.  Please call [phone]

## 2023-01-03 NOTE — TELEPHONE ENCOUNTER
Discussed results of left foot x-ray which shows no acute fracture or dislocation. Recommend moving forward with left leg angiogram which is scheduled for January 11th. Will have the office call patient to discuss details.     Electronically signed by KARTHIKEYAN Ferrari CNP on 1/3/2023 at 10:37 AM

## 2023-01-05 ENCOUNTER — HOSPITAL ENCOUNTER (OUTPATIENT)
Dept: MRI IMAGING | Age: 73
Discharge: HOME | End: 2023-01-05
Payer: MEDICARE

## 2023-01-05 DIAGNOSIS — H90.3 ASYMMETRIC SNHL (SENSORINEURAL HEARING LOSS): ICD-10-CM

## 2023-01-05 DIAGNOSIS — H93.12 TINNITUS OF LEFT EAR: ICD-10-CM

## 2023-01-05 PROCEDURE — A9577 INJ MULTIHANCE: HCPCS | Performed by: STUDENT IN AN ORGANIZED HEALTH CARE EDUCATION/TRAINING PROGRAM

## 2023-01-05 PROCEDURE — 70553 MRI BRAIN STEM W/O & W/DYE: CPT

## 2023-01-05 PROCEDURE — 6360000004 HC RX CONTRAST MEDICATION: Performed by: STUDENT IN AN ORGANIZED HEALTH CARE EDUCATION/TRAINING PROGRAM

## 2023-01-05 RX ADMIN — GADOBENATE DIMEGLUMINE 11 ML: 529 INJECTION, SOLUTION INTRAVENOUS at 12:26

## 2023-01-06 ENCOUNTER — TELEPHONE (OUTPATIENT)
Dept: CARDIOLOGY CLINIC | Age: 73
End: 2023-01-06

## 2023-01-06 NOTE — TELEPHONE ENCOUNTER
Medication Refill    Medication needing refilled:  XARELTO    Dosage of the medication:  20mg    How are you taking this medication (QD, BID, TID, QID, PRN):  Take 1 tablet by mouth daily (with breakfast)    30 or 90 day supply called in: 90    When will you run out of your medication:    Which Pharmacy are we sending the medication to?:    [facility] [address] [place] [address], [address] [place] [address] [address]

## 2023-01-06 NOTE — TELEPHONE ENCOUNTER
Last person to refill: 09.12.22 LES  Last office visit: 08.22.22 LES  Last Labs: 12.07.22  Next office visit: 02.18.23 on the recall list LES

## 2023-01-17 ENCOUNTER — TELEPHONE (OUTPATIENT)
Dept: ENT CLINIC | Age: 73
End: 2023-01-17

## 2023-01-17 NOTE — TELEPHONE ENCOUNTER
Patient is calling to see why Dr. Jassi Kirkland hasn't called her about her MRI test she had done. She also states she still can't hear out of her right ear.

## 2023-01-19 ENCOUNTER — TELEPHONE (OUTPATIENT)
Dept: OTOLARYNGOLOGY | Age: 73
End: 2023-01-19

## 2023-01-19 NOTE — TELEPHONE ENCOUNTER
Spoke with the patient. I reviewed her MRI results with her which was negative for retrocochlear pathology. Continues to have issues hearing, would recommend hearing aids for underlying nerve hearing loss. Informed the patient if she would like to pursue hearing aids through our office she can call and set up an appointment with Dr. Mason Lr for hearing aid evaluation.

## 2023-01-23 ENCOUNTER — HOSPITAL ENCOUNTER (OUTPATIENT)
Dept: CARDIAC CATH/INVASIVE PROCEDURES | Age: 73
Discharge: HOME | End: 2023-01-23

## 2023-01-30 ENCOUNTER — TELEPHONE (OUTPATIENT)
Dept: CARDIOLOGY CLINIC | Age: 73
End: 2023-01-30

## 2023-01-30 NOTE — TELEPHONE ENCOUNTER
Pt needs her carotid doppler study that was scheduled on 1/31/23 rescheduled for 2/20/23. Please call pt and rescheduled.      Thanks

## 2023-02-01 ENCOUNTER — TELEPHONE (OUTPATIENT)
Dept: CARDIOLOGY CLINIC | Age: 73
End: 2023-02-01

## 2023-02-01 NOTE — TELEPHONE ENCOUNTER
Pt asking for a call back to discuss the Doppler she has coming up and an appt to see LES. Pt is upset about scheduling of both and would like to talk to LES. Pt works at [employer] and asking you call back before 3PM. Please advise.

## 2023-02-01 NOTE — TELEPHONE ENCOUNTER
Pt called stating that she wants to know when will she be scheduled for the carotid test the one that goes thru your neck. She also stated that not the Dr. Ant Abdullahi test.  Please call to discuss.  Please advise

## 2023-02-02 ENCOUNTER — TELEPHONE (OUTPATIENT)
Dept: VASCULAR SURGERY | Age: 73
End: 2023-02-02

## 2023-02-02 NOTE — TELEPHONE ENCOUNTER
Patient called again today not understanding \"why her appointments keep getting messed up\". I explained to her that she missed her appt yesterday and we rescheduled for 2/6. I spoke to her yesterday with this information. The previous appts were canceled bc patient took her Xarelto prior to procedure. I confirmed with her again, that she is to arrive at 7am Monday 2/6 for an 8am procedure.  NPO and hold Xarelto 48 hrs

## 2023-02-03 ENCOUNTER — TELEPHONE (OUTPATIENT)
Dept: CARDIOLOGY CLINIC | Age: 73
End: 2023-02-03

## 2023-02-03 NOTE — TELEPHONE ENCOUNTER
[address] is wanting to know for the Pt is the Pt to be taking Xarelto 20mg and Plavix 75mg. Please call to discuss.   Please advise

## 2023-02-06 ENCOUNTER — HOSPITAL ENCOUNTER (OUTPATIENT)
Dept: CARDIAC CATH/INVASIVE PROCEDURES | Age: 73
Discharge: HOME | End: 2023-02-06
Attending: SURGERY | Admitting: SURGERY
Payer: MEDICARE

## 2023-02-06 VITALS
HEIGHT: 55 IN | BODY MASS INDEX: 29.39 KG/M2 | SYSTOLIC BLOOD PRESSURE: 132 MMHG | DIASTOLIC BLOOD PRESSURE: 72 MMHG | WEIGHT: 127 LBS

## 2023-02-06 LAB
ANION GAP SERPL CALCULATED.3IONS-SCNC: 8 MMOL/L (ref 3–16)
BUN BLDV-MCNC: 30 MG/DL (ref 7–20)
CALCIUM SERPL-MCNC: 10 MG/DL (ref 8.3–10.6)
CHLORIDE BLD-SCNC: 95 MMOL/L (ref 99–110)
CO2: 33 MMOL/L (ref 21–32)
CREAT SERPL-MCNC: 1 MG/DL (ref 0.6–1.2)
GFR SERPL CREATININE-BSD FRML MDRD: 59 ML/MIN/{1.73_M2}
GLUCOSE BLD-MCNC: 122 MG/DL (ref 70–99)
HCT VFR BLD CALC: 33.2 % (ref 36–48)
HEMOGLOBIN: 10.9 G/DL (ref 12–16)
MCH RBC QN AUTO: 27.7 PG (ref 26–34)
MCHC RBC AUTO-ENTMCNC: 32.8 G/DL (ref 31–36)
MCV RBC AUTO: 84.2 FL (ref 80–100)
PDW BLD-RTO: 15.7 % (ref 12.4–15.4)
PLATELET # BLD: 177 K/UL (ref 135–450)
PMV BLD AUTO: 7.7 FL (ref 5–10.5)
POTASSIUM SERPL-SCNC: 3.1 MMOL/L (ref 3.5–5.1)
RBC # BLD: 3.94 M/UL (ref 4–5.2)
SODIUM BLD-SCNC: 136 MMOL/L (ref 136–145)
WBC # BLD: 6.8 K/UL (ref 4–11)

## 2023-02-06 PROCEDURE — C1769 GUIDE WIRE: HCPCS

## 2023-02-06 PROCEDURE — 6360000002 HC RX W HCPCS

## 2023-02-06 PROCEDURE — 76937 US GUIDE VASCULAR ACCESS: CPT | Performed by: SURGERY

## 2023-02-06 PROCEDURE — C1894 INTRO/SHEATH, NON-LASER: HCPCS

## 2023-02-06 PROCEDURE — 75625 CONTRAST EXAM ABDOMINL AORTA: CPT

## 2023-02-06 PROCEDURE — 75625 CONTRAST EXAM ABDOMINL AORTA: CPT | Performed by: SURGERY

## 2023-02-06 PROCEDURE — 36246 INS CATH ABD/L-EXT ART 2ND: CPT | Performed by: SURGERY

## 2023-02-06 PROCEDURE — 36200 PLACE CATHETER IN AORTA: CPT

## 2023-02-06 PROCEDURE — 80048 BASIC METABOLIC PNL TOTAL CA: CPT

## 2023-02-06 PROCEDURE — 6360000004 HC RX CONTRAST MEDICATION: Performed by: SURGERY

## 2023-02-06 PROCEDURE — 75710 ARTERY X-RAYS ARM/LEG: CPT | Performed by: SURGERY

## 2023-02-06 PROCEDURE — 85027 COMPLETE CBC AUTOMATED: CPT

## 2023-02-06 PROCEDURE — 99152 MOD SED SAME PHYS/QHP 5/>YRS: CPT

## 2023-02-06 PROCEDURE — 2709999900 HC NON-CHARGEABLE SUPPLY

## 2023-02-06 PROCEDURE — 99152 MOD SED SAME PHYS/QHP 5/>YRS: CPT | Performed by: SURGERY

## 2023-02-06 PROCEDURE — 2500000003 HC RX 250 WO HCPCS

## 2023-02-06 PROCEDURE — 36415 COLL VENOUS BLD VENIPUNCTURE: CPT

## 2023-02-06 PROCEDURE — C1887 CATHETER, GUIDING: HCPCS

## 2023-02-06 RX ORDER — SODIUM CHLORIDE 0.9 % (FLUSH) 0.9 %
5-40 SYRINGE (ML) INJECTION EVERY 12 HOURS SCHEDULED
Status: DISCONTINUED | OUTPATIENT
Start: 2023-02-06 | End: 2023-02-06 | Stop reason: HOSPADM

## 2023-02-06 RX ORDER — SODIUM CHLORIDE 0.9 % (FLUSH) 0.9 %
5-40 SYRINGE (ML) INJECTION PRN
Status: DISCONTINUED | OUTPATIENT
Start: 2023-02-06 | End: 2023-02-06 | Stop reason: HOSPADM

## 2023-02-06 RX ORDER — IODIXANOL 320 MG/ML
25 INJECTION, SOLUTION INTRAVASCULAR ONCE
Status: COMPLETED | OUTPATIENT
Start: 2023-02-06 | End: 2023-02-06

## 2023-02-06 RX ORDER — SODIUM CHLORIDE 9 MG/ML
INJECTION, SOLUTION INTRAVENOUS PRN
Status: DISCONTINUED | OUTPATIENT
Start: 2023-02-06 | End: 2023-02-06 | Stop reason: HOSPADM

## 2023-02-06 RX ADMIN — IODIXANOL 25 ML: 320 INJECTION, SOLUTION INTRAVASCULAR at 08:39

## 2023-02-06 NOTE — TELEPHONE ENCOUNTER
She needs to stay on xarelto due to atrial fibrillation.  I suspect Dr Mata Plenty started plavix for vascular findings

## 2023-02-06 NOTE — OP NOTE
Procedure Note 2/6/2023    Jennifer Endo  YOB: 1950  [id number]    Pre-procedure Diagnosis:   Left lower extremity [place] 4 peripheral arterial disease    Post-procedure Diagnosis: Same    Procedure: 1) Ultrasound guided access to the Right   common femoral artery. 2)  Abdominal aortogram with 2nd order left lower extremity runoffs        Surgeons/Assistants: Tano Ness MD, MD FACS    Estimated Blood Loss: Minimal    Complications: none    Specimens: none    Indications and consent: This is a 67y.o. year old female with Left lower extremity [place] category 4 peripheral arterial disease. Treatment options were discussed. Angiography was recommended to evaluate and possibly intervene to improve symptoms. Risks, benefits, and alternatives were discussed prior to the procedure. Questions from the patient and family were answered and appropriate signed informed consent was obtained. Procedure: After witnessed informed consent was obtained patient brought to the Cath Lab were under my supervision Versed and fentanyl were administered for intravenous sedation. Pulse oximetry, heart rate, and blood pressure were monitored by an independent trained observer that was present. I spent 20 minutes of face-to-face sedation time with the patient. The Right groin were carefully prepped and draped. 1% lidocaine was infiltrated locally into the area overlying the common femoral artery. Ultrasound was used to identify the common femoral artery which was noted to be patent and an image was saved to the patient's permanent medical record. A 4 [place] micropuncture needle was used to access the artery under direct ultrasound visualization. This was then exchanged for a 5 [place] sheath. An omniflush catheter was positioned in the abdominal aorta at the level of the renal arteries and an abdominal aortogram was performed.   It was pulled back to the level of the aortic bifurcation and used to hook the aortic bifurcation. A stiff Glidewire was placed up and over the bifurcation into the left common femoral artery. The Omni Flush catheter was removed and a glide cath was placed up and over the bifurcation into the left common femoral artery and a selective left lower extremity angiogram was performed    Abdominal Aortogram:    Renal arteries: right renal occluded  Abdominal Aorta:  mild calcification. Bilateral common external and internal iliac arteries show mild atherosclerosis no focal high-grade stenosis    Left lower extremity runoff:  Left common femoral and profunda are widely patent. SFA shows mild diffuse atherosclerosis with 1 focal area of approximately 30% stenosis. The popliteal is patent. The anterior tibial and peroneal are patent proximally however just proximal to the ankle occluded. There is no significant reconstitution seen of the pedal arch. She has very diminutive infra malleolar flow          The patient tolerated the procedure well and was taken to recovery in stable condition. The sheath was pulled with manual pressure held for hemostasis. Plan:   76-year-old female with severe infra malleoli or occlusive disease over the left lower extremity contributing to left foot rest pain. No further revascularization options available. We will continue with aggressive medical management.           Juana Navas MD, MD [facility] 2/6/2023 8:35 AM

## 2023-02-06 NOTE — H&P
Consultation/History & Physical    Date of Admission:  2/6/2023  Date of Consultation:  2/6/2023    PCP:  Tristian Reddy MD     Chief Complaint: left leg rest pain    History of Present Illness:  Loulou Arriaga is a 67 y.o. female who presents with left leg rest pain. Presents for angiogram    PMH:   has a past medical history of AF (atrial fibrillation) ([address] Back pain, CAD (coronary artery disease), Centrilobular emphysema ([address] Compression fracture, Hyperlipidemia, Hypertension, Myelolipoma, PVD (peripheral vascular disease) ([address] Right ear injury, Shingles, and Valvular disease. PSH:   has a past surgical history that includes Coronary angioplasty with stent (2014, 3/4/21); Coronary angioplasty with stent (03/14/2003 & 06/02/2003); Hysterectomy (09/01/1983); Tubal ligation (09/1983); and ventral hernia repair (5-). Allergies: Allergies   Allergen Reactions    Actos [Pioglitazone]     Percocet [Oxycodone-Acetaminophen]      Confused , loopy    Vicodin [Hydrocodone-Acetaminophen]         Home Meds:    Prior to Admission medications    Medication Sig Start Date End Date Taking? Authorizing Provider   rivaroxaban (XARELTO) 20 MG TABS tablet Take 1 tablet by mouth daily (with breakfast) 1/6/23   Kumar Donis MD   nitroGLYCERIN (NITROSTAT) 0.4 MG SL tablet ONE TABLET UNDER TONGUE AS NEEDED FOR CHEST PAIN. IF PAIN REMAINS AFTER 5 MINUTES CALL 911 AND REPEAT.  MAX 3 TABLETS IN 15 MINUTES 12/19/22   Kumar Donis MD   clopidogrel (PLAVIX) 75 MG tablet Take 1 tablet by mouth daily 12/7/22   Michelle Chery II, MD   albuterol sulfate HFA (PROVENTIL;VENTOLIN;PROAIR) 108 (90 Base) MCG/ACT inhaler INHALE 2 PUFFS INTO THE LUNGS FOUR TIMES DAILY AS NEEDED FOR WHEEZING 11/7/22   Kumar Donis MD   escitalopram (LEXAPRO) 10 MG tablet TAKE 1 TABLET BY MOUTH EVERY DAY  Patient not taking: No sig reported 8/11/22   Historical Provider, MD   torsemide (DEMADEX) 20 MG tablet Take 1 tablet by mouth daily 22   Wiliam Henderson MD   metOLazone (ZAROXOLYN) 5 MG tablet Take as needed. once a week, for shortness of breath 22   Wiliam Henderson MD   Coenzyme Q10 (COQ-10) 100 MG CPCR Take 100 mg by mouth daily 22   Wiliam Henderson MD   ofloxacin (OCUFLOX) 0.3 % solution Place 4 drops in left ear twice a day for the next 5 days. 22   Byron Gale DO   atenolol (TENORMIN) 25 MG tablet TAKE 1 TABLET BY MOUTH DAILY 22   Wiliam Henderson MD   rosuvastatin (CRESTOR) 10 MG tablet TAKE ONE TABLET BY MOUTH DAILY 22   Bonilla Contreras MD   acetaminophen (TYLENOL) 500 MG tablet Take 1 tablet by mouth 4 times daily as needed for Pain 22   Valentina Plant, APRN - CNP   OMEPRAZOLE PO Take by mouth daily as needed    Historical Provider, MD   Specialty Vitamins Products (VITAMINS FOR THE HAIR PO) Take by mouth daily    Historical Provider, MD   Magnesium Oxide 250 MG TABS Take 2 tablets by mouth daily  Patient taking differently: Take 250 mg by mouth daily 16   Angela Romo MD        [facility] Meds:    Current Facility-Administered Medications   Medication Dose Route Frequency Provider Last Rate Last Admin    sodium chloride flush 0.9 % injection 5-40 mL  5-40 mL IntraVENous 2 times per day Jacolyn Or, APRN - CNP        sodium chloride flush 0.9 % injection 5-40 mL  5-40 mL IntraVENous PRN Jacolyn Or, APRN - CNP        0.9 % sodium chloride infusion   IntraVENous PRN Jacolyn Or, APRN - CNP           Social History:       Social History     Socioeconomic History    Marital status:     Occupational History    Occupation: cleans woods   Tobacco Use    Smoking status: Former     Years: 40.00     Types: Cigarettes     Quit date: 3/1/2014     Years since quittin.9    Smokeless tobacco: Never    Tobacco comments:     Trying to quit   Vaping Use    Vaping Use: Never used   Substance and Sexual Activity    Alcohol use: No     Alcohol/week: 3.0 standard drinks     Types: 3 Cans of beer per week    Drug use: No    Sexual activity: Not Currently       Family Histroy:      Family History   Problem Relation Age of Onset    Heart Attack Father 76    Heart Disease Father         complications from surgery, per pt    Heart Attack Mother 64       Review of Systems:  Review of systems performed and negative with the exception of the above findings        Physical Exam   Vital Signs: /72   Ht 4' 7\" (1.397 m)   Wt 127 lb (57.6 kg)   LMP 01/01/1983   BMI 29.52 kg/m²        Admission Weight: 127 lb (57.6 kg)   ASA 3 - Patient with moderate systemic disease with functional limitations    Mallampati Airway Assessment:  Mallampati Class II - (soft palate, fauces & uvula are visible)    General appearance: alert, appears stated age, cooperative, and no distress  Head: Normocephalic, without obvious abnormality, atraumatic  Lungs: clear to auscultation bilaterally  Heart: regular rate and rhythm, S1, S2 normal, no murmur, click, rub or gallop  Abdomen: soft, non-tender.  Bowel sounds normal. No masses,  no organomegaly  Extremities: extremities normal, atraumatic, no cyanosis or edema  Pulses:      Labs:    BMP:   Lab Results   Component Value Date/Time     02/06/2023 07:20 AM    K 3.1 02/06/2023 07:20 AM    K 4.5 02/08/2022 06:12 PM    CL 95 02/06/2023 07:20 AM    CO2 33 02/06/2023 07:20 AM    PHOS 3.0 05/08/2019 01:05 PM    BUN 30 02/06/2023 07:20 AM    CREATININE 1.0 02/06/2023 07:20 AM      No results found for: GLU  Lab Results   Component Value Date/Time    MG 1.6 06/20/2017 02:06 PM      CBC:   Lab Results   Component Value Date/Time    WBC 6.8 02/06/2023 07:20 AM    HGB 10.9 02/06/2023 07:20 AM    HCT 33.2 02/06/2023 07:20 AM    MCV 84.2 02/06/2023 07:20 AM     02/06/2023 07:20 AM      Coagulation:   Lab Results   Component Value Date/Time    INR 2.73 02/25/2022 12:55 PM    APTT 28.8 03/08/2021 10:05 AM     Cardiac markers:   Lab Results   Component Value Date/Time CKTOTAL 175 01/22/2019 01:20 PM    TROPONINI <0.01 02/08/2022 06:12 PM     Lab Results   Component Value Date/Time    LABA1C 6.3 01/22/2019 01:20 PM    No results found for: ALB    Lab Results   Component Value Date/Time    BILITOT 0.7 07/01/2022 11:20 AM    BILIDIR 0.2 05/08/2019 01:05 PM    AST 16 07/01/2022 11:20 AM    ALT 15 07/01/2022 11:20 AM    ALKPHOS 81 07/01/2022 11:20 AM      Lab Results   Component Value Date/Time    CHOL 118 07/31/2021 12:00 AM    HDL 48 07/31/2021 12:00 AM    HDL 52 05/21/2012 12:36 PM    LDLCALC 51 07/31/2021 12:00 AM    TRIG 101 07/31/2021 12:00 AM          Diagnosis:  Patient Active Problem List   Diagnosis    Pure hypercholesterolemia    Coronary artery disease involving native coronary artery of native heart without angina pectoris    Mitral valve disorder    Preop cardiovascular exam    Atrial fibrillation (HCC)    Mitral regurgitation    Ischemic chest pain (HCC)    Valvular disease    SOB (shortness of breath)    Atherosclerosis of native arteries of extremities with rest pain, bilateral legs (HCC)    Positive cardiac stress test    Essential hypertension    Hyperlipidemia    Unstable angina ([address]    Centrilobular emphysema (HCC)    Former smoker    Atherosclerosis of native arteries of extremities with intermittent claudication, bilateral legs ([address]           Plan:  angiogram with runoff, possible left leg intervention        Jeovany Lay M.D., FACS.  2/6/2023  7:58 AM

## 2023-02-07 ENCOUNTER — TELEPHONE (OUTPATIENT)
Dept: VASCULAR SURGERY | Age: 73
End: 2023-02-07

## 2023-03-02 ENCOUNTER — TELEPHONE (OUTPATIENT)
Dept: SURGERY | Age: 73
End: 2023-03-02

## 2023-03-02 NOTE — TELEPHONE ENCOUNTER
Called patient to schedule 6 month carotid sometime after 5/21/23. I noticed she already had a carotid US scheduled on 3/13/23 at the hospital. I informed her that it was too soon to have the test per insurance guidelines. Patient stated she does not remember having test done in November 2022. She refused to schedule here in the office stated she would call the hospital.

## 2023-03-03 RX ORDER — ROSUVASTATIN CALCIUM 10 MG/1
TABLET, COATED ORAL
Qty: 30 TABLET | Refills: 11 | Status: SHIPPED | OUTPATIENT
Start: 2023-03-03

## 2023-03-03 NOTE — TELEPHONE ENCOUNTER
Received refill request for rosuvastatin (CRESTOR) 10 MG tablet from [address].      Last OV: 8- LES    Next OV: 3-6-2023 LES    Last Labs: 7-     Last Filled:  2- [facility]

## 2023-03-06 ENCOUNTER — TELEPHONE (OUTPATIENT)
Dept: CARDIOLOGY CLINIC | Age: 73
End: 2023-03-06

## 2023-03-06 NOTE — TELEPHONE ENCOUNTER
Pt was told she does not need a carotid until after May 21, so she didn't have her test on Friday and I rescheduled todays no-show. Pt wants to know if she even needs the appt on 3/14 with [facility] since no test was done. Please advise. Thank you.

## 2023-03-07 NOTE — TELEPHONE ENCOUNTER
Called and spoke to pt and advised per LES msg she does not need appt and it has been cancelled.  Pt is aware.

## 2023-04-02 DIAGNOSIS — I70.223 ATHEROSCLEROSIS OF NATIVE ARTERIES OF EXTREMITIES WITH REST PAIN, BILATERAL LEGS (HCC): Primary | ICD-10-CM

## 2023-04-03 RX ORDER — CLOPIDOGREL BISULFATE 75 MG/1
75 TABLET ORAL DAILY
Qty: 30 TABLET | Refills: 3 | Status: SHIPPED | OUTPATIENT
Start: 2023-04-03

## 2023-04-24 RX ORDER — ATENOLOL 25 MG/1
25 TABLET ORAL DAILY
Qty: 90 TABLET | Refills: 3 | Status: SHIPPED | OUTPATIENT
Start: 2023-04-24

## 2023-04-24 NOTE — TELEPHONE ENCOUNTER
Received refill request for Atenolol from [facility].     Last ov:2022 LES    Last EK/10/2022    Last Refill:2022    Next appointment:None

## 2023-06-15 ENCOUNTER — HOSPITAL ENCOUNTER (INPATIENT)
Age: 73
LOS: 4 days | Discharge: HOME HEALTH | DRG: 300 | End: 2023-06-19
Attending: EMERGENCY MEDICINE | Admitting: INTERNAL MEDICINE
Payer: MEDICARE

## 2023-06-15 ENCOUNTER — APPOINTMENT (OUTPATIENT)
Dept: GENERAL RADIOLOGY | Age: 73
DRG: 300 | End: 2023-06-15
Payer: MEDICARE

## 2023-06-15 DIAGNOSIS — I96 DRY GANGRENE (HCC): Primary | ICD-10-CM

## 2023-06-15 DIAGNOSIS — L08.9 LEFT FOOT INFECTION: ICD-10-CM

## 2023-06-15 DIAGNOSIS — Z79.01 ANTICOAGULATED: ICD-10-CM

## 2023-06-15 LAB
ALBUMIN SERPL-MCNC: 4.3 G/DL (ref 3.4–5)
ALBUMIN/GLOB SERPL: 1.8 {RATIO} (ref 1.1–2.2)
ALP SERPL-CCNC: 81 U/L (ref 40–129)
ALT SERPL-CCNC: 9 U/L (ref 10–40)
ANION GAP SERPL CALCULATED.3IONS-SCNC: 11 MMOL/L (ref 3–16)
AST SERPL-CCNC: 16 U/L (ref 15–37)
BASOPHILS # BLD: 0.2 K/UL (ref 0–0.2)
BASOPHILS NFR BLD: 2.6 %
BILIRUB SERPL-MCNC: 0.5 MG/DL (ref 0–1)
BUN SERPL-MCNC: 12 MG/DL (ref 7–20)
CALCIUM SERPL-MCNC: 9.9 MG/DL (ref 8.3–10.6)
CHLORIDE SERPL-SCNC: 100 MMOL/L (ref 99–110)
CO2 SERPL-SCNC: 30 MMOL/L (ref 21–32)
CREAT SERPL-MCNC: 0.9 MG/DL (ref 0.6–1.2)
CRP SERPL-MCNC: <3 MG/L (ref 0–5.1)
DEPRECATED RDW RBC AUTO: 18.5 % (ref 12.4–15.4)
EOSINOPHIL # BLD: 0.2 K/UL (ref 0–0.6)
EOSINOPHIL NFR BLD: 3.1 %
ERYTHROCYTE [SEDIMENTATION RATE] IN BLOOD BY WESTERGREN METHOD: 7 MM/HR (ref 0–30)
GFR SERPLBLD CREATININE-BSD FMLA CKD-EPI: >60 ML/MIN/{1.73_M2}
GLUCOSE SERPL-MCNC: 106 MG/DL (ref 70–99)
HCT VFR BLD AUTO: 31.5 % (ref 36–48)
HGB BLD-MCNC: 10 G/DL (ref 12–16)
INR PPP: 3.45 (ref 0.84–1.16)
LACTATE BLDV-SCNC: 1.7 MMOL/L (ref 0.4–1.9)
LYMPHOCYTES # BLD: 1.2 K/UL (ref 1–5.1)
LYMPHOCYTES NFR BLD: 19.6 %
MCH RBC QN AUTO: 27.7 PG (ref 26–34)
MCHC RBC AUTO-ENTMCNC: 31.7 G/DL (ref 31–36)
MCV RBC AUTO: 87.5 FL (ref 80–100)
MONOCYTES # BLD: 0.5 K/UL (ref 0–1.3)
MONOCYTES NFR BLD: 7.4 %
NEUTROPHILS # BLD: 4.1 K/UL (ref 1.7–7.7)
NEUTROPHILS NFR BLD: 67.3 %
PLATELET # BLD AUTO: 166 K/UL (ref 135–450)
PMV BLD AUTO: 7.9 FL (ref 5–10.5)
POTASSIUM SERPL-SCNC: 3.7 MMOL/L (ref 3.5–5.1)
PROT SERPL-MCNC: 6.7 G/DL (ref 6.4–8.2)
PROTHROMBIN TIME: 34.5 SEC (ref 11.5–14.8)
RBC # BLD AUTO: 3.61 M/UL (ref 4–5.2)
SODIUM SERPL-SCNC: 141 MMOL/L (ref 136–145)
WBC # BLD AUTO: 6.2 K/UL (ref 4–11)

## 2023-06-15 PROCEDURE — 99285 EMERGENCY DEPT VISIT HI MDM: CPT

## 2023-06-15 PROCEDURE — 85652 RBC SED RATE AUTOMATED: CPT

## 2023-06-15 PROCEDURE — 2580000003 HC RX 258: Performed by: EMERGENCY MEDICINE

## 2023-06-15 PROCEDURE — 6370000000 HC RX 637 (ALT 250 FOR IP): Performed by: INTERNAL MEDICINE

## 2023-06-15 PROCEDURE — 96367 TX/PROPH/DG ADDL SEQ IV INF: CPT

## 2023-06-15 PROCEDURE — 85610 PROTHROMBIN TIME: CPT

## 2023-06-15 PROCEDURE — 80053 COMPREHEN METABOLIC PANEL: CPT

## 2023-06-15 PROCEDURE — 85025 COMPLETE CBC W/AUTO DIFF WBC: CPT

## 2023-06-15 PROCEDURE — 6360000002 HC RX W HCPCS: Performed by: EMERGENCY MEDICINE

## 2023-06-15 PROCEDURE — 96365 THER/PROPH/DIAG IV INF INIT: CPT

## 2023-06-15 PROCEDURE — 2580000003 HC RX 258: Performed by: INTERNAL MEDICINE

## 2023-06-15 PROCEDURE — 83605 ASSAY OF LACTIC ACID: CPT

## 2023-06-15 PROCEDURE — 1200000000 HC SEMI PRIVATE

## 2023-06-15 PROCEDURE — 73630 X-RAY EXAM OF FOOT: CPT

## 2023-06-15 PROCEDURE — 86140 C-REACTIVE PROTEIN: CPT

## 2023-06-15 PROCEDURE — 87040 BLOOD CULTURE FOR BACTERIA: CPT

## 2023-06-15 RX ORDER — ONDANSETRON 2 MG/ML
4 INJECTION INTRAMUSCULAR; INTRAVENOUS EVERY 6 HOURS PRN
Status: DISCONTINUED | OUTPATIENT
Start: 2023-06-15 | End: 2023-06-20 | Stop reason: HOSPADM

## 2023-06-15 RX ORDER — ACETAMINOPHEN 325 MG/1
650 TABLET ORAL EVERY 6 HOURS PRN
Status: DISCONTINUED | OUTPATIENT
Start: 2023-06-15 | End: 2023-06-20 | Stop reason: HOSPADM

## 2023-06-15 RX ORDER — SODIUM CHLORIDE 9 MG/ML
INJECTION, SOLUTION INTRAVENOUS PRN
Status: DISCONTINUED | OUTPATIENT
Start: 2023-06-15 | End: 2023-06-20 | Stop reason: HOSPADM

## 2023-06-15 RX ORDER — NITROGLYCERIN 0.4 MG/1
0.4 TABLET SUBLINGUAL EVERY 5 MIN PRN
Status: DISCONTINUED | OUTPATIENT
Start: 2023-06-15 | End: 2023-06-20 | Stop reason: HOSPADM

## 2023-06-15 RX ORDER — ACETAMINOPHEN 650 MG/1
650 SUPPOSITORY RECTAL EVERY 6 HOURS PRN
Status: DISCONTINUED | OUTPATIENT
Start: 2023-06-15 | End: 2023-06-20 | Stop reason: HOSPADM

## 2023-06-15 RX ORDER — ATORVASTATIN CALCIUM 80 MG/1
40 TABLET, FILM COATED ORAL NIGHTLY
Status: DISCONTINUED | OUTPATIENT
Start: 2023-06-15 | End: 2023-06-15

## 2023-06-15 RX ORDER — SODIUM CHLORIDE 0.9 % (FLUSH) 0.9 %
5-40 SYRINGE (ML) INJECTION PRN
Status: DISCONTINUED | OUTPATIENT
Start: 2023-06-15 | End: 2023-06-20 | Stop reason: HOSPADM

## 2023-06-15 RX ORDER — ESCITALOPRAM OXALATE 10 MG/1
10 TABLET ORAL DAILY
Status: DISCONTINUED | OUTPATIENT
Start: 2023-06-15 | End: 2023-06-15

## 2023-06-15 RX ORDER — ENOXAPARIN SODIUM 100 MG/ML
40 INJECTION SUBCUTANEOUS DAILY
Status: DISCONTINUED | OUTPATIENT
Start: 2023-06-15 | End: 2023-06-15

## 2023-06-15 RX ORDER — SODIUM CHLORIDE 0.9 % (FLUSH) 0.9 %
5-40 SYRINGE (ML) INJECTION EVERY 12 HOURS SCHEDULED
Status: DISCONTINUED | OUTPATIENT
Start: 2023-06-15 | End: 2023-06-20 | Stop reason: HOSPADM

## 2023-06-15 RX ORDER — POLYETHYLENE GLYCOL 3350 17 G/17G
17 POWDER, FOR SOLUTION ORAL DAILY PRN
Status: DISCONTINUED | OUTPATIENT
Start: 2023-06-15 | End: 2023-06-20 | Stop reason: HOSPADM

## 2023-06-15 RX ORDER — MELATONIN/PYRIDOXINE HCL (B6) 5 MG-10 MG
100 TABLET,IMMED, EXTENDED RELEASE, BIPHASIC ORAL DAILY
Status: DISCONTINUED | OUTPATIENT
Start: 2023-06-15 | End: 2023-06-15 | Stop reason: RX

## 2023-06-15 RX ORDER — ONDANSETRON 4 MG/1
4 TABLET, ORALLY DISINTEGRATING ORAL EVERY 8 HOURS PRN
Status: DISCONTINUED | OUTPATIENT
Start: 2023-06-15 | End: 2023-06-20 | Stop reason: HOSPADM

## 2023-06-15 RX ORDER — CLOPIDOGREL BISULFATE 75 MG/1
75 TABLET ORAL DAILY
Status: DISCONTINUED | OUTPATIENT
Start: 2023-06-16 | End: 2023-06-20 | Stop reason: HOSPADM

## 2023-06-15 RX ORDER — TRAMADOL HYDROCHLORIDE 50 MG/1
50 TABLET ORAL 2 TIMES DAILY
Status: DISCONTINUED | OUTPATIENT
Start: 2023-06-15 | End: 2023-06-20 | Stop reason: HOSPADM

## 2023-06-15 RX ORDER — TRAMADOL HYDROCHLORIDE 50 MG/1
50 TABLET ORAL 2 TIMES DAILY
COMMUNITY

## 2023-06-15 RX ORDER — SODIUM CHLORIDE, SODIUM LACTATE, POTASSIUM CHLORIDE, CALCIUM CHLORIDE 600; 310; 30; 20 MG/100ML; MG/100ML; MG/100ML; MG/100ML
INJECTION, SOLUTION INTRAVENOUS CONTINUOUS
Status: ACTIVE | OUTPATIENT
Start: 2023-06-15 | End: 2023-06-16

## 2023-06-15 RX ADMIN — METOPROLOL TARTRATE 25 MG: 25 TABLET, FILM COATED ORAL at 21:49

## 2023-06-15 RX ADMIN — CEFEPIME 2000 MG: 2 INJECTION, POWDER, FOR SOLUTION INTRAVENOUS at 17:14

## 2023-06-15 RX ADMIN — TRAMADOL HYDROCHLORIDE 50 MG: 50 TABLET ORAL at 21:49

## 2023-06-15 RX ADMIN — SODIUM CHLORIDE, PRESERVATIVE FREE 10 ML: 5 INJECTION INTRAVENOUS at 21:53

## 2023-06-15 RX ADMIN — VANCOMYCIN HYDROCHLORIDE 750 MG: 750 INJECTION, POWDER, LYOPHILIZED, FOR SOLUTION INTRAVENOUS at 17:48

## 2023-06-15 ASSESSMENT — LIFESTYLE VARIABLES
HOW OFTEN DO YOU HAVE A DRINK CONTAINING ALCOHOL: NEVER
HOW OFTEN DO YOU HAVE A DRINK CONTAINING ALCOHOL: NEVER
HOW MANY STANDARD DRINKS CONTAINING ALCOHOL DO YOU HAVE ON A TYPICAL DAY: PATIENT DOES NOT DRINK

## 2023-06-15 ASSESSMENT — PAIN SCALES - GENERAL: PAINLEVEL_OUTOF10: 6

## 2023-06-15 ASSESSMENT — PAIN DESCRIPTION - LOCATION: LOCATION: HIP

## 2023-06-15 ASSESSMENT — PAIN DESCRIPTION - DESCRIPTORS: DESCRIPTORS: SHOOTING

## 2023-06-15 ASSESSMENT — PAIN DESCRIPTION - ORIENTATION: ORIENTATION: RIGHT

## 2023-06-16 LAB
ANION GAP SERPL CALCULATED.3IONS-SCNC: 10 MMOL/L (ref 3–16)
BUN SERPL-MCNC: 16 MG/DL (ref 7–20)
CALCIUM SERPL-MCNC: 9.6 MG/DL (ref 8.3–10.6)
CHLORIDE SERPL-SCNC: 102 MMOL/L (ref 99–110)
CO2 SERPL-SCNC: 26 MMOL/L (ref 21–32)
CREAT SERPL-MCNC: 1 MG/DL (ref 0.6–1.2)
GFR SERPLBLD CREATININE-BSD FMLA CKD-EPI: 59 ML/MIN/{1.73_M2}
GLUCOSE SERPL-MCNC: 116 MG/DL (ref 70–99)
MAGNESIUM SERPL-MCNC: 2.2 MG/DL (ref 1.8–2.4)
POTASSIUM SERPL-SCNC: 3.5 MMOL/L (ref 3.5–5.1)
SODIUM SERPL-SCNC: 138 MMOL/L (ref 136–145)
VANCOMYCIN SERPL-MCNC: 6.9 UG/ML

## 2023-06-16 PROCEDURE — 6360000002 HC RX W HCPCS: Performed by: INTERNAL MEDICINE

## 2023-06-16 PROCEDURE — 99222 1ST HOSP IP/OBS MODERATE 55: CPT | Performed by: SURGERY

## 2023-06-16 PROCEDURE — 1200000000 HC SEMI PRIVATE

## 2023-06-16 PROCEDURE — 36415 COLL VENOUS BLD VENIPUNCTURE: CPT

## 2023-06-16 PROCEDURE — 80048 BASIC METABOLIC PNL TOTAL CA: CPT

## 2023-06-16 PROCEDURE — 2580000003 HC RX 258: Performed by: INTERNAL MEDICINE

## 2023-06-16 PROCEDURE — 6370000000 HC RX 637 (ALT 250 FOR IP): Performed by: INTERNAL MEDICINE

## 2023-06-16 PROCEDURE — 80202 ASSAY OF VANCOMYCIN: CPT

## 2023-06-16 PROCEDURE — 83735 ASSAY OF MAGNESIUM: CPT

## 2023-06-16 RX ADMIN — SODIUM CHLORIDE, PRESERVATIVE FREE 10 ML: 5 INJECTION INTRAVENOUS at 08:40

## 2023-06-16 RX ADMIN — VANCOMYCIN HYDROCHLORIDE 500 MG: 500 INJECTION, POWDER, LYOPHILIZED, FOR SOLUTION INTRAVENOUS at 19:02

## 2023-06-16 RX ADMIN — CEFEPIME 2000 MG: 2 INJECTION, POWDER, FOR SOLUTION INTRAVENOUS at 18:12

## 2023-06-16 RX ADMIN — METOPROLOL TARTRATE 25 MG: 25 TABLET, FILM COATED ORAL at 08:40

## 2023-06-16 RX ADMIN — CEFEPIME 2000 MG: 2 INJECTION, POWDER, FOR SOLUTION INTRAVENOUS at 06:14

## 2023-06-16 RX ADMIN — VANCOMYCIN HYDROCHLORIDE 500 MG: 500 INJECTION, POWDER, LYOPHILIZED, FOR SOLUTION INTRAVENOUS at 07:53

## 2023-06-16 RX ADMIN — TRAMADOL HYDROCHLORIDE 50 MG: 50 TABLET ORAL at 08:40

## 2023-06-16 RX ADMIN — CLOPIDOGREL BISULFATE 75 MG: 75 TABLET ORAL at 08:40

## 2023-06-16 RX ADMIN — METOPROLOL TARTRATE 25 MG: 25 TABLET, FILM COATED ORAL at 21:51

## 2023-06-16 RX ADMIN — TRAMADOL HYDROCHLORIDE 50 MG: 50 TABLET ORAL at 21:51

## 2023-06-16 ASSESSMENT — PAIN DESCRIPTION - LOCATION: LOCATION: FOOT

## 2023-06-16 ASSESSMENT — PAIN SCALES - GENERAL: PAINLEVEL_OUTOF10: 1

## 2023-06-17 ENCOUNTER — APPOINTMENT (OUTPATIENT)
Dept: MRI IMAGING | Age: 73
DRG: 300 | End: 2023-06-17
Payer: MEDICARE

## 2023-06-17 LAB — VANCOMYCIN SERPL-MCNC: 9.7 UG/ML

## 2023-06-17 PROCEDURE — 2580000003 HC RX 258: Performed by: INTERNAL MEDICINE

## 2023-06-17 PROCEDURE — 6360000002 HC RX W HCPCS: Performed by: INTERNAL MEDICINE

## 2023-06-17 PROCEDURE — 6370000000 HC RX 637 (ALT 250 FOR IP): Performed by: PHYSICIAN ASSISTANT

## 2023-06-17 PROCEDURE — 80202 ASSAY OF VANCOMYCIN: CPT

## 2023-06-17 PROCEDURE — 6370000000 HC RX 637 (ALT 250 FOR IP): Performed by: INTERNAL MEDICINE

## 2023-06-17 PROCEDURE — 1200000000 HC SEMI PRIVATE

## 2023-06-17 PROCEDURE — 73718 MRI LOWER EXTREMITY W/O DYE: CPT

## 2023-06-17 RX ORDER — HYDROCODONE BITARTRATE AND ACETAMINOPHEN 5; 325 MG/1; MG/1
1 TABLET ORAL EVERY 6 HOURS PRN
Status: DISCONTINUED | OUTPATIENT
Start: 2023-06-17 | End: 2023-06-20 | Stop reason: HOSPADM

## 2023-06-17 RX ADMIN — HYDROCODONE BITARTRATE AND ACETAMINOPHEN 1 TABLET: 5; 325 TABLET ORAL at 17:10

## 2023-06-17 RX ADMIN — METOPROLOL TARTRATE 25 MG: 25 TABLET, FILM COATED ORAL at 09:38

## 2023-06-17 RX ADMIN — CEFEPIME 2000 MG: 2 INJECTION, POWDER, FOR SOLUTION INTRAVENOUS at 05:15

## 2023-06-17 RX ADMIN — TRAMADOL HYDROCHLORIDE 50 MG: 50 TABLET ORAL at 09:38

## 2023-06-17 RX ADMIN — VANCOMYCIN HYDROCHLORIDE 500 MG: 500 INJECTION, POWDER, LYOPHILIZED, FOR SOLUTION INTRAVENOUS at 18:00

## 2023-06-17 RX ADMIN — CEFEPIME 2000 MG: 2 INJECTION, POWDER, FOR SOLUTION INTRAVENOUS at 17:16

## 2023-06-17 RX ADMIN — SODIUM CHLORIDE, PRESERVATIVE FREE 10 ML: 5 INJECTION INTRAVENOUS at 22:27

## 2023-06-17 RX ADMIN — METOPROLOL TARTRATE 25 MG: 25 TABLET, FILM COATED ORAL at 22:27

## 2023-06-17 RX ADMIN — TRAMADOL HYDROCHLORIDE 50 MG: 50 TABLET ORAL at 22:26

## 2023-06-17 RX ADMIN — VANCOMYCIN HYDROCHLORIDE 500 MG: 500 INJECTION, POWDER, LYOPHILIZED, FOR SOLUTION INTRAVENOUS at 06:16

## 2023-06-17 RX ADMIN — CLOPIDOGREL BISULFATE 75 MG: 75 TABLET ORAL at 09:39

## 2023-06-17 ASSESSMENT — PAIN SCALES - GENERAL
PAINLEVEL_OUTOF10: 0
PAINLEVEL_OUTOF10: 0
PAINLEVEL_OUTOF10: 6
PAINLEVEL_OUTOF10: 2

## 2023-06-17 ASSESSMENT — PAIN DESCRIPTION - ORIENTATION
ORIENTATION: MID
ORIENTATION: MID

## 2023-06-17 ASSESSMENT — PAIN DESCRIPTION - LOCATION
LOCATION: HEAD
LOCATION: NECK

## 2023-06-17 ASSESSMENT — PAIN DESCRIPTION - DESCRIPTORS: DESCRIPTORS: OTHER (COMMENT)

## 2023-06-18 LAB
ANION GAP SERPL CALCULATED.3IONS-SCNC: 8 MMOL/L (ref 3–16)
BASOPHILS # BLD: 0.1 K/UL (ref 0–0.2)
BASOPHILS NFR BLD: 0.8 %
BUN SERPL-MCNC: 17 MG/DL (ref 7–20)
CALCIUM SERPL-MCNC: 10.1 MG/DL (ref 8.3–10.6)
CHLORIDE SERPL-SCNC: 106 MMOL/L (ref 99–110)
CO2 SERPL-SCNC: 27 MMOL/L (ref 21–32)
CREAT SERPL-MCNC: 0.8 MG/DL (ref 0.6–1.2)
DEPRECATED RDW RBC AUTO: 18.2 % (ref 12.4–15.4)
EOSINOPHIL # BLD: 0.3 K/UL (ref 0–0.6)
EOSINOPHIL NFR BLD: 4.2 %
GFR SERPLBLD CREATININE-BSD FMLA CKD-EPI: >60 ML/MIN/{1.73_M2}
GLUCOSE SERPL-MCNC: 115 MG/DL (ref 70–99)
HCT VFR BLD AUTO: 31 % (ref 36–48)
HGB BLD-MCNC: 9.8 G/DL (ref 12–16)
INR PPP: 1.16 (ref 0.84–1.16)
LYMPHOCYTES # BLD: 1.5 K/UL (ref 1–5.1)
LYMPHOCYTES NFR BLD: 20.4 %
MCH RBC QN AUTO: 27.9 PG (ref 26–34)
MCHC RBC AUTO-ENTMCNC: 31.7 G/DL (ref 31–36)
MCV RBC AUTO: 88.1 FL (ref 80–100)
MONOCYTES # BLD: 0.6 K/UL (ref 0–1.3)
MONOCYTES NFR BLD: 8.3 %
NEUTROPHILS # BLD: 4.9 K/UL (ref 1.7–7.7)
NEUTROPHILS NFR BLD: 66.3 %
PLATELET # BLD AUTO: 141 K/UL (ref 135–450)
PMV BLD AUTO: 7.8 FL (ref 5–10.5)
POTASSIUM SERPL-SCNC: 4.5 MMOL/L (ref 3.5–5.1)
PROTHROMBIN TIME: 14.8 SEC (ref 11.5–14.8)
RBC # BLD AUTO: 3.52 M/UL (ref 4–5.2)
SODIUM SERPL-SCNC: 141 MMOL/L (ref 136–145)
WBC # BLD AUTO: 7.3 K/UL (ref 4–11)

## 2023-06-18 PROCEDURE — 6360000002 HC RX W HCPCS: Performed by: INTERNAL MEDICINE

## 2023-06-18 PROCEDURE — 6370000000 HC RX 637 (ALT 250 FOR IP): Performed by: PHYSICIAN ASSISTANT

## 2023-06-18 PROCEDURE — 6370000000 HC RX 637 (ALT 250 FOR IP): Performed by: INTERNAL MEDICINE

## 2023-06-18 PROCEDURE — 80048 BASIC METABOLIC PNL TOTAL CA: CPT

## 2023-06-18 PROCEDURE — 94760 N-INVAS EAR/PLS OXIMETRY 1: CPT

## 2023-06-18 PROCEDURE — 2580000003 HC RX 258: Performed by: INTERNAL MEDICINE

## 2023-06-18 PROCEDURE — 1200000000 HC SEMI PRIVATE

## 2023-06-18 PROCEDURE — 87641 MR-STAPH DNA AMP PROBE: CPT

## 2023-06-18 PROCEDURE — 99222 1ST HOSP IP/OBS MODERATE 55: CPT | Performed by: INTERNAL MEDICINE

## 2023-06-18 PROCEDURE — 85025 COMPLETE CBC W/AUTO DIFF WBC: CPT

## 2023-06-18 PROCEDURE — 85610 PROTHROMBIN TIME: CPT

## 2023-06-18 RX ADMIN — CEFEPIME 2000 MG: 2 INJECTION, POWDER, FOR SOLUTION INTRAVENOUS at 06:12

## 2023-06-18 RX ADMIN — CEFEPIME 2000 MG: 2 INJECTION, POWDER, FOR SOLUTION INTRAVENOUS at 17:45

## 2023-06-18 RX ADMIN — VANCOMYCIN HYDROCHLORIDE 500 MG: 500 INJECTION, POWDER, LYOPHILIZED, FOR SOLUTION INTRAVENOUS at 18:25

## 2023-06-18 RX ADMIN — METOPROLOL TARTRATE 25 MG: 25 TABLET, FILM COATED ORAL at 20:37

## 2023-06-18 RX ADMIN — CLOPIDOGREL BISULFATE 75 MG: 75 TABLET ORAL at 09:07

## 2023-06-18 RX ADMIN — RIVAROXABAN 20 MG: 20 TABLET, FILM COATED ORAL at 17:44

## 2023-06-18 RX ADMIN — SODIUM CHLORIDE, PRESERVATIVE FREE 10 ML: 5 INJECTION INTRAVENOUS at 20:39

## 2023-06-18 RX ADMIN — VANCOMYCIN HYDROCHLORIDE 500 MG: 500 INJECTION, POWDER, LYOPHILIZED, FOR SOLUTION INTRAVENOUS at 07:20

## 2023-06-18 RX ADMIN — METOPROLOL TARTRATE 25 MG: 25 TABLET, FILM COATED ORAL at 09:06

## 2023-06-18 RX ADMIN — TRAMADOL HYDROCHLORIDE 50 MG: 50 TABLET ORAL at 09:06

## 2023-06-18 RX ADMIN — TRAMADOL HYDROCHLORIDE 50 MG: 50 TABLET ORAL at 20:37

## 2023-06-18 ASSESSMENT — PAIN DESCRIPTION - ORIENTATION: ORIENTATION: LEFT

## 2023-06-18 ASSESSMENT — PAIN - FUNCTIONAL ASSESSMENT: PAIN_FUNCTIONAL_ASSESSMENT: PREVENTS OR INTERFERES SOME ACTIVE ACTIVITIES AND ADLS

## 2023-06-18 ASSESSMENT — PAIN DESCRIPTION - DESCRIPTORS: DESCRIPTORS: ACHING

## 2023-06-18 ASSESSMENT — PAIN DESCRIPTION - LOCATION: LOCATION: FOOT

## 2023-06-18 ASSESSMENT — PAIN SCALES - GENERAL
PAINLEVEL_OUTOF10: 0
PAINLEVEL_OUTOF10: 5

## 2023-06-18 ASSESSMENT — PAIN DESCRIPTION - PAIN TYPE: TYPE: ACUTE PAIN

## 2023-06-19 VITALS
WEIGHT: 134.5 LBS | HEART RATE: 73 BPM | OXYGEN SATURATION: 95 % | BODY MASS INDEX: 31.13 KG/M2 | DIASTOLIC BLOOD PRESSURE: 83 MMHG | RESPIRATION RATE: 16 BRPM | HEIGHT: 55 IN | TEMPERATURE: 98.1 F | SYSTOLIC BLOOD PRESSURE: 136 MMHG

## 2023-06-19 PROBLEM — L08.9 LEFT FOOT INFECTION: Status: ACTIVE | Noted: 2023-06-19

## 2023-06-19 PROBLEM — E66.811 CLASS 1 OBESITY DUE TO EXCESS CALORIES WITH BODY MASS INDEX (BMI) OF 31.0 TO 31.9 IN ADULT: Status: ACTIVE | Noted: 2023-06-19

## 2023-06-19 PROBLEM — Z79.01 ANTICOAGULATED: Status: ACTIVE | Noted: 2023-06-19

## 2023-06-19 PROBLEM — E66.09 CLASS 1 OBESITY DUE TO EXCESS CALORIES WITH BODY MASS INDEX (BMI) OF 31.0 TO 31.9 IN ADULT: Status: ACTIVE | Noted: 2023-06-19

## 2023-06-19 LAB
BACTERIA BLD CULT ORG #2: NORMAL
BACTERIA BLD CULT: NORMAL
MRSA DNA SPEC QL NAA+PROBE: NORMAL
VANCOMYCIN SERPL-MCNC: 12 UG/ML

## 2023-06-19 PROCEDURE — 2580000003 HC RX 258: Performed by: INTERNAL MEDICINE

## 2023-06-19 PROCEDURE — 99233 SBSQ HOSP IP/OBS HIGH 50: CPT | Performed by: INTERNAL MEDICINE

## 2023-06-19 PROCEDURE — 6370000000 HC RX 637 (ALT 250 FOR IP): Performed by: INTERNAL MEDICINE

## 2023-06-19 PROCEDURE — 80202 ASSAY OF VANCOMYCIN: CPT

## 2023-06-19 PROCEDURE — 6370000000 HC RX 637 (ALT 250 FOR IP): Performed by: PHYSICIAN ASSISTANT

## 2023-06-19 PROCEDURE — 6360000002 HC RX W HCPCS: Performed by: INTERNAL MEDICINE

## 2023-06-19 PROCEDURE — 1200000000 HC SEMI PRIVATE

## 2023-06-19 RX ORDER — LACTOBACILLUS RHAMNOSUS GG 10B CELL
1 CAPSULE ORAL 2 TIMES DAILY
Qty: 60 CAPSULE | Refills: 0 | Status: SHIPPED | OUTPATIENT
Start: 2023-06-19 | End: 2023-07-19

## 2023-06-19 RX ORDER — AMOXICILLIN AND CLAVULANATE POTASSIUM 875; 125 MG/1; MG/1
1 TABLET, FILM COATED ORAL EVERY 12 HOURS SCHEDULED
Status: DISCONTINUED | OUTPATIENT
Start: 2023-06-19 | End: 2023-06-20 | Stop reason: HOSPADM

## 2023-06-19 RX ORDER — AMOXICILLIN AND CLAVULANATE POTASSIUM 875; 125 MG/1; MG/1
1 TABLET, FILM COATED ORAL EVERY 12 HOURS SCHEDULED
Qty: 30 TABLET | Refills: 0 | Status: SHIPPED | OUTPATIENT
Start: 2023-06-19 | End: 2023-07-04

## 2023-06-19 RX ADMIN — VANCOMYCIN HYDROCHLORIDE 500 MG: 500 INJECTION, POWDER, LYOPHILIZED, FOR SOLUTION INTRAVENOUS at 06:45

## 2023-06-19 RX ADMIN — CEFEPIME 2000 MG: 2 INJECTION, POWDER, FOR SOLUTION INTRAVENOUS at 05:20

## 2023-06-19 RX ADMIN — CLOPIDOGREL BISULFATE 75 MG: 75 TABLET ORAL at 08:31

## 2023-06-19 RX ADMIN — METOPROLOL TARTRATE 25 MG: 25 TABLET, FILM COATED ORAL at 08:31

## 2023-06-19 RX ADMIN — TRAMADOL HYDROCHLORIDE 50 MG: 50 TABLET ORAL at 08:31

## 2023-06-19 RX ADMIN — RIVAROXABAN 20 MG: 20 TABLET, FILM COATED ORAL at 17:27

## 2023-06-19 ASSESSMENT — ENCOUNTER SYMPTOMS
EYE DISCHARGE: 0
BACK PAIN: 0
SINUS PRESSURE: 0
NAUSEA: 0
ABDOMINAL PAIN: 0
SINUS PAIN: 0
COUGH: 0
RHINORRHEA: 0
CONSTIPATION: 0
SORE THROAT: 0
EYE REDNESS: 0
DIARRHEA: 0
SHORTNESS OF BREATH: 0
WHEEZING: 0

## 2023-06-19 NOTE — CARE COORDINATION
Case Management -  Discharge Note      Patient Name: [place] Granger                   YOB: 1950            Readmission Risk (Low < 19, Mod (19-27), High > 27): Readmission Risk Score: 10.4    Current PCP: Janell Prather MD    ([facility]) Important Message from Medicare:    Date: 6/19/23    PT AM-PAC:   /24  OT AM-PAC:   /24    Patient/patient representative has been educated on the benefits of home health as well as the possible risks of declining recommended services. Patient/patient representative has acknowledged the information provided and decided on the following discharge plan. Patient/ patient representative has been provided freedom of choice regarding service provider, supported by basic dialogue that supports the patient's individualized plan of care/goals. Home Care Information:   Is patient resuming current home health care services: No    Home Care Agency:   [address]  [address]  Phone: [phone]  Fax: [phone]              Services: PT, OT, RN  Home Health Order Obtained: yes    Home health agency notified of discharge.        Financial    Payor: [facility] / Plan: [employer] GOLD PLUS HMO / Product Type: *No Product type* /     Pharmacy:  Potential assistance Purchasing Medications: No  Meds-to-Beds request:        Karine Rojas [address] [place] [address]  Phone: [phone] Fax: [phone]    [facility] [id number] - [id number], [address] [place] [address] [phone] Mechelle Villalobos [phone]  [address] [place] [address]  Phone: [phone] Fax: [phone]      Electronically signed by Radha Espinoza RN on 6/19/2023 at 3:57 PM

## 2023-06-19 NOTE — CARE COORDINATION
Discharge Planning Note:     CM Met with pt bedside to discuss possible need for IV ABX and SNF. Pt became very verbal, agitated and ranting about past medical issues, not being able to work and CM unable to keep pt calm and focused on today. CM spent 20 minutes with pt discussing need to get ahead of any infection to prevent losing the foot and this may include IV ABX. One one sentence pt angrily agrees to SNF but in the same sentence talks about home. Pt is unrealistic about  medical need and unwilling to focus on current situation. Pt refuses to go to [facility], [facility] for [facility] but not fully agreeing to go to either. Pt requested CM to call dtr but not mention this conversation. CM asked what the purpose of that call would be and pt started ranting about her issues with her dtr. CM asked pt if she wanted the dtr to make the decision as to SNF or not. Pt stated \"hell no. \"  CM informed pt that CM will not call dtr if pt is not wanting dtr to have information or partake in decision making. CM informed pt that this CM will send out the referrals mentioned and politely exited the room. CM notified Oliver JANE with update. Dr. Bianka Morrissey (Inf Dis) will be around to see pt shortly.     [facility]    Electronically signed by Regan Easton RN on 6/19/2023 at 2:50 PM

## 2023-06-19 NOTE — DISCHARGE SUMMARY
Hospital Medicine Discharge Summary    Patient: Clifford Khan     Gender: female  : 1950   Age: 67 y.o. MRN: [id number]    Admitting Physician: Bruce Hernandez MD  Discharge Physician: Saroj Wyatt PA-C     Code Status: Full Code     Admit Date: 6/15/2023   Discharge Date:   2023    Disposition:  Home  Time spent arranging discharge: 35 minutes    Discharge Diagnoses: Active Hospital Problems    Diagnosis Date Noted    Anticoagulated [Z79.01] 2023    Left foot infection [L08.9] 2023    Class 1 obesity due to excess calories with body mass index (BMI) of 31.0 to 31.9 in adult [E66.09, Z68.31] 2023    Dry gangrene ([address] Rodriguez Ramos 06/15/2023    Coronary artery disease due to lipid rich plaque [I25.10, I25.83] 2011       Recommendations: Follow up with wound clinic in one week, Dr Valentino Pili in 2 weeks    Condition at Discharge:  Stable    Hospital Course:       Discharge Exam:    /83   Pulse 73   Temp 98.1 °F (36.7 °C) (Oral)   Resp 16   Ht 4' 7\" (1.397 m)   Wt 134 lb 8 oz (61 kg)   LMP 1983   SpO2 95%   BMI 31.26 kg/m²   General appearance:  Appears comfortable. AAOx3  HEENT: atraumatic, Pupils equal, muscous membranes moist, no masses appreciated  Cardiovascular: Regular rate and rhythm no murmurs appreciated  Respiratory: CTAB no wheezing  Gastrointestinal: Abdomen soft, non-tender, BS+  EXT: no edema  Neurology: no gross focal deficts  Psychiatry: Appropriate affect.  Not agitated  Skin: Warm, dry, no rashes appreciated    Discharge Medications:   Current Discharge Medication List        START taking these medications    Details   amoxicillin-clavulanate (AUGMENTIN) 875-125 MG per tablet Take 1 tablet by mouth every 12 hours for 15 days  Qty: 30 tablet, Refills: 0      lactobacillus (CULTURELLE) capsule Take 1 capsule by mouth in the morning and at bedtime  Qty: 60 capsule, Refills: 0           Current Discharge Medication List        CONTINUE these detailed exam

## 2023-06-19 NOTE — PROGRESS NOTES
0827: Shift assessment completed, morning medications given per MAR. VSS, alert and oriented. Call light within reach. The care plan and education has been reviewed and mutually agreed upon with the patient.

## 2023-06-19 NOTE — CARE COORDINATION
Discharge Planning Note:  CM received a call from Mamadou Griggs at [facility] reporting that they will not accept pt back for home care services if pt is in need of wound care or IV ABX. This reason has been made d/t pt needs more assistance and is not supported at home. Per Mamadou Griggs, the daughter reports being supportive but would not do anything as simple as a betadine swab for wound care to the left foot. Pt is admitted for gangrene of the left foot. Mamadou Griggs and the home care agency do not feel is it appropriate for this patient to return home with no support if she is needing IV ABX and wound care.       Electronically signed by Derrick Sun RN on 6/19/2023 at 1:47 PM

## 2023-06-19 NOTE — PLAN OF CARE
Patient alert and oriented, remain free from falls or injury this admission, safety precautions in place, bed alarm on, pain is being managed, wound vac functioning correctly, patient educated on plan of care and verbalized understanding. RN will continue to monitor per shift.

## 2023-06-19 NOTE — PROGRESS NOTES
Upon entering patient's room, patient alert and oriented times 4. Patient complained of pain to left foot, scheduled Tramadol 50 mg given, Wound Vac in place, no leakage, intact, clean and dry dressing. Patient shared being upset because nutritions said she used inappropriate language to her. Patient stated, \"I never call her any bad names\". RN provided a listening ear, and patient stated, \"I feel better since I talked about it\". Patient was provided fresh water and  orange juice. Patient verbalized discomfort to lower back and right hip, RN provided a back and hip rub, which was affective. Patient denies further needs at this time. Safety precaution in place. RN will continue to monitor and treat per orders.

## 2023-06-19 NOTE — PLAN OF CARE
Problem: Discharge Planning  Goal: Discharge to home or other facility with appropriate resources  6/19/2023 1128 by Melvin Li RN  Outcome: Progressing  6/19/2023 0037 by Fahad Dallas RN  Outcome: Progressing     Problem: Pain  Goal: Verbalizes/displays adequate comfort level or baseline comfort level  6/19/2023 1128 by Melvin Li RN  Outcome: Progressing  6/19/2023 0037 by Fahad Dallas RN  Outcome: Progressing     Problem: Safety - Adult  Goal: Free from fall injury  6/19/2023 1128 by Melvin Li RN  Outcome: Progressing  6/19/2023 0037 by Fahad Dallas RN  Outcome: Progressing     Problem: ABCDS Injury Assessment  Goal: Absence of physical injury  6/19/2023 1128 by Melvin Li RN  Outcome: Progressing  6/19/2023 0037 by Fahad Dallas RN  Outcome: Progressing     Problem: Skin/Tissue Integrity - Adult  Goal: Skin integrity remains intact  6/19/2023 1128 by Melvin Li RN  Outcome: Progressing  6/19/2023 0037 by Fahad Dallas RN  Outcome: Progressing  Goal: Incisions, wounds, or drain sites healing without S/S of infection  6/19/2023 1128 by Melvin Li RN  Outcome: Progressing  6/19/2023 0037 by Fahad Dallas RN  Outcome: Progressing     Problem: Musculoskeletal - Adult  Goal: Return mobility to safest level of function  6/19/2023 1128 by Melvin Li RN  Outcome: Progressing  6/19/2023 0037 by Fahad Dallas RN  Outcome: Progressing  Goal: Maintain proper alignment of affected body part  6/19/2023 1128 by Melvin Li RN  Outcome: Progressing  6/19/2023 0037 by Fahad Dallas RN  Outcome: Progressing     Problem: Gastrointestinal - Adult  Goal: Minimal or absence of nausea and vomiting  Outcome: Progressing  Goal: Maintains or returns to baseline bowel function  Outcome: Progressing

## 2023-06-19 NOTE — PROGRESS NOTES
Infectious Diseases   Progress Note      Admission Date: 6/15/2023  Hospital Day: Hospital Day: 5   Attending: Jyothi Fagan MD  Date of service: 6/19/2023     Chief complaint/ Reason for consult:     Left foot wound with concern for osteomyelitis  Peripheral arterial disease  Essential hypertension  Coronary artery disease    Microbiology:        I have reviewed allavailable micro lab data and cultures    Blood culture (2/2) - collected on 6/15/2023: Negative      Antibiotics and immunizations:       Current antibiotics: All antibiotics and their doses were reviewed by me    Recent Abx Admin                     vancomycin (VANCOCIN) 500 mg in sodium chloride 0.9 % 100 mL IVPB (Fwns3Pcv) (mg) 500 mg New Bag 06/19/23 0645     500 mg New Bag 06/18/23 1825    ceFEPIme (MAXIPIME) 2,000 mg in sodium chloride 0.9 % 50 mL IVPB (mini-bag) (mg) 2,000 mg New Bag 06/19/23 0520     2,000 mg New Bag 06/18/23 1745                      Immunization History: All immunization history was reviewed by me today. Immunization History   Administered Date(s) Administered    COVID-19, J&J, (age 18y+), IM, 0.5 mL 06/08/2021    Influenza Virus Vaccine 11/06/2012    TDaP, ADACEL (age 10y-63y), Yolette Lyly (age 10y+), IM, 0.5mL 11/06/2012, 04/29/2022       Known drug allergies: All allergies were reviewed and updated    Allergies   Allergen Reactions    Actos [Pioglitazone]        Social history:     Social History:  All social andepidemiologic history was reviewed and updated by me today as needed. Tobacco use:   reports that she quit smoking about 9 years ago. Her smoking use included cigarettes. She has never used smokeless tobacco.  Alcohol use:   reports no history of alcohol use. Currently lives in: [address] [place] [address],[address] [id number]   reports no history of drug use.      COVID VACCINATION AND LAB RESULT RECORDS:     Internal Administration   First Dose COVID-19, J&J, (age 18y+), IM, 0.5 mL  06/08/2021   Second Dose           Last COVID Lab

## 2023-06-19 NOTE — CONSULTS
Clinical Pharmacy Note: Pharmacy to Dose Vancomycin    Vancomycin Day: 5  Indication: gangrene, osteomyelitis  Current Dose: 500 mg q 12 hrs  Dosing Method: Bayesian Modeling    Random: 12    Recent Labs     06/18/23  0508   BUN 17       Recent Labs     06/18/23  0508   CREATININE 0.8       Recent Labs     06/18/23  0508   WBC 7.3         Intake/Output Summary (Last 24 hours) at 6/19/2023 0855  Last data filed at 6/18/2023 1937  Gross per 24 hour   Intake 480 ml   Output --   Net 480 ml         Ht Readings from Last 1 Encounters:   06/16/23 4' 7\" (1.397 m)        Wt Readings from Last 1 Encounters:   06/19/23 134 lb 8 oz (61 kg)         Body mass index is 31.26 kg/m². Estimated Creatinine Clearance: 49 mL/min (based on SCr of 0.8 mg/dL). Assessment/Plan:  Vancomycin level is subtherapeutic. Increase vancomycin regimen to 750 mg every 12 hours. Bayesian Modeling predicts an AUC of 504 mg/L*hr and trough of 15.9 mg/L. No Further lvls. Changes in regimen will be determined based on culture results, renal function, and clinical response. Pharmacy will continue to monitor and adjust regimen as necessary.     Thank you for the consult,    Giuseppe Hamilton PharmD, [address] [facility] Specialist  [id number]

## 2023-06-20 NOTE — PROGRESS NOTES
Physician Progress Note      PATIENT:               Blake Alvarez  CSN #:                  [id number]  :                       1950  ADMIT DATE:       6/15/2023 3:50 PM  [address] DATE:        2023 6:24 PM  RESPONDING  PROVIDER #:        Amaris Becerra MD          QUERY TEXT:    Patient admitted with Gangrene, noted to have atrial fibrillation and is   maintained on Xarelto. If possible, please document in progress notes and   discharge summary if you are evaluating and/or treating any of the following: The medical record reflects the following:  Risk Factors: CAD/PAD  Clinical Indicators: H&P note noted as atrial fibrillation on Xarelto,  Treatment: rivaroxaban (XARELTO) 20 MG TABS tablet, Plavix. Options provided:  -- Secondary hypercoagulable state in a patient with atrial fibrillation  -- Other - I will add my own diagnosis  -- Disagree - Not applicable / Not valid  -- Disagree - Clinically unable to determine / Unknown  -- Refer to Clinical Documentation Reviewer    PROVIDER RESPONSE TEXT:    This patient has secondary hypercoagulable state in a patient with atrial   fibrillation.     Query created by: Danica Rios on 2023 11:24 AM      Electronically signed by:  Amaris Becerra MD 2023 11:42 AM

## 2023-07-20 RX ORDER — RIVAROXABAN 20 MG/1
TABLET, FILM COATED ORAL
Qty: 90 TABLET | Refills: 1 | Status: SHIPPED | OUTPATIENT
Start: 2023-07-20

## 2023-07-20 RX ORDER — TORSEMIDE 20 MG/1
20 TABLET ORAL DAILY
Qty: 90 TABLET | Refills: 3 | Status: SHIPPED | OUTPATIENT
Start: 2023-07-20

## 2023-07-20 NOTE — TELEPHONE ENCOUNTER
Received refill request for Xarelto and torsemide from [address] [place] [address].     Last ov: 06/13/2023 KEITH    Next appointment: 10/27/2023 LES

## 2023-08-18 RX ORDER — ROSUVASTATIN CALCIUM 10 MG/1
TABLET, COATED ORAL
Qty: 30 TABLET | Refills: 11 | Status: SHIPPED | OUTPATIENT
Start: 2023-08-18

## 2023-11-28 ENCOUNTER — TELEPHONE (OUTPATIENT)
Dept: CARDIOLOGY CLINIC | Age: 73
End: 2023-11-28

## 2023-11-28 NOTE — TELEPHONE ENCOUNTER
CARDIAC CLEARANCE     What type of procedure are you having? Left lower extremity angiogram possible common femoral artery cut down with endarterectomy possible left SFA popliteal tibial angioplasty - General anesthesia three hour duration  Which physician is performing your procedure? Dr Jaqui Gramajo. Mario Alberto  When is your procedure scheduled for?  12/19/23  Where are you having this procedure? [facility]  Are you taking Blood Thinners? yes   If so what? Plavix, Xerolto (Name/dose/frequesncy)     Does the surgeon want you to stop your blood thinner? Yes If so for how long? Plavix, no stop Xerolto 2 days prior     Phone Number and Contact Name for Physicians office:  [phone] option [facility]  Fax number to send information:  [phone]

## 2024-02-01 DIAGNOSIS — I70.223 ATHEROSCLEROSIS OF NATIVE ARTERIES OF EXTREMITIES WITH REST PAIN, BILATERAL LEGS (HCC): ICD-10-CM

## 2024-02-01 NOTE — TELEPHONE ENCOUNTER
Medication Refill    Medication needing refilled:  torsemide   Atenolol  clopidogrel       Dosage of the medication:    20mg  25mg  75mg    How are you taking this medication (QD, BID, TID, QID, PRN):  TAKE 1 TABLET BY MOUTH DAILY   TAKE 1 TABLET BY MOUTH DAILY   TAKE 1 TABLET BY MOUTH DAILY       30 or 90 day supply called in:    90 30  30    When will you run out of your medication:    Which Pharmacy are we sending the medication to?:    [id number]  [address] [place] [id number]  Phone: [phone]   Fax: [phone]

## 2024-02-01 NOTE — TELEPHONE ENCOUNTER
Refill request:    albuterol sulfate HFA (PROVENTIL;VENTOLIN;PROAIR) 108     clopidogrel (PLAVIX) 75 MG tablet     atenolol (TENORMIN) 25 MG tablet     torsemide (DEMADEX) 20 MG tablet     Last OV:22 LES    Last Lab:23 BMP    Last EK22    Next Appt:24 LES

## 2024-02-01 NOTE — TELEPHONE ENCOUNTER
Last OV: 10/27/2023  Lucia  Last Labs:-  Next OV: 02/23/2024   Lucia  Last Refill: Albuterol-11/07/2022  Lucia

## 2024-02-02 RX ORDER — TORSEMIDE 20 MG/1
20 TABLET ORAL DAILY
Qty: 90 TABLET | Refills: 3 | Status: SHIPPED | OUTPATIENT
Start: 2024-02-02

## 2024-02-02 RX ORDER — CLOPIDOGREL BISULFATE 75 MG/1
75 TABLET ORAL DAILY
Qty: 90 TABLET | Refills: 3 | Status: SHIPPED | OUTPATIENT
Start: 2024-02-02

## 2024-02-02 RX ORDER — ATENOLOL 25 MG/1
25 TABLET ORAL DAILY
Qty: 90 TABLET | Refills: 3 | Status: SHIPPED | OUTPATIENT
Start: 2024-02-02

## 2024-02-02 RX ORDER — ALBUTEROL SULFATE 90 UG/1
AEROSOL, METERED RESPIRATORY (INHALATION)
Qty: 54 G | Refills: 1 | Status: SHIPPED | OUTPATIENT
Start: 2024-02-02

## 2024-03-01 NOTE — PROGRESS NOTES
[facility]   Cardiac Follow Up     Referring Provider:  [place] Mccain MD     Chief Complaint   Patient presents with    Coronary Artery Disease          History of Present Illness:  Ms. Amezcua is a 73 y.o. female here for  follow up for CAD(s/p PCI), Htn, Hchol, MR, afib, she also has a small PFO and PAD. She is a former patient of Dr. Wyman. She works as a . She had a positive stress echo and underwent a LHC in November where she was found to have 3VD and referred to CTS. She, unfortunately is not a surgical candidate due to aortic calcification.      Leighann underwent LHC in February 2021 with PCI to RCA with DEE and again in March with PCI to LAD/LM with DEE.   Since her stents, she still had complaints of LUCAS and has had difficulty doing her job. She is following Dr Jane for COPD/Emphysema. She quit smoking 2-3 years ago.    Today she is here for 6 mos follow up. Her daughter is here with her. She has been hospitalized 11 times this year. She hasn't been able to work for the last year. She has no cardiac complaints. She has PAD/PVD and had cellulitis, gangrene, and osteomyelitis. She has been seeing Dr Llanes with [facility]. She has a home health nurse coming to her house.    Past Medical History:   has a past medical history of AF (atrial fibrillation) ([facility]), Back pain, CAD (coronary artery disease), Centrilobular emphysema ([facility]), Compression fracture, Hyperlipidemia, Hypertension, Myelolipoma, PVD (peripheral vascular disease) ([facility]), Right ear injury, Shingles, and Valvular disease.    Surgical History:   has a past surgical history that includes Coronary angioplasty with stent (2014, 3/4/21); Coronary angioplasty with stent (03/14/2003 & 06/02/2003); Hysterectomy (09/01/1983); Tubal ligation (09/1983); and ventral hernia repair (5-).     Social History:   reports that she quit smoking about 10 years ago. Her smoking use included cigarettes. She started smoking about 49

## 2024-03-08 ENCOUNTER — TELEPHONE (OUTPATIENT)
Dept: CARDIOLOGY CLINIC | Age: 74
End: 2024-03-08

## 2024-03-08 NOTE — TELEPHONE ENCOUNTER
Called and spoke with patient in great length. She wanted to know what medication is she taking from Dr Myers. She wanted to know if she is taking Eliquis and why she is taking 3 different blood thinners. Informed her of all the med's she is taking . Patient was encouraged to continue her medications and follow up with Dr Myers

## 2024-03-08 NOTE — TELEPHONE ENCOUNTER
Pt states she needs clarification on the medications she is to take. Please call to advise.    Also asking if there is something other than an inhaler to take. States it goes on her tongue and roof of mouth.

## 2024-03-25 ENCOUNTER — OFFICE VISIT (OUTPATIENT)
Dept: CARDIOLOGY CLINIC | Age: 74
End: 2024-03-25
Payer: MEDICARE

## 2024-03-25 VITALS
SYSTOLIC BLOOD PRESSURE: 130 MMHG | HEIGHT: 55 IN | OXYGEN SATURATION: 96 % | WEIGHT: 134 LBS | HEART RATE: 65 BPM | BODY MASS INDEX: 31.01 KG/M2 | DIASTOLIC BLOOD PRESSURE: 68 MMHG

## 2024-03-25 DIAGNOSIS — I34.0 NONRHEUMATIC MITRAL VALVE REGURGITATION: ICD-10-CM

## 2024-03-25 DIAGNOSIS — I48.0 PAROXYSMAL ATRIAL FIBRILLATION (HCC): ICD-10-CM

## 2024-03-25 DIAGNOSIS — I25.10 CORONARY ARTERY DISEASE DUE TO LIPID RICH PLAQUE: Primary | ICD-10-CM

## 2024-03-25 DIAGNOSIS — E78.2 MIXED HYPERLIPIDEMIA: ICD-10-CM

## 2024-03-25 DIAGNOSIS — I10 ESSENTIAL HYPERTENSION: ICD-10-CM

## 2024-03-25 DIAGNOSIS — I25.83 CORONARY ARTERY DISEASE DUE TO LIPID RICH PLAQUE: Primary | ICD-10-CM

## 2024-03-25 DIAGNOSIS — I73.9 PAD (PERIPHERAL ARTERY DISEASE) (HCC): ICD-10-CM

## 2024-03-25 PROCEDURE — G8484 FLU IMMUNIZE NO ADMIN: HCPCS | Performed by: INTERNAL MEDICINE

## 2024-03-25 PROCEDURE — 99214 OFFICE O/P EST MOD 30 MIN: CPT | Performed by: INTERNAL MEDICINE

## 2024-03-25 PROCEDURE — 3017F COLORECTAL CA SCREEN DOC REV: CPT | Performed by: INTERNAL MEDICINE

## 2024-03-25 PROCEDURE — 1090F PRES/ABSN URINE INCON ASSESS: CPT | Performed by: INTERNAL MEDICINE

## 2024-03-25 PROCEDURE — G8427 DOCREV CUR MEDS BY ELIG CLIN: HCPCS | Performed by: INTERNAL MEDICINE

## 2024-03-25 PROCEDURE — G8400 PT W/DXA NO RESULTS DOC: HCPCS | Performed by: INTERNAL MEDICINE

## 2024-03-25 PROCEDURE — G8417 CALC BMI ABV UP PARAM F/U: HCPCS | Performed by: INTERNAL MEDICINE

## 2024-03-25 PROCEDURE — 1036F TOBACCO NON-USER: CPT | Performed by: INTERNAL MEDICINE

## 2024-03-25 PROCEDURE — 1123F ACP DISCUSS/DSCN MKR DOCD: CPT | Performed by: INTERNAL MEDICINE

## 2024-03-25 PROCEDURE — 3075F SYST BP GE 130 - 139MM HG: CPT | Performed by: INTERNAL MEDICINE

## 2024-03-25 PROCEDURE — 93000 ELECTROCARDIOGRAM COMPLETE: CPT | Performed by: INTERNAL MEDICINE

## 2024-03-25 PROCEDURE — 3078F DIAST BP <80 MM HG: CPT | Performed by: INTERNAL MEDICINE

## 2024-04-19 ENCOUNTER — TELEPHONE (OUTPATIENT)
Dept: CARDIOLOGY CLINIC | Age: 74
End: 2024-04-19

## 2024-04-19 NOTE — TELEPHONE ENCOUNTER
Pt calling requesting a phone call back on clarification of which meds she is suppose to be taking, please call pt and discus  Thank you

## 2024-05-14 ENCOUNTER — TELEPHONE (OUTPATIENT)
Dept: CARDIOLOGY CLINIC | Age: 74
End: 2024-05-14

## 2024-05-14 NOTE — TELEPHONE ENCOUNTER
Pt called to inform the office that Torsemide 20mg is not drawing the water off of right let and foot.  Please call to discuss this concerns.  Please advise.  Thank you

## 2024-05-14 NOTE — TELEPHONE ENCOUNTER
Called and spoke to the patient and per LES she needs to bump it up to 40 mg daily and call back in a few days to let us know if there was any change. Patient verbalized understanding.

## 2024-05-29 ENCOUNTER — TELEPHONE (OUTPATIENT)
Dept: CARDIOLOGY CLINIC | Age: 74
End: 2024-05-29

## 2024-05-29 NOTE — TELEPHONE ENCOUNTER
Pt called stating they need refill on the ELIQUIS, Pt would RX to go to [facility]     apixaban (ELIQUIS) 2.5 MG TABS tablet   5 mg   Take 1 tablet by mouth BID     [facility] [id number] - [place], [place] - [address] - P [phone] - F [phone] [address] [facility] [address]  Phone: [phone]  Fax: [phone]

## 2024-05-30 NOTE — TELEPHONE ENCOUNTER
I spoke with Leighann and let her know that script was sent. She was dissatisfied that a 90 day rito was sent. I told her that pharmacy can break it down to a 30 day for her. I verified that with [facility], but was told that she got a temporary supply through [email or web address]. Ezra at the pharmacy will call [email or web address] to see if he can transfer.

## 2024-06-03 ENCOUNTER — TELEPHONE (OUTPATIENT)
Dept: CARDIOLOGY CLINIC | Age: 74
End: 2024-06-03

## 2024-06-03 NOTE — TELEPHONE ENCOUNTER
Pt called waning to know it they should continue the ferrous sulfate and the Vitamin B-12? Pt stated had 1 pill left of the ferrous sulfate    Ok to leave message if pt does not answer    If they need to continue the pls send to:    [facility] [id number] - [place], [place] - [address] - P [phone] - F [phone]  [address] [facility] [address]  Phone: [phone]  Fax: [phone]

## 2024-06-04 NOTE — TELEPHONE ENCOUNTER
Spoke to Leighann.    Per last OV with LES (3/25/24), Leighann was not on ferrous sulfate or the Vitamin B-12.  Leighann states she does not plan to take them as she is \"sick and tired\" of taking pills.  Encouraged her to contact PCP to confirm but she does not wish to do so.  She wishes to be seen by LES sooner than 6/27, offered 6/13 but states she will not have co-pay available until 6/27 so she will keep that current appointment.

## 2024-06-07 NOTE — PROGRESS NOTES
[facility]   Cardiac Follow Up     Referring Provider:  Aroldo Song DO     Chief Complaint   Patient presents with    Coronary Artery Disease       History of Present Illness:  Ms. Amezcua is a 73 y.o. female here for  follow up for CAD(s/p PCI), Htn, Hchol, MR, afib, she also has a small PFO and PAD. She is a former patient of Dr. Wyman. She works as a . She had a positive stress echo and underwent a LHC in November where she was found to have 3VD and referred to CTS. She, unfortunately is not a surgical candidate due to aortic calcification.      Leighann underwent LHC in February 2021 with PCI to RCA with DEE and again in March with PCI to LAD/LM with DEE.   Since her stents, she still had complaints of LUCAS and has had difficulty doing her job. She is following Dr Jane for COPD/Emphysema. She quit smoking 2-3 years ago.    Today she is here for follow up. She is with her daughter. She reports she's been having swelling in her ankles.  Every once in while she gets a pain in her chest \"like when you get a cold.\"  She has not had sob.  Lungs are clear. She's been taking torsemide twice daily but recently she did run out. She does not have a scale, so she does not weigh daily. She had labs drawn 6/18/24 (K 3.2), reviewed by me. She goes to [facility] pharmacy now.    Past Medical History:   has a past medical history of AF (atrial fibrillation) ([facility]), Back pain, CAD (coronary artery disease), Centrilobular emphysema ([facility]), Compression fracture, Hyperlipidemia, Hypertension, Myelolipoma, PVD (peripheral vascular disease) ([facility]), Right ear injury, Shingles, and Valvular disease.    Surgical History:   has a past surgical history that includes Coronary angioplasty with stent (2014, 3/4/21); Coronary angioplasty with stent (03/14/2003 & 06/02/2003); Hysterectomy (09/01/1983); Tubal ligation (09/1983); and ventral hernia repair (5-).     Social History:   reports that she quit smoking

## 2024-06-27 ENCOUNTER — OFFICE VISIT (OUTPATIENT)
Dept: CARDIOLOGY CLINIC | Age: 74
End: 2024-06-27
Payer: MEDICARE

## 2024-06-27 ENCOUNTER — TELEPHONE (OUTPATIENT)
Dept: CARDIOLOGY CLINIC | Age: 74
End: 2024-06-27

## 2024-06-27 VITALS
BODY MASS INDEX: 30.69 KG/M2 | HEART RATE: 67 BPM | OXYGEN SATURATION: 95 % | HEIGHT: 55 IN | DIASTOLIC BLOOD PRESSURE: 52 MMHG | WEIGHT: 132.6 LBS | SYSTOLIC BLOOD PRESSURE: 115 MMHG

## 2024-06-27 DIAGNOSIS — I48.0 PAROXYSMAL ATRIAL FIBRILLATION (HCC): ICD-10-CM

## 2024-06-27 DIAGNOSIS — I70.223 ATHEROSCLEROSIS OF NATIVE ARTERIES OF EXTREMITIES WITH REST PAIN, BILATERAL LEGS (HCC): ICD-10-CM

## 2024-06-27 DIAGNOSIS — I34.0 NONRHEUMATIC MITRAL VALVE REGURGITATION: ICD-10-CM

## 2024-06-27 DIAGNOSIS — I10 ESSENTIAL HYPERTENSION: ICD-10-CM

## 2024-06-27 DIAGNOSIS — I25.10 CORONARY ARTERY DISEASE DUE TO LIPID RICH PLAQUE: Primary | ICD-10-CM

## 2024-06-27 DIAGNOSIS — I73.9 PAD (PERIPHERAL ARTERY DISEASE) (HCC): ICD-10-CM

## 2024-06-27 DIAGNOSIS — E78.2 MIXED HYPERLIPIDEMIA: ICD-10-CM

## 2024-06-27 DIAGNOSIS — I25.83 CORONARY ARTERY DISEASE DUE TO LIPID RICH PLAQUE: Primary | ICD-10-CM

## 2024-06-27 PROCEDURE — 3017F COLORECTAL CA SCREEN DOC REV: CPT | Performed by: INTERNAL MEDICINE

## 2024-06-27 PROCEDURE — G8427 DOCREV CUR MEDS BY ELIG CLIN: HCPCS | Performed by: INTERNAL MEDICINE

## 2024-06-27 PROCEDURE — G8400 PT W/DXA NO RESULTS DOC: HCPCS | Performed by: INTERNAL MEDICINE

## 2024-06-27 PROCEDURE — 1123F ACP DISCUSS/DSCN MKR DOCD: CPT | Performed by: INTERNAL MEDICINE

## 2024-06-27 PROCEDURE — 93000 ELECTROCARDIOGRAM COMPLETE: CPT | Performed by: INTERNAL MEDICINE

## 2024-06-27 PROCEDURE — 3074F SYST BP LT 130 MM HG: CPT | Performed by: INTERNAL MEDICINE

## 2024-06-27 PROCEDURE — 1036F TOBACCO NON-USER: CPT | Performed by: INTERNAL MEDICINE

## 2024-06-27 PROCEDURE — 1090F PRES/ABSN URINE INCON ASSESS: CPT | Performed by: INTERNAL MEDICINE

## 2024-06-27 PROCEDURE — 99214 OFFICE O/P EST MOD 30 MIN: CPT | Performed by: INTERNAL MEDICINE

## 2024-06-27 PROCEDURE — G8417 CALC BMI ABV UP PARAM F/U: HCPCS | Performed by: INTERNAL MEDICINE

## 2024-06-27 PROCEDURE — 3078F DIAST BP <80 MM HG: CPT | Performed by: INTERNAL MEDICINE

## 2024-06-27 RX ORDER — CLOPIDOGREL BISULFATE 75 MG/1
75 TABLET ORAL DAILY
Qty: 90 TABLET | Refills: 3 | Status: SHIPPED | OUTPATIENT
Start: 2024-06-27

## 2024-06-27 RX ORDER — AMLODIPINE BESYLATE 5 MG/1
5 TABLET ORAL DAILY
Qty: 90 TABLET | Refills: 3 | Status: SHIPPED | OUTPATIENT
Start: 2024-06-27 | End: 2025-06-27

## 2024-06-27 RX ORDER — AMLODIPINE BESYLATE 5 MG/1
5 TABLET ORAL DAILY
COMMUNITY
Start: 2024-05-02 | End: 2024-06-27 | Stop reason: SDUPTHER

## 2024-06-27 RX ORDER — TORSEMIDE 20 MG/1
20 TABLET ORAL DAILY
Qty: 90 TABLET | Refills: 3 | Status: SHIPPED | OUTPATIENT
Start: 2024-06-27

## 2024-06-27 RX ORDER — ROSUVASTATIN CALCIUM 10 MG/1
TABLET, COATED ORAL
Qty: 180 TABLET | Refills: 3 | Status: SHIPPED | OUTPATIENT
Start: 2024-06-27

## 2024-06-27 RX ORDER — ATENOLOL 25 MG/1
25 TABLET ORAL DAILY
Qty: 90 TABLET | Refills: 3 | Status: SHIPPED | OUTPATIENT
Start: 2024-06-27

## 2024-06-27 RX ORDER — SPIRONOLACTONE 25 MG/1
12.5 TABLET ORAL DAILY
Qty: 45 TABLET | Refills: 3 | Status: SHIPPED | OUTPATIENT
Start: 2024-06-27

## 2024-06-27 RX ORDER — NITROGLYCERIN 0.4 MG/1
TABLET SUBLINGUAL
Qty: 25 TABLET | Refills: 0 | Status: SHIPPED | OUTPATIENT
Start: 2024-06-27

## 2024-06-27 NOTE — TELEPHONE ENCOUNTER
FYI~    Pt wanted LES to know she will take her torsemide as instructed and she will take the aldactone at noon daily.

## 2024-06-27 NOTE — TELEPHONE ENCOUNTER
Spoke to Leighann and clarified medications. Leighann accurately repeated back the new regimen/verbalizes understanding.  She would like all refills to [facility] now, not [facility].

## 2024-06-27 NOTE — TELEPHONE ENCOUNTER
Pt called states they are confused on what they are to take or not take. She feels there was two meds she was to start but only one is prescribed. Pt is asking for a call to discuss meds because she is unsure what is going on. Pt number is [phone]. Please advise. Thank you.

## 2024-06-27 NOTE — TELEPHONE ENCOUNTER
Pt called to get clarification on how and what to take her med.  Please call to discuss.  Thank you

## 2024-06-27 NOTE — PATIENT INSTRUCTIONS
Take torsemide every morning and can take second dose daily on an as needed basis for swelling  Start Aldactone 12.5 mg daily  Encouraged daily weights  Continue risk factor modifications.   Call for any change in symptoms, call to report any changes in shortness of breath or development of chest pain with activity.    Follow up in October as scheduled

## 2024-06-28 ENCOUNTER — TELEPHONE (OUTPATIENT)
Dept: CARDIOLOGY CLINIC | Age: 74
End: 2024-06-28

## 2024-06-28 NOTE — TELEPHONE ENCOUNTER
Pt calling back regarding medications. States Rebecca only had 2 of the 8 medications. States she only has 4 eliquis pills left. Please call pharmacy to find out where other medications are and call pt back today. If she does not answer please leave detailed message.     Also needs clarification on which medications she is not suppose to take together.

## 2024-06-28 NOTE — TELEPHONE ENCOUNTER
Called and spoke to the pharmacy and she picked up two RX yesterday and 3 of them he will fill today and the others ones are to soon to be filled per ins company. Called and spoke to patient and gave her this message and she verbally understood directions

## 2024-06-28 NOTE — TELEPHONE ENCOUNTER
Pt called back needs help with understanding her meds. spironolactone (ALDACTONE) 25 MG tablet says take half pill on bottle but papers say 1 pill. She needs to know which it is. Pt asking about not taking torsemide (DEMADEX) 20 MG tablet and spironolactone (ALDACTONE) 25 MG together. Please advise.

## 2024-06-28 NOTE — TELEPHONE ENCOUNTER
Called patient and let her know I called the pharmacy to check on the Eliquis refill.  The pharmacy has it ready for her to .   Verbalized understanding.

## 2024-07-02 NOTE — TELEPHONE ENCOUNTER
Received refill request for albuterol sulfate inhaler  from [facility] pharmacy.    Last ov:06/27/2024 LES    Last Refill:02/02/2024    Next appointment:10/09/2024 TANIKA

## 2024-07-03 RX ORDER — ALBUTEROL SULFATE 90 UG/1
AEROSOL, METERED RESPIRATORY (INHALATION)
Qty: 54 G | Refills: 1 | Status: SHIPPED | OUTPATIENT
Start: 2024-07-03

## 2024-07-10 ENCOUNTER — TELEPHONE (OUTPATIENT)
Dept: CARDIOLOGY CLINIC | Age: 74
End: 2024-07-10

## 2024-07-10 NOTE — TELEPHONE ENCOUNTER
Pt called in letting LES know that she has been experiencing some bad swelling in legs, feet,and ankles  and only feels relief when laying down all night but states when she walks back from the bathroom they look very swollen again, pt does not think the spironolactone is working and would like a call back    Please advise  Thank you

## 2024-07-10 NOTE — TELEPHONE ENCOUNTER
On recent visit peripheral edema was not bad at all but she said it was. Attempted to call her-no answer. Difficult to know what else to do for her

## 2024-07-10 NOTE — TELEPHONE ENCOUNTER
I spoke with pt. She stated that she woke up this morning that she had severe swelling. . Denies cough, CP , radiating pain. She says thay she slees in the recliner to keep. Cannot weigh , no scale.and does not have the ablity to take bp either.

## 2024-07-16 ENCOUNTER — TELEPHONE (OUTPATIENT)
Dept: VASCULAR SURGERY | Age: 74
End: 2024-07-16

## 2024-07-16 NOTE — TELEPHONE ENCOUNTER
Called patient regarding scheduling an apt to see Dr Anderson regarding her swelling in her legs. Left her a message to call back and schedule. akira

## 2024-08-23 ENCOUNTER — TELEPHONE (OUTPATIENT)
Dept: CARDIOLOGY CLINIC | Age: 74
End: 2024-08-23

## 2024-08-23 NOTE — TELEPHONE ENCOUNTER
Looks like pt was prescribed Zofran by Dr. Wyman. Back in 2014. Not sure if that's what she is referring too.

## 2024-08-23 NOTE — TELEPHONE ENCOUNTER
Pt states her torsemide is causing her to go to the bathroom. States she is only drinking 3 10 oz bottles of water a day.     Pt states Dr Wyman gave her something that helped but can not recall what it was.

## 2024-09-04 ENCOUNTER — TELEPHONE (OUTPATIENT)
Dept: CARDIOLOGY CLINIC | Age: 74
End: 2024-09-04

## 2024-09-04 NOTE — TELEPHONE ENCOUNTER
Pt called to ask LES why is she taking metOLazone (ZAROXOLYN) 2.5 mg once a week and how is that suppose to take the water off her body.  Please call to discuss her meds.  Thank you

## 2024-09-04 NOTE — TELEPHONE ENCOUNTER
Spoke to Leighann. She is \"aggravated\" with having to take medications every day and doubts that any medication will help her swelling. Denies sob.     Reviewed medication regimen > she picked up her Zaroxolyn yesterday, she will start taking it weekly as ordered and continue torsemide.

## 2024-09-10 RX ORDER — SPIRONOLACTONE 25 MG/1
12.5 TABLET ORAL DAILY
Qty: 45 TABLET | Refills: 3 | Status: SHIPPED | OUTPATIENT
Start: 2024-09-10

## 2024-10-01 ENCOUNTER — TELEPHONE (OUTPATIENT)
Dept: CARDIOLOGY CLINIC | Age: 74
End: 2024-10-01

## 2024-10-01 NOTE — TELEPHONE ENCOUNTER
Pt states she had doppler on carotid and on legs at [facility]. Pt asking if LES could review and call to discuss.

## 2024-10-01 NOTE — TELEPHONE ENCOUNTER
Spoke to Leighann, message relayed per LES.    Pt inquired about next OV with LES > informed it is scheduled 10/9.  Pt verbalized understanding.

## 2024-10-01 NOTE — TELEPHONE ENCOUNTER
Spoke with pt relayed  message below. Pt verbalized understanding.    Pt has questions about her findings and want to talk to JSRN only.

## 2024-10-01 NOTE — TELEPHONE ENCOUNTER
Let her know carotid fings are good. Leg findings as expected show severe vascular disease and lower extremities and she needs f/up with either Dr Anderson or Dr Llanes

## 2024-11-04 NOTE — PROGRESS NOTES
Review of Systems:   Constitutional: there has been no unanticipated weight loss. There's been no change in energy level, sleep pattern, or activity level.     Eyes: No visual changes or diplopia. No scleral icterus.  ENT: No Headaches, hearing loss or vertigo. No mouth sores or sore throat.  Cardiovascular: Reviewed in HPI  Respiratory: No cough or wheezing, no sputum production. No hematemesis.  +SOB   Gastrointestinal: No abdominal pain, appetite loss, blood in stools. No change in bowel or bladder habits.  Genitourinary: No dysuria, trouble voiding, or hematuria.  Musculoskeletal:  No gait disturbance, weakness or joint complaints.  Integumentary: No rash or pruritis.  Neurological: No headache, diplopia, change in muscle strength, numbness or tingling. No change in gait, balance, coordination, mood, affect, memory, mentation, behavior.  Psychiatric: No anxiety, no depression.  Endocrine: No malaise, fatigue or temperature intolerance. No excessive thirst, fluid intake, or urination. No tremor.  Hematologic/Lymphatic: No abnormal bruising or bleeding, blood clots or swollen lymph nodes.  Allergic/Immunologic: No nasal congestion or hives.    Physical Examination:    Vitals:    11/13/24 1452   BP: 122/64   Pulse: 75   SpO2: 97%                Wt Readings from Last 1 Encounters:   11/13/24 57.1 kg (125 lb 12.8 oz)     Cardiac Cath IVUS PCI: 3/4/21  Anatomy:   LM-distal 70%   LAD-prox 90%, distal 70%     IVUS LAD showed MLA of 1mm2  IVUS of LM showed MLA 5.2mm2      Intervention  ~Successful PCI to LAD with 2.5x32 DEE to 18atm. PCI to LM with 2.75x16 DEE to 18atm. PD with 3.0x8 NC to 24atm. Excellent Result. IVUS showed good stent apposition and expansion.     Impression  ~Coronary Angiography w/ Severe Single vessel CAD  ~Successful complex angioplasty and stenting of LAD/LM    Cardiac Cath PCI 2/10/21:  Anatomy:      RCA-ostial 50%  RPDA- mid 99% ISR     Intervention  ~Successful PCI to RCA with ELCA laser

## 2024-11-13 ENCOUNTER — OFFICE VISIT (OUTPATIENT)
Dept: CARDIOLOGY CLINIC | Age: 74
End: 2024-11-13

## 2024-11-13 VITALS
HEIGHT: 55 IN | WEIGHT: 125.8 LBS | OXYGEN SATURATION: 97 % | DIASTOLIC BLOOD PRESSURE: 64 MMHG | BODY MASS INDEX: 29.11 KG/M2 | HEART RATE: 75 BPM | SYSTOLIC BLOOD PRESSURE: 122 MMHG

## 2024-11-13 DIAGNOSIS — I73.9 PAD (PERIPHERAL ARTERY DISEASE) (HCC): ICD-10-CM

## 2024-11-13 DIAGNOSIS — I25.10 CORONARY ARTERY DISEASE DUE TO LIPID RICH PLAQUE: Primary | ICD-10-CM

## 2024-11-13 DIAGNOSIS — E78.2 MIXED HYPERLIPIDEMIA: ICD-10-CM

## 2024-11-13 DIAGNOSIS — I34.0 NONRHEUMATIC MITRAL VALVE REGURGITATION: ICD-10-CM

## 2024-11-13 DIAGNOSIS — I10 ESSENTIAL HYPERTENSION: ICD-10-CM

## 2024-11-13 DIAGNOSIS — I48.0 PAROXYSMAL ATRIAL FIBRILLATION (HCC): ICD-10-CM

## 2024-11-13 DIAGNOSIS — I25.83 CORONARY ARTERY DISEASE DUE TO LIPID RICH PLAQUE: Primary | ICD-10-CM

## 2024-11-13 NOTE — PATIENT INSTRUCTIONS
Stop Metolazone   Call [facility] to request refills - Spironolactone    Follow up with Dr. Myers in 6 months

## 2024-11-15 ENCOUNTER — TELEPHONE (OUTPATIENT)
Dept: CARDIOLOGY CLINIC | Age: 74
End: 2024-11-15

## 2024-11-15 RX ORDER — AMLODIPINE BESYLATE 5 MG/1
5 TABLET ORAL DAILY
Qty: 90 TABLET | Refills: 3 | Status: SHIPPED | OUTPATIENT
Start: 2024-11-15 | End: 2025-11-15

## 2024-11-15 NOTE — TELEPHONE ENCOUNTER
Medication Refill    Medication needing refilled:  amLODIPine (NORVASC)   Dosage of the medication:  5 MG tablet   How are you taking this medication (QD, BID, TID, QID, PRN):    30 or 90 day supply called in:    When will you run out of your medication:    Which Pharmacy are we sending the medication to?:  [facility] [id number] - [place], [place] - [address] [address] - [address] [phone] -  [phone]  [facility] [address]  Phone: [phone]  Fax: [phone]

## 2024-11-21 ENCOUNTER — TELEPHONE (OUTPATIENT)
Dept: CARDIOLOGY CLINIC | Age: 74
End: 2024-11-21

## 2024-11-21 NOTE — TELEPHONE ENCOUNTER
Pt called to request a list of her meds that was given to her on her appt on 11/13 at [facility], it had the meds highlighted.  Pt believes she left it. Ender advise.  Thank you

## 2025-03-10 ENCOUNTER — TELEPHONE (OUTPATIENT)
Dept: CARDIOLOGY CLINIC | Age: 75
End: 2025-03-10

## 2025-03-10 RX ORDER — ALBUTEROL SULFATE 90 UG/1
2 INHALANT RESPIRATORY (INHALATION) 4 TIMES DAILY PRN
Qty: 54 G | Refills: 1 | Status: SHIPPED | OUTPATIENT
Start: 2025-03-10

## 2025-03-10 NOTE — TELEPHONE ENCOUNTER
Medication Refill    Medication needing refilled:  albuterol sulfate HFA (PROVENTIL;VENTOLIN;PROAIR) 108 (90 Base) MCG/ACT inhaler   Dosage of the medication:    How are you taking this medication (QD, BID, TID, QID, PRN):  INHALE 2 PUFFS INTO THE LUNGS FOUR TIMES DAILY AS NEEDED FOR WHEEZING   30 or 90 day supply called in:90    When will you run out of your medication:  Pt states she pulled the front piece off the inhaler and pharmacy told her she would need a new script to get the piece replaced.  Which Pharmacy are we sending the medication to?:  [facility] [id number] - [place] [address] [phone] - F [phone]

## 2025-03-11 NOTE — TELEPHONE ENCOUNTER
Left a detailed message for patient advised her if she has any need to reach out to our office. Call complete

## 2025-03-12 ENCOUNTER — TELEPHONE (OUTPATIENT)
Dept: CARDIOLOGY CLINIC | Age: 75
End: 2025-03-12

## 2025-03-12 NOTE — TELEPHONE ENCOUNTER
Pt called stating they need the Inhaler Spacers that goes on the end of the inhaler, not the actual RX for albuterol sulfate HFA, Pharmacy informed the pt they need the RX for the Spacer.    Pls advise

## 2025-04-04 ENCOUNTER — TELEPHONE (OUTPATIENT)
Dept: CARDIOLOGY CLINIC | Age: 75
End: 2025-04-04

## 2025-04-04 NOTE — TELEPHONE ENCOUNTER
Let her know that this test must have been ordered by her vascular surgeon Dr Llanes so she needs to discuss findings with him. She is known to have very severe peripheral vascular disease. No one in our group ordered this test

## 2025-04-04 NOTE — TELEPHONE ENCOUNTER
Pt called to ask if the office has re: the results from Dr. Jorge Pimentel, the Doppler test.  Please advise.  Thank you

## 2025-04-07 NOTE — TELEPHONE ENCOUNTER
I spoke with pt and relayed message per  LES. She verbalized understanding, but she says that she cannot get in for an appointment and cannot understand the dr. I advised to call them , usually a nurse can give results. She requested that a referral for a dr that is close by that she can understand.     I called Dr Llanes's office and spoke with April and asked if they could  reach out to pt and discuss testing  with pt. She stated that she will try to have Dr call pt or get her in for an appointment.

## 2025-04-16 ENCOUNTER — TELEPHONE (OUTPATIENT)
Dept: CARDIOLOGY CLINIC | Age: 75
End: 2025-04-16

## 2025-04-16 RX ORDER — TORSEMIDE 20 MG/1
20 TABLET ORAL DAILY
Qty: 90 TABLET | Refills: 3 | Status: SHIPPED | OUTPATIENT
Start: 2025-04-16

## 2025-04-16 NOTE — TELEPHONE ENCOUNTER
Pt calling in requesting a call back from RN aleisha requesting if she should be still taking torsemide and if so she is needing a refill       Medication Refill    Medication needing refilled:    torsemide (DEMADEX)   Dosage of the medication:  20 MG tablet   How are you taking this medication (QD, BID, TID, QID, PRN):    30 or 90 day supply called in:  90  When will you run out of your medication:    Which Pharmacy are we sending the medication to?:  [facility] [id number] - [place], [place] - [address] [address] - [address] [phone] - F [phone]  [facility] [address]  Phone: [phone]  Fax: [phone]

## 2025-04-16 NOTE — TELEPHONE ENCOUNTER
Spoke to Leighann and informed her that her script will be sent to [facility], pt verbalizes understanding.     Also reviewed date/time/place of upcoming OV with TANIKA and Dr Crane.

## 2025-04-28 ENCOUNTER — TELEPHONE (OUTPATIENT)
Dept: CARDIOLOGY CLINIC | Age: 75
End: 2025-04-28

## 2025-04-28 NOTE — TELEPHONE ENCOUNTER
Pt called to ask LES that she is needing stents in both leg.  Please call to discuss her concerns.  Thank you

## 2025-04-29 NOTE — TELEPHONE ENCOUNTER
Called, spoke to Leighann's grandson, Canelo.    Canelo took message and will have Leighann call back as she is currently at work.    Pt has appointment with Dr Crane 5/23/25.

## 2025-05-08 ENCOUNTER — TELEPHONE (OUTPATIENT)
Dept: CARDIOLOGY CLINIC | Age: 75
End: 2025-05-08

## 2025-05-08 RX ORDER — SPIRONOLACTONE 25 MG/1
12.5 TABLET ORAL DAILY
Qty: 45 TABLET | Refills: 3 | Status: SHIPPED | OUTPATIENT
Start: 2025-05-08 | End: 2025-05-09

## 2025-05-08 NOTE — TELEPHONE ENCOUNTER
Medication Refill    Medication needing refilled:  spironolactone (ALDACTONE) 25 MG-  0.5 tablets by mouth daily     Dosage of the medication:    How are you taking this medication (QD, BID, TID, QID, PRN):    30 or 90 day supply called in: 90 day supply    When will you run out of your medication:    Which Pharmacy are we sending the medication to?: [facility] [id number] - [facility] [address] [phone] -  [phone]

## 2025-05-08 NOTE — TELEPHONE ENCOUNTER
Last ov:24 LES  Next ov:25 LES  Last EK24  Last labs: [facility] 24  Last filled:   Disp Refills Start End    spironolactone (ALDACTONE) 25 MG tablet 45 tablet 3 9/10/2024 --    Sig - Route: Take 0.5 tablets by mouth daily - Oral    Sent to pharmacy as: Spironolactone 25 MG Oral Tablet (ALDACTONE)    Cosign for Ordering: Accepted by Nader Myers MD on 2024 10:37 AM    E-Prescribing Status: Receipt confirmed by pharmacy (9/10/2024 11:47 AM EDT)

## 2025-05-09 RX ORDER — SPIRONOLACTONE 25 MG/1
12.5 TABLET ORAL DAILY
Qty: 45 TABLET | Refills: 3 | Status: SHIPPED | OUTPATIENT
Start: 2025-05-09

## 2025-05-09 NOTE — TELEPHONE ENCOUNTER
Request for refill to [facility]. Was just sent but to [facility]. Refilled to [facility].

## 2025-05-14 ENCOUNTER — TELEPHONE (OUTPATIENT)
Dept: CARDIOLOGY CLINIC | Age: 75
End: 2025-05-14

## 2025-05-20 PROBLEM — Z98.62 H/O ANGIOPLASTY: Status: ACTIVE | Noted: 2025-05-20

## 2025-05-20 NOTE — PROGRESS NOTES
[facility]   Cardiac Follow Up     Referring Provider:  Aroldo Song DO     Chief Complaint   Patient presents with    6 Month Follow-Up    Coronary Artery Disease    Hyperlipidemia    Atrial Fibrillation       History of Present Illness:  Ms. Amezcua is a 74 y.o. female here for  follow up for CAD(s/p PCI), Htn, Hchol, MR, afib, she also has a small PFO and PAD. She is a former patient of Dr. Wyman. She works as a . She had a positive stress echo and underwent a LHC in November where she was found to have 3VD and referred to CTS. She, unfortunately is not a surgical candidate due to aortic calcification.      Leighann underwent LHC in February 2021 with PCI to RCA with DEE and again in March with PCI to LAD/LM with DEE.   Since her stents, she still had complaints of LUCAS and has had difficulty doing her job. She is following Dr Jane for COPD/Emphysema. She quit smoking 2-3 years ago.    Today, Leighann is here for a 6 month follow up. She recently had abnormal ble arterial studies completed with her vascular doctor, Dr Llanes. She is planning to switch vascular doctors and will be seeing Dr Crane on 6/11. Her main complaint is left foot pain. She denies chest pain or shortness of breath.     Past Medical History:   has a past medical history of AF (atrial fibrillation) (HCC), Back pain, CAD (coronary artery disease), Centrilobular emphysema (HCC), Compression fracture, Hyperlipidemia, Hypertension, Myelolipoma, PVD (peripheral vascular disease), Right ear injury, Shingles, and Valvular disease.    Surgical History:   has a past surgical history that includes Coronary angioplasty with stent (2014, 3/4/21); Coronary angioplasty with stent (03/14/2003 & 06/02/2003); Hysterectomy (09/01/1983); Tubal ligation (09/1983); and ventral hernia repair (5-).     Social History:   reports that she quit smoking about 11 years ago. Her smoking use included cigarettes. She started smoking about

## 2025-05-22 NOTE — TELEPHONE ENCOUNTER
Patient advised she can go to [facility] to have X-ray at her convenience. Order is placed in her chart. Patient given all information for her Angiogram on 3/30. Arrive 11am  Hold Xarelto 48 hours. NPO after midnight. Spoke with patient's brother Ravin and let him know that Dr Ordaz is not scheduling with patient's insurance at this time due to his long wait list

## 2025-05-23 ENCOUNTER — OFFICE VISIT (OUTPATIENT)
Dept: CARDIOLOGY CLINIC | Age: 75
End: 2025-05-23
Payer: MEDICARE

## 2025-05-23 VITALS
SYSTOLIC BLOOD PRESSURE: 126 MMHG | HEIGHT: 55 IN | WEIGHT: 127 LBS | OXYGEN SATURATION: 96 % | HEART RATE: 70 BPM | BODY MASS INDEX: 29.39 KG/M2 | DIASTOLIC BLOOD PRESSURE: 70 MMHG

## 2025-05-23 DIAGNOSIS — Z98.62 H/O ANGIOPLASTY: ICD-10-CM

## 2025-05-23 DIAGNOSIS — I25.83 CORONARY ARTERY DISEASE DUE TO LIPID RICH PLAQUE: Primary | ICD-10-CM

## 2025-05-23 DIAGNOSIS — I48.0 PAROXYSMAL ATRIAL FIBRILLATION (HCC): ICD-10-CM

## 2025-05-23 DIAGNOSIS — I10 ESSENTIAL HYPERTENSION: ICD-10-CM

## 2025-05-23 DIAGNOSIS — I25.10 CORONARY ARTERY DISEASE DUE TO LIPID RICH PLAQUE: Primary | ICD-10-CM

## 2025-05-23 DIAGNOSIS — I73.9 PAD (PERIPHERAL ARTERY DISEASE): ICD-10-CM

## 2025-05-23 DIAGNOSIS — I34.0 NONRHEUMATIC MITRAL VALVE REGURGITATION: ICD-10-CM

## 2025-05-23 DIAGNOSIS — E78.2 MIXED HYPERLIPIDEMIA: ICD-10-CM

## 2025-05-23 PROCEDURE — 3074F SYST BP LT 130 MM HG: CPT | Performed by: INTERNAL MEDICINE

## 2025-05-23 PROCEDURE — G8400 PT W/DXA NO RESULTS DOC: HCPCS | Performed by: INTERNAL MEDICINE

## 2025-05-23 PROCEDURE — 1090F PRES/ABSN URINE INCON ASSESS: CPT | Performed by: INTERNAL MEDICINE

## 2025-05-23 PROCEDURE — 3078F DIAST BP <80 MM HG: CPT | Performed by: INTERNAL MEDICINE

## 2025-05-23 PROCEDURE — 1159F MED LIST DOCD IN RCRD: CPT | Performed by: INTERNAL MEDICINE

## 2025-05-23 PROCEDURE — 3017F COLORECTAL CA SCREEN DOC REV: CPT | Performed by: INTERNAL MEDICINE

## 2025-05-23 PROCEDURE — 1123F ACP DISCUSS/DSCN MKR DOCD: CPT | Performed by: INTERNAL MEDICINE

## 2025-05-23 PROCEDURE — G8427 DOCREV CUR MEDS BY ELIG CLIN: HCPCS | Performed by: INTERNAL MEDICINE

## 2025-05-23 PROCEDURE — G8417 CALC BMI ABV UP PARAM F/U: HCPCS | Performed by: INTERNAL MEDICINE

## 2025-05-23 PROCEDURE — 1036F TOBACCO NON-USER: CPT | Performed by: INTERNAL MEDICINE

## 2025-05-23 PROCEDURE — 99214 OFFICE O/P EST MOD 30 MIN: CPT | Performed by: INTERNAL MEDICINE

## 2025-05-23 NOTE — PATIENT INSTRUCTIONS
See Dr Crane as planned  No medication changes  Continue risk factor modifications.   Call for any change in symptoms, call to report any changes in shortness of breath or development of chest pain with activity.    Follow up in 6 months

## 2025-05-27 ENCOUNTER — TELEPHONE (OUTPATIENT)
Dept: CARDIOLOGY CLINIC | Age: 75
End: 2025-05-27

## 2025-05-27 NOTE — TELEPHONE ENCOUNTER
Pt called to inform LES that when she takes a deep breath there's a pain on her left side.  Please call to discuss her concerns.  Thank you

## 2025-05-29 NOTE — TELEPHONE ENCOUNTER
Spoke with the patient and advised her of the message below per LES. Patient voiced understanding. Call complete

## 2025-05-29 NOTE — TELEPHONE ENCOUNTER
Spoke to Pt, the pain started a year ago and comes/goes. Stated pain is located in the breast and is made worse when breathing in, usually goes away in about 30 seconds. Please advise.

## 2025-05-29 NOTE — TELEPHONE ENCOUNTER
She should take tylenol 1000 mg q6 prn. If currently worse then she needs to go to ER for an evaluation

## 2025-06-05 ENCOUNTER — TELEPHONE (OUTPATIENT)
Dept: CARDIOLOGY CLINIC | Age: 75
End: 2025-06-05

## 2025-06-05 NOTE — TELEPHONE ENCOUNTER
Leighann called to see if she could drink 1 Michel[employer] Ultra Light beer tonight at her friend's 59th birthday party.     Pt informed it should be ok to drink 1 Michelob Ultra Light beer tonight. She denies any h/o alcohol abuse, pt instructed not to drive if she drinks it.  Pt verbalizes understanding.

## 2025-06-05 NOTE — PROGRESS NOTES
[facility]      Cardiology Consult    Leighann Amezcua  1950    Referring Physician: Aroldo Song DO  Reason for Referral: PVD    CC: \"Unhealed wounds\"    HPI:  The patient is 74 y.o. female with a past medical history significant for Afib, back pain, CAD,hyperlipidemia, hypertension, PVD, shingles, Valvular disease.  She is looking for a new physician for PVD. She has unhealing wounds to left foot.  She has blisters on left foot.      Past Medical History:   Diagnosis Date    AF (atrial fibrillation) (HCC)     on anticoagulation    Back pain     compaound fracture    CAD (coronary artery disease)     Centrilobular emphysema (HCC) 3/16/2021    Compression fracture 1994    Hyperlipidemia     Hypertension     Myelolipoma     bilateral adrenals    PVD (peripheral vascular disease)     Right ear injury 1983    Per pt.    Shingles     Valvular disease      Past Surgical History:   Procedure Laterality Date    CORONARY ANGIOPLASTY WITH STENT PLACEMENT  , 3/4/21    CORONARY ANGIOPLASTY WITH STENT PLACEMENT  2003 & 2003    per pt    HYSTERECTOMY (CERVIX STATUS UNKNOWN)  1983    fibroids    TUBAL LIGATION  1983    VENTRAL HERNIA REPAIR  2013    VENTRAL HERNIA REPAIR WITH MESH            Family History   Problem Relation Age of Onset    Heart Attack Mother 61    Heart Attack Father 68    Heart Disease Father         complications from surgery, per pt     Social History     Tobacco Use    Smoking status: Former     Current packs/day: 0.00     Types: Cigarettes     Start date: 3/1/1974     Quit date: 3/1/2014     Years since quittin.2    Smokeless tobacco: Never    Tobacco comments:     Trying to quit   Vaping Use    Vaping status: Never Used   Substance Use Topics    Alcohol use: No     Alcohol/week: 3.0 standard drinks of alcohol     Types: 3 Cans of beer per week    Drug use: No       Allergies   Allergen Reactions    Actos [Pioglitazone]

## 2025-06-11 ENCOUNTER — OFFICE VISIT (OUTPATIENT)
Dept: CARDIOLOGY CLINIC | Age: 75
End: 2025-06-11
Payer: MEDICARE

## 2025-06-11 VITALS
OXYGEN SATURATION: 98 % | BODY MASS INDEX: 29.85 KG/M2 | HEART RATE: 73 BPM | SYSTOLIC BLOOD PRESSURE: 122 MMHG | DIASTOLIC BLOOD PRESSURE: 74 MMHG | HEIGHT: 55 IN | WEIGHT: 129 LBS

## 2025-06-11 DIAGNOSIS — I73.9 PAD (PERIPHERAL ARTERY DISEASE): Primary | ICD-10-CM

## 2025-06-11 PROCEDURE — 1090F PRES/ABSN URINE INCON ASSESS: CPT | Performed by: STUDENT IN AN ORGANIZED HEALTH CARE EDUCATION/TRAINING PROGRAM

## 2025-06-11 PROCEDURE — G8417 CALC BMI ABV UP PARAM F/U: HCPCS | Performed by: STUDENT IN AN ORGANIZED HEALTH CARE EDUCATION/TRAINING PROGRAM

## 2025-06-11 PROCEDURE — G8400 PT W/DXA NO RESULTS DOC: HCPCS | Performed by: STUDENT IN AN ORGANIZED HEALTH CARE EDUCATION/TRAINING PROGRAM

## 2025-06-11 PROCEDURE — G8427 DOCREV CUR MEDS BY ELIG CLIN: HCPCS | Performed by: STUDENT IN AN ORGANIZED HEALTH CARE EDUCATION/TRAINING PROGRAM

## 2025-06-11 PROCEDURE — 1159F MED LIST DOCD IN RCRD: CPT | Performed by: STUDENT IN AN ORGANIZED HEALTH CARE EDUCATION/TRAINING PROGRAM

## 2025-06-11 PROCEDURE — G2211 COMPLEX E/M VISIT ADD ON: HCPCS | Performed by: STUDENT IN AN ORGANIZED HEALTH CARE EDUCATION/TRAINING PROGRAM

## 2025-06-11 PROCEDURE — 3017F COLORECTAL CA SCREEN DOC REV: CPT | Performed by: STUDENT IN AN ORGANIZED HEALTH CARE EDUCATION/TRAINING PROGRAM

## 2025-06-11 PROCEDURE — 3074F SYST BP LT 130 MM HG: CPT | Performed by: STUDENT IN AN ORGANIZED HEALTH CARE EDUCATION/TRAINING PROGRAM

## 2025-06-11 PROCEDURE — 3078F DIAST BP <80 MM HG: CPT | Performed by: STUDENT IN AN ORGANIZED HEALTH CARE EDUCATION/TRAINING PROGRAM

## 2025-06-11 PROCEDURE — 99214 OFFICE O/P EST MOD 30 MIN: CPT | Performed by: STUDENT IN AN ORGANIZED HEALTH CARE EDUCATION/TRAINING PROGRAM

## 2025-06-11 PROCEDURE — 1123F ACP DISCUSS/DSCN MKR DOCD: CPT | Performed by: STUDENT IN AN ORGANIZED HEALTH CARE EDUCATION/TRAINING PROGRAM

## 2025-06-11 PROCEDURE — 1036F TOBACCO NON-USER: CPT | Performed by: STUDENT IN AN ORGANIZED HEALTH CARE EDUCATION/TRAINING PROGRAM

## 2025-06-17 ENCOUNTER — TELEPHONE (OUTPATIENT)
Dept: CARDIOLOGY CLINIC | Age: 75
End: 2025-06-17

## 2025-06-17 NOTE — TELEPHONE ENCOUNTER
Pt called stating they took Ex-Lax this morning and has not taken their heart medications, they were afraid there would be side effects with their medications.    Pt would like to know if they can take their medications while taking the Ex-Lax?    Pt would like to know if there is something they can take for the constipation that will not effect their heart medications?    Pls advise pt so they can take medications for today.

## 2025-06-23 ENCOUNTER — TELEPHONE (OUTPATIENT)
Dept: CARDIOLOGY CLINIC | Age: 75
End: 2025-06-23

## 2025-06-23 NOTE — TELEPHONE ENCOUNTER
LMOM for patient to return call in regards to message below     Canelo James RN        Please call patient to further assess. Please verify she is taking demadex 20 mg daily and 12.5 mg aldactone daily, as well as amlodipine daily.  Also assess for SOB and/or weight gain.

## 2025-06-23 NOTE — TELEPHONE ENCOUNTER
Pt states the torsemide is not helping swelling. States she is going to bathroom 15 x times during the night, but not too much at a time. Please call to advise.

## 2025-06-23 NOTE — TELEPHONE ENCOUNTER
Spoke to Pt, she states she has gained a lot of weight in the upper torso and legs. Pt states she can barely put on her jeans and shirt. She states this has been going on for a few months and she is taking all the medications as she is prescribed. Advised we would call back with recommendations from LES, and that he will return to office tomorrow.

## 2025-06-24 ENCOUNTER — HOSPITAL ENCOUNTER (OUTPATIENT)
Dept: VASCULAR LAB | Age: 75
Discharge: HOME | End: 2025-06-26
Attending: STUDENT IN AN ORGANIZED HEALTH CARE EDUCATION/TRAINING PROGRAM
Payer: MEDICARE

## 2025-06-24 DIAGNOSIS — I73.9 PAD (PERIPHERAL ARTERY DISEASE): ICD-10-CM

## 2025-06-24 DIAGNOSIS — R06.02 SHORTNESS OF BREATH: Primary | ICD-10-CM

## 2025-06-24 PROCEDURE — 93925 LOWER EXTREMITY STUDY: CPT

## 2025-06-24 RX ORDER — TORSEMIDE 20 MG/1
20 TABLET ORAL 2 TIMES DAILY
Qty: 180 TABLET | Refills: 3 | Status: SHIPPED | OUTPATIENT
Start: 2025-06-24

## 2025-06-26 NOTE — TELEPHONE ENCOUNTER
Patient is asking for to torsemide to go to [facility], does not want to use [facility] asking for [facility] to be removed from chart. [facility]. Please send all prescriptions to [facility].

## 2025-06-26 NOTE — TELEPHONE ENCOUNTER
I spoke with pt. She was irate that script was sent to wrong pharmacy. I tried to to pend a new script to the correct  pharmacy , but the software kept coming up with an error message. Please send torsemide to go to [facility]. She refused to ask them to transfer.

## 2025-06-27 LAB
VAS LEFT ABI: 0.73
VAS LEFT ARM BP: 142 MMHG
VAS LEFT ATA DIST PSV: 27.4 CM/S
VAS LEFT ATA MID PSV: 29.2 CM/S
VAS LEFT ATA PROX PSV: 63.9 CM/S
VAS LEFT CFA DIST PSV: 99.5 CM/S
VAS LEFT CFA PROX PSV: 95.3 CM/S
VAS LEFT DORSALIS PEDIS BP: 104 MMHG
VAS LEFT PERONEAL DIST PSV: 92.2 CM/S
VAS LEFT PERONEAL MID PSV: 62.8 CM/S
VAS LEFT PERONEAL PROX PSV: 61.5 CM/S
VAS LEFT PFA PROX PSV: 120 CM/S
VAS LEFT POP A DIST PSV: 82.3 CM/S
VAS LEFT POP A PROX PSV: 109 CM/S
VAS LEFT POP A PROX VEL RATIO: 1.26
VAS LEFT PTA BP: 92 MMHG
VAS LEFT PTA DIST PSV: 102 CM/S
VAS LEFT PTA MID PSV: 69 CM/S
VAS LEFT PTA PROX PSV: 39.6 CM/S
VAS LEFT SFA DIST PSV: 86.2 CM/S
VAS LEFT SFA DIST VEL RATIO: 0.56
VAS LEFT SFA MID PSV: 154 CM/S
VAS LEFT SFA MID VEL RATIO: 1.31
VAS LEFT SFA PROX PSV: 118 CM/S
VAS LEFT SFA PROX VEL RATIO: 1.19
VAS RIGHT ARM BP: 138 MMHG
VAS RIGHT ATA DIST PSV: 29.2 CM/S
VAS RIGHT ATA MID PSV: 12 CM/S
VAS RIGHT ATA PROX PSV: 256 CM/S
VAS RIGHT CFA DIST PSV: 68 CM/S
VAS RIGHT CFA PROX PSV: 162 CM/S
VAS RIGHT DORSALIS PEDIS BP: 255 MMHG
VAS RIGHT PERONEAL DIST PSV: 221 CM/S
VAS RIGHT PERONEAL MID PSV: 46.7 CM/S
VAS RIGHT PERONEAL PROX PSV: 32.9 CM/S
VAS RIGHT PFA PROX PSV: 111 CM/S
VAS RIGHT POP A DIST PSV: 122 CM/S
VAS RIGHT POP A PROX PSV: 83.9 CM/S
VAS RIGHT POP A PROX VEL RATIO: 0.95
VAS RIGHT PTA BP: 255 MMHG
VAS RIGHT PTA DIST PSV: 54.6 CM/S
VAS RIGHT PTA MID PSV: 67.6 CM/S
VAS RIGHT PTA PROX PSV: 215 CM/S
VAS RIGHT SFA DIST PSV: 88.2 CM/S
VAS RIGHT SFA DIST VEL RATIO: 0.97
VAS RIGHT SFA MID PSV: 91.3 CM/S
VAS RIGHT SFA MID VEL RATIO: 0.3
VAS RIGHT SFA PROX PSV: 300 CM/S
VAS RIGHT SFA PROX VEL RATIO: 1.9

## 2025-07-02 ENCOUNTER — TELEPHONE (OUTPATIENT)
Dept: CARDIOLOGY CLINIC | Age: 75
End: 2025-07-02

## 2025-07-02 NOTE — TELEPHONE ENCOUNTER
Discussed with Dr Crane and he reviewed testing. She does have evidence of disease. He would like her to continue lifestyle modifications and current medical management. She is scheduled for an echo on 7/9 and if the heart function is normal at that anali he will start her on Pletal. Left message for patient to return call.

## 2025-07-02 NOTE — TELEPHONE ENCOUNTER
Patient is calling to get results from vascular duplex done 06/24, patient complaints she is still having swelling and pain in her legs. She would like results before the holiday. Please call the patient.

## 2025-07-03 NOTE — TELEPHONE ENCOUNTER
Spoke with Pt and relayed message per CBM, pt v/u and would wait to hear about echo results next week.

## 2025-07-10 RX ORDER — ROSUVASTATIN CALCIUM 10 MG/1
10 TABLET, COATED ORAL DAILY
Qty: 180 TABLET | Refills: 3 | Status: SHIPPED | OUTPATIENT
Start: 2025-07-10

## 2025-07-10 NOTE — TELEPHONE ENCOUNTER
Received refill request for   rosuvastatin (CRESTOR) 10 MG  from   [facility]    pharmacy.     Last OV: 6/11/2025 CBM    Next OV: 11/19/2025 LES    Last Labs: 7/31/2021 LIPID

## 2025-07-11 RX ORDER — APIXABAN 2.5 MG/1
2.5 TABLET, FILM COATED ORAL 2 TIMES DAILY
Qty: 180 TABLET | Refills: 3 | Status: SHIPPED | OUTPATIENT
Start: 2025-07-11

## 2025-07-11 RX ORDER — ATENOLOL 25 MG/1
25 TABLET ORAL DAILY
Qty: 90 TABLET | Refills: 3 | Status: SHIPPED | OUTPATIENT
Start: 2025-07-11

## 2025-07-11 NOTE — TELEPHONE ENCOUNTER
Requested Prescriptions     Pending Prescriptions Disp Refills    ELIQUIS 2.5 MG TABS tablet [Pharmacy Med Name: ELIQUIS 2.5 MG TABLET] 180 tablet 3     Sig: TAKE 1 TABLET BY MOUTH 2 TIMES A DAY    atenolol (TENORMIN) 25 MG tablet [Pharmacy Med Name: ATENOLOL 25 MG TABLET] 90 tablet 3     Sig: TAKE 1 TABLET BY MOUTH DAILY      LAST OV: 6/11/2025   NEXT OV: 11/19/2025

## 2025-07-24 ENCOUNTER — TELEPHONE (OUTPATIENT)
Dept: CARDIOLOGY CLINIC | Age: 75
End: 2025-07-24

## 2025-07-24 RX ORDER — CILOSTAZOL 50 MG/1
50 TABLET ORAL 2 TIMES DAILY
Qty: 60 TABLET | Refills: 3 | Status: SHIPPED | OUTPATIENT
Start: 2025-07-24

## 2025-07-24 NOTE — TELEPHONE ENCOUNTER
Pletal 50 mg BID    Discontinue Plavix     Walking 30 mins three times per week     3 months office appt     Continue to check feet daily.  Call if you notice any wounds.    Patient hung up prior to me giving other instructions.

## 2025-07-24 NOTE — TELEPHONE ENCOUNTER
Called patient back to instruct about discontinuation of Plavix.      Also instructed her about Dr. Crane's recommendations.      Patient stated has a blister on great toe.      Scheduled appt.  8/4/25     Patient agreeable.

## 2025-08-04 ENCOUNTER — OFFICE VISIT (OUTPATIENT)
Dept: CARDIOLOGY CLINIC | Age: 75
End: 2025-08-04
Payer: MEDICARE

## 2025-08-04 ENCOUNTER — TELEPHONE (OUTPATIENT)
Dept: CARDIOLOGY CLINIC | Age: 75
End: 2025-08-04

## 2025-08-04 VITALS
BODY MASS INDEX: 29.35 KG/M2 | HEART RATE: 59 BPM | WEIGHT: 126.8 LBS | SYSTOLIC BLOOD PRESSURE: 132 MMHG | HEIGHT: 55 IN | OXYGEN SATURATION: 97 % | DIASTOLIC BLOOD PRESSURE: 68 MMHG

## 2025-08-04 DIAGNOSIS — I73.9 PAD (PERIPHERAL ARTERY DISEASE): Primary | ICD-10-CM

## 2025-08-04 PROCEDURE — 1090F PRES/ABSN URINE INCON ASSESS: CPT | Performed by: STUDENT IN AN ORGANIZED HEALTH CARE EDUCATION/TRAINING PROGRAM

## 2025-08-04 PROCEDURE — 3017F COLORECTAL CA SCREEN DOC REV: CPT | Performed by: STUDENT IN AN ORGANIZED HEALTH CARE EDUCATION/TRAINING PROGRAM

## 2025-08-04 PROCEDURE — 1159F MED LIST DOCD IN RCRD: CPT | Performed by: STUDENT IN AN ORGANIZED HEALTH CARE EDUCATION/TRAINING PROGRAM

## 2025-08-04 PROCEDURE — G2211 COMPLEX E/M VISIT ADD ON: HCPCS | Performed by: STUDENT IN AN ORGANIZED HEALTH CARE EDUCATION/TRAINING PROGRAM

## 2025-08-04 PROCEDURE — 99215 OFFICE O/P EST HI 40 MIN: CPT | Performed by: STUDENT IN AN ORGANIZED HEALTH CARE EDUCATION/TRAINING PROGRAM

## 2025-08-04 PROCEDURE — 1036F TOBACCO NON-USER: CPT | Performed by: STUDENT IN AN ORGANIZED HEALTH CARE EDUCATION/TRAINING PROGRAM

## 2025-08-04 PROCEDURE — 1123F ACP DISCUSS/DSCN MKR DOCD: CPT | Performed by: STUDENT IN AN ORGANIZED HEALTH CARE EDUCATION/TRAINING PROGRAM

## 2025-08-04 PROCEDURE — 3075F SYST BP GE 130 - 139MM HG: CPT | Performed by: STUDENT IN AN ORGANIZED HEALTH CARE EDUCATION/TRAINING PROGRAM

## 2025-08-04 PROCEDURE — G8427 DOCREV CUR MEDS BY ELIG CLIN: HCPCS | Performed by: STUDENT IN AN ORGANIZED HEALTH CARE EDUCATION/TRAINING PROGRAM

## 2025-08-04 PROCEDURE — G8400 PT W/DXA NO RESULTS DOC: HCPCS | Performed by: STUDENT IN AN ORGANIZED HEALTH CARE EDUCATION/TRAINING PROGRAM

## 2025-08-04 PROCEDURE — G8417 CALC BMI ABV UP PARAM F/U: HCPCS | Performed by: STUDENT IN AN ORGANIZED HEALTH CARE EDUCATION/TRAINING PROGRAM

## 2025-08-04 PROCEDURE — 3078F DIAST BP <80 MM HG: CPT | Performed by: STUDENT IN AN ORGANIZED HEALTH CARE EDUCATION/TRAINING PROGRAM
